# Patient Record
Sex: MALE | Race: WHITE | NOT HISPANIC OR LATINO | Employment: OTHER | URBAN - METROPOLITAN AREA
[De-identification: names, ages, dates, MRNs, and addresses within clinical notes are randomized per-mention and may not be internally consistent; named-entity substitution may affect disease eponyms.]

---

## 2023-01-01 ENCOUNTER — TELEPHONE (OUTPATIENT)
Dept: ADMISSION | Facility: HOSPITAL | Age: 68
End: 2023-01-01
Payer: MEDICARE

## 2023-01-01 ENCOUNTER — PHARMACY VISIT (OUTPATIENT)
Dept: PHARMACY | Facility: CLINIC | Age: 68
End: 2023-01-01
Payer: COMMERCIAL

## 2023-01-01 ENCOUNTER — APPOINTMENT (OUTPATIENT)
Dept: PALLIATIVE MEDICINE | Facility: HOSPITAL | Age: 68
End: 2023-01-01
Payer: MEDICARE

## 2023-01-01 ENCOUNTER — NUTRITION (OUTPATIENT)
Dept: HEMATOLOGY/ONCOLOGY | Facility: HOSPITAL | Age: 68
End: 2023-01-01
Payer: MEDICARE

## 2023-01-01 ENCOUNTER — OFFICE VISIT (OUTPATIENT)
Dept: HEMATOLOGY/ONCOLOGY | Facility: HOSPITAL | Age: 68
DRG: 372 | End: 2023-01-01
Payer: MEDICARE

## 2023-01-01 ENCOUNTER — NURSE TRIAGE (OUTPATIENT)
Dept: ADMISSION | Facility: HOSPITAL | Age: 68
End: 2023-01-01
Payer: MEDICARE

## 2023-01-01 ENCOUNTER — LAB REQUISITION (OUTPATIENT)
Dept: LAB | Facility: HOSPITAL | Age: 68
End: 2023-01-01
Payer: MEDICARE

## 2023-01-01 ENCOUNTER — APPOINTMENT (OUTPATIENT)
Dept: CARDIOLOGY | Facility: HOSPITAL | Age: 68
End: 2023-01-01
Payer: MEDICARE

## 2023-01-01 ENCOUNTER — ANCILLARY PROCEDURE (OUTPATIENT)
Dept: EMERGENCY MEDICINE | Facility: HOSPITAL | Age: 68
DRG: 312 | End: 2023-01-01
Payer: MEDICARE

## 2023-01-01 ENCOUNTER — APPOINTMENT (OUTPATIENT)
Dept: HEMATOLOGY/ONCOLOGY | Facility: HOSPITAL | Age: 68
End: 2023-01-01
Payer: MEDICARE

## 2023-01-01 ENCOUNTER — HOSPITAL ENCOUNTER (INPATIENT)
Facility: HOSPITAL | Age: 68
LOS: 7 days | Discharge: HOME | DRG: 371 | End: 2023-11-21
Attending: HOSPITALIST | Admitting: STUDENT IN AN ORGANIZED HEALTH CARE EDUCATION/TRAINING PROGRAM
Payer: MEDICARE

## 2023-01-01 ENCOUNTER — HOSPITAL ENCOUNTER (INPATIENT)
Facility: HOSPITAL | Age: 68
LOS: 3 days | Discharge: HOME | DRG: 372 | End: 2023-11-27
Attending: STUDENT IN AN ORGANIZED HEALTH CARE EDUCATION/TRAINING PROGRAM | Admitting: INTERNAL MEDICINE
Payer: MEDICARE

## 2023-01-01 ENCOUNTER — APPOINTMENT (OUTPATIENT)
Dept: HEMATOLOGY/ONCOLOGY | Facility: CLINIC | Age: 68
End: 2023-01-01
Payer: MEDICARE

## 2023-01-01 ENCOUNTER — APPOINTMENT (OUTPATIENT)
Dept: DERMATOLOGY | Facility: CLINIC | Age: 68
End: 2023-01-01
Payer: MEDICARE

## 2023-01-01 ENCOUNTER — TELEMEDICINE (OUTPATIENT)
Dept: PALLIATIVE MEDICINE | Facility: HOSPITAL | Age: 68
End: 2023-01-01
Payer: MEDICARE

## 2023-01-01 ENCOUNTER — HOSPITAL ENCOUNTER (OUTPATIENT)
Facility: HOSPITAL | Age: 68
Setting detail: OBSERVATION
Discharge: HOME | DRG: 312 | End: 2023-12-28
Attending: STUDENT IN AN ORGANIZED HEALTH CARE EDUCATION/TRAINING PROGRAM | Admitting: STUDENT IN AN ORGANIZED HEALTH CARE EDUCATION/TRAINING PROGRAM
Payer: MEDICARE

## 2023-01-01 ENCOUNTER — SOCIAL WORK (OUTPATIENT)
Dept: CASE MANAGEMENT | Facility: HOSPITAL | Age: 68
End: 2023-01-01
Payer: MEDICARE

## 2023-01-01 ENCOUNTER — APPOINTMENT (OUTPATIENT)
Dept: HEMATOLOGY/ONCOLOGY | Facility: HOSPITAL | Age: 68
DRG: 371 | End: 2023-01-01
Payer: MEDICARE

## 2023-01-01 ENCOUNTER — APPOINTMENT (OUTPATIENT)
Dept: RADIOLOGY | Facility: HOSPITAL | Age: 68
DRG: 312 | End: 2023-01-01
Payer: MEDICARE

## 2023-01-01 ENCOUNTER — APPOINTMENT (OUTPATIENT)
Dept: RADIOLOGY | Facility: HOSPITAL | Age: 68
DRG: 371 | End: 2023-01-01
Payer: MEDICARE

## 2023-01-01 ENCOUNTER — TELEPHONE (OUTPATIENT)
Dept: HEMATOLOGY/ONCOLOGY | Facility: HOSPITAL | Age: 68
End: 2023-01-01
Payer: MEDICARE

## 2023-01-01 ENCOUNTER — APPOINTMENT (OUTPATIENT)
Dept: PALLIATIVE MEDICINE | Facility: CLINIC | Age: 68
DRG: 371 | End: 2023-01-01
Payer: MEDICARE

## 2023-01-01 ENCOUNTER — OFFICE VISIT (OUTPATIENT)
Dept: HEMATOLOGY/ONCOLOGY | Facility: HOSPITAL | Age: 68
End: 2023-01-01
Payer: MEDICARE

## 2023-01-01 ENCOUNTER — HOSPITAL ENCOUNTER (INPATIENT)
Facility: HOSPITAL | Age: 68
LOS: 7 days | DRG: 312 | End: 2024-01-07
Attending: EMERGENCY MEDICINE | Admitting: INTERNAL MEDICINE
Payer: MEDICARE

## 2023-01-01 ENCOUNTER — HOME HEALTH ADMISSION (OUTPATIENT)
Dept: HOME HEALTH SERVICES | Facility: HOME HEALTH | Age: 68
End: 2023-01-01
Payer: MEDICARE

## 2023-01-01 ENCOUNTER — HOSPITAL ENCOUNTER (INPATIENT)
Age: 68
End: 2023-01-01
Attending: HOSPITALIST | Admitting: HOSPITALIST
Payer: MEDICARE

## 2023-01-01 ENCOUNTER — OFFICE VISIT (OUTPATIENT)
Dept: PALLIATIVE MEDICINE | Facility: HOSPITAL | Age: 68
End: 2023-01-01
Payer: MEDICARE

## 2023-01-01 ENCOUNTER — APPOINTMENT (OUTPATIENT)
Dept: RADIOLOGY | Facility: HOSPITAL | Age: 68
DRG: 372 | End: 2023-01-01
Payer: MEDICARE

## 2023-01-01 ENCOUNTER — NUTRITION (OUTPATIENT)
Dept: HEMATOLOGY/ONCOLOGY | Facility: HOSPITAL | Age: 68
End: 2023-01-01

## 2023-01-01 VITALS
SYSTOLIC BLOOD PRESSURE: 129 MMHG | DIASTOLIC BLOOD PRESSURE: 84 MMHG | TEMPERATURE: 97.3 F | WEIGHT: 117.95 LBS | OXYGEN SATURATION: 99 % | HEART RATE: 78 BPM | BODY MASS INDEX: 19.49 KG/M2 | RESPIRATION RATE: 18 BRPM

## 2023-01-01 VITALS
TEMPERATURE: 99.3 F | SYSTOLIC BLOOD PRESSURE: 124 MMHG | HEART RATE: 84 BPM | RESPIRATION RATE: 18 BRPM | DIASTOLIC BLOOD PRESSURE: 72 MMHG | HEIGHT: 67 IN | WEIGHT: 128.09 LBS | BODY MASS INDEX: 20.1 KG/M2 | OXYGEN SATURATION: 92 %

## 2023-01-01 VITALS
RESPIRATION RATE: 20 BRPM | WEIGHT: 130.95 LBS | HEART RATE: 86 BPM | SYSTOLIC BLOOD PRESSURE: 125 MMHG | OXYGEN SATURATION: 98 % | DIASTOLIC BLOOD PRESSURE: 75 MMHG | BODY MASS INDEX: 21.82 KG/M2 | HEIGHT: 65 IN | TEMPERATURE: 98.1 F

## 2023-01-01 VITALS
WEIGHT: 124.6 LBS | RESPIRATION RATE: 16 BRPM | BODY MASS INDEX: 20.76 KG/M2 | DIASTOLIC BLOOD PRESSURE: 69 MMHG | HEIGHT: 65 IN | OXYGEN SATURATION: 95 % | HEART RATE: 79 BPM | SYSTOLIC BLOOD PRESSURE: 112 MMHG | TEMPERATURE: 96.8 F

## 2023-01-01 VITALS
TEMPERATURE: 97.2 F | HEART RATE: 90 BPM | DIASTOLIC BLOOD PRESSURE: 71 MMHG | OXYGEN SATURATION: 98 % | WEIGHT: 127.43 LBS | BODY MASS INDEX: 21.05 KG/M2 | RESPIRATION RATE: 18 BRPM | SYSTOLIC BLOOD PRESSURE: 102 MMHG

## 2023-01-01 VITALS
BODY MASS INDEX: 21.23 KG/M2 | RESPIRATION RATE: 16 BRPM | OXYGEN SATURATION: 98 % | HEIGHT: 65 IN | SYSTOLIC BLOOD PRESSURE: 122 MMHG | DIASTOLIC BLOOD PRESSURE: 72 MMHG | HEART RATE: 100 BPM | TEMPERATURE: 97.3 F | WEIGHT: 127.43 LBS

## 2023-01-01 VITALS
SYSTOLIC BLOOD PRESSURE: 99 MMHG | RESPIRATION RATE: 16 BRPM | DIASTOLIC BLOOD PRESSURE: 55 MMHG | OXYGEN SATURATION: 92 % | TEMPERATURE: 97.3 F | HEART RATE: 95 BPM | HEIGHT: 66 IN | WEIGHT: 126.2 LBS | BODY MASS INDEX: 20.28 KG/M2

## 2023-01-01 DIAGNOSIS — R19.7 DIARRHEA OF PRESUMED INFECTIOUS ORIGIN: ICD-10-CM

## 2023-01-01 DIAGNOSIS — G62.0 CHRONIC PAINFUL POLYNEUROPATHY AFTER CHEMOTHERAPY (MULTI): ICD-10-CM

## 2023-01-01 DIAGNOSIS — C18.1 ADENOCARCINOMA OF APPENDIX (MULTI): ICD-10-CM

## 2023-01-01 DIAGNOSIS — G89.3 NEOPLASM RELATED PAIN: ICD-10-CM

## 2023-01-01 DIAGNOSIS — E86.0 DEHYDRATION: Primary | ICD-10-CM

## 2023-01-01 DIAGNOSIS — C18.1 PRIMARY MALIGNANT NEOPLASM OF APPENDIX (MULTI): ICD-10-CM

## 2023-01-01 DIAGNOSIS — C48.2 MALIGNANT NEOPLASM OF PERITONEUM, UNSPECIFIED (MULTI): ICD-10-CM

## 2023-01-01 DIAGNOSIS — Z98.890 STATUS POST REVERSAL OF ILEOSTOMY: ICD-10-CM

## 2023-01-01 DIAGNOSIS — F41.9 ANXIETY: ICD-10-CM

## 2023-01-01 DIAGNOSIS — R19.7 DIARRHEA, UNSPECIFIED TYPE: ICD-10-CM

## 2023-01-01 DIAGNOSIS — Z85.038 HISTORY OF MALIGNANT NEOPLASM OF COLON: Primary | ICD-10-CM

## 2023-01-01 DIAGNOSIS — C18.1 CANCER OF APPENDIX (MULTI): Primary | ICD-10-CM

## 2023-01-01 DIAGNOSIS — A09 DIARRHEA OF INFECTIOUS ORIGIN: ICD-10-CM

## 2023-01-01 DIAGNOSIS — R19.7 DIARRHEA: Primary | ICD-10-CM

## 2023-01-01 DIAGNOSIS — A04.72 COLITIS DUE TO CLOSTRIDIOIDES DIFFICILE: Primary | ICD-10-CM

## 2023-01-01 DIAGNOSIS — C80.1 MALIGNANT (PRIMARY) NEOPLASM, UNSPECIFIED (MULTI): ICD-10-CM

## 2023-01-01 DIAGNOSIS — E86.0 DEHYDRATION: ICD-10-CM

## 2023-01-01 DIAGNOSIS — A04.72 C. DIFFICILE DIARRHEA: ICD-10-CM

## 2023-01-01 DIAGNOSIS — G89.3 CANCER RELATED PAIN: Primary | ICD-10-CM

## 2023-01-01 DIAGNOSIS — R09.02 HYPOXEMIA: ICD-10-CM

## 2023-01-01 DIAGNOSIS — T45.1X5A CHRONIC PAINFUL POLYNEUROPATHY AFTER CHEMOTHERAPY (MULTI): ICD-10-CM

## 2023-01-01 DIAGNOSIS — E78.5 HYPERLIPIDEMIA, ACQUIRED: ICD-10-CM

## 2023-01-01 DIAGNOSIS — C78.6 SECONDARY MALIGNANT NEOPLASM OF RETROPERITONEUM AND PERITONEUM (MULTI): ICD-10-CM

## 2023-01-01 DIAGNOSIS — C18.1 PRIMARY MALIGNANT NEOPLASM OF APPENDIX (MULTI): Primary | ICD-10-CM

## 2023-01-01 DIAGNOSIS — E03.9 HYPOTHYROIDISM, UNSPECIFIED TYPE: ICD-10-CM

## 2023-01-01 DIAGNOSIS — G62.0 CHEMOTHERAPY-INDUCED NEUROPATHY (MULTI): Primary | ICD-10-CM

## 2023-01-01 DIAGNOSIS — I50.20 HFREF (HEART FAILURE WITH REDUCED EJECTION FRACTION) (MULTI): ICD-10-CM

## 2023-01-01 DIAGNOSIS — J18.9 PNEUMONIA OF RIGHT UPPER LOBE DUE TO INFECTIOUS ORGANISM: ICD-10-CM

## 2023-01-01 DIAGNOSIS — R55 SYNCOPE AND COLLAPSE: Primary | ICD-10-CM

## 2023-01-01 DIAGNOSIS — T45.1X5A CHEMOTHERAPY-INDUCED NEUROPATHY (MULTI): Primary | ICD-10-CM

## 2023-01-01 DIAGNOSIS — I81 PORTAL VEIN THROMBOSIS: ICD-10-CM

## 2023-01-01 DIAGNOSIS — R55 SYNCOPE, UNSPECIFIED SYNCOPE TYPE: ICD-10-CM

## 2023-01-01 DIAGNOSIS — E83.42 HYPOMAGNESEMIA: Primary | ICD-10-CM

## 2023-01-01 DIAGNOSIS — A04.72 C. DIFFICILE COLITIS: ICD-10-CM

## 2023-01-01 DIAGNOSIS — C78.6 PERITONEAL CARCINOMATOSIS (MULTI): ICD-10-CM

## 2023-01-01 DIAGNOSIS — Z85.038 HISTORY OF MALIGNANT NEOPLASM OF COLON: ICD-10-CM

## 2023-01-01 DIAGNOSIS — I50.22 CHRONIC SYSTOLIC CONGESTIVE HEART FAILURE (MULTI): ICD-10-CM

## 2023-01-01 DIAGNOSIS — R11.14 BILIOUS VOMITING WITH NAUSEA: ICD-10-CM

## 2023-01-01 LAB
ABO GROUP (TYPE) IN BLOOD: NORMAL
ACANTHOCYTES BLD QL SMEAR: ABNORMAL
ACANTHOCYTES BLD QL SMEAR: ABNORMAL
ACANTHOCYTES BLD QL SMEAR: NORMAL
ACTIVATED PARTIAL THROMBOPLASTIN TIME IN PPP BY COAGULATION ASSAY: 35 SEC (ref 27–38)
ALANINE AMINOTRANSFERASE (SGPT) (U/L) IN SER/PLAS: 16 U/L (ref 10–52)
ALBUMIN (G/DL) IN SER/PLAS: 2.8 G/DL (ref 3.4–5)
ALBUMIN SERPL BCP-MCNC: 2.3 G/DL (ref 3.4–5)
ALBUMIN SERPL BCP-MCNC: 2.3 G/DL (ref 3.4–5)
ALBUMIN SERPL BCP-MCNC: 2.5 G/DL (ref 3.4–5)
ALBUMIN SERPL BCP-MCNC: 2.6 G/DL (ref 3.4–5)
ALBUMIN SERPL BCP-MCNC: 2.6 G/DL (ref 3.4–5)
ALBUMIN SERPL BCP-MCNC: 2.7 G/DL (ref 3.4–5)
ALBUMIN SERPL BCP-MCNC: 2.9 G/DL (ref 3.4–5)
ALBUMIN SERPL BCP-MCNC: 3 G/DL (ref 3.4–5)
ALBUMIN SERPL BCP-MCNC: 3.1 G/DL (ref 3.4–5)
ALBUMIN SERPL BCP-MCNC: 3.1 G/DL (ref 3.4–5)
ALBUMIN SERPL BCP-MCNC: 3.2 G/DL (ref 3.4–5)
ALBUMIN SERPL BCP-MCNC: 3.2 G/DL (ref 3.4–5)
ALBUMIN SERPL BCP-MCNC: 3.3 G/DL (ref 3.4–5)
ALKALINE PHOSPHATASE (U/L) IN SER/PLAS: 279 U/L (ref 33–136)
ALP SERPL-CCNC: 226 U/L (ref 33–136)
ALP SERPL-CCNC: 257 U/L (ref 33–136)
ALP SERPL-CCNC: 258 U/L (ref 33–136)
ALP SERPL-CCNC: 275 U/L (ref 33–136)
ALP SERPL-CCNC: 283 U/L (ref 33–136)
ALP SERPL-CCNC: 289 U/L (ref 33–136)
ALP SERPL-CCNC: 291 U/L (ref 33–136)
ALP SERPL-CCNC: 304 U/L (ref 33–136)
ALP SERPL-CCNC: 338 U/L (ref 33–136)
ALP SERPL-CCNC: 338 U/L (ref 33–136)
ALT SERPL W P-5'-P-CCNC: 10 U/L (ref 10–52)
ALT SERPL W P-5'-P-CCNC: 11 U/L (ref 10–52)
ALT SERPL W P-5'-P-CCNC: 12 U/L (ref 10–52)
ALT SERPL W P-5'-P-CCNC: 13 U/L (ref 10–52)
ALT SERPL W P-5'-P-CCNC: 15 U/L (ref 10–52)
ALT SERPL W P-5'-P-CCNC: 15 U/L (ref 10–52)
ALT SERPL W P-5'-P-CCNC: 22 U/L (ref 10–52)
ALT SERPL W P-5'-P-CCNC: 22 U/L (ref 10–52)
ALT SERPL W P-5'-P-CCNC: 6 U/L (ref 10–52)
ALT SERPL W P-5'-P-CCNC: 7 U/L (ref 10–52)
ALT SERPL W P-5'-P-CCNC: 9 U/L (ref 10–52)
ANION GAP BLDV CALCULATED.4IONS-SCNC: 10 MMOL/L (ref 10–25)
ANION GAP BLDV CALCULATED.4IONS-SCNC: 5 MMOL/L (ref 10–25)
ANION GAP BLDV CALCULATED.4IONS-SCNC: ABNORMAL MMOL/L
ANION GAP IN SER/PLAS: 11 MMOL/L (ref 10–20)
ANION GAP SERPL CALC-SCNC: 10 MMOL/L (ref 10–20)
ANION GAP SERPL CALC-SCNC: 10 MMOL/L (ref 10–20)
ANION GAP SERPL CALC-SCNC: 11 MMOL/L (ref 10–20)
ANION GAP SERPL CALC-SCNC: 12 MMOL/L (ref 10–20)
ANION GAP SERPL CALC-SCNC: 13 MMOL/L (ref 10–20)
ANION GAP SERPL CALC-SCNC: 14 MMOL/L (ref 10–20)
ANION GAP SERPL CALC-SCNC: 15 MMOL/L (ref 10–20)
ANION GAP SERPL CALC-SCNC: 16 MMOL/L (ref 10–20)
ANTIBODY SCREEN: NORMAL
APPEARANCE UR: CLEAR
ASPARTATE AMINOTRANSFERASE (SGOT) (U/L) IN SER/PLAS: 17 U/L (ref 9–39)
AST SERPL W P-5'-P-CCNC: 12 U/L (ref 9–39)
AST SERPL W P-5'-P-CCNC: 16 U/L (ref 9–39)
AST SERPL W P-5'-P-CCNC: 20 U/L (ref 9–39)
AST SERPL W P-5'-P-CCNC: 20 U/L (ref 9–39)
AST SERPL W P-5'-P-CCNC: 21 U/L (ref 9–39)
AST SERPL W P-5'-P-CCNC: 22 U/L (ref 9–39)
AST SERPL W P-5'-P-CCNC: 23 U/L (ref 9–39)
AST SERPL W P-5'-P-CCNC: 27 U/L (ref 9–39)
ATRIAL RATE: 119 BPM
ATRIAL RATE: 83 BPM
ATRIAL RATE: 84 BPM
BACTERIA BLD AEROBE CULT: ABNORMAL
BACTERIA BLD CULT: ABNORMAL
BACTERIA BLD CULT: NORMAL
BAND NEUTROPHILS (10*3/UL) BLOOD MANUAL COUNT - WAM: 0.2 X10E9/L (ref 0–0.7)
BASE EXCESS BLDV CALC-SCNC: 0.2 MMOL/L (ref -2–3)
BASE EXCESS BLDV CALC-SCNC: 1.8 MMOL/L (ref -2–3)
BASE EXCESS BLDV CALC-SCNC: 3.5 MMOL/L (ref -2–3)
BASOPHILS # BLD AUTO: 0.02 X10*3/UL (ref 0–0.1)
BASOPHILS # BLD AUTO: 0.03 X10*3/UL (ref 0–0.1)
BASOPHILS # BLD AUTO: 0.04 X10*3/UL (ref 0–0.1)
BASOPHILS # BLD AUTO: 0.04 X10*3/UL (ref 0–0.1)
BASOPHILS # BLD AUTO: 0.05 X10*3/UL (ref 0–0.1)
BASOPHILS # BLD AUTO: 0.06 X10*3/UL (ref 0–0.1)
BASOPHILS # BLD AUTO: NORMAL 10*3/UL
BASOPHILS # BLD MANUAL: 0 X10*3/UL (ref 0–0.1)
BASOPHILS # BLD MANUAL: 0.16 X10*3/UL (ref 0–0.1)
BASOPHILS # BLD MANUAL: NORMAL 10*3/UL
BASOPHILS (10*3/UL) IN BLOOD BY MANUAL COUNT - WAM: 0.39 X10E9/L (ref 0–0.1)
BASOPHILS NFR BLD AUTO: 0.1 %
BASOPHILS NFR BLD AUTO: 0.2 %
BASOPHILS NFR BLD AUTO: 0.3 %
BASOPHILS NFR BLD AUTO: 0.4 %
BASOPHILS NFR BLD AUTO: 0.5 %
BASOPHILS NFR BLD AUTO: NORMAL %
BASOPHILS NFR BLD MANUAL: 0 %
BASOPHILS NFR BLD MANUAL: 1 %
BASOPHILS NFR BLD MANUAL: NORMAL %
BASOPHILS/100 LEUKOCYTES IN BLOOD BY MANUAL COUNT - WAM: 2 % (ref 0–2)
BILIRUB DIRECT SERPL-MCNC: 0.1 MG/DL (ref 0–0.3)
BILIRUB SERPL-MCNC: 0.3 MG/DL (ref 0–1.2)
BILIRUB SERPL-MCNC: 0.4 MG/DL (ref 0–1.2)
BILIRUB SERPL-MCNC: 0.5 MG/DL (ref 0–1.2)
BILIRUB SERPL-MCNC: 0.5 MG/DL (ref 0–1.2)
BILIRUB SERPL-MCNC: 0.6 MG/DL (ref 0–1.2)
BILIRUB SERPL-MCNC: 0.6 MG/DL (ref 0–1.2)
BILIRUB SERPL-MCNC: 0.9 MG/DL (ref 0–1.2)
BILIRUB UR STRIP.AUTO-MCNC: NEGATIVE MG/DL
BILIRUBIN DIRECT (MG/DL) IN SER/PLAS: 0.2 MG/DL (ref 0–0.3)
BILIRUBIN TOTAL (MG/DL) IN SER/PLAS: 0.4 MG/DL (ref 0–1.2)
BLOOD EXPIRATION DATE: NORMAL
BODY TEMPERATURE: 37 DEGREES CELSIUS
BUN SERPL-MCNC: 10 MG/DL (ref 6–23)
BUN SERPL-MCNC: 10 MG/DL (ref 6–23)
BUN SERPL-MCNC: 11 MG/DL (ref 6–23)
BUN SERPL-MCNC: 13 MG/DL (ref 6–23)
BUN SERPL-MCNC: 14 MG/DL (ref 6–23)
BUN SERPL-MCNC: 15 MG/DL (ref 6–23)
BUN SERPL-MCNC: 20 MG/DL (ref 6–23)
BUN SERPL-MCNC: 20 MG/DL (ref 6–23)
BUN SERPL-MCNC: 29 MG/DL (ref 6–23)
BUN SERPL-MCNC: 6 MG/DL (ref 6–23)
BUN SERPL-MCNC: 7 MG/DL (ref 6–23)
BUN SERPL-MCNC: 7 MG/DL (ref 6–23)
BUN SERPL-MCNC: 8 MG/DL (ref 6–23)
BUN SERPL-MCNC: 9 MG/DL (ref 6–23)
BURR CELLS BLD QL SMEAR: ABNORMAL
BURR CELLS BLD QL SMEAR: NORMAL
C COLI+JEJ+UPSA DNA STL QL NAA+PROBE: NOT DETECTED
C DIF TOX TCDA+TCDB STL QL NAA+PROBE: DETECTED
C DIF TOX TCDA+TCDB STL QL NAA+PROBE: NOT DETECTED
C DIFF TOX A+B STL QL IA: POSITIVE
CA-I BLD-SCNC: 1.13 MMOL/L (ref 1.1–1.33)
CA-I BLD-SCNC: 1.13 MMOL/L (ref 1.1–1.33)
CA-I BLDV-SCNC: 1.11 MMOL/L (ref 1.1–1.33)
CA-I BLDV-SCNC: 1.13 MMOL/L (ref 1.1–1.33)
CA-I BLDV-SCNC: 1.2 MMOL/L (ref 1.1–1.33)
CALCIUM (MG/DL) IN SER/PLAS: 8.8 MG/DL (ref 8.6–10.3)
CALCIUM SERPL-MCNC: 7.2 MG/DL (ref 8.6–10.3)
CALCIUM SERPL-MCNC: 7.3 MG/DL (ref 8.6–10.6)
CALCIUM SERPL-MCNC: 7.4 MG/DL (ref 8.6–10.6)
CALCIUM SERPL-MCNC: 7.6 MG/DL (ref 8.6–10.6)
CALCIUM SERPL-MCNC: 7.7 MG/DL (ref 8.6–10.6)
CALCIUM SERPL-MCNC: 7.7 MG/DL (ref 8.6–10.6)
CALCIUM SERPL-MCNC: 7.9 MG/DL (ref 8.6–10.6)
CALCIUM SERPL-MCNC: 8.2 MG/DL (ref 8.6–10.3)
CALCIUM SERPL-MCNC: 8.2 MG/DL (ref 8.6–10.3)
CALCIUM SERPL-MCNC: 8.2 MG/DL (ref 8.6–10.6)
CALCIUM SERPL-MCNC: 8.2 MG/DL (ref 8.6–10.6)
CALCIUM SERPL-MCNC: 8.3 MG/DL (ref 8.6–10.3)
CALCIUM SERPL-MCNC: 8.3 MG/DL (ref 8.6–10.3)
CALCIUM SERPL-MCNC: 8.3 MG/DL (ref 8.6–10.6)
CALCIUM SERPL-MCNC: 8.3 MG/DL (ref 8.6–10.6)
CALCIUM SERPL-MCNC: 8.4 MG/DL (ref 8.6–10.6)
CALCIUM SERPL-MCNC: 8.5 MG/DL (ref 8.6–10.6)
CALCIUM SERPL-MCNC: 8.5 MG/DL (ref 8.6–10.6)
CALCIUM SERPL-MCNC: 8.6 MG/DL (ref 8.6–10.3)
CALCIUM SERPL-MCNC: 8.6 MG/DL (ref 8.6–10.6)
CALCIUM SERPL-MCNC: 8.7 MG/DL (ref 8.6–10.6)
CALCIUM SERPL-MCNC: 8.8 MG/DL (ref 8.6–10.3)
CALCIUM SERPL-MCNC: 9 MG/DL (ref 8.6–10.3)
CARBON DIOXIDE, TOTAL (MMOL/L) IN SER/PLAS: 27 MMOL/L (ref 21–32)
CARDIAC TROPONIN I PNL SERPL HS: 4 NG/L (ref 0–53)
CARDIAC TROPONIN I PNL SERPL HS: 5 NG/L (ref 0–53)
CHLORIDE (MMOL/L) IN SER/PLAS: 102 MMOL/L (ref 98–107)
CHLORIDE BLDV-SCNC: 102 MMOL/L (ref 98–107)
CHLORIDE BLDV-SCNC: 106 MMOL/L (ref 98–107)
CHLORIDE BLDV-SCNC: ABNORMAL MMOL/L
CHLORIDE SERPL-SCNC: 100 MMOL/L (ref 98–107)
CHLORIDE SERPL-SCNC: 100 MMOL/L (ref 98–107)
CHLORIDE SERPL-SCNC: 102 MMOL/L (ref 98–107)
CHLORIDE SERPL-SCNC: 102 MMOL/L (ref 98–107)
CHLORIDE SERPL-SCNC: 103 MMOL/L (ref 98–107)
CHLORIDE SERPL-SCNC: 104 MMOL/L (ref 98–107)
CHLORIDE SERPL-SCNC: 105 MMOL/L (ref 98–107)
CHLORIDE SERPL-SCNC: 105 MMOL/L (ref 98–107)
CHLORIDE SERPL-SCNC: 106 MMOL/L (ref 98–107)
CHLORIDE SERPL-SCNC: 107 MMOL/L (ref 98–107)
CHLORIDE SERPL-SCNC: 97 MMOL/L (ref 98–107)
CHLORIDE SERPL-SCNC: 98 MMOL/L (ref 98–107)
CHLORIDE SERPL-SCNC: 99 MMOL/L (ref 98–107)
CHLORIDE UR-SCNC: 80 MMOL/L
CHLORIDE/CREATININE (MMOL/G) IN URINE: 315 MMOL/G CREAT (ref 23–275)
CO2 SERPL-SCNC: 22 MMOL/L (ref 21–32)
CO2 SERPL-SCNC: 22 MMOL/L (ref 21–32)
CO2 SERPL-SCNC: 24 MMOL/L (ref 21–32)
CO2 SERPL-SCNC: 24 MMOL/L (ref 21–32)
CO2 SERPL-SCNC: 25 MMOL/L (ref 21–32)
CO2 SERPL-SCNC: 26 MMOL/L (ref 21–32)
CO2 SERPL-SCNC: 27 MMOL/L (ref 21–32)
CO2 SERPL-SCNC: 28 MMOL/L (ref 21–32)
CO2 SERPL-SCNC: 30 MMOL/L (ref 21–32)
COBALAMIN (VITAMIN B12) (PG/ML) IN SER/PLAS: 1095 PG/ML (ref 211–911)
COLOR UR: ABNORMAL
COLOR UR: NORMAL
COLOR UR: NORMAL
COLOR UR: YELLOW
CREAT SERPL-MCNC: 0.67 MG/DL (ref 0.5–1.3)
CREAT SERPL-MCNC: 0.69 MG/DL (ref 0.5–1.3)
CREAT SERPL-MCNC: 0.69 MG/DL (ref 0.5–1.3)
CREAT SERPL-MCNC: 0.8 MG/DL (ref 0.5–1.3)
CREAT SERPL-MCNC: 0.81 MG/DL (ref 0.5–1.3)
CREAT SERPL-MCNC: 0.82 MG/DL (ref 0.5–1.3)
CREAT SERPL-MCNC: 0.82 MG/DL (ref 0.5–1.3)
CREAT SERPL-MCNC: 0.84 MG/DL (ref 0.5–1.3)
CREAT SERPL-MCNC: 0.85 MG/DL (ref 0.5–1.3)
CREAT SERPL-MCNC: 0.86 MG/DL (ref 0.5–1.3)
CREAT SERPL-MCNC: 0.87 MG/DL (ref 0.5–1.3)
CREAT SERPL-MCNC: 0.91 MG/DL (ref 0.5–1.3)
CREAT SERPL-MCNC: 0.93 MG/DL (ref 0.5–1.3)
CREAT SERPL-MCNC: 0.93 MG/DL (ref 0.5–1.3)
CREAT SERPL-MCNC: 0.96 MG/DL (ref 0.5–1.3)
CREAT SERPL-MCNC: 0.98 MG/DL (ref 0.5–1.3)
CREAT SERPL-MCNC: 0.99 MG/DL (ref 0.5–1.3)
CREAT SERPL-MCNC: 1.03 MG/DL (ref 0.5–1.3)
CREAT SERPL-MCNC: 1.16 MG/DL (ref 0.5–1.3)
CREAT SERPL-MCNC: 1.59 MG/DL (ref 0.5–1.3)
CREAT SERPL-MCNC: 1.76 MG/DL (ref 0.5–1.3)
CREAT SERPL-MCNC: 1.89 MG/DL (ref 0.5–1.3)
CREAT SERPL-MCNC: 2 MG/DL (ref 0.5–1.3)
CREAT UR-MCNC: 25.4 MG/DL (ref 20–370)
CREATININE (MG/DL) IN SER/PLAS: 0.61 MG/DL (ref 0.5–1.3)
DISPENSE STATUS: NORMAL
EC STX1 GENE STL QL NAA+PROBE: NOT DETECTED
EC STX2 GENE STL QL NAA+PROBE: NOT DETECTED
EOSINOPHIL # BLD AUTO: 0.03 X10*3/UL (ref 0–0.7)
EOSINOPHIL # BLD AUTO: 0.05 X10*3/UL (ref 0–0.7)
EOSINOPHIL # BLD AUTO: 0.07 X10*3/UL (ref 0–0.7)
EOSINOPHIL # BLD AUTO: 0.09 X10*3/UL (ref 0–0.7)
EOSINOPHIL # BLD AUTO: 0.1 X10*3/UL (ref 0–0.7)
EOSINOPHIL # BLD AUTO: 0.15 X10*3/UL (ref 0–0.7)
EOSINOPHIL # BLD AUTO: 0.18 X10*3/UL (ref 0–0.7)
EOSINOPHIL # BLD AUTO: 0.26 X10*3/UL (ref 0–0.7)
EOSINOPHIL # BLD AUTO: NORMAL 10*3/UL
EOSINOPHIL # BLD MANUAL: 0.12 X10*3/UL (ref 0–0.7)
EOSINOPHIL # BLD MANUAL: 0.13 X10*3/UL (ref 0–0.7)
EOSINOPHIL # BLD MANUAL: 0.14 X10*3/UL (ref 0–0.7)
EOSINOPHIL # BLD MANUAL: 0.16 X10*3/UL (ref 0–0.7)
EOSINOPHIL # BLD MANUAL: NORMAL 10*3/UL
EOSINOPHIL NFR BLD AUTO: 0.1 %
EOSINOPHIL NFR BLD AUTO: 0.3 %
EOSINOPHIL NFR BLD AUTO: 0.4 %
EOSINOPHIL NFR BLD AUTO: 0.7 %
EOSINOPHIL NFR BLD AUTO: 0.8 %
EOSINOPHIL NFR BLD AUTO: 1.1 %
EOSINOPHIL NFR BLD AUTO: 1.5 %
EOSINOPHIL NFR BLD AUTO: 2.3 %
EOSINOPHIL NFR BLD AUTO: NORMAL %
EOSINOPHIL NFR BLD MANUAL: 0.9 %
EOSINOPHIL NFR BLD MANUAL: 0.9 %
EOSINOPHIL NFR BLD MANUAL: 1 %
EOSINOPHIL NFR BLD MANUAL: 1 %
EOSINOPHIL NFR BLD MANUAL: NORMAL %
EOSINOPHILS (10*3/UL) IN BLOOD BY MANUAL COUNT - WAM: 0.59 X10E9/L (ref 0–0.7)
EOSINOPHILS/100 LEUKOCYTES IN BLOOD BY MANUAL COUNT - WAM: 3 % (ref 0–6)
ERYTHROCYTE DISTRIBUTION WIDTH (RATIO) BY AUTOMATED COUNT: 21 % (ref 11.5–14.5)
ERYTHROCYTE MEAN CORPUSCULAR HEMOGLOBIN CONCENTRATION (G/DL) BY AUTOMATED: 30.4 G/DL (ref 32–36)
ERYTHROCYTE MEAN CORPUSCULAR VOLUME (FL) BY AUTOMATED COUNT: 92 FL (ref 80–100)
ERYTHROCYTE [DISTWIDTH] IN BLOOD BY AUTOMATED COUNT: 17.7 % (ref 11.5–14.5)
ERYTHROCYTE [DISTWIDTH] IN BLOOD BY AUTOMATED COUNT: 18.6 % (ref 11.5–14.5)
ERYTHROCYTE [DISTWIDTH] IN BLOOD BY AUTOMATED COUNT: 18.7 % (ref 11.5–14.5)
ERYTHROCYTE [DISTWIDTH] IN BLOOD BY AUTOMATED COUNT: 19.9 % (ref 11.5–14.5)
ERYTHROCYTE [DISTWIDTH] IN BLOOD BY AUTOMATED COUNT: 19.9 % (ref 11.5–14.5)
ERYTHROCYTE [DISTWIDTH] IN BLOOD BY AUTOMATED COUNT: 20.3 % (ref 11.5–14.5)
ERYTHROCYTE [DISTWIDTH] IN BLOOD BY AUTOMATED COUNT: 20.7 % (ref 11.5–14.5)
ERYTHROCYTE [DISTWIDTH] IN BLOOD BY AUTOMATED COUNT: 21.2 % (ref 11.5–14.5)
ERYTHROCYTE [DISTWIDTH] IN BLOOD BY AUTOMATED COUNT: 21.2 % (ref 11.5–14.5)
ERYTHROCYTE [DISTWIDTH] IN BLOOD BY AUTOMATED COUNT: 21.5 % (ref 11.5–14.5)
ERYTHROCYTE [DISTWIDTH] IN BLOOD BY AUTOMATED COUNT: 21.6 % (ref 11.5–14.5)
ERYTHROCYTE [DISTWIDTH] IN BLOOD BY AUTOMATED COUNT: 22.2 % (ref 11.5–14.5)
ERYTHROCYTE [DISTWIDTH] IN BLOOD BY AUTOMATED COUNT: 22.5 % (ref 11.5–14.5)
ERYTHROCYTE [DISTWIDTH] IN BLOOD BY AUTOMATED COUNT: 22.5 % (ref 11.5–14.5)
ERYTHROCYTE [DISTWIDTH] IN BLOOD BY AUTOMATED COUNT: 23 % (ref 11.5–14.5)
ERYTHROCYTE [DISTWIDTH] IN BLOOD BY AUTOMATED COUNT: 23 % (ref 11.5–14.5)
ERYTHROCYTE [DISTWIDTH] IN BLOOD BY AUTOMATED COUNT: 23.2 % (ref 11.5–14.5)
ERYTHROCYTE [DISTWIDTH] IN BLOOD BY AUTOMATED COUNT: NORMAL %
ERYTHROCYTES (10*6/UL) IN BLOOD BY AUTOMATED COUNT: 3.26 X10E12/L (ref 4.5–5.9)
FLUAV RNA RESP QL NAA+PROBE: NOT DETECTED
FLUBV RNA RESP QL NAA+PROBE: NOT DETECTED
GFR MALE: >90 ML/MIN/1.73M2
GFR SERPL CREATININE-BSD FRML MDRD: 36 ML/MIN/1.73M*2
GFR SERPL CREATININE-BSD FRML MDRD: 38 ML/MIN/1.73M*2
GFR SERPL CREATININE-BSD FRML MDRD: 42 ML/MIN/1.73M*2
GFR SERPL CREATININE-BSD FRML MDRD: 47 ML/MIN/1.73M*2
GFR SERPL CREATININE-BSD FRML MDRD: 69 ML/MIN/1.73M*2
GFR SERPL CREATININE-BSD FRML MDRD: 80 ML/MIN/1.73M*2
GFR SERPL CREATININE-BSD FRML MDRD: 83 ML/MIN/1.73M*2
GFR SERPL CREATININE-BSD FRML MDRD: 85 ML/MIN/1.73M*2
GFR SERPL CREATININE-BSD FRML MDRD: 87 ML/MIN/1.73M*2
GFR SERPL CREATININE-BSD FRML MDRD: 90 ML/MIN/1.73M*2
GFR SERPL CREATININE-BSD FRML MDRD: 90 ML/MIN/1.73M*2
GFR SERPL CREATININE-BSD FRML MDRD: >90 ML/MIN/1.73M*2
GGT SERPL-CCNC: 108 U/L (ref 5–64)
GGT SERPL-CCNC: 91 U/L (ref 5–64)
GGT SERPL-CCNC: 95 U/L (ref 5–64)
GLUCOSE (MG/DL) IN SER/PLAS: 69 MG/DL (ref 74–99)
GLUCOSE BLDV-MCNC: 103 MG/DL (ref 74–99)
GLUCOSE BLDV-MCNC: 131 MG/DL (ref 74–99)
GLUCOSE BLDV-MCNC: 84 MG/DL (ref 74–99)
GLUCOSE SERPL-MCNC: 100 MG/DL (ref 74–99)
GLUCOSE SERPL-MCNC: 100 MG/DL (ref 74–99)
GLUCOSE SERPL-MCNC: 105 MG/DL (ref 74–99)
GLUCOSE SERPL-MCNC: 117 MG/DL (ref 74–99)
GLUCOSE SERPL-MCNC: 126 MG/DL (ref 74–99)
GLUCOSE SERPL-MCNC: 63 MG/DL (ref 74–99)
GLUCOSE SERPL-MCNC: 67 MG/DL (ref 74–99)
GLUCOSE SERPL-MCNC: 68 MG/DL (ref 74–99)
GLUCOSE SERPL-MCNC: 69 MG/DL (ref 74–99)
GLUCOSE SERPL-MCNC: 73 MG/DL (ref 74–99)
GLUCOSE SERPL-MCNC: 74 MG/DL (ref 74–99)
GLUCOSE SERPL-MCNC: 75 MG/DL (ref 74–99)
GLUCOSE SERPL-MCNC: 76 MG/DL (ref 74–99)
GLUCOSE SERPL-MCNC: 79 MG/DL (ref 74–99)
GLUCOSE SERPL-MCNC: 79 MG/DL (ref 74–99)
GLUCOSE SERPL-MCNC: 81 MG/DL (ref 74–99)
GLUCOSE SERPL-MCNC: 81 MG/DL (ref 74–99)
GLUCOSE SERPL-MCNC: 84 MG/DL (ref 74–99)
GLUCOSE SERPL-MCNC: 89 MG/DL (ref 74–99)
GLUCOSE SERPL-MCNC: 90 MG/DL (ref 74–99)
GLUCOSE SERPL-MCNC: 91 MG/DL (ref 74–99)
GLUCOSE SERPL-MCNC: 91 MG/DL (ref 74–99)
GLUCOSE SERPL-MCNC: 92 MG/DL (ref 74–99)
GLUCOSE UR STRIP.AUTO-MCNC: NEGATIVE MG/DL
GRAM STN SPEC: ABNORMAL
HCO3 BLDV-SCNC: 25.4 MMOL/L (ref 22–26)
HCO3 BLDV-SCNC: 26.6 MMOL/L (ref 22–26)
HCO3 BLDV-SCNC: 27.8 MMOL/L (ref 22–26)
HCT VFR BLD AUTO: 19.7 % (ref 41–52)
HCT VFR BLD AUTO: 20.6 % (ref 41–52)
HCT VFR BLD AUTO: 22 % (ref 41–52)
HCT VFR BLD AUTO: 23 % (ref 41–52)
HCT VFR BLD AUTO: 25.2 % (ref 41–52)
HCT VFR BLD AUTO: 25.6 % (ref 41–52)
HCT VFR BLD AUTO: 25.7 % (ref 41–52)
HCT VFR BLD AUTO: 25.8 % (ref 41–52)
HCT VFR BLD AUTO: 26.1 % (ref 41–52)
HCT VFR BLD AUTO: 26.7 % (ref 41–52)
HCT VFR BLD AUTO: 27 % (ref 41–52)
HCT VFR BLD AUTO: 27 % (ref 41–52)
HCT VFR BLD AUTO: 28 % (ref 41–52)
HCT VFR BLD AUTO: 28.4 % (ref 41–52)
HCT VFR BLD AUTO: 28.4 % (ref 41–52)
HCT VFR BLD AUTO: 28.8 % (ref 41–52)
HCT VFR BLD AUTO: 29.1 % (ref 41–52)
HCT VFR BLD AUTO: 30.6 % (ref 41–52)
HCT VFR BLD AUTO: 30.9 % (ref 41–52)
HCT VFR BLD AUTO: NORMAL %
HCT VFR BLD EST: 19 % (ref 41–52)
HCT VFR BLD EST: 23 % (ref 41–52)
HCT VFR BLD EST: 24 % (ref 41–52)
HEMATOCRIT (%) IN BLOOD BY AUTOMATED COUNT: 29.9 % (ref 41–52)
HEMOGLOBIN (G/DL) IN BLOOD: 9.1 G/DL (ref 13.5–17.5)
HGB BLD-MCNC: 6.4 G/DL (ref 13.5–17.5)
HGB BLD-MCNC: 6.7 G/DL (ref 13.5–17.5)
HGB BLD-MCNC: 7.2 G/DL (ref 13.5–17.5)
HGB BLD-MCNC: 7.5 G/DL (ref 13.5–17.5)
HGB BLD-MCNC: 7.9 G/DL (ref 13.5–17.5)
HGB BLD-MCNC: 8.2 G/DL (ref 13.5–17.5)
HGB BLD-MCNC: 8.3 G/DL (ref 13.5–17.5)
HGB BLD-MCNC: 8.3 G/DL (ref 13.5–17.5)
HGB BLD-MCNC: 8.4 G/DL (ref 13.5–17.5)
HGB BLD-MCNC: 8.5 G/DL (ref 13.5–17.5)
HGB BLD-MCNC: 8.8 G/DL (ref 13.5–17.5)
HGB BLD-MCNC: 8.8 G/DL (ref 13.5–17.5)
HGB BLD-MCNC: 9 G/DL (ref 13.5–17.5)
HGB BLD-MCNC: 9.1 G/DL (ref 13.5–17.5)
HGB BLD-MCNC: 9.1 G/DL (ref 13.5–17.5)
HGB BLD-MCNC: 9.4 G/DL (ref 13.5–17.5)
HGB BLD-MCNC: 9.6 G/DL (ref 13.5–17.5)
HGB BLD-MCNC: NORMAL G/DL
HGB BLDV-MCNC: 6.4 G/DL (ref 13.5–17.5)
HGB BLDV-MCNC: 7.6 G/DL (ref 13.5–17.5)
HGB BLDV-MCNC: 7.9 G/DL (ref 13.5–17.5)
HOLD SPECIMEN: NORMAL
HOWELL-JOLLY BOD BLD QL SMEAR: PRESENT
HOWELL-JOLLY BOD BLD QL SMEAR: PRESENT
HYPOCHROMIA (PRESENCE) IN BLOOD BY LIGHT MICROSCOPY: NORMAL
HYPOCHROMIA BLD QL SMEAR: ABNORMAL
HYPOCHROMIA BLD QL SMEAR: NORMAL
IMM GRANULOCYTES # BLD AUTO: 0.04 X10*3/UL (ref 0–0.7)
IMM GRANULOCYTES # BLD AUTO: 0.04 X10*3/UL (ref 0–0.7)
IMM GRANULOCYTES # BLD AUTO: 0.05 X10*3/UL (ref 0–0.7)
IMM GRANULOCYTES # BLD AUTO: 0.07 X10*3/UL (ref 0–0.7)
IMM GRANULOCYTES # BLD AUTO: 0.08 X10*3/UL (ref 0–0.7)
IMM GRANULOCYTES # BLD AUTO: 0.13 X10*3/UL (ref 0–0.7)
IMM GRANULOCYTES # BLD AUTO: 0.22 X10*3/UL (ref 0–0.7)
IMM GRANULOCYTES # BLD AUTO: NORMAL 10*3/UL
IMM GRANULOCYTES NFR BLD AUTO: 0.3 % (ref 0–0.9)
IMM GRANULOCYTES NFR BLD AUTO: 0.4 % (ref 0–0.9)
IMM GRANULOCYTES NFR BLD AUTO: 0.5 % (ref 0–0.9)
IMM GRANULOCYTES NFR BLD AUTO: 0.7 % (ref 0–0.9)
IMM GRANULOCYTES NFR BLD AUTO: 0.9 % (ref 0–0.9)
IMM GRANULOCYTES NFR BLD AUTO: NORMAL %
IMMATURE GRANULOCYTES/100 LEUKOCYTES IN BLOOD BY AUTOMATED COUNT: 0.6 % (ref 0–0.9)
INHALED O2 CONCENTRATION: 21 %
INHALED O2 CONCENTRATION: 36 %
INHALED O2 CONCENTRATION: 94 %
INR IN PPP BY COAGULATION ASSAY: 1.5 (ref 0.9–1.1)
INR PPP: 1.7 (ref 0.9–1.1)
KETONES UR STRIP.AUTO-MCNC: NEGATIVE MG/DL
LACTATE BLDV-SCNC: 0.8 MMOL/L (ref 0.4–2)
LACTATE BLDV-SCNC: 1.7 MMOL/L (ref 0.4–2)
LACTATE BLDV-SCNC: 2.8 MMOL/L (ref 0.4–2)
LEGIONELLA AG UR QL: NEGATIVE
LEUKOCYTE ESTERASE UR QL STRIP.AUTO: NEGATIVE
LEUKOCYTES (10*3/UL) IN BLOOD BY AUTOMATED COUNT: 19.7 X10E9/L (ref 4.4–11.3)
LYMPHOCYTES # BLD AUTO: 2.59 X10*3/UL (ref 1.2–4.8)
LYMPHOCYTES # BLD AUTO: 3.23 X10*3/UL (ref 1.2–4.8)
LYMPHOCYTES # BLD AUTO: 3.91 X10*3/UL (ref 1.2–4.8)
LYMPHOCYTES # BLD AUTO: 4.05 X10*3/UL (ref 1.2–4.8)
LYMPHOCYTES # BLD AUTO: 4.23 X10*3/UL (ref 1.2–4.8)
LYMPHOCYTES # BLD AUTO: 4.26 X10*3/UL (ref 1.2–4.8)
LYMPHOCYTES # BLD AUTO: 4.61 X10*3/UL (ref 1.2–4.8)
LYMPHOCYTES # BLD AUTO: 4.93 X10*3/UL (ref 1.2–4.8)
LYMPHOCYTES # BLD AUTO: NORMAL 10*3/UL
LYMPHOCYTES # BLD MANUAL: 1.59 X10*3/UL (ref 1.2–4.8)
LYMPHOCYTES # BLD MANUAL: 2.37 X10*3/UL (ref 1.2–4.8)
LYMPHOCYTES # BLD MANUAL: 4.14 X10*3/UL (ref 1.2–4.8)
LYMPHOCYTES # BLD MANUAL: 6.48 X10*3/UL (ref 1.2–4.8)
LYMPHOCYTES # BLD MANUAL: NORMAL 10*3/UL
LYMPHOCYTES (10*3/UL) IN BLOOD BY MANUAL COUNT - WAM: 5.52 X10E9/L (ref 1.2–4.8)
LYMPHOCYTES NFR BLD AUTO: 15.4 %
LYMPHOCYTES NFR BLD AUTO: 23.5 %
LYMPHOCYTES NFR BLD AUTO: 24.2 %
LYMPHOCYTES NFR BLD AUTO: 26.6 %
LYMPHOCYTES NFR BLD AUTO: 29.1 %
LYMPHOCYTES NFR BLD AUTO: 30.6 %
LYMPHOCYTES NFR BLD AUTO: 34.1 %
LYMPHOCYTES NFR BLD AUTO: 36.9 %
LYMPHOCYTES NFR BLD AUTO: NORMAL %
LYMPHOCYTES NFR BLD MANUAL: 11.5 %
LYMPHOCYTES NFR BLD MANUAL: 16.7 %
LYMPHOCYTES NFR BLD MANUAL: 30 %
LYMPHOCYTES NFR BLD MANUAL: 41 %
LYMPHOCYTES NFR BLD MANUAL: NORMAL %
LYMPHOCYTES VARIANT/100 LEUKOCYTES IN BLOOD - WAM: 1 % (ref 0–2)
LYMPHOCYTES/100 LEUKOCYTES IN BLOOD BY MANUAL COUNT - WAM: 28 % (ref 13–44)
MAGNESIUM (MG/DL) IN SER/PLAS: 1.89 MG/DL (ref 1.6–2.4)
MAGNESIUM SERPL-MCNC: 0.88 MG/DL (ref 1.6–2.4)
MAGNESIUM SERPL-MCNC: 0.89 MG/DL (ref 1.6–2.4)
MAGNESIUM SERPL-MCNC: 1.31 MG/DL (ref 1.6–2.4)
MAGNESIUM SERPL-MCNC: 1.33 MG/DL (ref 1.6–2.4)
MAGNESIUM SERPL-MCNC: 1.34 MG/DL (ref 1.6–2.4)
MAGNESIUM SERPL-MCNC: 1.37 MG/DL (ref 1.6–2.4)
MAGNESIUM SERPL-MCNC: 1.44 MG/DL (ref 1.6–2.4)
MAGNESIUM SERPL-MCNC: 1.54 MG/DL (ref 1.6–2.4)
MAGNESIUM SERPL-MCNC: 1.56 MG/DL (ref 1.6–2.4)
MAGNESIUM SERPL-MCNC: 1.57 MG/DL (ref 1.6–2.4)
MAGNESIUM SERPL-MCNC: 1.59 MG/DL (ref 1.6–2.4)
MAGNESIUM SERPL-MCNC: 1.59 MG/DL (ref 1.6–2.4)
MAGNESIUM SERPL-MCNC: 1.62 MG/DL (ref 1.6–2.4)
MAGNESIUM SERPL-MCNC: 1.64 MG/DL (ref 1.6–2.4)
MAGNESIUM SERPL-MCNC: 1.75 MG/DL (ref 1.6–2.4)
MAGNESIUM SERPL-MCNC: 1.83 MG/DL (ref 1.6–2.4)
MAGNESIUM SERPL-MCNC: 1.84 MG/DL (ref 1.6–2.4)
MAGNESIUM SERPL-MCNC: 1.87 MG/DL (ref 1.6–2.4)
MAGNESIUM SERPL-MCNC: 1.87 MG/DL (ref 1.6–2.4)
MAGNESIUM SERPL-MCNC: 1.9 MG/DL (ref 1.6–2.4)
MAGNESIUM SERPL-MCNC: 1.97 MG/DL (ref 1.6–2.4)
MAGNESIUM SERPL-MCNC: 1.97 MG/DL (ref 1.6–2.4)
MAGNESIUM SERPL-MCNC: 2 MG/DL (ref 1.6–2.4)
MAGNESIUM SERPL-MCNC: 2.29 MG/DL (ref 1.6–2.4)
MANUAL DIFFERENTIAL Y/N: ABNORMAL
MCH RBC QN AUTO: 26.2 PG (ref 26–34)
MCH RBC QN AUTO: 26.6 PG (ref 26–34)
MCH RBC QN AUTO: 27.2 PG (ref 26–34)
MCH RBC QN AUTO: 27.3 PG (ref 26–34)
MCH RBC QN AUTO: 27.4 PG (ref 26–34)
MCH RBC QN AUTO: 27.4 PG (ref 26–34)
MCH RBC QN AUTO: 27.6 PG (ref 26–34)
MCH RBC QN AUTO: 27.7 PG (ref 26–34)
MCH RBC QN AUTO: 27.8 PG (ref 26–34)
MCH RBC QN AUTO: 27.9 PG (ref 26–34)
MCH RBC QN AUTO: 28.1 PG (ref 26–34)
MCH RBC QN AUTO: 28.4 PG (ref 26–34)
MCH RBC QN AUTO: 28.5 PG (ref 26–34)
MCH RBC QN AUTO: 28.7 PG (ref 26–34)
MCH RBC QN AUTO: 28.9 PG (ref 26–34)
MCH RBC QN AUTO: NORMAL PG
MCHC RBC AUTO-ENTMCNC: 30.4 G/DL (ref 32–36)
MCHC RBC AUTO-ENTMCNC: 30.7 G/DL (ref 32–36)
MCHC RBC AUTO-ENTMCNC: 30.7 G/DL (ref 32–36)
MCHC RBC AUTO-ENTMCNC: 31 G/DL (ref 32–36)
MCHC RBC AUTO-ENTMCNC: 31.3 G/DL (ref 32–36)
MCHC RBC AUTO-ENTMCNC: 31.3 G/DL (ref 32–36)
MCHC RBC AUTO-ENTMCNC: 31.4 G/DL (ref 32–36)
MCHC RBC AUTO-ENTMCNC: 31.4 G/DL (ref 32–36)
MCHC RBC AUTO-ENTMCNC: 31.5 G/DL (ref 32–36)
MCHC RBC AUTO-ENTMCNC: 31.6 G/DL (ref 32–36)
MCHC RBC AUTO-ENTMCNC: 31.7 G/DL (ref 32–36)
MCHC RBC AUTO-ENTMCNC: 31.8 G/DL (ref 32–36)
MCHC RBC AUTO-ENTMCNC: 31.9 G/DL (ref 32–36)
MCHC RBC AUTO-ENTMCNC: 32 G/DL (ref 32–36)
MCHC RBC AUTO-ENTMCNC: 32.5 G/DL (ref 32–36)
MCHC RBC AUTO-ENTMCNC: 32.5 G/DL (ref 32–36)
MCHC RBC AUTO-ENTMCNC: 32.6 G/DL (ref 32–36)
MCHC RBC AUTO-ENTMCNC: 32.6 G/DL (ref 32–36)
MCHC RBC AUTO-ENTMCNC: 32.7 G/DL (ref 32–36)
MCHC RBC AUTO-ENTMCNC: NORMAL G/DL
MCV RBC AUTO: 81 FL (ref 80–100)
MCV RBC AUTO: 81 FL (ref 80–100)
MCV RBC AUTO: 84 FL (ref 80–100)
MCV RBC AUTO: 86 FL (ref 80–100)
MCV RBC AUTO: 87 FL (ref 80–100)
MCV RBC AUTO: 87 FL (ref 80–100)
MCV RBC AUTO: 88 FL (ref 80–100)
MCV RBC AUTO: 89 FL (ref 80–100)
MCV RBC AUTO: 89 FL (ref 80–100)
MCV RBC AUTO: 90 FL (ref 80–100)
MCV RBC AUTO: 91 FL (ref 80–100)
MCV RBC AUTO: 93 FL (ref 80–100)
MCV RBC AUTO: NORMAL FL
MONOCYTES # BLD AUTO: 1 X10*3/UL (ref 0.1–1)
MONOCYTES # BLD AUTO: 1.1 X10*3/UL (ref 0.1–1)
MONOCYTES # BLD AUTO: 1.1 X10*3/UL (ref 0.1–1)
MONOCYTES # BLD AUTO: 1.13 X10*3/UL (ref 0.1–1)
MONOCYTES # BLD AUTO: 1.15 X10*3/UL (ref 0.1–1)
MONOCYTES # BLD AUTO: 1.18 X10*3/UL (ref 0.1–1)
MONOCYTES # BLD AUTO: 1.25 X10*3/UL (ref 0.1–1)
MONOCYTES # BLD AUTO: 1.25 X10*3/UL (ref 0.1–1)
MONOCYTES # BLD AUTO: NORMAL 10*3/UL
MONOCYTES # BLD MANUAL: 0.25 X10*3/UL (ref 0.1–1)
MONOCYTES # BLD MANUAL: 0.74 X10*3/UL (ref 0.1–1)
MONOCYTES # BLD MANUAL: 0.83 X10*3/UL (ref 0.1–1)
MONOCYTES # BLD MANUAL: 2.05 X10*3/UL (ref 0.1–1)
MONOCYTES # BLD MANUAL: NORMAL 10*3/UL
MONOCYTES (10*3/UL) IN BLOOD BY MANUAL COUNT - WAM: 1.77 X10E9/L (ref 0.1–1)
MONOCYTES NFR BLD AUTO: 10.6 %
MONOCYTES NFR BLD AUTO: 4.9 %
MONOCYTES NFR BLD AUTO: 6.2 %
MONOCYTES NFR BLD AUTO: 7.9 %
MONOCYTES NFR BLD AUTO: 8 %
MONOCYTES NFR BLD AUTO: 8.7 %
MONOCYTES NFR BLD AUTO: 8.9 %
MONOCYTES NFR BLD AUTO: 8.9 %
MONOCYTES NFR BLD AUTO: NORMAL %
MONOCYTES NFR BLD MANUAL: 1.8 %
MONOCYTES NFR BLD MANUAL: 13 %
MONOCYTES NFR BLD MANUAL: 5.2 %
MONOCYTES NFR BLD MANUAL: 6 %
MONOCYTES NFR BLD MANUAL: NORMAL %
MONOCYTES/100 LEUKOCYTES IN BLOOD BY MANUAL COUNT - WAM: 9 % (ref 2–10)
MRSA DNA SPEC QL NAA+PROBE: NOT DETECTED
MUCOUS THREADS #/AREA URNS AUTO: NORMAL /LPF
NEUTROPHILS # BLD AUTO: 12.19 X10*3/UL (ref 1.2–7.7)
NEUTROPHILS # BLD AUTO: 20 X10*3/UL (ref 1.2–7.7)
NEUTROPHILS # BLD AUTO: 5.95 X10*3/UL (ref 1.2–7.7)
NEUTROPHILS # BLD AUTO: 6.78 X10*3/UL (ref 1.2–7.7)
NEUTROPHILS # BLD AUTO: 6.81 X10*3/UL (ref 1.2–7.7)
NEUTROPHILS # BLD AUTO: 7.72 X10*3/UL (ref 1.2–7.7)
NEUTROPHILS # BLD AUTO: 9.25 X10*3/UL (ref 1.2–7.7)
NEUTROPHILS # BLD AUTO: 9.8 X10*3/UL (ref 1.2–7.7)
NEUTROPHILS # BLD AUTO: NORMAL 10*3/UL
NEUTROPHILS (SEGS+BANDS) (10*3/UL) MANUAL COUNT - WAM: 11.23 X10E9/L (ref 1.2–7.7)
NEUTROPHILS BAND FORM/100 LEUKOCYTES IN BLOOD BY MANUAL COUNT - WAM: 1 % (ref 0–5)
NEUTROPHILS NFR BLD AUTO: 51.5 %
NEUTROPHILS NFR BLD AUTO: 54.6 %
NEUTROPHILS NFR BLD AUTO: 58.5 %
NEUTROPHILS NFR BLD AUTO: 61.8 %
NEUTROPHILS NFR BLD AUTO: 63.6 %
NEUTROPHILS NFR BLD AUTO: 65.9 %
NEUTROPHILS NFR BLD AUTO: 67.2 %
NEUTROPHILS NFR BLD AUTO: 78.5 %
NEUTROPHILS NFR BLD AUTO: NORMAL %
NEUTS SEG # BLD MANUAL: 10.83 X10*3/UL (ref 1.2–7)
NEUTS SEG # BLD MANUAL: 11.11 X10*3/UL (ref 1.2–7)
NEUTS SEG # BLD MANUAL: 6.32 X10*3/UL (ref 1.2–7)
NEUTS SEG # BLD MANUAL: 8.69 X10*3/UL (ref 1.2–7)
NEUTS SEG # BLD MANUAL: NORMAL 10*3/UL
NEUTS SEG NFR BLD MANUAL: 40 %
NEUTS SEG NFR BLD MANUAL: 63 %
NEUTS SEG NFR BLD MANUAL: 76.3 %
NEUTS SEG NFR BLD MANUAL: 80.5 %
NEUTS SEG NFR BLD MANUAL: NORMAL %
NITRITE UR QL STRIP.AUTO: NEGATIVE
NOROVIRUS GI + GII RNA STL NAA+PROBE: NOT DETECTED
NRBC (PER 100 WBCS) BY AUTOMATED COUNT: 0.1 /100 WBC (ref 0–0)
NRBC BLD MANUAL-RTO: 0.9 % (ref 0–0)
NRBC BLD-RTO: 0 /100 WBCS (ref 0–0)
NRBC BLD-RTO: 0.1 /100 WBCS (ref 0–0)
NRBC BLD-RTO: 0.2 /100 WBCS (ref 0–0)
NRBC BLD-RTO: NORMAL /100{WBCS}
OVALOCYTES BLD QL SMEAR: ABNORMAL
OVALOCYTES BLD QL SMEAR: NORMAL
OVALOCYTES PRESENCE IN BLOOD BY LIGHT MICROSCOPY: NORMAL
OXYHGB MFR BLDV: 15.9 % (ref 45–75)
OXYHGB MFR BLDV: 68.2 % (ref 45–75)
OXYHGB MFR BLDV: 76 % (ref 45–75)
P AXIS: 36 DEGREES
P AXIS: 37 DEGREES
P AXIS: 65 DEGREES
P OFFSET: 150 MS
P OFFSET: 176 MS
P OFFSET: 184 MS
P ONSET: 102 MS
P ONSET: 123 MS
P ONSET: 141 MS
PCO2 BLDV: 40 MM HG (ref 41–51)
PCO2 BLDV: 42 MM HG (ref 41–51)
PCO2 BLDV: 43 MM HG (ref 41–51)
PH BLDV: 7.38 PH (ref 7.33–7.43)
PH BLDV: 7.41 PH (ref 7.33–7.43)
PH BLDV: 7.45 PH (ref 7.33–7.43)
PH UR STRIP.AUTO: 6 [PH]
PHOSPHATE (MG/DL) IN SER/PLAS: 3.7 MG/DL (ref 2.5–4.9)
PHOSPHATE SERPL-MCNC: 2.6 MG/DL (ref 2.5–4.9)
PHOSPHATE SERPL-MCNC: 2.9 MG/DL (ref 2.5–4.9)
PHOSPHATE SERPL-MCNC: 3 MG/DL (ref 2.5–4.9)
PHOSPHATE SERPL-MCNC: 3.1 MG/DL (ref 2.5–4.9)
PHOSPHATE SERPL-MCNC: 3.2 MG/DL (ref 2.5–4.9)
PHOSPHATE SERPL-MCNC: 3.2 MG/DL (ref 2.5–4.9)
PHOSPHATE SERPL-MCNC: 3.3 MG/DL (ref 2.5–4.9)
PHOSPHATE SERPL-MCNC: 3.3 MG/DL (ref 2.5–4.9)
PHOSPHATE SERPL-MCNC: 3.4 MG/DL (ref 2.5–4.9)
PHOSPHATE SERPL-MCNC: 3.5 MG/DL (ref 2.5–4.9)
PHOSPHATE SERPL-MCNC: 3.5 MG/DL (ref 2.5–4.9)
PHOSPHATE SERPL-MCNC: 3.6 MG/DL (ref 2.5–4.9)
PHOSPHATE SERPL-MCNC: 3.6 MG/DL (ref 2.5–4.9)
PHOSPHATE SERPL-MCNC: 3.7 MG/DL (ref 2.5–4.9)
PHOSPHATE SERPL-MCNC: 3.8 MG/DL (ref 2.5–4.9)
PHOSPHATE SERPL-MCNC: 4 MG/DL (ref 2.5–4.9)
PHOSPHATE SERPL-MCNC: 4.1 MG/DL (ref 2.5–4.9)
PHOSPHATE SERPL-MCNC: 4.3 MG/DL (ref 2.5–4.9)
PLASMA CELLS # BLD MANUAL: 0.13 X10*3/UL
PLASMA CELLS NFR BLD MANUAL: 0.9 %
PLATELET # BLD AUTO: 361 X10*3/UL (ref 150–450)
PLATELET # BLD AUTO: 383 X10*3/UL (ref 150–450)
PLATELET # BLD AUTO: 441 X10*3/UL (ref 150–450)
PLATELET # BLD AUTO: 454 X10*3/UL (ref 150–450)
PLATELET # BLD AUTO: 459 X10*3/UL (ref 150–450)
PLATELET # BLD AUTO: 485 X10*3/UL (ref 150–450)
PLATELET # BLD AUTO: 486 X10*3/UL (ref 150–450)
PLATELET # BLD AUTO: 489 X10*3/UL (ref 150–450)
PLATELET # BLD AUTO: 531 X10*3/UL (ref 150–450)
PLATELET # BLD AUTO: 535 X10*3/UL (ref 150–450)
PLATELET # BLD AUTO: 538 X10*3/UL (ref 150–450)
PLATELET # BLD AUTO: 546 X10*3/UL (ref 150–450)
PLATELET # BLD AUTO: 547 X10*3/UL (ref 150–450)
PLATELET # BLD AUTO: 555 X10*3/UL (ref 150–450)
PLATELET # BLD AUTO: 570 X10*3/UL (ref 150–450)
PLATELET # BLD AUTO: 581 X10*3/UL (ref 150–450)
PLATELET # BLD AUTO: 586 X10*3/UL (ref 150–450)
PLATELET # BLD AUTO: 597 X10*3/UL (ref 150–450)
PLATELET # BLD AUTO: 598 X10*3/UL (ref 150–450)
PLATELET # BLD AUTO: NORMAL 10*3/UL
PLATELET CLUMP BLD QL SMEAR: PRESENT
PLATELETS (10*3/UL) IN BLOOD AUTOMATED COUNT: 610 X10E9/L (ref 150–450)
PMV BLD AUTO: 10.1 FL (ref 7.5–11.5)
PMV BLD AUTO: 10.7 FL (ref 7.5–11.5)
PMV BLD AUTO: NORMAL FL
PO2 BLDV: 25 MM HG (ref 35–45)
PO2 BLDV: 47 MM HG (ref 35–45)
PO2 BLDV: 49 MM HG (ref 35–45)
POC CALCIUM IONIZED (MMOL/L) IN BLOOD: NORMAL
POLYCHROMASIA BLD QL SMEAR: NORMAL
POLYCHROMASIA BLD QL SMEAR: NORMAL
POTASSIUM (MMOL/L) IN SER/PLAS: 4.5 MMOL/L (ref 3.5–5.3)
POTASSIUM BLDV-SCNC: 3.2 MMOL/L (ref 3.5–5.3)
POTASSIUM BLDV-SCNC: 3.3 MMOL/L (ref 3.5–5.3)
POTASSIUM BLDV-SCNC: 3.9 MMOL/L (ref 3.5–5.3)
POTASSIUM SERPL-SCNC: 3 MMOL/L (ref 3.5–5.3)
POTASSIUM SERPL-SCNC: 3.1 MMOL/L (ref 3.5–5.3)
POTASSIUM SERPL-SCNC: 3.2 MMOL/L (ref 3.5–5.3)
POTASSIUM SERPL-SCNC: 3.3 MMOL/L (ref 3.5–5.3)
POTASSIUM SERPL-SCNC: 3.3 MMOL/L (ref 3.5–5.3)
POTASSIUM SERPL-SCNC: 3.4 MMOL/L (ref 3.5–5.3)
POTASSIUM SERPL-SCNC: 3.5 MMOL/L (ref 3.5–5.3)
POTASSIUM SERPL-SCNC: 3.6 MMOL/L (ref 3.5–5.3)
POTASSIUM SERPL-SCNC: 3.7 MMOL/L (ref 3.5–5.3)
POTASSIUM SERPL-SCNC: 3.9 MMOL/L (ref 3.5–5.3)
POTASSIUM SERPL-SCNC: 4 MMOL/L (ref 3.5–5.3)
POTASSIUM SERPL-SCNC: 4.1 MMOL/L (ref 3.5–5.3)
POTASSIUM SERPL-SCNC: 4.1 MMOL/L (ref 3.5–5.3)
POTASSIUM SERPL-SCNC: 4.3 MMOL/L (ref 3.5–5.3)
POTASSIUM SERPL-SCNC: 4.4 MMOL/L (ref 3.5–5.3)
POTASSIUM SERPL-SCNC: 4.5 MMOL/L (ref 3.5–5.3)
POTASSIUM UR-SCNC: 14 MMOL/L
POTASSIUM/CREAT UR-RTO: 55 MMOL/G CREAT
PR INTERVAL: 154 MS
PR INTERVAL: 172 MS
PR INTERVAL: 186 MS
PRODUCT BLOOD TYPE: 5100
PRODUCT CODE: NORMAL
PROT SERPL-MCNC: 6.1 G/DL (ref 6.4–8.2)
PROT SERPL-MCNC: 6.2 G/DL (ref 6.4–8.2)
PROT SERPL-MCNC: 6.6 G/DL (ref 6.4–8.2)
PROT SERPL-MCNC: 6.7 G/DL (ref 6.4–8.2)
PROT SERPL-MCNC: 7 G/DL (ref 6.4–8.2)
PROT SERPL-MCNC: 7.8 G/DL (ref 6.4–8.2)
PROT UR STRIP.AUTO-MCNC: NEGATIVE MG/DL
PROTEIN TOTAL: 7.1 G/DL (ref 6.4–8.2)
PROTHROMBIN TIME (PT) IN PPP BY COAGULATION ASSAY: 17.2 SEC (ref 9.8–12.8)
PROTHROMBIN TIME: 19.6 SECONDS (ref 9.8–12.8)
Q ONSET: 188 MS
Q ONSET: 216 MS
Q ONSET: 218 MS
QRS COUNT: 13 BEATS
QRS COUNT: 14 BEATS
QRS COUNT: 19 BEATS
QRS DURATION: 132 MS
QRS DURATION: 138 MS
QRS DURATION: 142 MS
QT INTERVAL: 348 MS
QT INTERVAL: 412 MS
QT INTERVAL: 452 MS
QTC CALCULATION(BAZETT): 484 MS
QTC CALCULATION(BAZETT): 489 MS
QTC CALCULATION(BAZETT): 534 MS
QTC FREDERICIA: 437 MS
QTC FREDERICIA: 459 MS
QTC FREDERICIA: 505 MS
R AXIS: -29 DEGREES
R AXIS: -61 DEGREES
R AXIS: -75 DEGREES
RBC # BLD AUTO: 2.4 X10*6/UL (ref 4.5–5.9)
RBC # BLD AUTO: 2.44 X10*6/UL (ref 4.5–5.9)
RBC # BLD AUTO: 2.71 X10*6/UL (ref 4.5–5.9)
RBC # BLD AUTO: 2.74 X10*6/UL (ref 4.5–5.9)
RBC # BLD AUTO: 2.83 X10*6/UL (ref 4.5–5.9)
RBC # BLD AUTO: 2.84 X10*6/UL (ref 4.5–5.9)
RBC # BLD AUTO: 2.89 X10*6/UL (ref 4.5–5.9)
RBC # BLD AUTO: 2.94 X10*6/UL (ref 4.5–5.9)
RBC # BLD AUTO: 2.97 X10*6/UL (ref 4.5–5.9)
RBC # BLD AUTO: 2.98 X10*6/UL (ref 4.5–5.9)
RBC # BLD AUTO: 2.99 X10*6/UL (ref 4.5–5.9)
RBC # BLD AUTO: 3 X10*6/UL (ref 4.5–5.9)
RBC # BLD AUTO: 3.17 X10*6/UL (ref 4.5–5.9)
RBC # BLD AUTO: 3.21 X10*6/UL (ref 4.5–5.9)
RBC # BLD AUTO: 3.24 X10*6/UL (ref 4.5–5.9)
RBC # BLD AUTO: 3.31 X10*6/UL (ref 4.5–5.9)
RBC # BLD AUTO: 3.33 X10*6/UL (ref 4.5–5.9)
RBC # BLD AUTO: 3.34 X10*6/UL (ref 4.5–5.9)
RBC # BLD AUTO: 3.38 X10*6/UL (ref 4.5–5.9)
RBC # BLD AUTO: NORMAL 10*6/UL
RBC # UR STRIP.AUTO: ABNORMAL /UL
RBC # UR STRIP.AUTO: ABNORMAL /UL
RBC # UR STRIP.AUTO: NEGATIVE /UL
RBC # UR STRIP.AUTO: NEGATIVE /UL
RBC #/AREA URNS AUTO: NORMAL /HPF
RBC #/AREA URNS AUTO: NORMAL /HPF
RBC MORPH BLD: ABNORMAL
RBC MORPH BLD: NORMAL
RBC MORPHOLOGY IN BLOOD: NORMAL
RH FACTOR (ANTIGEN D): NORMAL
RSV RNA RESP QL NAA+PROBE: NOT DETECTED
RV RNA STL NAA+PROBE: NOT DETECTED
S PNEUM AG UR QL: NEGATIVE
SALMONELLA DNA STL QL NAA+PROBE: NOT DETECTED
SAO2 % BLDV: 16 % (ref 45–75)
SAO2 % BLDV: 69 % (ref 45–75)
SAO2 % BLDV: 78 % (ref 45–75)
SARS-COV-2 RNA RESP QL NAA+PROBE: NOT DETECTED
SARS-COV-2 RNA RESP QL NAA+PROBE: NOT DETECTED
SCHISTOCYTES (PRESENCE) IN BLOOD BY LIGHT MICROSCOPY: NORMAL
SCHISTOCYTES BLD QL SMEAR: NORMAL
SEGMENTED NEUTROPHILS (10*3/UL) BLOOD MANUAL - WAM: 11.03 X10E9/L (ref 1.2–7)
SEGMENTED NEUTROPHILS/100 LEUKOCYTES BY MANUAL COUNT -: 56 % (ref 40–80)
SHIGELLA DNA SPEC QL NAA+PROBE: NOT DETECTED
SODIUM (MMOL/L) IN SER/PLAS: 135 MMOL/L (ref 136–145)
SODIUM BLDV-SCNC: 132 MMOL/L (ref 136–145)
SODIUM BLDV-SCNC: 139 MMOL/L (ref 136–145)
SODIUM BLDV-SCNC: 140 MMOL/L (ref 136–145)
SODIUM SERPL-SCNC: 131 MMOL/L (ref 136–145)
SODIUM SERPL-SCNC: 133 MMOL/L (ref 136–145)
SODIUM SERPL-SCNC: 133 MMOL/L (ref 136–145)
SODIUM SERPL-SCNC: 134 MMOL/L (ref 136–145)
SODIUM SERPL-SCNC: 134 MMOL/L (ref 136–145)
SODIUM SERPL-SCNC: 135 MMOL/L (ref 136–145)
SODIUM SERPL-SCNC: 135 MMOL/L (ref 136–145)
SODIUM SERPL-SCNC: 136 MMOL/L (ref 136–145)
SODIUM SERPL-SCNC: 136 MMOL/L (ref 136–145)
SODIUM SERPL-SCNC: 137 MMOL/L (ref 136–145)
SODIUM SERPL-SCNC: 138 MMOL/L (ref 136–145)
SODIUM SERPL-SCNC: 139 MMOL/L (ref 136–145)
SODIUM SERPL-SCNC: 140 MMOL/L (ref 136–145)
SODIUM SERPL-SCNC: 141 MMOL/L (ref 136–145)
SODIUM SERPL-SCNC: 142 MMOL/L (ref 136–145)
SODIUM UR-SCNC: 67 MMOL/L
SODIUM/CREAT UR-RTO: 264 MMOL/G CREAT
SP GR UR STRIP.AUTO: 1
SP GR UR STRIP.AUTO: 1
SP GR UR STRIP.AUTO: 1.01
SP GR UR STRIP.AUTO: 1.03
T AXIS: 100 DEGREES
T AXIS: 37 DEGREES
T AXIS: 71 DEGREES
T OFFSET: 392 MS
T OFFSET: 414 MS
T OFFSET: 422 MS
T4 FREE SERPL-MCNC: 0.74 NG/DL (ref 0.78–1.48)
T4 FREE SERPL-MCNC: 0.75 NG/DL (ref 0.78–1.48)
TARGET CELLS IN BLOOD BY LIGHT MICROSCOPY: NORMAL
TARGETS BLD QL SMEAR: ABNORMAL
TARGETS BLD QL SMEAR: NORMAL
TARGETS BLD QL SMEAR: NORMAL
TOTAL CELLS COUNTED BLD: 100
TOTAL CELLS COUNTED BLD: 100
TOTAL CELLS COUNTED BLD: 113
TOTAL CELLS COUNTED BLD: 114
TOTAL CELLS COUNTED BLD: NORMAL
TRIGL SERPL-MCNC: 73 MG/DL (ref 0–149)
TRIGL SERPL-MCNC: 79 MG/DL (ref 0–149)
TRIGL SERPL-MCNC: 80 MG/DL (ref 0–149)
TRIGL SERPL-MCNC: 83 MG/DL (ref 0–149)
TRIGLYCERIDE (MG/DL) IN SER/PLAS: 104 MG/DL (ref 0–149)
TSH SERPL-ACNC: 28.34 MIU/L (ref 0.44–3.98)
TSH SERPL-ACNC: 38.38 MIU/L (ref 0.44–3.98)
UNIT ABO: NORMAL
UNIT NUMBER: NORMAL
UNIT RH: NORMAL
UNIT VOLUME: 350
UREA NITROGEN (MG/DL) IN SER/PLAS: 29 MG/DL (ref 6–23)
UROBILINOGEN UR STRIP.AUTO-MCNC: <2 MG/DL
V CHOLERAE DNA STL QL NAA+PROBE: NOT DETECTED
VANCOMYCIN SERPL-MCNC: 11.4 UG/ML (ref 5–20)
VANCOMYCIN SERPL-MCNC: 7.7 UG/ML (ref 5–20)
VANCOMYCIN TROUGH SERPL-MCNC: 4.6 UG/ML (ref 5–20)
VARIANT LYMPHOCYTES (10*3/UL) BLOOD MANUAL COUNT - WAM: 0.2 X10E9/L (ref 0–0.5)
VARIANT LYMPHS # BLD MANUAL: 0.63 X10*3/UL (ref 0–0.5)
VARIANT LYMPHS # BLD MANUAL: 0.73 X10*3/UL (ref 0–0.5)
VARIANT LYMPHS NFR BLD: 4 %
VARIANT LYMPHS NFR BLD: 5.3 %
VENTRICULAR RATE: 119 BPM
VENTRICULAR RATE: 83 BPM
VENTRICULAR RATE: 84 BPM
WBC # BLD AUTO: 10.7 X10*3/UL (ref 4.4–11.3)
WBC # BLD AUTO: 11.6 X10*3/UL (ref 4.4–11.3)
WBC # BLD AUTO: 12.4 X10*3/UL (ref 4.4–11.3)
WBC # BLD AUTO: 12.5 X10*3/UL (ref 4.4–11.3)
WBC # BLD AUTO: 12.8 X10*3/UL (ref 4.4–11.3)
WBC # BLD AUTO: 12.9 X10*3/UL (ref 4.4–11.3)
WBC # BLD AUTO: 13.2 X10*3/UL (ref 4.4–11.3)
WBC # BLD AUTO: 13.8 X10*3/UL (ref 4.4–11.3)
WBC # BLD AUTO: 13.9 X10*3/UL (ref 4.4–11.3)
WBC # BLD AUTO: 14.2 X10*3/UL (ref 4.4–11.3)
WBC # BLD AUTO: 15.5 X10*3/UL (ref 4.4–11.3)
WBC # BLD AUTO: 15.8 X10*3/UL (ref 4.4–11.3)
WBC # BLD AUTO: 15.9 X10*3/UL (ref 4.4–11.3)
WBC # BLD AUTO: 18.5 X10*3/UL (ref 4.4–11.3)
WBC # BLD AUTO: 23 X10*3/UL (ref 4.4–11.3)
WBC # BLD AUTO: 25.5 X10*3/UL (ref 4.4–11.3)
WBC # BLD AUTO: 27 X10*3/UL (ref 4.4–11.3)
WBC # BLD AUTO: NORMAL 10*3/UL
WBC #/AREA URNS AUTO: NORMAL /HPF
WBC #/AREA URNS AUTO: NORMAL /HPF
XM INTEP: NORMAL
Y ENTEROCOL DNA STL QL NAA+PROBE: NOT DETECTED

## 2023-01-01 PROCEDURE — 2500000001 HC RX 250 WO HCPCS SELF ADMINISTERED DRUGS (ALT 637 FOR MEDICARE OP)

## 2023-01-01 PROCEDURE — 85027 COMPLETE CBC AUTOMATED: CPT | Mod: OUT | Performed by: SURGERY

## 2023-01-01 PROCEDURE — 93010 ELECTROCARDIOGRAM REPORT: CPT | Performed by: STUDENT IN AN ORGANIZED HEALTH CARE EDUCATION/TRAINING PROGRAM

## 2023-01-01 PROCEDURE — 85025 COMPLETE CBC W/AUTO DIFF WBC: CPT | Mod: OUT | Performed by: INTERNAL MEDICINE

## 2023-01-01 PROCEDURE — 99215 OFFICE O/P EST HI 40 MIN: CPT

## 2023-01-01 PROCEDURE — 2500000004 HC RX 250 GENERAL PHARMACY W/ HCPCS (ALT 636 FOR OP/ED)

## 2023-01-01 PROCEDURE — 87899 AGENT NOS ASSAY W/OPTIC: CPT

## 2023-01-01 PROCEDURE — 2500000004 HC RX 250 GENERAL PHARMACY W/ HCPCS (ALT 636 FOR OP/ED): Performed by: STUDENT IN AN ORGANIZED HEALTH CARE EDUCATION/TRAINING PROGRAM

## 2023-01-01 PROCEDURE — 80053 COMPREHEN METABOLIC PANEL: CPT | Mod: OUT | Performed by: SURGERY

## 2023-01-01 PROCEDURE — 71045 X-RAY EXAM CHEST 1 VIEW: CPT | Mod: FOREIGN READ | Performed by: RADIOLOGY

## 2023-01-01 PROCEDURE — 80069 RENAL FUNCTION PANEL: CPT | Mod: OUT | Performed by: INTERNAL MEDICINE

## 2023-01-01 PROCEDURE — 2500000004 HC RX 250 GENERAL PHARMACY W/ HCPCS (ALT 636 FOR OP/ED): Performed by: NURSE PRACTITIONER

## 2023-01-01 PROCEDURE — 2550000001 HC RX 255 CONTRASTS: Performed by: EMERGENCY MEDICINE

## 2023-01-01 PROCEDURE — 74177 CT ABD & PELVIS W/CONTRAST: CPT | Performed by: RADIOLOGY

## 2023-01-01 PROCEDURE — 96361 HYDRATE IV INFUSION ADD-ON: CPT | Mod: INF

## 2023-01-01 PROCEDURE — 2500000002 HC RX 250 W HCPCS SELF ADMINISTERED DRUGS (ALT 637 FOR MEDICARE OP, ALT 636 FOR OP/ED): Performed by: STUDENT IN AN ORGANIZED HEALTH CARE EDUCATION/TRAINING PROGRAM

## 2023-01-01 PROCEDURE — 99215 OFFICE O/P EST HI 40 MIN: CPT | Performed by: STUDENT IN AN ORGANIZED HEALTH CARE EDUCATION/TRAINING PROGRAM

## 2023-01-01 PROCEDURE — 80069 RENAL FUNCTION PANEL: CPT

## 2023-01-01 PROCEDURE — 83735 ASSAY OF MAGNESIUM: CPT

## 2023-01-01 PROCEDURE — 85025 COMPLETE CBC W/AUTO DIFF WBC: CPT

## 2023-01-01 PROCEDURE — 80069 RENAL FUNCTION PANEL: CPT | Performed by: STUDENT IN AN ORGANIZED HEALTH CARE EDUCATION/TRAINING PROGRAM

## 2023-01-01 PROCEDURE — 83735 ASSAY OF MAGNESIUM: CPT | Mod: OUT | Performed by: SURGERY

## 2023-01-01 PROCEDURE — 80076 HEPATIC FUNCTION PANEL: CPT | Mod: CCI | Performed by: SURGERY

## 2023-01-01 PROCEDURE — 36415 COLL VENOUS BLD VENIPUNCTURE: CPT | Performed by: STUDENT IN AN ORGANIZED HEALTH CARE EDUCATION/TRAINING PROGRAM

## 2023-01-01 PROCEDURE — 80202 ASSAY OF VANCOMYCIN: CPT

## 2023-01-01 PROCEDURE — 99232 SBSQ HOSP IP/OBS MODERATE 35: CPT

## 2023-01-01 PROCEDURE — 1170000001 HC PRIVATE ONCOLOGY ROOM DAILY

## 2023-01-01 PROCEDURE — 2500000001 HC RX 250 WO HCPCS SELF ADMINISTERED DRUGS (ALT 637 FOR MEDICARE OP): Performed by: STUDENT IN AN ORGANIZED HEALTH CARE EDUCATION/TRAINING PROGRAM

## 2023-01-01 PROCEDURE — P9040 RBC LEUKOREDUCED IRRADIATED: HCPCS

## 2023-01-01 PROCEDURE — 93005 ELECTROCARDIOGRAM TRACING: CPT

## 2023-01-01 PROCEDURE — 99223 1ST HOSP IP/OBS HIGH 75: CPT | Performed by: STUDENT IN AN ORGANIZED HEALTH CARE EDUCATION/TRAINING PROGRAM

## 2023-01-01 PROCEDURE — 83735 ASSAY OF MAGNESIUM: CPT | Performed by: STUDENT IN AN ORGANIZED HEALTH CARE EDUCATION/TRAINING PROGRAM

## 2023-01-01 PROCEDURE — 82330 ASSAY OF CALCIUM: CPT

## 2023-01-01 PROCEDURE — 1036F TOBACCO NON-USER: CPT | Performed by: STUDENT IN AN ORGANIZED HEALTH CARE EDUCATION/TRAINING PROGRAM

## 2023-01-01 PROCEDURE — 99223 1ST HOSP IP/OBS HIGH 75: CPT

## 2023-01-01 PROCEDURE — 74177 CT ABD & PELVIS W/CONTRAST: CPT

## 2023-01-01 PROCEDURE — 84075 ASSAY ALKALINE PHOSPHATASE: CPT | Mod: OUT | Performed by: INTERNAL MEDICINE

## 2023-01-01 PROCEDURE — 3008F BODY MASS INDEX DOCD: CPT

## 2023-01-01 PROCEDURE — G0378 HOSPITAL OBSERVATION PER HR: HCPCS

## 2023-01-01 PROCEDURE — 83735 ASSAY OF MAGNESIUM: CPT | Mod: OUT | Performed by: INTERNAL MEDICINE

## 2023-01-01 PROCEDURE — 99285 EMERGENCY DEPT VISIT HI MDM: CPT | Performed by: EMERGENCY MEDICINE

## 2023-01-01 PROCEDURE — 85007 BL SMEAR W/DIFF WBC COUNT: CPT | Mod: OUT | Performed by: SURGERY

## 2023-01-01 PROCEDURE — 85007 BL SMEAR W/DIFF WBC COUNT: CPT | Mod: OUT | Performed by: INTERNAL MEDICINE

## 2023-01-01 PROCEDURE — 71275 CT ANGIOGRAPHY CHEST: CPT

## 2023-01-01 PROCEDURE — 85025 COMPLETE CBC W/AUTO DIFF WBC: CPT | Performed by: STUDENT IN AN ORGANIZED HEALTH CARE EDUCATION/TRAINING PROGRAM

## 2023-01-01 PROCEDURE — 81003 URINALYSIS AUTO W/O SCOPE: CPT

## 2023-01-01 PROCEDURE — 85027 COMPLETE CBC AUTOMATED: CPT

## 2023-01-01 PROCEDURE — 93010 ELECTROCARDIOGRAM REPORT: CPT | Performed by: INTERNAL MEDICINE

## 2023-01-01 PROCEDURE — 36430 TRANSFUSION BLD/BLD COMPNT: CPT

## 2023-01-01 PROCEDURE — 85025 COMPLETE CBC W/AUTO DIFF WBC: CPT | Performed by: NURSE PRACTITIONER

## 2023-01-01 PROCEDURE — 84478 ASSAY OF TRIGLYCERIDES: CPT | Mod: OUT | Performed by: INTERNAL MEDICINE

## 2023-01-01 PROCEDURE — 80053 COMPREHEN METABOLIC PANEL: CPT | Performed by: NURSE PRACTITIONER

## 2023-01-01 PROCEDURE — 93010 ELECTROCARDIOGRAM REPORT: CPT | Performed by: EMERGENCY MEDICINE

## 2023-01-01 PROCEDURE — 99222 1ST HOSP IP/OBS MODERATE 55: CPT | Performed by: SURGERY

## 2023-01-01 PROCEDURE — 71045 X-RAY EXAM CHEST 1 VIEW: CPT

## 2023-01-01 PROCEDURE — 84132 ASSAY OF SERUM POTASSIUM: CPT | Performed by: STUDENT IN AN ORGANIZED HEALTH CARE EDUCATION/TRAINING PROGRAM

## 2023-01-01 PROCEDURE — 2500000001 HC RX 250 WO HCPCS SELF ADMINISTERED DRUGS (ALT 637 FOR MEDICARE OP): Performed by: NURSE PRACTITIONER

## 2023-01-01 PROCEDURE — 96361 HYDRATE IV INFUSION ADD-ON: CPT

## 2023-01-01 PROCEDURE — 2500000002 HC RX 250 W HCPCS SELF ADMINISTERED DRUGS (ALT 637 FOR MEDICARE OP, ALT 636 FOR OP/ED)

## 2023-01-01 PROCEDURE — 87449 NOS EACH ORGANISM AG IA: CPT

## 2023-01-01 PROCEDURE — 83735 ASSAY OF MAGNESIUM: CPT | Performed by: NURSE PRACTITIONER

## 2023-01-01 PROCEDURE — 96366 THER/PROPH/DIAG IV INF ADDON: CPT

## 2023-01-01 PROCEDURE — 1126F AMNT PAIN NOTED NONE PRSNT: CPT | Performed by: STUDENT IN AN ORGANIZED HEALTH CARE EDUCATION/TRAINING PROGRAM

## 2023-01-01 PROCEDURE — 36415 COLL VENOUS BLD VENIPUNCTURE: CPT

## 2023-01-01 PROCEDURE — 84100 ASSAY OF PHOSPHORUS: CPT | Performed by: STUDENT IN AN ORGANIZED HEALTH CARE EDUCATION/TRAINING PROGRAM

## 2023-01-01 PROCEDURE — 82040 ASSAY OF SERUM ALBUMIN: CPT | Mod: CCI | Performed by: SURGERY

## 2023-01-01 PROCEDURE — 84439 ASSAY OF FREE THYROXINE: CPT | Performed by: STUDENT IN AN ORGANIZED HEALTH CARE EDUCATION/TRAINING PROGRAM

## 2023-01-01 PROCEDURE — 84100 ASSAY OF PHOSPHORUS: CPT | Performed by: NURSE PRACTITIONER

## 2023-01-01 PROCEDURE — 81003 URINALYSIS AUTO W/O SCOPE: CPT | Mod: 59 | Performed by: NURSE PRACTITIONER

## 2023-01-01 PROCEDURE — 84100 ASSAY OF PHOSPHORUS: CPT | Mod: OUT | Performed by: SURGERY

## 2023-01-01 PROCEDURE — 86900 BLOOD TYPING SEROLOGIC ABO: CPT | Mod: 91 | Performed by: STUDENT IN AN ORGANIZED HEALTH CARE EDUCATION/TRAINING PROGRAM

## 2023-01-01 PROCEDURE — 81001 URINALYSIS AUTO W/SCOPE: CPT

## 2023-01-01 PROCEDURE — 82247 BILIRUBIN TOTAL: CPT | Mod: OUT | Performed by: INTERNAL MEDICINE

## 2023-01-01 PROCEDURE — 85027 COMPLETE CBC AUTOMATED: CPT | Mod: OUT | Performed by: INTERNAL MEDICINE

## 2023-01-01 PROCEDURE — 1125F AMNT PAIN NOTED PAIN PRSNT: CPT

## 2023-01-01 PROCEDURE — 85025 COMPLETE CBC W/AUTO DIFF WBC: CPT | Mod: 91 | Performed by: STUDENT IN AN ORGANIZED HEALTH CARE EDUCATION/TRAINING PROGRAM

## 2023-01-01 PROCEDURE — RXMED WILLOW AMBULATORY MEDICATION CHARGE

## 2023-01-01 PROCEDURE — 2500000005 HC RX 250 GENERAL PHARMACY W/O HCPCS: Performed by: STUDENT IN AN ORGANIZED HEALTH CARE EDUCATION/TRAINING PROGRAM

## 2023-01-01 PROCEDURE — 82374 ASSAY BLOOD CARBON DIOXIDE: CPT | Mod: OUT | Performed by: INTERNAL MEDICINE

## 2023-01-01 PROCEDURE — 82040 ASSAY OF SERUM ALBUMIN: CPT | Mod: OUT | Performed by: INTERNAL MEDICINE

## 2023-01-01 PROCEDURE — 99285 EMERGENCY DEPT VISIT HI MDM: CPT | Performed by: NURSE PRACTITIONER

## 2023-01-01 PROCEDURE — 96360 HYDRATION IV INFUSION INIT: CPT | Mod: INF

## 2023-01-01 PROCEDURE — 87493 C DIFF AMPLIFIED PROBE: CPT | Performed by: STUDENT IN AN ORGANIZED HEALTH CARE EDUCATION/TRAINING PROGRAM

## 2023-01-01 PROCEDURE — 87075 CULTR BACTERIA EXCEPT BLOOD: CPT

## 2023-01-01 PROCEDURE — 96366 THER/PROPH/DIAG IV INF ADDON: CPT | Mod: INF

## 2023-01-01 PROCEDURE — 99215 OFFICE O/P EST HI 40 MIN: CPT | Mod: 25 | Performed by: NURSE PRACTITIONER

## 2023-01-01 PROCEDURE — 84484 ASSAY OF TROPONIN QUANT: CPT | Performed by: STUDENT IN AN ORGANIZED HEALTH CARE EDUCATION/TRAINING PROGRAM

## 2023-01-01 PROCEDURE — 2550000001 HC RX 255 CONTRASTS: Performed by: INTERNAL MEDICINE

## 2023-01-01 PROCEDURE — 1036F TOBACCO NON-USER: CPT

## 2023-01-01 PROCEDURE — 84132 ASSAY OF SERUM POTASSIUM: CPT

## 2023-01-01 PROCEDURE — 84478 ASSAY OF TRIGLYCERIDES: CPT | Mod: OUT | Performed by: SURGERY

## 2023-01-01 PROCEDURE — 87040 BLOOD CULTURE FOR BACTERIA: CPT

## 2023-01-01 PROCEDURE — 81001 URINALYSIS AUTO W/SCOPE: CPT | Performed by: STUDENT IN AN ORGANIZED HEALTH CARE EDUCATION/TRAINING PROGRAM

## 2023-01-01 PROCEDURE — 96375 TX/PRO/DX INJ NEW DRUG ADDON: CPT

## 2023-01-01 PROCEDURE — 99215 OFFICE O/P EST HI 40 MIN: CPT | Performed by: NURSE PRACTITIONER

## 2023-01-01 PROCEDURE — 96368 THER/DIAG CONCURRENT INF: CPT

## 2023-01-01 PROCEDURE — 84450 TRANSFERASE (AST) (SGOT): CPT | Mod: OUT | Performed by: INTERNAL MEDICINE

## 2023-01-01 PROCEDURE — 82977 ASSAY OF GGT: CPT | Mod: OUT | Performed by: INTERNAL MEDICINE

## 2023-01-01 PROCEDURE — 86920 COMPATIBILITY TEST SPIN: CPT | Mod: 91

## 2023-01-01 PROCEDURE — 87324 CLOSTRIDIUM AG IA: CPT | Performed by: STUDENT IN AN ORGANIZED HEALTH CARE EDUCATION/TRAINING PROGRAM

## 2023-01-01 PROCEDURE — 84460 ALANINE AMINO (ALT) (SGPT): CPT | Mod: OUT | Performed by: INTERNAL MEDICINE

## 2023-01-01 PROCEDURE — 99233 SBSQ HOSP IP/OBS HIGH 50: CPT | Performed by: NURSE PRACTITIONER

## 2023-01-01 PROCEDURE — 87641 MR-STAPH DNA AMP PROBE: CPT

## 2023-01-01 PROCEDURE — 84443 ASSAY THYROID STIM HORMONE: CPT | Performed by: STUDENT IN AN ORGANIZED HEALTH CARE EDUCATION/TRAINING PROGRAM

## 2023-01-01 PROCEDURE — 84132 ASSAY OF SERUM POTASSIUM: CPT | Performed by: NURSE PRACTITIONER

## 2023-01-01 PROCEDURE — 1111F DSCHRG MED/CURRENT MED MERGE: CPT | Performed by: STUDENT IN AN ORGANIZED HEALTH CARE EDUCATION/TRAINING PROGRAM

## 2023-01-01 PROCEDURE — 85007 BL SMEAR W/DIFF WBC COUNT: CPT

## 2023-01-01 PROCEDURE — 70450 CT HEAD/BRAIN W/O DYE: CPT | Performed by: RADIOLOGY

## 2023-01-01 PROCEDURE — 84100 ASSAY OF PHOSPHORUS: CPT

## 2023-01-01 PROCEDURE — 84132 ASSAY OF SERUM POTASSIUM: CPT | Mod: 91 | Performed by: STUDENT IN AN ORGANIZED HEALTH CARE EDUCATION/TRAINING PROGRAM

## 2023-01-01 PROCEDURE — 99223 1ST HOSP IP/OBS HIGH 75: CPT | Performed by: NURSE PRACTITIONER

## 2023-01-01 PROCEDURE — 80051 ELECTROLYTE PANEL: CPT

## 2023-01-01 PROCEDURE — 85610 PROTHROMBIN TIME: CPT | Performed by: STUDENT IN AN ORGANIZED HEALTH CARE EDUCATION/TRAINING PROGRAM

## 2023-01-01 PROCEDURE — 1159F MED LIST DOCD IN RCRD: CPT | Performed by: STUDENT IN AN ORGANIZED HEALTH CARE EDUCATION/TRAINING PROGRAM

## 2023-01-01 PROCEDURE — 99233 SBSQ HOSP IP/OBS HIGH 50: CPT

## 2023-01-01 PROCEDURE — 97161 PT EVAL LOW COMPLEX 20 MIN: CPT | Mod: GP

## 2023-01-01 PROCEDURE — 96367 TX/PROPH/DG ADDL SEQ IV INF: CPT

## 2023-01-01 PROCEDURE — 96365 THER/PROPH/DIAG IV INF INIT: CPT

## 2023-01-01 PROCEDURE — 1200000002 HC GENERAL ROOM WITH TELEMETRY DAILY

## 2023-01-01 PROCEDURE — 87506 IADNA-DNA/RNA PROBE TQ 6-11: CPT

## 2023-01-01 PROCEDURE — 84443 ASSAY THYROID STIM HORMONE: CPT

## 2023-01-01 PROCEDURE — 87493 C DIFF AMPLIFIED PROBE: CPT

## 2023-01-01 PROCEDURE — 84484 ASSAY OF TROPONIN QUANT: CPT | Mod: 91 | Performed by: STUDENT IN AN ORGANIZED HEALTH CARE EDUCATION/TRAINING PROGRAM

## 2023-01-01 PROCEDURE — 96376 TX/PRO/DX INJ SAME DRUG ADON: CPT

## 2023-01-01 PROCEDURE — 99285 EMERGENCY DEPT VISIT HI MDM: CPT | Mod: 25 | Performed by: STUDENT IN AN ORGANIZED HEALTH CARE EDUCATION/TRAINING PROGRAM

## 2023-01-01 PROCEDURE — 84439 ASSAY OF FREE THYROXINE: CPT

## 2023-01-01 PROCEDURE — 71045 X-RAY EXAM CHEST 1 VIEW: CPT | Performed by: RADIOLOGY

## 2023-01-01 PROCEDURE — 1159F MED LIST DOCD IN RCRD: CPT

## 2023-01-01 PROCEDURE — 99215 OFFICE O/P EST HI 40 MIN: CPT | Mod: 25,ZK | Performed by: NURSE PRACTITIONER

## 2023-01-01 PROCEDURE — 99233 SBSQ HOSP IP/OBS HIGH 50: CPT | Performed by: STUDENT IN AN ORGANIZED HEALTH CARE EDUCATION/TRAINING PROGRAM

## 2023-01-01 PROCEDURE — 99213 OFFICE O/P EST LOW 20 MIN: CPT | Mod: 95

## 2023-01-01 PROCEDURE — A4217 STERILE WATER/SALINE, 500 ML: HCPCS | Performed by: STUDENT IN AN ORGANIZED HEALTH CARE EDUCATION/TRAINING PROGRAM

## 2023-01-01 PROCEDURE — 82330 ASSAY OF CALCIUM: CPT | Mod: OUT | Performed by: INTERNAL MEDICINE

## 2023-01-01 PROCEDURE — 74177 CT ABD & PELVIS W/CONTRAST: CPT | Performed by: STUDENT IN AN ORGANIZED HEALTH CARE EDUCATION/TRAINING PROGRAM

## 2023-01-01 PROCEDURE — 87637 SARSCOV2&INF A&B&RSV AMP PRB: CPT | Performed by: STUDENT IN AN ORGANIZED HEALTH CARE EDUCATION/TRAINING PROGRAM

## 2023-01-01 PROCEDURE — 87635 SARS-COV-2 COVID-19 AMP PRB: CPT | Performed by: NURSE PRACTITIONER

## 2023-01-01 PROCEDURE — 87081 CULTURE SCREEN ONLY: CPT

## 2023-01-01 PROCEDURE — 70450 CT HEAD/BRAIN W/O DYE: CPT

## 2023-01-01 PROCEDURE — 71275 CT ANGIOGRAPHY CHEST: CPT | Performed by: RADIOLOGY

## 2023-01-01 PROCEDURE — 99213 OFFICE O/P EST LOW 20 MIN: CPT

## 2023-01-01 PROCEDURE — 99233 SBSQ HOSP IP/OBS HIGH 50: CPT | Performed by: INTERNAL MEDICINE

## 2023-01-01 PROCEDURE — 3008F BODY MASS INDEX DOCD: CPT | Performed by: STUDENT IN AN ORGANIZED HEALTH CARE EDUCATION/TRAINING PROGRAM

## 2023-01-01 PROCEDURE — 99239 HOSP IP/OBS DSCHRG MGMT >30: CPT

## 2023-01-01 PROCEDURE — 97530 THERAPEUTIC ACTIVITIES: CPT | Mod: GP

## 2023-01-01 PROCEDURE — 99239 HOSP IP/OBS DSCHRG MGMT >30: CPT | Performed by: STUDENT IN AN ORGANIZED HEALTH CARE EDUCATION/TRAINING PROGRAM

## 2023-01-01 PROCEDURE — 1157F ADVNC CARE PLAN IN RCRD: CPT | Performed by: STUDENT IN AN ORGANIZED HEALTH CARE EDUCATION/TRAINING PROGRAM

## 2023-01-01 PROCEDURE — 82436 ASSAY OF URINE CHLORIDE: CPT

## 2023-01-01 PROCEDURE — 97110 THERAPEUTIC EXERCISES: CPT | Mod: GP

## 2023-01-01 PROCEDURE — 87040 BLOOD CULTURE FOR BACTERIA: CPT | Performed by: STUDENT IN AN ORGANIZED HEALTH CARE EDUCATION/TRAINING PROGRAM

## 2023-01-01 PROCEDURE — 96365 THER/PROPH/DIAG IV INF INIT: CPT | Mod: INF

## 2023-01-01 PROCEDURE — 84520 ASSAY OF UREA NITROGEN: CPT

## 2023-01-01 RX ORDER — VIBEGRON 75 MG/1
75 TABLET, FILM COATED ORAL DAILY
COMMUNITY

## 2023-01-01 RX ORDER — ASPIRIN 81 MG/1
81 TABLET ORAL DAILY
Status: DISCONTINUED | OUTPATIENT
Start: 2023-01-01 | End: 2023-01-01 | Stop reason: HOSPADM

## 2023-01-01 RX ORDER — HEPARIN 100 UNIT/ML
5 SYRINGE INTRAVENOUS ONCE
Status: DISCONTINUED | OUTPATIENT
Start: 2023-01-01 | End: 2023-01-01 | Stop reason: HOSPADM

## 2023-01-01 RX ORDER — FAMOTIDINE 20 MG/1
20 TABLET, FILM COATED ORAL 2 TIMES DAILY
Status: DISCONTINUED | OUTPATIENT
Start: 2023-01-01 | End: 2023-01-01 | Stop reason: HOSPADM

## 2023-01-01 RX ORDER — FINASTERIDE 5 MG/1
5 TABLET, FILM COATED ORAL DAILY
Start: 2023-01-01 | End: 2023-01-01 | Stop reason: SDUPTHER

## 2023-01-01 RX ORDER — ROSUVASTATIN CALCIUM 20 MG/1
20 TABLET, COATED ORAL DAILY
Status: DISCONTINUED | OUTPATIENT
Start: 2023-01-01 | End: 2023-01-01 | Stop reason: HOSPADM

## 2023-01-01 RX ORDER — LOPERAMIDE HYDROCHLORIDE 2 MG/1
2 CAPSULE ORAL 4 TIMES DAILY PRN
Status: DISCONTINUED | OUTPATIENT
Start: 2023-01-01 | End: 2023-01-01 | Stop reason: HOSPADM

## 2023-01-01 RX ORDER — VANCOMYCIN HYDROCHLORIDE 125 MG/1
125 CAPSULE ORAL 4 TIMES DAILY
Qty: 37 CAPSULE | Refills: 1 | Status: ON HOLD | OUTPATIENT
Start: 2023-01-01 | End: 2023-01-01

## 2023-01-01 RX ORDER — LEVOTHYROXINE SODIUM 125 UG/1
125 TABLET ORAL DAILY
Status: DISCONTINUED | OUTPATIENT
Start: 2024-01-01 | End: 2024-01-01 | Stop reason: HOSPADM

## 2023-01-01 RX ORDER — LORAZEPAM 0.5 MG/1
0.5 TABLET ORAL ONCE
Status: COMPLETED | OUTPATIENT
Start: 2023-01-01 | End: 2023-01-01

## 2023-01-01 RX ORDER — ROSUVASTATIN CALCIUM 20 MG/1
20 TABLET, COATED ORAL DAILY
Status: CANCELLED | OUTPATIENT
Start: 2023-01-01

## 2023-01-01 RX ORDER — POTASSIUM CHLORIDE 14.9 MG/ML
20 INJECTION INTRAVENOUS
Status: DISCONTINUED | OUTPATIENT
Start: 2023-01-01 | End: 2023-01-01

## 2023-01-01 RX ORDER — DULOXETIN HYDROCHLORIDE 30 MG/1
30 CAPSULE, DELAYED RELEASE ORAL EVERY MORNING
Status: DISCONTINUED | OUTPATIENT
Start: 2024-01-01 | End: 2024-01-01 | Stop reason: HOSPADM

## 2023-01-01 RX ORDER — PANTOPRAZOLE SODIUM 40 MG/1
40 TABLET, DELAYED RELEASE ORAL DAILY
Status: DISCONTINUED | OUTPATIENT
Start: 2023-01-01 | End: 2023-01-01 | Stop reason: HOSPADM

## 2023-01-01 RX ORDER — HEPARIN 100 UNIT/ML
10 SYRINGE INTRAVENOUS AS NEEDED
Status: DISCONTINUED | OUTPATIENT
Start: 2023-01-01 | End: 2023-01-01 | Stop reason: HOSPADM

## 2023-01-01 RX ORDER — ASPIRIN 81 MG/1
81 TABLET ORAL DAILY
COMMUNITY

## 2023-01-01 RX ORDER — CALCIUM CARBONATE 200(500)MG
500 TABLET,CHEWABLE ORAL ONCE
Status: COMPLETED | OUTPATIENT
Start: 2023-01-01 | End: 2023-01-01

## 2023-01-01 RX ORDER — VANCOMYCIN HYDROCHLORIDE 125 MG/1
125 CAPSULE ORAL 4 TIMES DAILY
Qty: 35 CAPSULE | Refills: 0 | Status: SHIPPED | OUTPATIENT
Start: 2023-01-01 | End: 2023-01-01 | Stop reason: HOSPADM

## 2023-01-01 RX ORDER — ROSUVASTATIN CALCIUM 20 MG/1
1 TABLET, COATED ORAL DAILY
Status: ON HOLD | COMMUNITY
Start: 2020-08-27 | End: 2023-01-01 | Stop reason: SDUPTHER

## 2023-01-01 RX ORDER — ROSUVASTATIN CALCIUM 20 MG/1
20 TABLET, COATED ORAL DAILY
Qty: 30 TABLET | Refills: 0 | Status: SHIPPED | OUTPATIENT
Start: 2023-01-01 | End: 2023-01-01 | Stop reason: SDUPTHER

## 2023-01-01 RX ORDER — ONDANSETRON HYDROCHLORIDE 2 MG/ML
4 INJECTION, SOLUTION INTRAVENOUS ONCE
Status: COMPLETED | OUTPATIENT
Start: 2023-01-01 | End: 2023-01-01

## 2023-01-01 RX ORDER — ACETAMINOPHEN 325 MG/1
650 TABLET ORAL EVERY 6 HOURS PRN
COMMUNITY
Start: 2021-07-01

## 2023-01-01 RX ORDER — LORAZEPAM 0.5 MG/1
0.5 TABLET ORAL 2 TIMES DAILY PRN
Status: DISCONTINUED | OUTPATIENT
Start: 2023-01-01 | End: 2023-01-01 | Stop reason: HOSPADM

## 2023-01-01 RX ORDER — VANCOMYCIN HYDROCHLORIDE 1 G/200ML
1000 INJECTION, SOLUTION INTRAVENOUS EVERY 24 HOURS
Status: DISCONTINUED | OUTPATIENT
Start: 2023-01-01 | End: 2023-01-01

## 2023-01-01 RX ORDER — PANTOPRAZOLE SODIUM 40 MG/1
40 TABLET, DELAYED RELEASE ORAL 2 TIMES DAILY
Status: DISCONTINUED | OUTPATIENT
Start: 2023-01-01 | End: 2023-01-01 | Stop reason: HOSPADM

## 2023-01-01 RX ORDER — POTASSIUM CHLORIDE 14.9 MG/ML
20 INJECTION INTRAVENOUS
Status: COMPLETED | OUTPATIENT
Start: 2023-01-01 | End: 2023-01-01

## 2023-01-01 RX ORDER — POTASSIUM CHLORIDE 20 MEQ/1
40 TABLET, EXTENDED RELEASE ORAL ONCE
Status: COMPLETED | OUTPATIENT
Start: 2023-01-01 | End: 2023-01-01

## 2023-01-01 RX ORDER — CALCIUM CARBONATE 500(1250)
500 TABLET,CHEWABLE ORAL ONCE
Status: DISCONTINUED | OUTPATIENT
Start: 2023-01-01 | End: 2023-01-01

## 2023-01-01 RX ORDER — VANCOMYCIN HYDROCHLORIDE 125 MG/1
125 CAPSULE ORAL
Status: DISCONTINUED | OUTPATIENT
Start: 2023-01-01 | End: 2023-01-01 | Stop reason: HOSPADM

## 2023-01-01 RX ORDER — DULOXETIN HYDROCHLORIDE 60 MG/1
60 CAPSULE, DELAYED RELEASE ORAL EVERY MORNING
Status: DISCONTINUED | OUTPATIENT
Start: 2023-01-01 | End: 2023-01-01 | Stop reason: HOSPADM

## 2023-01-01 RX ORDER — PANTOPRAZOLE SODIUM 20 MG/1
40 TABLET, DELAYED RELEASE ORAL ONCE
Status: COMPLETED | OUTPATIENT
Start: 2023-01-01 | End: 2023-01-01

## 2023-01-01 RX ORDER — PROCHLORPERAZINE EDISYLATE 5 MG/ML
10 INJECTION INTRAMUSCULAR; INTRAVENOUS EVERY 6 HOURS PRN
Status: DISCONTINUED | OUTPATIENT
Start: 2023-01-01 | End: 2023-01-01 | Stop reason: HOSPADM

## 2023-01-01 RX ORDER — LEVOTHYROXINE SODIUM 100 UG/1
100 TABLET ORAL DAILY
Status: DISCONTINUED | OUTPATIENT
Start: 2023-01-01 | End: 2023-01-01 | Stop reason: HOSPADM

## 2023-01-01 RX ORDER — VANCOMYCIN HYDROCHLORIDE 125 MG/1
125 CAPSULE ORAL 4 TIMES DAILY
Status: CANCELLED | OUTPATIENT
Start: 2023-01-01 | End: 2023-01-01

## 2023-01-01 RX ORDER — LOPERAMIDE HYDROCHLORIDE 2 MG/1
2 CAPSULE ORAL 4 TIMES DAILY PRN
Qty: 30 CAPSULE | Refills: 0 | Status: SHIPPED | OUTPATIENT
Start: 2023-01-01

## 2023-01-01 RX ORDER — LORAZEPAM 0.5 MG/1
0.5 TABLET ORAL 2 TIMES DAILY PRN
Qty: 60 TABLET | Refills: 0 | Status: SHIPPED | OUTPATIENT
Start: 2023-01-01 | End: 2023-01-01

## 2023-01-01 RX ORDER — LANOLIN ALCOHOL/MO/W.PET/CERES
1000 CREAM (GRAM) TOPICAL DAILY
Status: DISCONTINUED | OUTPATIENT
Start: 2023-01-01 | End: 2024-01-01 | Stop reason: HOSPADM

## 2023-01-01 RX ORDER — SODIUM CHLORIDE 9 MG/ML
1000 INJECTION, SOLUTION INTRAVENOUS DAILY
COMMUNITY

## 2023-01-01 RX ORDER — LEVOTHYROXINE SODIUM 25 UG/1
25 TABLET ORAL DAILY
Qty: 30 TABLET | Refills: 0 | Status: SHIPPED | OUTPATIENT
Start: 2023-01-01 | End: 2023-01-01

## 2023-01-01 RX ORDER — ACETAMINOPHEN 325 MG/1
650 TABLET ORAL EVERY 6 HOURS PRN
Status: DISCONTINUED | OUTPATIENT
Start: 2023-01-01 | End: 2023-01-01 | Stop reason: HOSPADM

## 2023-01-01 RX ORDER — ONDANSETRON HYDROCHLORIDE 2 MG/ML
4 INJECTION, SOLUTION INTRAVENOUS EVERY 4 HOURS PRN
Status: DISCONTINUED | OUTPATIENT
Start: 2023-01-01 | End: 2023-01-01 | Stop reason: HOSPADM

## 2023-01-01 RX ORDER — ONDANSETRON 8 MG/1
8 TABLET, ORALLY DISINTEGRATING ORAL EVERY 8 HOURS PRN
Status: ON HOLD | COMMUNITY
Start: 2020-08-24 | End: 2023-01-01 | Stop reason: ALTCHOICE

## 2023-01-01 RX ORDER — POTASSIUM CHLORIDE 1.5 G/1.58G
20 POWDER, FOR SOLUTION ORAL ONCE
Status: COMPLETED | OUTPATIENT
Start: 2023-01-01 | End: 2023-01-01

## 2023-01-01 RX ORDER — SILDENAFIL 100 MG/1
100 TABLET, FILM COATED ORAL DAILY PRN
COMMUNITY
End: 2023-01-01 | Stop reason: WASHOUT

## 2023-01-01 RX ORDER — POTASSIUM CHLORIDE 14.9 MG/ML
20 INJECTION INTRAVENOUS ONCE
Status: COMPLETED | OUTPATIENT
Start: 2023-01-01 | End: 2023-01-01

## 2023-01-01 RX ORDER — FINASTERIDE 5 MG/1
5 TABLET, FILM COATED ORAL DAILY
Status: DISCONTINUED | OUTPATIENT
Start: 2023-01-01 | End: 2023-01-01 | Stop reason: HOSPADM

## 2023-01-01 RX ORDER — DULOXETIN HYDROCHLORIDE 30 MG/1
30 CAPSULE, DELAYED RELEASE ORAL DAILY
Qty: 30 CAPSULE | Refills: 3 | Status: SHIPPED | OUTPATIENT
Start: 2023-01-01 | End: 2023-01-01 | Stop reason: HOSPADM

## 2023-01-01 RX ORDER — FINASTERIDE 5 MG/1
5 TABLET, FILM COATED ORAL DAILY
Status: DISCONTINUED | OUTPATIENT
Start: 2023-01-01 | End: 2024-01-01 | Stop reason: HOSPADM

## 2023-01-01 RX ORDER — VANCOMYCIN HYDROCHLORIDE 125 MG/1
125 CAPSULE ORAL 2 TIMES WEEKLY
Qty: 30 CAPSULE | Refills: 0 | Status: SHIPPED | OUTPATIENT
Start: 2023-01-01 | End: 2024-01-01

## 2023-01-01 RX ORDER — HEPARIN 100 UNIT/ML
5 SYRINGE INTRAVENOUS AS NEEDED
Status: DISCONTINUED | OUTPATIENT
Start: 2023-01-01 | End: 2023-01-01

## 2023-01-01 RX ORDER — PROCHLORPERAZINE MALEATE 10 MG
10 TABLET ORAL EVERY 6 HOURS PRN
COMMUNITY
Start: 2020-08-24 | End: 2023-01-01 | Stop reason: HOSPADM

## 2023-01-01 RX ORDER — MAGNESIUM SULFATE HEPTAHYDRATE 40 MG/ML
2 INJECTION, SOLUTION INTRAVENOUS ONCE
Status: COMPLETED | OUTPATIENT
Start: 2023-01-01 | End: 2023-01-01

## 2023-01-01 RX ORDER — LEVOTHYROXINE SODIUM 25 UG/1
25 TABLET ORAL DAILY
Status: DISCONTINUED | OUTPATIENT
Start: 2023-01-01 | End: 2023-01-01

## 2023-01-01 RX ORDER — DIPHENOXYLATE HYDROCHLORIDE AND ATROPINE SULFATE 2.5; .025 MG/5ML; MG/5ML
5 SOLUTION ORAL EVERY 12 HOURS
COMMUNITY
Start: 2021-04-29 | End: 2023-01-01 | Stop reason: HOSPADM

## 2023-01-01 RX ORDER — MAGNESIUM SULFATE HEPTAHYDRATE 40 MG/ML
4 INJECTION, SOLUTION INTRAVENOUS ONCE
Status: COMPLETED | OUTPATIENT
Start: 2023-01-01 | End: 2023-01-01

## 2023-01-01 RX ORDER — FAMOTIDINE 10 MG/ML
20 INJECTION INTRAVENOUS DAILY
Status: DISCONTINUED | OUTPATIENT
Start: 2023-01-01 | End: 2023-01-01

## 2023-01-01 RX ORDER — VANCOMYCIN HYDROCHLORIDE 125 MG/1
125 CAPSULE ORAL DAILY
Qty: 7 CAPSULE | Refills: 0 | Status: SHIPPED | OUTPATIENT
Start: 2023-01-01 | End: 2023-01-01

## 2023-01-01 RX ORDER — FINASTERIDE 5 MG/1
5 TABLET, FILM COATED ORAL DAILY
Qty: 30 TABLET | Refills: 0 | Status: SHIPPED | OUTPATIENT
Start: 2023-01-01 | End: 2024-01-01

## 2023-01-01 RX ORDER — PREGABALIN 75 MG/1
300 CAPSULE ORAL 2 TIMES DAILY
Status: DISCONTINUED | OUTPATIENT
Start: 2023-01-01 | End: 2023-01-01

## 2023-01-01 RX ORDER — ONDANSETRON 4 MG/1
4 TABLET, ORALLY DISINTEGRATING ORAL ONCE
Status: DISCONTINUED | OUTPATIENT
Start: 2023-01-01 | End: 2023-01-01 | Stop reason: HOSPADM

## 2023-01-01 RX ORDER — VANCOMYCIN HYDROCHLORIDE 125 MG/1
125 CAPSULE ORAL 4 TIMES DAILY
Status: COMPLETED | OUTPATIENT
Start: 2023-01-01 | End: 2023-01-01

## 2023-01-01 RX ORDER — LIDOCAINE AND PRILOCAINE 25; 25 MG/G; MG/G
CREAM TOPICAL
Status: ON HOLD | COMMUNITY
Start: 2020-08-24 | End: 2023-01-01 | Stop reason: ALTCHOICE

## 2023-01-01 RX ORDER — ONDANSETRON 4 MG/1
4 TABLET, FILM COATED ORAL EVERY 8 HOURS PRN
Qty: 20 TABLET | Refills: 0 | Status: SHIPPED | OUTPATIENT
Start: 2023-01-01

## 2023-01-01 RX ORDER — PROCHLORPERAZINE 25 MG/1
25 SUPPOSITORY RECTAL EVERY 12 HOURS PRN
Status: DISCONTINUED | OUTPATIENT
Start: 2023-01-01 | End: 2023-01-01

## 2023-01-01 RX ORDER — DULOXETIN HYDROCHLORIDE 60 MG/1
60 CAPSULE, DELAYED RELEASE ORAL EVERY MORNING
Qty: 30 CAPSULE | Refills: 0 | Status: SHIPPED | OUTPATIENT
Start: 2023-01-01 | End: 2023-01-01

## 2023-01-01 RX ORDER — ROSUVASTATIN CALCIUM 20 MG/1
20 TABLET, COATED ORAL DAILY
Status: DISCONTINUED | OUTPATIENT
Start: 2023-01-01 | End: 2024-01-01 | Stop reason: HOSPADM

## 2023-01-01 RX ORDER — MIDODRINE HYDROCHLORIDE 10 MG/1
10 TABLET ORAL 2 TIMES DAILY
Status: CANCELLED | OUTPATIENT
Start: 2023-01-01

## 2023-01-01 RX ORDER — ASPIRIN 81 MG/1
81 TABLET ORAL DAILY
Status: DISCONTINUED | OUTPATIENT
Start: 2023-01-01 | End: 2024-01-01 | Stop reason: HOSPADM

## 2023-01-01 RX ORDER — PROCHLORPERAZINE 25 MG/1
25 SUPPOSITORY RECTAL EVERY 12 HOURS PRN
Status: DISCONTINUED | OUTPATIENT
Start: 2023-01-01 | End: 2023-01-01 | Stop reason: HOSPADM

## 2023-01-01 RX ORDER — PANTOPRAZOLE SODIUM 40 MG/1
40 TABLET, DELAYED RELEASE ORAL DAILY
Qty: 30 TABLET | Refills: 0 | Status: SHIPPED | OUTPATIENT
Start: 2023-01-01 | End: 2024-01-01

## 2023-01-01 RX ORDER — BACLOFEN 10 MG/1
5 TABLET ORAL 2 TIMES DAILY
Status: DISCONTINUED | OUTPATIENT
Start: 2023-01-01 | End: 2024-01-01 | Stop reason: HOSPADM

## 2023-01-01 RX ORDER — MIDODRINE HYDROCHLORIDE 5 MG/1
5 TABLET ORAL 3 TIMES DAILY
Qty: 270 TABLET | Refills: 1 | Status: SHIPPED | OUTPATIENT
Start: 2023-01-01 | End: 2024-01-01

## 2023-01-01 RX ORDER — VANCOMYCIN HYDROCHLORIDE 125 MG/1
125 CAPSULE ORAL EVERY OTHER DAY
Status: DISCONTINUED | OUTPATIENT
Start: 2024-01-01 | End: 2024-01-01 | Stop reason: HOSPADM

## 2023-01-01 RX ORDER — VANCOMYCIN HYDROCHLORIDE 125 MG/1
125 CAPSULE ORAL EVERY 24 HOURS
Status: DISCONTINUED | OUTPATIENT
Start: 2023-01-01 | End: 2023-01-01 | Stop reason: HOSPADM

## 2023-01-01 RX ORDER — TRAMADOL HYDROCHLORIDE 50 MG/1
50-100 TABLET ORAL 2 TIMES DAILY PRN
Qty: 42 TABLET | Refills: 0 | Status: SHIPPED | OUTPATIENT
Start: 2023-01-01 | End: 2023-01-01 | Stop reason: HOSPADM

## 2023-01-01 RX ORDER — PANTOPRAZOLE SODIUM 40 MG/1
40 TABLET, DELAYED RELEASE ORAL DAILY
Status: DISCONTINUED | OUTPATIENT
Start: 2023-01-01 | End: 2023-01-01

## 2023-01-01 RX ORDER — OXYMETAZOLINE HCL 0.05 %
2 SPRAY, NON-AEROSOL (ML) NASAL EVERY 12 HOURS PRN
Status: DISCONTINUED | OUTPATIENT
Start: 2023-01-01 | End: 2023-01-01 | Stop reason: HOSPADM

## 2023-01-01 RX ORDER — MIDODRINE HYDROCHLORIDE 5 MG/1
5 TABLET ORAL 3 TIMES DAILY
COMMUNITY
Start: 2023-01-01 | End: 2023-01-01

## 2023-01-01 RX ORDER — CALCIUM CARBONATE 200(500)MG
500 TABLET,CHEWABLE ORAL 4 TIMES DAILY PRN
Status: DISCONTINUED | OUTPATIENT
Start: 2023-01-01 | End: 2023-01-01 | Stop reason: HOSPADM

## 2023-01-01 RX ORDER — VANCOMYCIN HYDROCHLORIDE 125 MG/1
125 CAPSULE ORAL 2 TIMES DAILY
Status: DISCONTINUED | OUTPATIENT
Start: 2023-01-01 | End: 2023-01-01 | Stop reason: HOSPADM

## 2023-01-01 RX ORDER — HEPARIN 100 UNIT/ML
500 SYRINGE INTRAVENOUS AS NEEDED
Status: DISCONTINUED | OUTPATIENT
Start: 2023-01-01 | End: 2023-01-01 | Stop reason: HOSPADM

## 2023-01-01 RX ORDER — ROSUVASTATIN CALCIUM 20 MG/1
20 TABLET, COATED ORAL DAILY
Qty: 30 TABLET | Refills: 0 | Status: SHIPPED | OUTPATIENT
Start: 2023-01-01 | End: 2024-01-01

## 2023-01-01 RX ORDER — POTASSIUM CHLORIDE 29.8 MG/ML
40 INJECTION INTRAVENOUS ONCE
Status: COMPLETED | OUTPATIENT
Start: 2023-01-01 | End: 2023-01-01

## 2023-01-01 RX ORDER — LEVOTHYROXINE SODIUM 125 UG/1
125 TABLET ORAL DAILY
Qty: 30 TABLET | Refills: 1 | Status: CANCELLED | OUTPATIENT
Start: 2023-01-01 | End: 2023-01-01

## 2023-01-01 RX ORDER — ONDANSETRON 4 MG/1
4 TABLET, FILM COATED ORAL EVERY 8 HOURS PRN
Status: DISCONTINUED | OUTPATIENT
Start: 2023-01-01 | End: 2023-01-01

## 2023-01-01 RX ORDER — PREGABALIN 200 MG/1
200 CAPSULE ORAL 3 TIMES DAILY
Status: ON HOLD | COMMUNITY
End: 2023-01-01 | Stop reason: ALTCHOICE

## 2023-01-01 RX ORDER — DULOXETIN HYDROCHLORIDE 60 MG/1
60 CAPSULE, DELAYED RELEASE ORAL NIGHTLY
Status: DISCONTINUED | OUTPATIENT
Start: 2023-01-01 | End: 2024-01-01 | Stop reason: HOSPADM

## 2023-01-01 RX ORDER — PANTOPRAZOLE SODIUM 40 MG/1
40 TABLET, DELAYED RELEASE ORAL DAILY
Status: DISCONTINUED | OUTPATIENT
Start: 2024-01-01 | End: 2024-01-01

## 2023-01-01 RX ORDER — ONDANSETRON 4 MG/1
4 TABLET, FILM COATED ORAL EVERY 8 HOURS PRN
Status: DISCONTINUED | OUTPATIENT
Start: 2023-01-01 | End: 2023-01-01 | Stop reason: HOSPADM

## 2023-01-01 RX ORDER — DULOXETIN HYDROCHLORIDE 60 MG/1
60 CAPSULE, DELAYED RELEASE ORAL NIGHTLY
Status: DISCONTINUED | OUTPATIENT
Start: 2023-01-01 | End: 2023-01-01 | Stop reason: HOSPADM

## 2023-01-01 RX ORDER — PROCHLORPERAZINE MALEATE 10 MG
10 TABLET ORAL EVERY 6 HOURS PRN
Status: DISCONTINUED | OUTPATIENT
Start: 2023-01-01 | End: 2023-01-01 | Stop reason: HOSPADM

## 2023-01-01 RX ORDER — VANCOMYCIN HYDROCHLORIDE 750 MG/150ML
750 INJECTION, SOLUTION INTRAVENOUS EVERY 12 HOURS
Status: DISCONTINUED | OUTPATIENT
Start: 2023-01-01 | End: 2023-01-01

## 2023-01-01 RX ORDER — VANCOMYCIN HYDROCHLORIDE 125 MG/1
125 CAPSULE ORAL 2 TIMES WEEKLY
Qty: 4 CAPSULE | Refills: 0 | Status: SHIPPED | OUTPATIENT
Start: 2023-01-01 | End: 2023-01-01

## 2023-01-01 RX ORDER — MIDODRINE HYDROCHLORIDE 5 MG/1
5 TABLET ORAL 3 TIMES DAILY
Status: DISCONTINUED | OUTPATIENT
Start: 2023-01-01 | End: 2024-01-01

## 2023-01-01 RX ORDER — POLYETHYLENE GLYCOL 3350 17 G/17G
17 POWDER, FOR SOLUTION ORAL DAILY
COMMUNITY
Start: 2021-07-03 | End: 2023-01-01 | Stop reason: HOSPADM

## 2023-01-01 RX ORDER — POTASSIUM CHLORIDE 1.5 G/1.58G
40 POWDER, FOR SOLUTION ORAL ONCE
Status: DISCONTINUED | OUTPATIENT
Start: 2023-01-01 | End: 2023-01-01

## 2023-01-01 RX ORDER — LOPERAMIDE HYDROCHLORIDE 2 MG/1
2 CAPSULE ORAL 4 TIMES DAILY PRN
Status: DISCONTINUED | OUTPATIENT
Start: 2023-01-01 | End: 2023-01-01

## 2023-01-01 RX ORDER — GABAPENTIN 100 MG/1
CAPSULE ORAL
Status: ON HOLD | COMMUNITY
Start: 2021-07-09 | End: 2023-01-01 | Stop reason: ALTCHOICE

## 2023-01-01 RX ORDER — DULOXETIN HYDROCHLORIDE 30 MG/1
30 CAPSULE, DELAYED RELEASE ORAL EVERY MORNING
Status: DISCONTINUED | OUTPATIENT
Start: 2023-01-01 | End: 2023-01-01

## 2023-01-01 RX ORDER — PROCHLORPERAZINE MALEATE 10 MG
5 TABLET ORAL EVERY 6 HOURS PRN
Status: DISCONTINUED | OUTPATIENT
Start: 2023-01-01 | End: 2023-01-01

## 2023-01-01 RX ORDER — BACLOFEN 5 MG/1
5 TABLET ORAL 2 TIMES DAILY
Qty: 60 TABLET | Refills: 0 | Status: SHIPPED | OUTPATIENT
Start: 2023-01-01 | End: 2024-01-01

## 2023-01-01 RX ORDER — PANTOPRAZOLE SODIUM 40 MG/1
40 TABLET, DELAYED RELEASE ORAL
Status: DISCONTINUED | OUTPATIENT
Start: 2023-01-01 | End: 2023-01-01 | Stop reason: HOSPADM

## 2023-01-01 RX ORDER — OLANZAPINE 5 MG/1
5 TABLET ORAL NIGHTLY
Status: DISCONTINUED | OUTPATIENT
Start: 2023-01-01 | End: 2023-01-01 | Stop reason: HOSPADM

## 2023-01-01 RX ORDER — VANCOMYCIN HYDROCHLORIDE 125 MG/1
125 CAPSULE ORAL 4 TIMES DAILY
Status: DISCONTINUED | OUTPATIENT
Start: 2023-01-01 | End: 2023-01-01 | Stop reason: HOSPADM

## 2023-01-01 RX ORDER — VANCOMYCIN HYDROCHLORIDE 125 MG/1
125 CAPSULE ORAL 4 TIMES DAILY
Status: DISCONTINUED | OUTPATIENT
Start: 2023-01-01 | End: 2023-01-01

## 2023-01-01 RX ORDER — OLANZAPINE 5 MG/1
5 TABLET, ORALLY DISINTEGRATING ORAL NIGHTLY
Status: DISCONTINUED | OUTPATIENT
Start: 2023-01-01 | End: 2023-01-01

## 2023-01-01 RX ORDER — DULOXETIN HYDROCHLORIDE 60 MG/1
60 CAPSULE, DELAYED RELEASE ORAL NIGHTLY
Qty: 30 CAPSULE | Refills: 3 | Status: SHIPPED | OUTPATIENT
Start: 2023-01-01 | End: 2023-01-01 | Stop reason: HOSPADM

## 2023-01-01 RX ORDER — PANTOPRAZOLE SODIUM 40 MG/1
1 TABLET, DELAYED RELEASE ORAL DAILY
COMMUNITY
Start: 2021-01-08 | End: 2023-01-01 | Stop reason: SDUPTHER

## 2023-01-01 RX ORDER — LANOLIN ALCOHOL/MO/W.PET/CERES
1000 CREAM (GRAM) TOPICAL DAILY
COMMUNITY

## 2023-01-01 RX ORDER — FAMOTIDINE 20 MG/1
20 TABLET, FILM COATED ORAL DAILY
Status: DISCONTINUED | OUTPATIENT
Start: 2023-01-01 | End: 2023-01-01

## 2023-01-01 RX ORDER — GABAPENTIN 300 MG/1
1 CAPSULE ORAL NIGHTLY
Status: ON HOLD | COMMUNITY
Start: 2021-04-29 | End: 2023-01-01 | Stop reason: ALTCHOICE

## 2023-01-01 RX ORDER — BACLOFEN 5 MG/1
5 TABLET ORAL 2 TIMES DAILY
Qty: 60 TABLET | Refills: 0 | Status: SHIPPED | OUTPATIENT
Start: 2023-01-01 | End: 2023-01-01 | Stop reason: SDUPTHER

## 2023-01-01 RX ORDER — LEVOTHYROXINE SODIUM 100 UG/1
1 TABLET ORAL DAILY
COMMUNITY
Start: 2017-03-10

## 2023-01-01 RX ORDER — METRONIDAZOLE 7.5 MG/G
GEL TOPICAL
Status: ON HOLD | COMMUNITY
Start: 2019-10-08 | End: 2023-01-01 | Stop reason: ALTCHOICE

## 2023-01-01 RX ORDER — BACLOFEN 10 MG/1
5 TABLET ORAL 2 TIMES DAILY
Status: DISCONTINUED | OUTPATIENT
Start: 2023-01-01 | End: 2023-01-01 | Stop reason: HOSPADM

## 2023-01-01 RX ORDER — LOPERAMIDE HYDROCHLORIDE 2 MG/1
2 CAPSULE ORAL ONCE
Status: COMPLETED | OUTPATIENT
Start: 2023-01-01 | End: 2023-01-01

## 2023-01-01 RX ORDER — ONDANSETRON HYDROCHLORIDE 2 MG/ML
INJECTION, SOLUTION INTRAVENOUS
Status: COMPLETED
Start: 2023-01-01 | End: 2023-01-01

## 2023-01-01 RX ORDER — DULOXETIN HYDROCHLORIDE 60 MG/1
60 CAPSULE, DELAYED RELEASE ORAL DAILY
Status: DISCONTINUED | OUTPATIENT
Start: 2023-01-01 | End: 2023-01-01 | Stop reason: HOSPADM

## 2023-01-01 RX ORDER — ONDANSETRON 8 MG/1
8 TABLET, ORALLY DISINTEGRATING ORAL EVERY 8 HOURS PRN
Qty: 90 TABLET | Refills: 0 | Status: SHIPPED | OUTPATIENT
Start: 2023-01-01 | End: 2023-01-01 | Stop reason: HOSPADM

## 2023-01-01 RX ORDER — SODIUM CHLORIDE 9 MG/ML
500 INJECTION, SOLUTION INTRAVENOUS CONTINUOUS
Status: ACTIVE | OUTPATIENT
Start: 2023-01-01 | End: 2023-01-01

## 2023-01-01 RX ORDER — VANCOMYCIN HYDROCHLORIDE 750 MG/150ML
15 INJECTION, SOLUTION INTRAVENOUS EVERY 12 HOURS
Status: DISCONTINUED | OUTPATIENT
Start: 2023-01-01 | End: 2023-01-01

## 2023-01-01 RX ORDER — MAGNESIUM OXIDE 420 MG/1
420 TABLET ORAL 2 TIMES DAILY
COMMUNITY
Start: 2023-01-01

## 2023-01-01 RX ORDER — HEPARIN 100 UNIT/ML
5 SYRINGE INTRAVENOUS AS NEEDED
Status: DISCONTINUED | OUTPATIENT
Start: 2023-01-01 | End: 2023-01-01 | Stop reason: HOSPADM

## 2023-01-01 RX ORDER — CALCIUM CARBONATE 200(500)MG
500 TABLET,CHEWABLE ORAL DAILY
Status: DISCONTINUED | OUTPATIENT
Start: 2023-01-01 | End: 2023-01-01 | Stop reason: HOSPADM

## 2023-01-01 RX ORDER — OLANZAPINE 5 MG/1
5 TABLET, ORALLY DISINTEGRATING ORAL NIGHTLY
Status: DISCONTINUED | OUTPATIENT
Start: 2023-01-01 | End: 2023-01-01 | Stop reason: HOSPADM

## 2023-01-01 RX ORDER — LEVOTHYROXINE SODIUM 100 UG/1
100 TABLET ORAL DAILY
Status: DISCONTINUED | OUTPATIENT
Start: 2023-01-01 | End: 2023-01-01

## 2023-01-01 RX ORDER — VANCOMYCIN HYDROCHLORIDE 125 MG/1
125 CAPSULE ORAL DAILY
Status: DISCONTINUED | OUTPATIENT
Start: 2023-01-01 | End: 2023-01-01 | Stop reason: HOSPADM

## 2023-01-01 RX ORDER — ENOXAPARIN SODIUM 100 MG/ML
30 INJECTION SUBCUTANEOUS EVERY 24 HOURS
Status: DISCONTINUED | OUTPATIENT
Start: 2023-01-01 | End: 2023-01-01

## 2023-01-01 RX ORDER — FAMOTIDINE 10 MG/ML
20 INJECTION INTRAVENOUS 2 TIMES DAILY
Status: DISCONTINUED | OUTPATIENT
Start: 2023-01-01 | End: 2023-01-01 | Stop reason: HOSPADM

## 2023-01-01 RX ORDER — OLANZAPINE 5 MG/1
5 TABLET, ORALLY DISINTEGRATING ORAL NIGHTLY
Qty: 30 TABLET | Refills: 0 | Status: SHIPPED | OUTPATIENT
Start: 2023-01-01 | End: 2023-01-01

## 2023-01-01 RX ORDER — PREGABALIN 200 MG/1
200 CAPSULE ORAL 3 TIMES DAILY
COMMUNITY
Start: 2023-01-01

## 2023-01-01 RX ORDER — MIDODRINE HYDROCHLORIDE 5 MG/1
5 TABLET ORAL EVERY 8 HOURS
Status: DISCONTINUED | OUTPATIENT
Start: 2023-01-01 | End: 2023-01-01 | Stop reason: HOSPADM

## 2023-01-01 RX ORDER — LANOLIN ALCOHOL/MO/W.PET/CERES
420 CREAM (GRAM) TOPICAL 2 TIMES DAILY
Status: DISCONTINUED | OUTPATIENT
Start: 2023-01-01 | End: 2024-01-01

## 2023-01-01 RX ORDER — OLANZAPINE 5 MG/1
7.5 TABLET, ORALLY DISINTEGRATING ORAL NIGHTLY
Status: DISCONTINUED | OUTPATIENT
Start: 2023-01-01 | End: 2023-01-01 | Stop reason: HOSPADM

## 2023-01-01 RX ORDER — VANCOMYCIN HYDROCHLORIDE 125 MG/1
125 CAPSULE ORAL EVERY OTHER DAY
Status: DISCONTINUED | OUTPATIENT
Start: 2024-11-10 | End: 2023-01-01 | Stop reason: HOSPADM

## 2023-01-01 RX ORDER — VANCOMYCIN HYDROCHLORIDE 125 MG/1
125 CAPSULE ORAL 2 TIMES DAILY
Qty: 14 CAPSULE | Refills: 0 | Status: SHIPPED | OUTPATIENT
Start: 2023-01-01 | End: 2023-01-01

## 2023-01-01 RX ORDER — POTASSIUM CHLORIDE 20 MEQ/1
20 TABLET, EXTENDED RELEASE ORAL ONCE
Status: COMPLETED | OUTPATIENT
Start: 2023-01-01 | End: 2023-01-01

## 2023-01-01 RX ORDER — LOPERAMIDE HCL 2 MG
TABLET ORAL
COMMUNITY
Start: 2020-08-24 | End: 2023-01-01 | Stop reason: HOSPADM

## 2023-01-01 RX ORDER — HEPARIN 100 UNIT/ML
10 SYRINGE INTRAVENOUS AS NEEDED
Status: DISCONTINUED | OUTPATIENT
Start: 2023-01-01 | End: 2023-01-01

## 2023-01-01 RX ORDER — DULOXETIN HYDROCHLORIDE 30 MG/1
30 CAPSULE, DELAYED RELEASE ORAL EVERY MORNING
COMMUNITY
Start: 2023-01-01

## 2023-01-01 RX ORDER — LANOLIN ALCOHOL/MO/W.PET/CERES
400 CREAM (GRAM) TOPICAL ONCE
Status: COMPLETED | OUTPATIENT
Start: 2023-01-01 | End: 2023-01-01

## 2023-01-01 RX ADMIN — SODIUM CHLORIDE, POTASSIUM CHLORIDE, SODIUM LACTATE AND CALCIUM CHLORIDE 1000 ML: 600; 310; 30; 20 INJECTION, SOLUTION INTRAVENOUS at 10:39

## 2023-01-01 RX ADMIN — ONDANSETRON HYDROCHLORIDE 4 MG: 4 TABLET, FILM COATED ORAL at 15:50

## 2023-01-01 RX ADMIN — DULOXETINE HYDROCHLORIDE 60 MG: 60 CAPSULE, DELAYED RELEASE ORAL at 20:54

## 2023-01-01 RX ADMIN — POTASSIUM CHLORIDE 20 MEQ: 14.9 INJECTION, SOLUTION INTRAVENOUS at 10:43

## 2023-01-01 RX ADMIN — ONDANSETRON HYDROCHLORIDE 4 MG: 4 TABLET, FILM COATED ORAL at 09:08

## 2023-01-01 RX ADMIN — BACLOFEN 5 MG: 10 TABLET ORAL at 14:31

## 2023-01-01 RX ADMIN — ASPIRIN 81 MG: 81 TABLET, COATED ORAL at 20:48

## 2023-01-01 RX ADMIN — MIDODRINE HYDROCHLORIDE 5 MG: 5 TABLET ORAL at 18:45

## 2023-01-01 RX ADMIN — DULOXETINE HYDROCHLORIDE 30 MG: 30 CAPSULE, DELAYED RELEASE ORAL at 09:08

## 2023-01-01 RX ADMIN — APIXABAN 5 MG: 5 TABLET, FILM COATED ORAL at 09:24

## 2023-01-01 RX ADMIN — PANTOPRAZOLE SODIUM 40 MG: 40 TABLET, DELAYED RELEASE ORAL at 09:24

## 2023-01-01 RX ADMIN — VANCOMYCIN HYDROCHLORIDE 125 MG: 125 CAPSULE ORAL at 09:11

## 2023-01-01 RX ADMIN — APIXABAN 5 MG: 5 TABLET, FILM COATED ORAL at 22:35

## 2023-01-01 RX ADMIN — ROSUVASTATIN CALCIUM 20 MG: 20 TABLET, FILM COATED ORAL at 08:13

## 2023-01-01 RX ADMIN — MIDODRINE HYDROCHLORIDE 5 MG: 5 TABLET ORAL at 10:25

## 2023-01-01 RX ADMIN — APIXABAN 5 MG: 5 TABLET, FILM COATED ORAL at 22:26

## 2023-01-01 RX ADMIN — POTASSIUM CHLORIDE 40 MEQ: 29.8 INJECTION, SOLUTION INTRAVENOUS at 11:26

## 2023-01-01 RX ADMIN — SODIUM CHLORIDE, POTASSIUM CHLORIDE, SODIUM LACTATE AND CALCIUM CHLORIDE 500 ML: 600; 310; 30; 20 INJECTION, SOLUTION INTRAVENOUS at 06:08

## 2023-01-01 RX ADMIN — DULOXETINE HYDROCHLORIDE 60 MG: 60 CAPSULE, DELAYED RELEASE ORAL at 20:33

## 2023-01-01 RX ADMIN — CALCIUM CARBONATE (ANTACID) CHEW TAB 500 MG 500 MG: 500 CHEW TAB at 10:55

## 2023-01-01 RX ADMIN — ONDANSETRON 4 MG: 2 INJECTION INTRAMUSCULAR; INTRAVENOUS at 12:40

## 2023-01-01 RX ADMIN — MAGNESIUM SULFATE HEPTAHYDRATE 2 G: 40 INJECTION, SOLUTION INTRAVENOUS at 18:39

## 2023-01-01 RX ADMIN — PIPERACILLIN SODIUM AND TAZOBACTAM SODIUM 3.38 G: 3; .375 INJECTION, SOLUTION INTRAVENOUS at 00:02

## 2023-01-01 RX ADMIN — FINASTERIDE 5 MG: 5 TABLET, FILM COATED ORAL at 09:24

## 2023-01-01 RX ADMIN — MIDODRINE HYDROCHLORIDE 5 MG: 5 TABLET ORAL at 02:14

## 2023-01-01 RX ADMIN — SODIUM CHLORIDE 500 ML/HR: 9 INJECTION, SOLUTION INTRAVENOUS at 15:41

## 2023-01-01 RX ADMIN — ROSUVASTATIN CALCIUM 20 MG: 20 TABLET, FILM COATED ORAL at 09:24

## 2023-01-01 RX ADMIN — Medication 400 MG: at 09:11

## 2023-01-01 RX ADMIN — ASPIRIN 81 MG: 81 TABLET, COATED ORAL at 09:00

## 2023-01-01 RX ADMIN — CALCIUM CARBONATE (ANTACID) CHEW TAB 500 MG 500 MG: 500 CHEW TAB at 17:18

## 2023-01-01 RX ADMIN — VANCOMYCIN HYDROCHLORIDE 125 MG: 125 CAPSULE ORAL at 22:26

## 2023-01-01 RX ADMIN — LEVOTHYROXINE SODIUM 100 MCG: 100 TABLET ORAL at 06:04

## 2023-01-01 RX ADMIN — APIXABAN 5 MG: 5 TABLET, FILM COATED ORAL at 06:39

## 2023-01-01 RX ADMIN — PIPERACILLIN SODIUM AND TAZOBACTAM SODIUM 3.38 G: 3; .375 INJECTION, SOLUTION INTRAVENOUS at 20:44

## 2023-01-01 RX ADMIN — ONDANSETRON 4 MG: 2 INJECTION INTRAMUSCULAR; INTRAVENOUS at 20:43

## 2023-01-01 RX ADMIN — VANCOMYCIN HYDROCHLORIDE 125 MG: 125 CAPSULE ORAL at 13:53

## 2023-01-01 RX ADMIN — MIDODRINE HYDROCHLORIDE 5 MG: 5 TABLET ORAL at 20:32

## 2023-01-01 RX ADMIN — MIDODRINE HYDROCHLORIDE 5 MG: 5 TABLET ORAL at 01:54

## 2023-01-01 RX ADMIN — HEPARIN 500 UNITS: 100 SYRINGE at 16:46

## 2023-01-01 RX ADMIN — MIDODRINE HYDROCHLORIDE 5 MG: 5 TABLET ORAL at 01:10

## 2023-01-01 RX ADMIN — VANCOMYCIN HYDROCHLORIDE 125 MG: 125 CAPSULE ORAL at 13:00

## 2023-01-01 RX ADMIN — SODIUM CHLORIDE, SODIUM LACTATE, POTASSIUM CHLORIDE, AND CALCIUM CHLORIDE 1000 ML: 600; 310; 30; 20 INJECTION, SOLUTION INTRAVENOUS at 19:00

## 2023-01-01 RX ADMIN — PIPERACILLIN SODIUM AND TAZOBACTAM SODIUM 3.38 G: 3; .375 INJECTION, SOLUTION INTRAVENOUS at 02:14

## 2023-01-01 RX ADMIN — LEVOTHYROXINE SODIUM 100 MCG: 100 TABLET ORAL at 09:08

## 2023-01-01 RX ADMIN — FAMOTIDINE 20 MG: 20 TABLET, FILM COATED ORAL at 20:33

## 2023-01-01 RX ADMIN — MIDODRINE HYDROCHLORIDE 5 MG: 5 TABLET ORAL at 20:59

## 2023-01-01 RX ADMIN — SODIUM CHLORIDE 1000 ML: 9 INJECTION, SOLUTION INTRAVENOUS at 13:40

## 2023-01-01 RX ADMIN — FINASTERIDE 5 MG: 5 TABLET, FILM COATED ORAL at 08:14

## 2023-01-01 RX ADMIN — APIXABAN 5 MG: 5 TABLET, FILM COATED ORAL at 22:00

## 2023-01-01 RX ADMIN — OLANZAPINE 5 MG: 5 TABLET, ORALLY DISINTEGRATING ORAL at 22:36

## 2023-01-01 RX ADMIN — VANCOMYCIN HYDROCHLORIDE 125 MG: 125 CAPSULE ORAL at 16:13

## 2023-01-01 RX ADMIN — MIDODRINE HYDROCHLORIDE 5 MG: 5 TABLET ORAL at 02:17

## 2023-01-01 RX ADMIN — DULOXETINE HYDROCHLORIDE 60 MG: 60 CAPSULE, DELAYED RELEASE ORAL at 10:00

## 2023-01-01 RX ADMIN — MIDODRINE HYDROCHLORIDE 5 MG: 5 TABLET ORAL at 11:45

## 2023-01-01 RX ADMIN — FINASTERIDE 5 MG: 5 TABLET, FILM COATED ORAL at 09:11

## 2023-01-01 RX ADMIN — MAGNESIUM SULFATE HEPTAHYDRATE 2 G: 40 INJECTION, SOLUTION INTRAVENOUS at 13:04

## 2023-01-01 RX ADMIN — CALCIUM CARBONATE (ANTACID) CHEW TAB 500 MG 500 MG: 500 CHEW TAB at 20:13

## 2023-01-01 RX ADMIN — OLANZAPINE 5 MG: 5 TABLET, ORALLY DISINTEGRATING ORAL at 20:33

## 2023-01-01 RX ADMIN — PIPERACILLIN SODIUM AND TAZOBACTAM SODIUM 3.38 G: 3; .375 INJECTION, SOLUTION INTRAVENOUS at 12:13

## 2023-01-01 RX ADMIN — LEVOTHYROXINE SODIUM 125 MCG: 100 TABLET ORAL at 06:39

## 2023-01-01 RX ADMIN — LOPERAMIDE HYDROCHLORIDE 2 MG: 2 CAPSULE ORAL at 17:15

## 2023-01-01 RX ADMIN — PIPERACILLIN SODIUM AND TAZOBACTAM SODIUM 3.38 G: 3; .375 INJECTION, SOLUTION INTRAVENOUS at 17:25

## 2023-01-01 RX ADMIN — VANCOMYCIN HYDROCHLORIDE 125 MG: 125 CAPSULE ORAL at 16:46

## 2023-01-01 RX ADMIN — PIPERACILLIN SODIUM AND TAZOBACTAM SODIUM 3.38 G: 3; .375 INJECTION, SOLUTION INTRAVENOUS at 05:35

## 2023-01-01 RX ADMIN — VANCOMYCIN HYDROCHLORIDE 1500 MG: 5 INJECTION, POWDER, LYOPHILIZED, FOR SOLUTION INTRAVENOUS at 23:13

## 2023-01-01 RX ADMIN — VANCOMYCIN HYDROCHLORIDE 125 MG: 125 CAPSULE ORAL at 06:23

## 2023-01-01 RX ADMIN — LOPERAMIDE HYDROCHLORIDE 2 MG: 2 CAPSULE ORAL at 20:09

## 2023-01-01 RX ADMIN — SODIUM CHLORIDE, POTASSIUM CHLORIDE, SODIUM LACTATE AND CALCIUM CHLORIDE 1000 ML: 600; 310; 30; 20 INJECTION, SOLUTION INTRAVENOUS at 09:21

## 2023-01-01 RX ADMIN — ONDANSETRON 4 MG: 2 INJECTION INTRAMUSCULAR; INTRAVENOUS at 08:12

## 2023-01-01 RX ADMIN — ASPIRIN 81 MG: 81 TABLET, COATED ORAL at 10:00

## 2023-01-01 RX ADMIN — MIDODRINE HYDROCHLORIDE 5 MG: 5 TABLET ORAL at 08:13

## 2023-01-01 RX ADMIN — PIPERACILLIN SODIUM AND TAZOBACTAM SODIUM 3.38 G: 3; .375 INJECTION, SOLUTION INTRAVENOUS at 09:09

## 2023-01-01 RX ADMIN — ONDANSETRON 4 MG: 2 INJECTION INTRAMUSCULAR; INTRAVENOUS at 09:35

## 2023-01-01 RX ADMIN — LEVOTHYROXINE SODIUM 100 MCG: 100 TABLET ORAL at 08:13

## 2023-01-01 RX ADMIN — LORAZEPAM 0.5 MG: 0.5 TABLET ORAL at 23:25

## 2023-01-01 RX ADMIN — ASPIRIN 81 MG: 81 TABLET, COATED ORAL at 08:40

## 2023-01-01 RX ADMIN — FINASTERIDE 5 MG: 5 TABLET, FILM COATED ORAL at 20:48

## 2023-01-01 RX ADMIN — VANCOMYCIN HYDROCHLORIDE 1000 MG: 1 INJECTION, SOLUTION INTRAVENOUS at 09:35

## 2023-01-01 RX ADMIN — FAMOTIDINE 20 MG: 20 TABLET, FILM COATED ORAL at 09:34

## 2023-01-01 RX ADMIN — PANTOPRAZOLE SODIUM 40 MG: 40 TABLET, DELAYED RELEASE ORAL at 22:26

## 2023-01-01 RX ADMIN — PIPERACILLIN SODIUM AND TAZOBACTAM SODIUM 3.38 G: 3; .375 INJECTION, SOLUTION INTRAVENOUS at 20:18

## 2023-01-01 RX ADMIN — APIXABAN 5 MG: 5 TABLET, FILM COATED ORAL at 05:17

## 2023-01-01 RX ADMIN — DULOXETINE HYDROCHLORIDE 60 MG: 60 CAPSULE, DELAYED RELEASE ORAL at 22:36

## 2023-01-01 RX ADMIN — DULOXETINE HYDROCHLORIDE 60 MG: 60 CAPSULE, DELAYED RELEASE ORAL at 22:27

## 2023-01-01 RX ADMIN — DULOXETINE HYDROCHLORIDE 60 MG: 60 CAPSULE, DELAYED RELEASE ORAL at 09:11

## 2023-01-01 RX ADMIN — OLANZAPINE 5 MG: 5 TABLET, ORALLY DISINTEGRATING ORAL at 01:36

## 2023-01-01 RX ADMIN — OLANZAPINE 5 MG: 5 TABLET, ORALLY DISINTEGRATING ORAL at 22:27

## 2023-01-01 RX ADMIN — SODIUM CHLORIDE 1000 ML: 9 INJECTION, SOLUTION INTRAVENOUS at 10:08

## 2023-01-01 RX ADMIN — LOPERAMIDE HYDROCHLORIDE 2 MG: 2 CAPSULE ORAL at 11:00

## 2023-01-01 RX ADMIN — ASPIRIN 81 MG: 81 TABLET, COATED ORAL at 09:08

## 2023-01-01 RX ADMIN — APIXABAN 5 MG: 5 TABLET, FILM COATED ORAL at 20:31

## 2023-01-01 RX ADMIN — VANCOMYCIN HYDROCHLORIDE 1000 MG: 1 INJECTION, SOLUTION INTRAVENOUS at 10:19

## 2023-01-01 RX ADMIN — VANCOMYCIN HYDROCHLORIDE 125 MG: 125 CAPSULE ORAL at 12:52

## 2023-01-01 RX ADMIN — ASPIRIN 81 MG: 81 TABLET, COATED ORAL at 09:11

## 2023-01-01 RX ADMIN — ROSUVASTATIN CALCIUM 20 MG: 20 TABLET, FILM COATED ORAL at 09:12

## 2023-01-01 RX ADMIN — FINASTERIDE 5 MG: 5 TABLET, FILM COATED ORAL at 09:08

## 2023-01-01 RX ADMIN — VANCOMYCIN HYDROCHLORIDE 125 MG: 125 CAPSULE ORAL at 22:36

## 2023-01-01 RX ADMIN — MAGNESIUM SULFATE HEPTAHYDRATE 2 G: 40 INJECTION, SOLUTION INTRAVENOUS at 10:43

## 2023-01-01 RX ADMIN — LEVOTHYROXINE SODIUM 100 MCG: 100 TABLET ORAL at 20:48

## 2023-01-01 RX ADMIN — VANCOMYCIN HYDROCHLORIDE 125 MG: 125 CAPSULE ORAL at 08:12

## 2023-01-01 RX ADMIN — DULOXETINE HYDROCHLORIDE 60 MG: 60 CAPSULE, DELAYED RELEASE ORAL at 20:16

## 2023-01-01 RX ADMIN — LOPERAMIDE HYDROCHLORIDE 2 MG: 2 CAPSULE ORAL at 22:00

## 2023-01-01 RX ADMIN — FINASTERIDE 5 MG: 5 TABLET, FILM COATED ORAL at 10:00

## 2023-01-01 RX ADMIN — VANCOMYCIN HYDROCHLORIDE 125 MG: 125 CAPSULE ORAL at 20:46

## 2023-01-01 RX ADMIN — LEVOTHYROXINE SODIUM 125 MCG: 50 TABLET ORAL at 10:05

## 2023-01-01 RX ADMIN — VANCOMYCIN HYDROCHLORIDE 125 MG: 125 CAPSULE ORAL at 09:22

## 2023-01-01 RX ADMIN — PANTOPRAZOLE SODIUM 40 MG: 40 TABLET, DELAYED RELEASE ORAL at 08:14

## 2023-01-01 RX ADMIN — APIXABAN 5 MG: 5 TABLET, FILM COATED ORAL at 08:13

## 2023-01-01 RX ADMIN — ONDANSETRON 4 MG: 2 INJECTION INTRAMUSCULAR; INTRAVENOUS at 09:22

## 2023-01-01 RX ADMIN — ONDANSETRON HYDROCHLORIDE 4 MG: 4 TABLET, FILM COATED ORAL at 20:54

## 2023-01-01 RX ADMIN — BACLOFEN 5 MG: 10 TABLET ORAL at 20:43

## 2023-01-01 RX ADMIN — DULOXETINE HYDROCHLORIDE 60 MG: 60 CAPSULE, DELAYED RELEASE ORAL at 08:13

## 2023-01-01 RX ADMIN — ROSUVASTATIN CALCIUM 20 MG: 20 TABLET, FILM COATED ORAL at 09:33

## 2023-01-01 RX ADMIN — DULOXETINE HYDROCHLORIDE 60 MG: 60 CAPSULE, DELAYED RELEASE ORAL at 09:24

## 2023-01-01 RX ADMIN — VANCOMYCIN HYDROCHLORIDE 125 MG: 125 CAPSULE ORAL at 08:14

## 2023-01-01 RX ADMIN — ONDANSETRON 4 MG: 2 INJECTION INTRAMUSCULAR; INTRAVENOUS at 05:08

## 2023-01-01 RX ADMIN — VANCOMYCIN HYDROCHLORIDE 125 MG: 125 CAPSULE ORAL at 20:54

## 2023-01-01 RX ADMIN — VANCOMYCIN HYDROCHLORIDE 125 MG: 125 CAPSULE ORAL at 20:09

## 2023-01-01 RX ADMIN — BACLOFEN 5 MG: 10 TABLET ORAL at 09:25

## 2023-01-01 RX ADMIN — POTASSIUM CHLORIDE 40 MEQ: 1500 TABLET, EXTENDED RELEASE ORAL at 18:39

## 2023-01-01 RX ADMIN — PANTOPRAZOLE SODIUM 40 MG: 40 TABLET, DELAYED RELEASE ORAL at 10:00

## 2023-01-01 RX ADMIN — OLANZAPINE 5 MG: 5 TABLET, ORALLY DISINTEGRATING ORAL at 20:42

## 2023-01-01 RX ADMIN — ONDANSETRON 4 MG: 2 INJECTION INTRAMUSCULAR; INTRAVENOUS at 10:55

## 2023-01-01 RX ADMIN — FINASTERIDE 5 MG: 5 TABLET, FILM COATED ORAL at 09:22

## 2023-01-01 RX ADMIN — FAMOTIDINE 20 MG: 20 TABLET, FILM COATED ORAL at 20:43

## 2023-01-01 RX ADMIN — ONDANSETRON HYDROCHLORIDE 4 MG: 4 TABLET, FILM COATED ORAL at 23:38

## 2023-01-01 RX ADMIN — SODIUM CHLORIDE, POTASSIUM CHLORIDE, SODIUM LACTATE AND CALCIUM CHLORIDE 1000 ML: 600; 310; 30; 20 INJECTION, SOLUTION INTRAVENOUS at 14:25

## 2023-01-01 RX ADMIN — LEVOTHYROXINE SODIUM 100 MCG: 100 TABLET ORAL at 09:12

## 2023-01-01 RX ADMIN — DULOXETINE HYDROCHLORIDE 60 MG: 60 CAPSULE, DELAYED RELEASE ORAL at 20:48

## 2023-01-01 RX ADMIN — VANCOMYCIN HYDROCHLORIDE 125 MG: 125 CAPSULE ORAL at 20:32

## 2023-01-01 RX ADMIN — OLANZAPINE 5 MG: 5 TABLET, FILM COATED ORAL at 20:09

## 2023-01-01 RX ADMIN — PIPERACILLIN SODIUM AND TAZOBACTAM SODIUM 3.38 G: 3; .375 INJECTION, SOLUTION INTRAVENOUS at 01:56

## 2023-01-01 RX ADMIN — PANTOPRAZOLE SODIUM 40 MG: 40 TABLET, DELAYED RELEASE ORAL at 08:12

## 2023-01-01 RX ADMIN — PROCHLORPERAZINE EDISYLATE 10 MG: 5 INJECTION INTRAMUSCULAR; INTRAVENOUS at 12:52

## 2023-01-01 RX ADMIN — APIXABAN 5 MG: 5 TABLET, FILM COATED ORAL at 17:15

## 2023-01-01 RX ADMIN — IOHEXOL 75 ML: 350 INJECTION, SOLUTION INTRAVENOUS at 12:25

## 2023-01-01 RX ADMIN — MAGNESIUM SULFATE 4 G: 4 INJECTION INTRAVENOUS at 11:54

## 2023-01-01 RX ADMIN — PANTOPRAZOLE SODIUM 40 MG: 40 TABLET, DELAYED RELEASE ORAL at 09:11

## 2023-01-01 RX ADMIN — VANCOMYCIN HYDROCHLORIDE 125 MG: 125 CAPSULE ORAL at 13:04

## 2023-01-01 RX ADMIN — ASPIRIN 81 MG: 81 TABLET, COATED ORAL at 14:33

## 2023-01-01 RX ADMIN — ONDANSETRON HYDROCHLORIDE 4 MG: 2 INJECTION, SOLUTION INTRAVENOUS at 12:40

## 2023-01-01 RX ADMIN — ROSUVASTATIN CALCIUM 20 MG: 20 TABLET, FILM COATED ORAL at 10:00

## 2023-01-01 RX ADMIN — AZITHROMYCIN 500 MG: 500 INJECTION, POWDER, LYOPHILIZED, FOR SOLUTION INTRAVENOUS at 11:21

## 2023-01-01 RX ADMIN — CALCIUM CARBONATE (ANTACID) CHEW TAB 500 MG 500 MG: 500 CHEW TAB at 02:17

## 2023-01-01 RX ADMIN — PANTOPRAZOLE SODIUM 40 MG: 40 TABLET, DELAYED RELEASE ORAL at 08:13

## 2023-01-01 RX ADMIN — FINASTERIDE 5 MG: 5 TABLET, FILM COATED ORAL at 09:34

## 2023-01-01 RX ADMIN — VANCOMYCIN HYDROCHLORIDE 125 MG: 125 CAPSULE ORAL at 20:34

## 2023-01-01 RX ADMIN — APIXABAN 5 MG: 5 TABLET, FILM COATED ORAL at 09:12

## 2023-01-01 RX ADMIN — MIDODRINE HYDROCHLORIDE 5 MG: 5 TABLET ORAL at 17:10

## 2023-01-01 RX ADMIN — FAMOTIDINE 20 MG: 20 TABLET, FILM COATED ORAL at 22:26

## 2023-01-01 RX ADMIN — PIPERACILLIN SODIUM AND TAZOBACTAM SODIUM 3.38 G: 3; .375 INJECTION, SOLUTION INTRAVENOUS at 14:31

## 2023-01-01 RX ADMIN — APIXABAN 5 MG: 5 TABLET, FILM COATED ORAL at 20:54

## 2023-01-01 RX ADMIN — CALCIUM CARBONATE (ANTACID) CHEW TAB 500 MG 500 MG: 500 CHEW TAB at 15:50

## 2023-01-01 RX ADMIN — PIPERACILLIN SODIUM AND TAZOBACTAM SODIUM 3.38 G: 3; .375 INJECTION, SOLUTION INTRAVENOUS at 10:40

## 2023-01-01 RX ADMIN — LEVOTHYROXINE SODIUM 100 MCG: 100 TABLET ORAL at 05:35

## 2023-01-01 RX ADMIN — PIPERACILLIN SODIUM AND TAZOBACTAM SODIUM 3.38 G: 3; .375 INJECTION, SOLUTION INTRAVENOUS at 02:31

## 2023-01-01 RX ADMIN — PROCHLORPERAZINE EDISYLATE 10 MG: 5 INJECTION INTRAMUSCULAR; INTRAVENOUS at 00:10

## 2023-01-01 RX ADMIN — BACLOFEN 5 MG: 10 TABLET ORAL at 08:14

## 2023-01-01 RX ADMIN — DULOXETINE HYDROCHLORIDE 60 MG: 60 CAPSULE, DELAYED RELEASE ORAL at 20:43

## 2023-01-01 RX ADMIN — MIDODRINE HYDROCHLORIDE 5 MG: 5 TABLET ORAL at 02:12

## 2023-01-01 RX ADMIN — APIXABAN 5 MG: 5 TABLET, FILM COATED ORAL at 14:33

## 2023-01-01 RX ADMIN — PIPERACILLIN SODIUM AND TAZOBACTAM SODIUM 3.38 G: 3; .375 INJECTION, SOLUTION INTRAVENOUS at 09:34

## 2023-01-01 RX ADMIN — PROCHLORPERAZINE EDISYLATE 10 MG: 5 INJECTION INTRAMUSCULAR; INTRAVENOUS at 00:53

## 2023-01-01 RX ADMIN — FAMOTIDINE 20 MG: 20 TABLET, FILM COATED ORAL at 08:13

## 2023-01-01 RX ADMIN — HEPARIN 1000 UNITS: 100 SYRINGE at 12:21

## 2023-01-01 RX ADMIN — ALTEPLASE 1 MG: 2.2 INJECTION, POWDER, LYOPHILIZED, FOR SOLUTION INTRAVENOUS at 04:08

## 2023-01-01 RX ADMIN — APIXABAN 5 MG: 5 TABLET, FILM COATED ORAL at 09:08

## 2023-01-01 RX ADMIN — ROSUVASTATIN 20 MG: 10 TABLET, FILM COATED ORAL at 20:16

## 2023-01-01 RX ADMIN — ONDANSETRON HYDROCHLORIDE 4 MG: 4 TABLET, FILM COATED ORAL at 23:25

## 2023-01-01 RX ADMIN — LEVOTHYROXINE SODIUM 100 MCG: 100 TABLET ORAL at 05:38

## 2023-01-01 RX ADMIN — PREGABALIN 200 MG: 25 CAPSULE ORAL at 20:15

## 2023-01-01 RX ADMIN — VANCOMYCIN HYDROCHLORIDE 125 MG: 125 CAPSULE ORAL at 17:00

## 2023-01-01 RX ADMIN — POTASSIUM CHLORIDE 40 MEQ: 1500 TABLET, EXTENDED RELEASE ORAL at 13:08

## 2023-01-01 RX ADMIN — PANTOPRAZOLE SODIUM 40 MG: 40 TABLET, DELAYED RELEASE ORAL at 22:35

## 2023-01-01 RX ADMIN — ROSUVASTATIN CALCIUM 20 MG: 20 TABLET, FILM COATED ORAL at 10:05

## 2023-01-01 RX ADMIN — ONDANSETRON 4 MG: 2 INJECTION INTRAMUSCULAR; INTRAVENOUS at 12:11

## 2023-01-01 RX ADMIN — OLANZAPINE 5 MG: 5 TABLET, ORALLY DISINTEGRATING ORAL at 20:54

## 2023-01-01 RX ADMIN — VANCOMYCIN HYDROCHLORIDE 125 MG: 125 CAPSULE ORAL at 13:17

## 2023-01-01 RX ADMIN — LOPERAMIDE HYDROCHLORIDE 2 MG: 2 CAPSULE ORAL at 09:21

## 2023-01-01 RX ADMIN — VANCOMYCIN HYDROCHLORIDE 750 MG: 750 INJECTION, SOLUTION INTRAVENOUS at 05:10

## 2023-01-01 RX ADMIN — VANCOMYCIN HYDROCHLORIDE 1500 MG: 1.5 INJECTION, POWDER, LYOPHILIZED, FOR SOLUTION INTRAVENOUS at 12:52

## 2023-01-01 RX ADMIN — DULOXETINE HYDROCHLORIDE 60 MG: 60 CAPSULE, DELAYED RELEASE ORAL at 09:22

## 2023-01-01 RX ADMIN — FAMOTIDINE 20 MG: 20 TABLET, FILM COATED ORAL at 22:36

## 2023-01-01 RX ADMIN — PIPERACILLIN SODIUM AND TAZOBACTAM SODIUM 3.38 G: 3; .375 INJECTION, SOLUTION INTRAVENOUS at 22:35

## 2023-01-01 RX ADMIN — PANTOPRAZOLE SODIUM 40 MG: 40 TABLET, DELAYED RELEASE ORAL at 09:29

## 2023-01-01 RX ADMIN — LORAZEPAM 0.5 MG: 0.5 TABLET ORAL at 20:59

## 2023-01-01 RX ADMIN — LOPERAMIDE HYDROCHLORIDE 2 MG: 2 CAPSULE ORAL at 08:12

## 2023-01-01 RX ADMIN — ONDANSETRON 4 MG: 2 INJECTION INTRAMUSCULAR; INTRAVENOUS at 20:22

## 2023-01-01 RX ADMIN — APIXABAN 5 MG: 5 TABLET, FILM COATED ORAL at 08:14

## 2023-01-01 RX ADMIN — MIDODRINE HYDROCHLORIDE 5 MG: 5 TABLET ORAL at 03:17

## 2023-01-01 RX ADMIN — BACLOFEN 5 MG: 10 TABLET ORAL at 09:34

## 2023-01-01 RX ADMIN — DULOXETINE HYDROCHLORIDE 60 MG: 60 CAPSULE, DELAYED RELEASE ORAL at 08:40

## 2023-01-01 RX ADMIN — FINASTERIDE 5 MG: 5 TABLET, FILM COATED ORAL at 08:13

## 2023-01-01 RX ADMIN — PIPERACILLIN SODIUM AND TAZOBACTAM SODIUM 3.38 G: 3; .375 INJECTION, SOLUTION INTRAVENOUS at 13:17

## 2023-01-01 RX ADMIN — APIXABAN 5 MG: 5 TABLET, FILM COATED ORAL at 18:28

## 2023-01-01 RX ADMIN — CALCIUM CARBONATE (ANTACID) CHEW TAB 500 MG 500 MG: 500 CHEW TAB at 10:00

## 2023-01-01 RX ADMIN — VANCOMYCIN HYDROCHLORIDE 125 MG: 125 CAPSULE ORAL at 10:00

## 2023-01-01 RX ADMIN — LEVOTHYROXINE SODIUM 100 MCG: 100 TABLET ORAL at 05:08

## 2023-01-01 RX ADMIN — APIXABAN 5 MG: 5 TABLET, FILM COATED ORAL at 20:33

## 2023-01-01 RX ADMIN — PANTOPRAZOLE SODIUM 40 MG: 40 TABLET, DELAYED RELEASE ORAL at 09:08

## 2023-01-01 RX ADMIN — FINASTERIDE 5 MG: 5 TABLET, FILM COATED ORAL at 08:12

## 2023-01-01 RX ADMIN — PANTOPRAZOLE SODIUM 40 MG: 20 TABLET, DELAYED RELEASE ORAL at 14:18

## 2023-01-01 RX ADMIN — PIPERACILLIN SODIUM AND TAZOBACTAM SODIUM 3.38 G: 3; .375 INJECTION, SOLUTION INTRAVENOUS at 14:20

## 2023-01-01 RX ADMIN — ROSUVASTATIN CALCIUM 20 MG: 20 TABLET, FILM COATED ORAL at 20:48

## 2023-01-01 RX ADMIN — VANCOMYCIN HYDROCHLORIDE 125 MG: 125 CAPSULE ORAL at 20:43

## 2023-01-01 RX ADMIN — LORAZEPAM 0.5 MG: 0.5 TABLET ORAL at 23:15

## 2023-01-01 RX ADMIN — VANCOMYCIN HYDROCHLORIDE 125 MG: 125 CAPSULE ORAL at 09:34

## 2023-01-01 RX ADMIN — ASPIRIN 81 MG: 81 TABLET, COATED ORAL at 08:12

## 2023-01-01 RX ADMIN — MIDODRINE HYDROCHLORIDE 5 MG: 5 TABLET ORAL at 17:51

## 2023-01-01 RX ADMIN — POTASSIUM CHLORIDE 20 MEQ: 1500 TABLET, EXTENDED RELEASE ORAL at 13:04

## 2023-01-01 RX ADMIN — ALTEPLASE 1 MG: 2.2 INJECTION, POWDER, LYOPHILIZED, FOR SOLUTION INTRAVENOUS at 04:09

## 2023-01-01 RX ADMIN — PANTOPRAZOLE SODIUM 40 MG: 40 TABLET, DELAYED RELEASE ORAL at 20:33

## 2023-01-01 RX ADMIN — APIXABAN 5 MG: 5 TABLET, FILM COATED ORAL at 20:43

## 2023-01-01 RX ADMIN — ONDANSETRON 4 MG: 2 INJECTION INTRAMUSCULAR; INTRAVENOUS at 22:39

## 2023-01-01 RX ADMIN — SODIUM CHLORIDE, POTASSIUM CHLORIDE, SODIUM LACTATE AND CALCIUM CHLORIDE 500 ML: 600; 310; 30; 20 INJECTION, SOLUTION INTRAVENOUS at 16:15

## 2023-01-01 RX ADMIN — VANCOMYCIN HYDROCHLORIDE 1500 MG: 1.5 INJECTION, POWDER, LYOPHILIZED, FOR SOLUTION INTRAVENOUS at 10:26

## 2023-01-01 RX ADMIN — VANCOMYCIN HYDROCHLORIDE 125 MG: 125 CAPSULE ORAL at 20:22

## 2023-01-01 RX ADMIN — VANCOMYCIN HYDROCHLORIDE 125 MG: 125 CAPSULE ORAL at 06:39

## 2023-01-01 RX ADMIN — BACLOFEN 5 MG: 10 TABLET ORAL at 22:27

## 2023-01-01 RX ADMIN — VANCOMYCIN HYDROCHLORIDE 125 MG: 125 CAPSULE ORAL at 17:25

## 2023-01-01 RX ADMIN — POTASSIUM CHLORIDE 40 MEQ: 1500 TABLET, EXTENDED RELEASE ORAL at 13:24

## 2023-01-01 RX ADMIN — SODIUM CHLORIDE, POTASSIUM CHLORIDE, SODIUM LACTATE AND CALCIUM CHLORIDE 500 ML: 600; 310; 30; 20 INJECTION, SOLUTION INTRAVENOUS at 23:43

## 2023-01-01 RX ADMIN — PANTOPRAZOLE SODIUM 40 MG: 40 TABLET, DELAYED RELEASE ORAL at 20:43

## 2023-01-01 RX ADMIN — SODIUM CHLORIDE, POTASSIUM CHLORIDE, SODIUM LACTATE AND CALCIUM CHLORIDE 2000 ML: 600; 310; 30; 20 INJECTION, SOLUTION INTRAVENOUS at 20:23

## 2023-01-01 RX ADMIN — ONDANSETRON 4 MG: 2 INJECTION INTRAMUSCULAR; INTRAVENOUS at 11:59

## 2023-01-01 RX ADMIN — DULOXETINE HYDROCHLORIDE 60 MG: 60 CAPSULE, DELAYED RELEASE ORAL at 20:22

## 2023-01-01 RX ADMIN — MIDODRINE HYDROCHLORIDE 5 MG: 5 TABLET ORAL at 17:54

## 2023-01-01 RX ADMIN — PIPERACILLIN SODIUM AND TAZOBACTAM SODIUM 3.38 G: 3; .375 INJECTION, SOLUTION INTRAVENOUS at 20:33

## 2023-01-01 RX ADMIN — PROCHLORPERAZINE MALEATE 10 MG: 10 TABLET ORAL at 17:22

## 2023-01-01 RX ADMIN — POTASSIUM CHLORIDE 20 MEQ: 1.5 FOR SOLUTION ORAL at 11:26

## 2023-01-01 RX ADMIN — APIXABAN 5 MG: 5 TABLET, FILM COATED ORAL at 10:00

## 2023-01-01 RX ADMIN — VANCOMYCIN HYDROCHLORIDE 125 MG: 125 CAPSULE ORAL at 11:45

## 2023-01-01 RX ADMIN — VANCOMYCIN HYDROCHLORIDE 125 MG: 125 CAPSULE ORAL at 17:15

## 2023-01-01 RX ADMIN — POTASSIUM CHLORIDE 40 MEQ: 1500 TABLET, EXTENDED RELEASE ORAL at 12:30

## 2023-01-01 RX ADMIN — ALTEPLASE 1 MG: 2.2 INJECTION, POWDER, LYOPHILIZED, FOR SOLUTION INTRAVENOUS at 02:13

## 2023-01-01 RX ADMIN — LORAZEPAM 0.5 MG: 0.5 TABLET ORAL at 01:36

## 2023-01-01 RX ADMIN — FINASTERIDE 5 MG: 5 TABLET, FILM COATED ORAL at 14:33

## 2023-01-01 RX ADMIN — PANTOPRAZOLE SODIUM 40 MG: 40 TABLET, DELAYED RELEASE ORAL at 14:33

## 2023-01-01 RX ADMIN — BACLOFEN 5 MG: 10 TABLET ORAL at 20:32

## 2023-01-01 RX ADMIN — FAMOTIDINE 20 MG: 20 TABLET, FILM COATED ORAL at 09:25

## 2023-01-01 RX ADMIN — BACLOFEN 5 MG: 10 TABLET ORAL at 08:13

## 2023-01-01 RX ADMIN — FAMOTIDINE 20 MG: 20 TABLET, FILM COATED ORAL at 20:31

## 2023-01-01 RX ADMIN — VANCOMYCIN HYDROCHLORIDE 125 MG: 125 CAPSULE ORAL at 17:10

## 2023-01-01 RX ADMIN — BACLOFEN 5 MG: 10 TABLET ORAL at 22:35

## 2023-01-01 RX ADMIN — VANCOMYCIN HYDROCHLORIDE 125 MG: 125 CAPSULE ORAL at 17:53

## 2023-01-01 RX ADMIN — ROSUVASTATIN CALCIUM 20 MG: 20 TABLET, FILM COATED ORAL at 08:14

## 2023-01-01 RX ADMIN — CALCIUM CARBONATE (ANTACID) CHEW TAB 500 MG 500 MG: 500 CHEW TAB at 22:51

## 2023-01-01 RX ADMIN — IOHEXOL 80 ML: 350 INJECTION, SOLUTION INTRAVENOUS at 10:20

## 2023-01-01 RX ADMIN — OLANZAPINE 5 MG: 5 TABLET, FILM COATED ORAL at 20:46

## 2023-01-01 RX ADMIN — POTASSIUM CHLORIDE 20 MEQ: 14.9 INJECTION, SOLUTION INTRAVENOUS at 12:33

## 2023-01-01 RX ADMIN — PIPERACILLIN SODIUM AND TAZOBACTAM SODIUM 3.38 G: 3; .375 INJECTION, SOLUTION INTRAVENOUS at 11:35

## 2023-01-01 RX ADMIN — DULOXETINE HYDROCHLORIDE 60 MG: 60 CAPSULE, DELAYED RELEASE ORAL at 08:12

## 2023-01-01 RX ADMIN — MAGNESIUM SULFATE HEPTAHYDRATE 2 G: 40 INJECTION, SOLUTION INTRAVENOUS at 11:26

## 2023-01-01 RX ADMIN — DULOXETINE HYDROCHLORIDE 60 MG: 60 CAPSULE, DELAYED RELEASE ORAL at 09:34

## 2023-01-01 RX ADMIN — PIPERACILLIN SODIUM AND TAZOBACTAM SODIUM 3.38 G: 3; .375 INJECTION, SOLUTION INTRAVENOUS at 08:11

## 2023-01-01 RX ADMIN — ROSUVASTATIN CALCIUM 20 MG: 20 TABLET, FILM COATED ORAL at 14:33

## 2023-01-01 RX ADMIN — VANCOMYCIN HYDROCHLORIDE 125 MG: 125 CAPSULE ORAL at 17:46

## 2023-01-01 RX ADMIN — BACLOFEN 5 MG: 10 TABLET ORAL at 20:15

## 2023-01-01 RX ADMIN — VANCOMYCIN HYDROCHLORIDE 125 MG: 125 CAPSULE ORAL at 14:44

## 2023-01-01 RX ADMIN — ASPIRIN 81 MG: 81 TABLET, COATED ORAL at 08:13

## 2023-01-01 RX ADMIN — MIDODRINE HYDROCHLORIDE 5 MG: 5 TABLET ORAL at 01:56

## 2023-01-01 RX ADMIN — ASPIRIN 81 MG: 81 TABLET, COATED ORAL at 09:34

## 2023-01-01 RX ADMIN — FAMOTIDINE 20 MG: 20 TABLET, FILM COATED ORAL at 08:14

## 2023-01-01 RX ADMIN — LEVOTHYROXINE SODIUM 100 MCG: 100 TABLET ORAL at 10:00

## 2023-01-01 RX ADMIN — POTASSIUM CHLORIDE 20 MEQ: 14.9 INJECTION, SOLUTION INTRAVENOUS at 20:22

## 2023-01-01 RX ADMIN — MAGNESIUM SULFATE HEPTAHYDRATE 2 G: 40 INJECTION, SOLUTION INTRAVENOUS at 13:41

## 2023-01-01 RX ADMIN — PROCHLORPERAZINE EDISYLATE 10 MG: 5 INJECTION INTRAMUSCULAR; INTRAVENOUS at 20:41

## 2023-01-01 RX ADMIN — PIPERACILLIN SODIUM AND TAZOBACTAM SODIUM 3.38 G: 3; .375 INJECTION, SOLUTION INTRAVENOUS at 05:38

## 2023-01-01 RX ADMIN — APIXABAN 5 MG: 5 TABLET, FILM COATED ORAL at 09:34

## 2023-01-01 RX ADMIN — Medication 400 MG: at 20:15

## 2023-01-01 RX ADMIN — MIDODRINE HYDROCHLORIDE 5 MG: 5 TABLET ORAL at 09:35

## 2023-01-01 RX ADMIN — FAMOTIDINE 20 MG: 20 TABLET, FILM COATED ORAL at 09:11

## 2023-01-01 RX ADMIN — MIDODRINE HYDROCHLORIDE 5 MG: 5 TABLET ORAL at 17:26

## 2023-01-01 RX ADMIN — PANTOPRAZOLE SODIUM 40 MG: 40 TABLET, DELAYED RELEASE ORAL at 09:34

## 2023-01-01 RX ADMIN — DULOXETINE HYDROCHLORIDE 60 MG: 60 CAPSULE, DELAYED RELEASE ORAL at 08:14

## 2023-01-01 RX ADMIN — SODIUM CHLORIDE, PRESERVATIVE FREE 500 UNITS: 5 INJECTION INTRAVENOUS at 14:42

## 2023-01-01 RX ADMIN — DULOXETINE HYDROCHLORIDE 60 MG: 60 CAPSULE, DELAYED RELEASE ORAL at 20:31

## 2023-01-01 RX ADMIN — VANCOMYCIN HYDROCHLORIDE 125 MG: 125 CAPSULE ORAL at 09:24

## 2023-01-01 RX ADMIN — APIXABAN 5 MG: 5 TABLET, FILM COATED ORAL at 17:18

## 2023-01-01 RX ADMIN — ROSUVASTATIN CALCIUM 20 MG: 20 TABLET, FILM COATED ORAL at 09:08

## 2023-01-01 RX ADMIN — OLANZAPINE 5 MG: 5 TABLET, ORALLY DISINTEGRATING ORAL at 22:00

## 2023-01-01 RX ADMIN — ASPIRIN 81 MG: 81 TABLET, COATED ORAL at 09:24

## 2023-01-01 SDOH — SOCIAL STABILITY: SOCIAL INSECURITY: ABUSE: ADULT

## 2023-01-01 SDOH — SOCIAL STABILITY: SOCIAL INSECURITY: ARE YOU OR HAVE YOU BEEN THREATENED OR ABUSED PHYSICALLY, EMOTIONALLY, OR SEXUALLY BY ANYONE?: NO

## 2023-01-01 SDOH — SOCIAL STABILITY: SOCIAL INSECURITY: DO YOU FEEL ANYONE HAS EXPLOITED OR TAKEN ADVANTAGE OF YOU FINANCIALLY OR OF YOUR PERSONAL PROPERTY?: NO

## 2023-01-01 SDOH — SOCIAL STABILITY: SOCIAL INSECURITY: DOES ANYONE TRY TO KEEP YOU FROM HAVING/CONTACTING OTHER FRIENDS OR DOING THINGS OUTSIDE YOUR HOME?: NO

## 2023-01-01 SDOH — SOCIAL STABILITY: SOCIAL INSECURITY: HAS ANYONE EVER THREATENED TO HURT YOUR FAMILY OR YOUR PETS?: NO

## 2023-01-01 SDOH — SOCIAL STABILITY: SOCIAL INSECURITY: ARE THERE ANY APPARENT SIGNS OF INJURIES/BEHAVIORS THAT COULD BE RELATED TO ABUSE/NEGLECT?: NO

## 2023-01-01 SDOH — SOCIAL STABILITY: SOCIAL INSECURITY: DO YOU FEEL UNSAFE GOING BACK TO THE PLACE WHERE YOU ARE LIVING?: NO

## 2023-01-01 SDOH — SOCIAL STABILITY: SOCIAL INSECURITY: WERE YOU ABLE TO COMPLETE ALL THE BEHAVIORAL HEALTH SCREENINGS?: YES

## 2023-01-01 SDOH — SOCIAL STABILITY: SOCIAL INSECURITY: HAVE YOU HAD THOUGHTS OF HARMING ANYONE ELSE?: NO

## 2023-01-01 ASSESSMENT — COGNITIVE AND FUNCTIONAL STATUS - GENERAL
MOVING TO AND FROM BED TO CHAIR: A LITTLE
STANDING UP FROM CHAIR USING ARMS: TOTAL
MOBILITY SCORE: 24
WALKING IN HOSPITAL ROOM: TOTAL
STANDING UP FROM CHAIR USING ARMS: A LITTLE
MOVING FROM LYING ON BACK TO SITTING ON SIDE OF FLAT BED WITH BEDRAILS: A LITTLE
DAILY ACTIVITIY SCORE: 24
MOBILITY SCORE: 24
CLIMB 3 TO 5 STEPS WITH RAILING: A LITTLE
WALKING IN HOSPITAL ROOM: A LITTLE
WALKING IN HOSPITAL ROOM: TOTAL
MOVING TO AND FROM BED TO CHAIR: TOTAL
DRESSING REGULAR UPPER BODY CLOTHING: A LITTLE
DAILY ACTIVITIY SCORE: 24
PERSONAL GROOMING: A LITTLE
HELP NEEDED FOR BATHING: A LITTLE
MOBILITY SCORE: 19
DAILY ACTIVITIY SCORE: 22
PATIENT BASELINE BEDBOUND: NO
TURNING FROM BACK TO SIDE WHILE IN FLAT BAD: A LITTLE
DAILY ACTIVITIY SCORE: 20
DAILY ACTIVITIY SCORE: 19
TURNING FROM BACK TO SIDE WHILE IN FLAT BAD: A LITTLE
DAILY ACTIVITIY SCORE: 19
MOBILITY SCORE: 24
MOVING TO AND FROM BED TO CHAIR: A LITTLE
STANDING UP FROM CHAIR USING ARMS: A LITTLE
DAILY ACTIVITIY SCORE: 24
MOBILITY SCORE: 21
HELP NEEDED FOR BATHING: A LITTLE
CLIMB 3 TO 5 STEPS WITH RAILING: A LITTLE
MOBILITY SCORE: 14
STANDING UP FROM CHAIR USING ARMS: A LITTLE
MOBILITY SCORE: 24
MOVING FROM LYING ON BACK TO SITTING ON SIDE OF FLAT BED WITH BEDRAILS: A LITTLE
PERSONAL GROOMING: A LITTLE
HELP NEEDED FOR BATHING: A LITTLE
CLIMB 3 TO 5 STEPS WITH RAILING: A LITTLE
DRESSING REGULAR LOWER BODY CLOTHING: A LITTLE
CLIMB 3 TO 5 STEPS WITH RAILING: A LITTLE
MOBILITY SCORE: 18
TURNING FROM BACK TO SIDE WHILE IN FLAT BAD: A LITTLE
WALKING IN HOSPITAL ROOM: A LITTLE
DRESSING REGULAR UPPER BODY CLOTHING: A LITTLE
MOBILITY SCORE: 20
STANDING UP FROM CHAIR USING ARMS: A LITTLE
HELP NEEDED FOR BATHING: A LITTLE
WALKING IN HOSPITAL ROOM: A LITTLE
TOILETING: A LITTLE
TOILETING: A LITTLE
MOBILITY SCORE: 24
DAILY ACTIVITIY SCORE: 19
DRESSING REGULAR UPPER BODY CLOTHING: A LITTLE
PERSONAL GROOMING: A LITTLE
TOILETING: A LITTLE
WALKING IN HOSPITAL ROOM: A LITTLE
DAILY ACTIVITIY SCORE: 24
MOVING TO AND FROM BED TO CHAIR: A LITTLE
DAILY ACTIVITIY SCORE: 24
WALKING IN HOSPITAL ROOM: A LITTLE
DRESSING REGULAR UPPER BODY CLOTHING: A LITTLE
DAILY ACTIVITIY SCORE: 24
DAILY ACTIVITIY SCORE: 22
DAILY ACTIVITIY SCORE: 24
STANDING UP FROM CHAIR USING ARMS: A LITTLE
TOILETING: A LITTLE
TOILETING: A LITTLE
MOVING TO AND FROM BED TO CHAIR: A LITTLE
STANDING UP FROM CHAIR USING ARMS: A LITTLE
PATIENT BASELINE BEDBOUND: NO
MOBILITY SCORE: 20
TURNING FROM BACK TO SIDE WHILE IN FLAT BAD: A LITTLE
CLIMB 3 TO 5 STEPS WITH RAILING: A LITTLE
STANDING UP FROM CHAIR USING ARMS: A LITTLE
MOBILITY SCORE: 24
HELP NEEDED FOR BATHING: A LITTLE
STANDING UP FROM CHAIR USING ARMS: A LITTLE
DAILY ACTIVITIY SCORE: 24
DRESSING REGULAR LOWER BODY CLOTHING: A LITTLE
DAILY ACTIVITIY SCORE: 24
MOBILITY SCORE: 24
DAILY ACTIVITIY SCORE: 22
DAILY ACTIVITIY SCORE: 24
MOBILITY SCORE: 10
MOVING FROM LYING ON BACK TO SITTING ON SIDE OF FLAT BED WITH BEDRAILS: A LITTLE
CLIMB 3 TO 5 STEPS WITH RAILING: A LITTLE
MOBILITY SCORE: 24
DAILY ACTIVITIY SCORE: 20
WALKING IN HOSPITAL ROOM: A LITTLE
TOILETING: A LITTLE
MOBILITY SCORE: 18
DAILY ACTIVITIY SCORE: 22
PERSONAL GROOMING: A LITTLE
MOBILITY SCORE: 21
PERSONAL GROOMING: A LITTLE
MOBILITY SCORE: 24
TOILETING: A LITTLE
HELP NEEDED FOR BATHING: A LITTLE
TURNING FROM BACK TO SIDE WHILE IN FLAT BAD: A LITTLE
WALKING IN HOSPITAL ROOM: A LITTLE
MOVING FROM LYING ON BACK TO SITTING ON SIDE OF FLAT BED WITH BEDRAILS: A LITTLE
DRESSING REGULAR UPPER BODY CLOTHING: A LITTLE
MOVING FROM LYING ON BACK TO SITTING ON SIDE OF FLAT BED WITH BEDRAILS: A LITTLE
CLIMB 3 TO 5 STEPS WITH RAILING: TOTAL
CLIMB 3 TO 5 STEPS WITH RAILING: TOTAL
TURNING FROM BACK TO SIDE WHILE IN FLAT BAD: A LITTLE
DRESSING REGULAR LOWER BODY CLOTHING: A LITTLE
MOVING TO AND FROM BED TO CHAIR: A LITTLE
STANDING UP FROM CHAIR USING ARMS: A LITTLE
PATIENT BASELINE BEDBOUND: NO
CLIMB 3 TO 5 STEPS WITH RAILING: A LITTLE
STANDING UP FROM CHAIR USING ARMS: A LITTLE
MOBILITY SCORE: 18
MOBILITY SCORE: 23
MOBILITY SCORE: 24
CLIMB 3 TO 5 STEPS WITH RAILING: A LITTLE
HELP NEEDED FOR BATHING: A LITTLE
DAILY ACTIVITIY SCORE: 24
MOVING FROM LYING ON BACK TO SITTING ON SIDE OF FLAT BED WITH BEDRAILS: A LITTLE
MOVING TO AND FROM BED TO CHAIR: A LITTLE
CLIMB 3 TO 5 STEPS WITH RAILING: A LITTLE
TOILETING: A LITTLE
WALKING IN HOSPITAL ROOM: A LITTLE
HELP NEEDED FOR BATHING: A LITTLE
TURNING FROM BACK TO SIDE WHILE IN FLAT BAD: A LITTLE
MOVING TO AND FROM BED TO CHAIR: A LITTLE
WALKING IN HOSPITAL ROOM: A LITTLE
MOBILITY SCORE: 18
HELP NEEDED FOR BATHING: A LITTLE
DAILY ACTIVITIY SCORE: 24
CLIMB 3 TO 5 STEPS WITH RAILING: A LITTLE
TOILETING: A LITTLE
MOBILITY SCORE: 24
MOVING TO AND FROM BED TO CHAIR: A LITTLE

## 2023-01-01 ASSESSMENT — ENCOUNTER SYMPTOMS
VOMITING: 1
FEVER: 0
NAUSEA: 1
ABDOMINAL PAIN: 0
DEPRESSION: 0
CHEST TIGHTNESS: 0
ABDOMINAL DISTENTION: 0
ARTHRALGIAS: 0
DYSURIA: 0
LIGHT-HEADEDNESS: 0
APPETITE CHANGE: 0
CONSTIPATION: 0
DIARRHEA: 1
COLOR CHANGE: 0
SHORTNESS OF BREATH: 0
DYSURIA: 0
RECTAL PAIN: 0
ABDOMINAL PAIN: 0
WOUND: 0
AGITATION: 0
BLOOD IN STOOL: 0
UNEXPECTED WEIGHT CHANGE: 1
HEADACHES: 0
LOSS OF SENSATION IN FEET: 0
DIFFICULTY URINATING: 0
FEVER: 0
COUGH: 0
DIARRHEA: 1
DIFFICULTY URINATING: 0
SHORTNESS OF BREATH: 0
OCCASIONAL FEELINGS OF UNSTEADINESS: 0
CONSTIPATION: 0
CONFUSION: 0
NAUSEA: 1
BRUISES/BLEEDS EASILY: 1
ACTIVITY CHANGE: 0
SHORTNESS OF BREATH: 0
CHILLS: 0
FATIGUE: 1
MYALGIAS: 0
NUMBNESS: 0
BLOOD IN STOOL: 0
FACIAL SWELLING: 0
OCCASIONAL FEELINGS OF UNSTEADINESS: 0
CHILLS: 0
LOSS OF SENSATION IN FEET: 1
ANAL BLEEDING: 0
BLOOD IN STOOL: 0
COUGH: 0
CHEST TIGHTNESS: 0
HEMATURIA: 0
FEVER: 0
HEMATURIA: 0
VOMITING: 1
DYSURIA: 0
FATIGUE: 0
DIZZINESS: 0
FLANK PAIN: 0
WEAKNESS: 0
ABDOMINAL DISTENTION: 0
PALPITATIONS: 0

## 2023-01-01 ASSESSMENT — PAIN SCALES - GENERAL
PAINLEVEL_OUTOF10: 0 - NO PAIN
PAINLEVEL_OUTOF10: 2
PAINLEVEL_OUTOF10: 0 - NO PAIN
PAINLEVEL_OUTOF10: 2
PAINLEVEL: 0-NO PAIN
PAINLEVEL_OUTOF10: 0 - NO PAIN
PAINLEVEL: 3
PAINLEVEL_OUTOF10: 4
PAINLEVEL_OUTOF10: 2
PAINLEVEL_OUTOF10: 2
PAINLEVEL_OUTOF10: 0 - NO PAIN
PAINLEVEL: 0-NO PAIN
PAINLEVEL_OUTOF10: 0 - NO PAIN
PAINLEVEL: 0-NO PAIN
PAINLEVEL_OUTOF10: 0 - NO PAIN
PAINLEVEL: 0-NO PAIN
PAINLEVEL_OUTOF10: 2
PAINLEVEL_OUTOF10: 0 - NO PAIN
PAINLEVEL_OUTOF10: 2
PAINLEVEL_OUTOF10: 2
PAINLEVEL_OUTOF10: 0 - NO PAIN
PAINLEVEL_OUTOF10: 0 - NO PAIN

## 2023-01-01 ASSESSMENT — LIFESTYLE VARIABLES
SUBSTANCE_ABUSE_PAST_12_MONTHS: NO
HOW OFTEN DO YOU HAVE 6 OR MORE DRINKS ON ONE OCCASION: NEVER
HOW MANY STANDARD DRINKS CONTAINING ALCOHOL DO YOU HAVE ON A TYPICAL DAY: PATIENT DOES NOT DRINK
AUDIT-C TOTAL SCORE: 0
PRESCIPTION_ABUSE_PAST_12_MONTHS: NO
AUDIT-C TOTAL SCORE: 0
HOW OFTEN DO YOU HAVE A DRINK CONTAINING ALCOHOL: NEVER
HOW OFTEN DO YOU HAVE A DRINK CONTAINING ALCOHOL: NEVER
REASON UNABLE TO ASSESS: NO
AUDIT-C TOTAL SCORE: 0
HOW OFTEN DO YOU HAVE 6 OR MORE DRINKS ON ONE OCCASION: NEVER
EVER HAD A DRINK FIRST THING IN THE MORNING TO STEADY YOUR NERVES TO GET RID OF A HANGOVER: NO
SUBSTANCE_ABUSE_PAST_12_MONTHS: NO
SKIP TO QUESTIONS 9-10: 1
HOW MANY STANDARD DRINKS CONTAINING ALCOHOL DO YOU HAVE ON A TYPICAL DAY: PATIENT DOES NOT DRINK
HOW OFTEN DO YOU HAVE A DRINK CONTAINING ALCOHOL: NEVER
AUDIT-C TOTAL SCORE: 0
SUBSTANCE_ABUSE_PAST_12_MONTHS: NO
PRESCIPTION_ABUSE_PAST_12_MONTHS: NO
HAVE YOU EVER FELT YOU SHOULD CUT DOWN ON YOUR DRINKING: NO
HOW OFTEN DO YOU HAVE 6 OR MORE DRINKS ON ONE OCCASION: NEVER
HOW MANY STANDARD DRINKS CONTAINING ALCOHOL DO YOU HAVE ON A TYPICAL DAY: PATIENT DOES NOT DRINK
PRESCIPTION_ABUSE_PAST_12_MONTHS: NO
SKIP TO QUESTIONS 9-10: 1
AUDIT-C TOTAL SCORE: 0
HOW MANY STANDARD DRINKS CONTAINING ALCOHOL DO YOU HAVE ON A TYPICAL DAY: PATIENT DOES NOT DRINK
SKIP TO QUESTIONS 9-10: 1
EVER FELT BAD OR GUILTY ABOUT YOUR DRINKING: NO
SKIP TO QUESTIONS 9-10: 1
HAVE PEOPLE ANNOYED YOU BY CRITICIZING YOUR DRINKING: NO
HOW OFTEN DO YOU HAVE 6 OR MORE DRINKS ON ONE OCCASION: NEVER
AUDIT-C TOTAL SCORE: 0
HOW OFTEN DO YOU HAVE A DRINK CONTAINING ALCOHOL: NEVER
SUBSTANCE_ABUSE_PAST_12_MONTHS: NO
PRESCIPTION_ABUSE_PAST_12_MONTHS: NO

## 2023-01-01 ASSESSMENT — ACTIVITIES OF DAILY LIVING (ADL)
DRESSING YOURSELF: INDEPENDENT
GROOMING: INDEPENDENT
ADEQUATE_TO_COMPLETE_ADL: YES
HEARING - RIGHT EAR: FUNCTIONAL
ADEQUATE_TO_COMPLETE_ADL: YES
WALKS IN HOME: INDEPENDENT
FEEDING YOURSELF: INDEPENDENT
FEEDING YOURSELF: INDEPENDENT
TOILETING: INDEPENDENT
LACK_OF_TRANSPORTATION: NO
PATIENT'S MEMORY ADEQUATE TO SAFELY COMPLETE DAILY ACTIVITIES?: YES
JUDGMENT_ADEQUATE_SAFELY_COMPLETE_DAILY_ACTIVITIES: YES
FEEDING YOURSELF: INDEPENDENT
ADL_ASSISTANCE: INDEPENDENT
WALKS IN HOME: INDEPENDENT
PATIENT'S MEMORY ADEQUATE TO SAFELY COMPLETE DAILY ACTIVITIES?: YES
ASSISTIVE_DEVICE: EYEGLASSES
HEARING - RIGHT EAR: FUNCTIONAL
GROOMING: INDEPENDENT
HEARING - LEFT EAR: FUNCTIONAL
LACK_OF_TRANSPORTATION: NO
LACK_OF_TRANSPORTATION: NO
BATHING: INDEPENDENT
HEARING - LEFT EAR: FUNCTIONAL
DRESSING YOURSELF: INDEPENDENT
LACK_OF_TRANSPORTATION: NO
TOILETING: INDEPENDENT
JUDGMENT_ADEQUATE_SAFELY_COMPLETE_DAILY_ACTIVITIES: YES
BATHING: INDEPENDENT
PATIENT'S MEMORY ADEQUATE TO SAFELY COMPLETE DAILY ACTIVITIES?: YES
GROOMING: INDEPENDENT
WALKS IN HOME: INDEPENDENT
HEARING - LEFT EAR: FUNCTIONAL
FEEDING YOURSELF: INDEPENDENT
TOILETING: INDEPENDENT
ADEQUATE_TO_COMPLETE_ADL: YES
JUDGMENT_ADEQUATE_SAFELY_COMPLETE_DAILY_ACTIVITIES: YES
DRESSING YOURSELF: INDEPENDENT
PATIENT'S MEMORY ADEQUATE TO SAFELY COMPLETE DAILY ACTIVITIES?: YES
HEARING - LEFT EAR: FUNCTIONAL
DRESSING YOURSELF: INDEPENDENT
WALKS IN HOME: INDEPENDENT
BATHING: INDEPENDENT
ADEQUATE_TO_COMPLETE_ADL: YES
GROOMING: INDEPENDENT
TOILETING: INDEPENDENT
HEARING - RIGHT EAR: FUNCTIONAL
JUDGMENT_ADEQUATE_SAFELY_COMPLETE_DAILY_ACTIVITIES: YES
BATHING: INDEPENDENT
HEARING - RIGHT EAR: FUNCTIONAL

## 2023-01-01 ASSESSMENT — COLUMBIA-SUICIDE SEVERITY RATING SCALE - C-SSRS
1. IN THE PAST MONTH, HAVE YOU WISHED YOU WERE DEAD OR WISHED YOU COULD GO TO SLEEP AND NOT WAKE UP?: NO
2. HAVE YOU ACTUALLY HAD ANY THOUGHTS OF KILLING YOURSELF?: NO
2. HAVE YOU ACTUALLY HAD ANY THOUGHTS OF KILLING YOURSELF?: NO
6. HAVE YOU EVER DONE ANYTHING, STARTED TO DO ANYTHING, OR PREPARED TO DO ANYTHING TO END YOUR LIFE?: NO
1. IN THE PAST MONTH, HAVE YOU WISHED YOU WERE DEAD OR WISHED YOU COULD GO TO SLEEP AND NOT WAKE UP?: NO
6. HAVE YOU EVER DONE ANYTHING, STARTED TO DO ANYTHING, OR PREPARED TO DO ANYTHING TO END YOUR LIFE?: NO
2. HAVE YOU ACTUALLY HAD ANY THOUGHTS OF KILLING YOURSELF?: NO
1. IN THE PAST MONTH, HAVE YOU WISHED YOU WERE DEAD OR WISHED YOU COULD GO TO SLEEP AND NOT WAKE UP?: NO
1. IN THE PAST MONTH, HAVE YOU WISHED YOU WERE DEAD OR WISHED YOU COULD GO TO SLEEP AND NOT WAKE UP?: NO
6. HAVE YOU EVER DONE ANYTHING, STARTED TO DO ANYTHING, OR PREPARED TO DO ANYTHING TO END YOUR LIFE?: NO
6. HAVE YOU EVER DONE ANYTHING, STARTED TO DO ANYTHING, OR PREPARED TO DO ANYTHING TO END YOUR LIFE?: NO
2. HAVE YOU ACTUALLY HAD ANY THOUGHTS OF KILLING YOURSELF?: NO
6. HAVE YOU EVER DONE ANYTHING, STARTED TO DO ANYTHING, OR PREPARED TO DO ANYTHING TO END YOUR LIFE?: NO
1. IN THE PAST MONTH, HAVE YOU WISHED YOU WERE DEAD OR WISHED YOU COULD GO TO SLEEP AND NOT WAKE UP?: NO
2. HAVE YOU ACTUALLY HAD ANY THOUGHTS OF KILLING YOURSELF?: NO

## 2023-01-01 ASSESSMENT — PATIENT HEALTH QUESTIONNAIRE - PHQ9
1. LITTLE INTEREST OR PLEASURE IN DOING THINGS: NOT AT ALL
SUM OF ALL RESPONSES TO PHQ9 QUESTIONS 1 & 2: 0
SUM OF ALL RESPONSES TO PHQ9 QUESTIONS 1 & 2: 0
2. FEELING DOWN, DEPRESSED OR HOPELESS: NOT AT ALL
2. FEELING DOWN, DEPRESSED OR HOPELESS: NOT AT ALL
SUM OF ALL RESPONSES TO PHQ9 QUESTIONS 1 & 2: 0
1. LITTLE INTEREST OR PLEASURE IN DOING THINGS: NOT AT ALL
2. FEELING DOWN, DEPRESSED OR HOPELESS: NOT AT ALL
1. LITTLE INTEREST OR PLEASURE IN DOING THINGS: NOT AT ALL
2. FEELING DOWN, DEPRESSED OR HOPELESS: NOT AT ALL
SUM OF ALL RESPONSES TO PHQ9 QUESTIONS 1 AND 2: 0
SUM OF ALL RESPONSES TO PHQ9 QUESTIONS 1 & 2: 0
2. FEELING DOWN, DEPRESSED OR HOPELESS: NOT AT ALL

## 2023-01-01 ASSESSMENT — PAIN - FUNCTIONAL ASSESSMENT

## 2023-01-01 ASSESSMENT — PAIN DESCRIPTION - PAIN TYPE: TYPE: ACUTE PAIN

## 2023-01-01 ASSESSMENT — PAIN DESCRIPTION - DESCRIPTORS
DESCRIPTORS: TENDER
DESCRIPTORS: ACHING

## 2023-01-01 ASSESSMENT — PAIN DESCRIPTION - LOCATION: LOCATION: ABDOMEN

## 2023-10-04 PROBLEM — E46 MALNUTRITION (MULTI): Status: ACTIVE | Noted: 2023-01-01

## 2023-10-04 PROBLEM — Z85.038 HISTORY OF MALIGNANT NEOPLASM OF COLON: Status: ACTIVE | Noted: 2022-02-25

## 2023-10-04 PROBLEM — R10.9 ABDOMINAL PAIN: Status: ACTIVE | Noted: 2023-01-01

## 2023-10-04 PROBLEM — D63.8 ANEMIA IN OTHER CHRONIC DISEASES CLASSIFIED ELSEWHERE: Status: ACTIVE | Noted: 2023-01-01

## 2023-10-04 PROBLEM — E83.42 HYPOMAGNESEMIA: Status: ACTIVE | Noted: 2023-01-01

## 2023-10-04 PROBLEM — E86.0 DEHYDRATION: Status: ACTIVE | Noted: 2023-01-01
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
## 2023-10-04 PROBLEM — Z90.49 H/O HEMICOLECTOMY: Status: ACTIVE | Noted: 2021-07-22

## 2023-10-04 PROBLEM — S09.90XA INJURY OF HEAD: Status: ACTIVE | Noted: 2022-05-01

## 2023-10-04 PROBLEM — C80.0 CARCINOMATOSIS (MULTI): Status: ACTIVE | Noted: 2023-01-01

## 2023-10-04 PROBLEM — T81.43XA POSTPROCEDURAL INTRAABDOMINAL ABSCESS: Status: ACTIVE | Noted: 2023-01-01

## 2023-10-04 PROBLEM — M25.569 KNEE PAIN: Status: ACTIVE | Noted: 2023-01-01

## 2023-10-04 PROBLEM — E78.5 HYPERLIPIDEMIA, ACQUIRED: Status: ACTIVE | Noted: 2022-03-24

## 2023-10-04 PROBLEM — T45.1X5A TOXIC EFFECT OF CHEMOTHERAPY: Status: ACTIVE | Noted: 2022-05-01

## 2023-10-04 PROBLEM — T45.1X5A: Status: ACTIVE | Noted: 2022-02-25

## 2023-10-04 PROBLEM — S01.81XA FOREHEAD LACERATION: Status: ACTIVE | Noted: 2023-01-01

## 2023-10-04 PROBLEM — D22.5 MELANOCYTIC NEVI OF TRUNK: Status: ACTIVE | Noted: 2022-10-11

## 2023-10-04 PROBLEM — E87.6 HYPOKALEMIA: Status: ACTIVE | Noted: 2023-01-01

## 2023-10-04 PROBLEM — B99.9 INFECTION: Status: ACTIVE | Noted: 2023-01-01

## 2023-10-04 PROBLEM — R55 SYNCOPE: Status: ACTIVE | Noted: 2023-01-01

## 2023-10-04 PROBLEM — Z98.890 STATUS POST REVERSAL OF ILEOSTOMY: Status: ACTIVE | Noted: 2022-01-26

## 2023-10-04 PROBLEM — G89.3 NEOPLASM RELATED PAIN: Status: ACTIVE | Noted: 2023-01-01

## 2023-10-04 PROBLEM — L81.4 OTHER MELANIN HYPERPIGMENTATION: Status: ACTIVE | Noted: 2022-10-11

## 2023-10-04 PROBLEM — E03.9 HYPOTHYROIDISM: Status: ACTIVE | Noted: 2022-03-24

## 2023-10-04 PROBLEM — A49.8 INFECTION DUE TO KLEBSIELLA SPECIES: Status: ACTIVE | Noted: 2023-01-01

## 2023-10-04 PROBLEM — D22.71 MELANOCYTIC NEVI OF RIGHT LOWER LIMB, INCLUDING HIP: Status: ACTIVE | Noted: 2022-10-11

## 2023-10-04 PROBLEM — I44.7 LBBB (LEFT BUNDLE BRANCH BLOCK): Status: ACTIVE | Noted: 2023-01-01

## 2023-10-04 PROBLEM — K56.600 PARTIAL SMALL BOWEL OBSTRUCTION (MULTI): Status: ACTIVE | Noted: 2023-01-01

## 2023-10-04 PROBLEM — D22.60 MELANOCYTIC NEVI OF UNSPECIFIED UPPER LIMB, INCLUDING SHOULDER: Status: ACTIVE | Noted: 2022-10-11

## 2023-10-04 PROBLEM — Z85.038 PERSONAL HISTORY OF OTHER MALIGNANT NEOPLASM OF LARGE INTESTINE: Status: ACTIVE | Noted: 2022-03-24

## 2023-10-04 PROBLEM — C18.1 ADENOCARCINOMA OF APPENDIX (MULTI): Status: ACTIVE | Noted: 2023-01-01

## 2023-10-04 PROBLEM — I95.1 ORTHOSTATIC HYPOTENSION: Status: ACTIVE | Noted: 2023-01-01

## 2023-10-04 PROBLEM — Z85.07 HISTORY OF MALIGNANT NEOPLASM OF PANCREAS: Status: ACTIVE | Noted: 2021-07-22

## 2023-10-04 PROBLEM — D18.01 HEMANGIOMA OF SKIN AND SUBCUTANEOUS TISSUE: Status: ACTIVE | Noted: 2022-10-11

## 2023-10-04 PROBLEM — L71.9 ROSACEA, UNSPECIFIED: Status: ACTIVE | Noted: 2022-10-11

## 2023-10-04 PROBLEM — M17.12 PRIMARY OSTEOARTHRITIS OF LEFT KNEE: Status: ACTIVE | Noted: 2023-01-01

## 2023-10-04 PROBLEM — R93.1 ABNORMAL ECHOCARDIOGRAM: Status: ACTIVE | Noted: 2023-01-01

## 2023-10-04 PROBLEM — R94.31 ABNORMAL EKG: Status: ACTIVE | Noted: 2023-01-01

## 2023-10-04 PROBLEM — N17.9 ACUTE RENAL FAILURE (CMS-HCC): Status: ACTIVE | Noted: 2023-01-01

## 2023-10-04 PROBLEM — L71.1 RHINOPHYMA: Status: ACTIVE | Noted: 2022-10-11

## 2023-10-04 PROBLEM — I81 PORTAL VEIN THROMBOSIS: Status: ACTIVE | Noted: 2023-01-01

## 2023-10-04 PROBLEM — L82.1 OTHER SEBORRHEIC KERATOSIS: Status: ACTIVE | Noted: 2022-10-11

## 2023-10-04 PROBLEM — C78.6 PERITONEAL CARCINOMATOSIS (MULTI): Status: ACTIVE | Noted: 2023-01-01

## 2023-10-04 PROBLEM — D72.829 LEUCOCYTOSIS: Status: ACTIVE | Noted: 2023-01-01

## 2023-10-04 PROBLEM — G62.0: Status: ACTIVE | Noted: 2022-02-25

## 2023-10-04 PROBLEM — I25.10 CORONARY ARTERY CALCIFICATION SEEN ON CT SCAN: Status: ACTIVE | Noted: 2023-01-01

## 2023-10-04 PROBLEM — Z90.81 HISTORY OF SPLENECTOMY: Status: ACTIVE | Noted: 2021-07-22

## 2023-10-04 PROBLEM — C18.1: Status: ACTIVE | Noted: 2023-01-01

## 2023-10-26 NOTE — PROGRESS NOTES
SUPPORTIVE AND PALLIATIVE ONCOLOGY OUTPATIENT FOLLOW-UP      SERVICE DATE: 10/26/2023    Subjective   HISTORY OF PRESENT ILLNESS: Leonardo Wilder is a 68 y.o. male who presents with met appendicael cancer diagnosed 7/2020. Completed treatment with FOLFOX 9/2020-2/2021. He is s/p debulking surgery with HIPEC at [facility] 3/15/21. 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 He had an MRI  at [facility] that showed recurrence in the peritoneum and perihepatic fissures. He will start FOLFIRI 4/20/22. He is now on treatment with FOLFOX. CT scan 2/24/23 with stable disease. He is s/p CRS + HIPEC at [facility] on 8/31/23.     Pain Assessment:  Pain Score:   3/10  Location: Abdomen  Education:      Symptom Assessment:  Pain:a little  Headache: none  Dizziness:none  Lack of energy: somewhat related to lack of sleep   Difficulty sleeping: very much related to diarrhea and anxiety   Worrying: a little  Anxiety: a little  Depression: a little  Pain in mouth/swallowing: none  Dry mouth: none  Taste changes: none  Shortness of breath: none  Lack of appetite: somewhat   Nausea: somewhat  Vomiting: a little  Constipation: none  Diarrhea: somewhat  Sore muscles: none  Numbness or tingling in hands/feet/other: somewhat  Weight loss: somewhat  Other: none      Information obtained from: chart review, interview of patient, and interview of family  ______________________________________________________________________        Objective     Lab Requisition on 10/24/2023   Component Date Value Ref Range Status    Glucose 10/24/2023 73 (L)  74 - 99 mg/dL Final    Sodium 10/24/2023 133 (L)  136 - 145 mmol/L Final    Potassium 10/24/2023 3.1 (L)  3.5 - 5.3 mmol/L Final    Chloride 10/24/2023 98  98 - 107 mmol/L Final    Bicarbonate 10/24/2023 24  21 - 32 mmol/L Final    Anion Gap 10/24/2023 14  10 - 20 mmol/L Final    Urea Nitrogen 10/24/2023 11  6 - 23 mg/dL Final    Creatinine 10/24/2023 0.69  0.50 - 1.30 mg/dL Final    eGFR 10/24/2023 >90   >60 mL/min/1.73m*2 Final    Calculations of estimated GFR are performed using the 2021 CKD-EPI Study Refit equation without the race variable for the IDMS-Traceable creatinine methods.  https://jasn.asnjournals.org/content/early/2021/09/22/ASN.0544360345    Calcium 10/24/2023 8.3 (L)  8.6 - 10.3 mg/dL Final    Albumin 10/24/2023 2.9 (L)  3.4 - 5.0 g/dL Final    Alkaline Phosphatase 10/24/2023 338 (H)  33 - 136 U/L Final    Total Protein 10/24/2023 6.7  6.4 - 8.2 g/dL Final    AST 10/24/2023 27  9 - 39 U/L Final    Bilirubin, Total 10/24/2023 0.4  0.0 - 1.2 mg/dL Final    ALT 10/24/2023 22  10 - 52 U/L Final    Patients treated with Sulfasalazine may generate falsely decreased results for ALT.    WBC 10/24/2023 13.8 (H)  4.4 - 11.3 x10*3/uL Final    nRBC 10/24/2023 0.0  0.0 - 0.0 /100 WBCs Final    RBC 10/24/2023 3.33 (L)  4.50 - 5.90 x10*6/uL Final    Hemoglobin 10/24/2023 9.1 (L)  13.5 - 17.5 g/dL Final    Hematocrit 10/24/2023 28.8 (L)  41.0 - 52.0 % Final    MCV 10/24/2023 87  80 - 100 fL Final    MCH 10/24/2023 27.3  26.0 - 34.0 pg Final    MCHC 10/24/2023 31.6 (L)  32.0 - 36.0 g/dL Final    RDW 10/24/2023 17.7 (H)  11.5 - 14.5 % Final    Platelets 10/24/2023 531 (H)  150 - 450 x10*3/uL Final    MPV 10/24/2023 10.1  7.5 - 11.5 fL Final    Immature Granulocytes %, Automated 10/24/2023 0.5  0.0 - 0.9 % Final    Immature Granulocyte Count (IG) includes promyelocytes, myelocytes and metamyelocytes but does not include bands. Percent differential counts (%) should be interpreted in the context of the absolute cell counts (cells/UL).    Immature Granulocytes Absolute, Au* 10/24/2023 0.07  0.00 - 0.70 x10*3/uL Final    Magnesium 10/24/2023 1.34 (L)  1.60 - 2.40 mg/dL Final    Phosphorus 10/24/2023 3.3  2.5 - 4.9 mg/dL Final    The performance characteristics of phosphorus testing in heparinized plasma have been validated by the individual  laboratory site where testing is performed. Testing on heparinized plasma is  not approved by the FDA; however, such approval is not necessary.    Albumin 10/24/2023 2.9 (L)  3.4 - 5.0 g/dL Final    Bilirubin, Total 10/24/2023 0.4  0.0 - 1.2 mg/dL Final    Bilirubin, Direct 10/24/2023 0.1  0.0 - 0.3 mg/dL Final    Alkaline Phosphatase 10/24/2023 338 (H)  33 - 136 U/L Final    ALT 10/24/2023 22  10 - 52 U/L Final    Patients treated with Sulfasalazine may generate falsely decreased results for ALT.    AST 10/24/2023 27  9 - 39 U/L Final    Total Protein 10/24/2023 6.7  6.4 - 8.2 g/dL Final    Triglycerides 10/24/2023 73  0 - 149 mg/dL Final       Age         Desirable   Borderline High   High     Very High   0 D-90 D    19 - 174         ----         ----        ----  91 D- 9 Y     0 -  74        75 -  99     >/= 100      ----    10-19 Y     0 -  89        90 - 129     >/= 130      ----    20-24 Y     0 - 114       115 - 149     >/= 150      ----         >24 Y     0 - 149       150 - 199    200- 499    >/= 500    Venipuncture immediately after or during the administration of Metamizole may lead to falsely low results. Testing should be performed immediately prior to Metamizole dosing.    Albumin 10/24/2023 2.9 (L)  3.4 - 5.0 g/dL Final    Extra Tube 10/24/2023 Hold for add-ons.   Final    Auto resulted.    Neutrophils %, Manual 10/24/2023 63.0  40.0 - 80.0 % Final    Percent differential counts (%) should be interpreted in the context of the absolute cell counts (cells/uL).    Lymphocytes %, Manual 10/24/2023 30.0  13.0 - 44.0 % Final    Monocytes %, Manual 10/24/2023 6.0  2.0 - 10.0 % Final    Eosinophils %, Manual 10/24/2023 1.0  0.0 - 6.0 % Final    Basophils %, Manual 10/24/2023 0.0  0.0 - 2.0 % Final    Seg Neutrophils Absolute, Manual 10/24/2023 8.69 (H)  1.20 - 7.00 x10*3/uL Final    Lymphocytes Absolute, Manual 10/24/2023 4.14  1.20 - 4.80 x10*3/uL Final    Monocytes Absolute, Manual 10/24/2023 0.83  0.10 - 1.00 x10*3/uL Final    Eosinophils Absolute, Manual 10/24/2023 0.14  0.00 - 0.70  x10*3/uL Final    Basophils Absolute, Manual 10/24/2023 0.00  0.00 - 0.10 x10*3/uL Final    Total Cells Counted 10/24/2023 100   Final    RBC Morphology 10/24/2023 See Below   Final    Hypochromia 10/24/2023 Mild   Final    Target Cells 10/24/2023 Few   Final    Josie Cells 10/24/2023 Few   Final    Acanthocytes 10/24/2023 Few   Final    Lockwood-Paterson Bodies 10/24/2023 Present   Final        PHYSICAL EXAMINATION   Vital Signs:   Vital signs reviewed  Vitals:    10/26/23 1117   BP: 125/75   Pulse: 86   Resp: 20   Temp: 36.7 °C (98.1 °F)   SpO2: 98%     Pain Score:       Physical Exam  Constitutional:       Comments: thin   Pulmonary:      Effort: Pulmonary effort is normal.   Musculoskeletal:         General: Normal range of motion.   Skin:     General: Skin is warm.   Neurological:      Mental Status: He is alert and oriented to person, place, and time.   Psychiatric:         Mood and Affect: Mood normal.         Behavior: Behavior normal.         ASSESSMENT/PLAN    Pain  Pain is: post-operative  Type: visceral and somatic  Pain control: well-controlled  Home regimen:   - Not currently on any pain medications for post-op pain.    CIPN:  - B12 and  B6 normal  - Has been on Gabapentin 600 TID in the past   - Stop Lyrica to 200 mg TID. Was taken off in the hospital and does not have worsening neuropathy off of this  - Continue Duloxetine 30 mg in Am and 60 mg at bedtime   - Has tried creams in the past with minimal relief    Opioid Use  Medication Management:   - OARRS report reviewed with no aberrant behavior; consistent with  prescriptions/records and patient history  - MED 00.  Overdose Risk Score 00.   This has been discussed with patient.   - We will continue to closely monitor the patient for signs of prescription misuse including UDS, OARRS review and subjective reports at each visit.  - No concurrent benzodiazepine use   - I am a provider who either is or has consulted and collaborated with a provider certified  in Hospice and Palliative Medicine and have conducted a face-face visit and examination for this patient.  - Routine Urine Drug Screen: deferred  - Controlled Substance Agreement completed - not currently on controlled substances   - Specifically discussed that controlled substance prescriptions will only be provided by our group as outlined in the completed agreement  - Prescribed naloxone: Deferred at this time  - Red Flags: none     Nausea   Intermittent nausea with vomiting related to  post-op course    - Continue Ondansetron 8 mg every 8 hours as needed  - Continue Prochlorperazine 10 mg every 6 hours as needed  - Start Lorazepam 0.5 mg as needed 2 times per day- may try taking 1/2 tab first     Diarrhea  - Continue Imodium as prescribed by Surgeon  - Continue to increase fiber intake per surgeons recommendation - may start with 2 doses of benefiber daily     Altered Mood  Chronic anxiety and depression related to health concerns   controlled with home regimen  Home regimen:   - Continue Duloxetine as described above  - Start Lorazepam as needed for anxiety     Sleeping Difficulty:  Impaired sleep related to diarrhea/anxiety  Home regimen:    - Start Lorazepam as mentioned above  - Continue imodium as allowed    Decreased appetite  Related to disease process and surgery  Nutrition following  Home regimen:    - Continue smaller, more frequent meals  - was on TPN, discontinued    Supportive and Palliative Oncology encounter:  Spoke with patient and wife at bedside  Emotional support provided  Coordination of care  We will continue to follow and address symptoms as needed    Advance Directives  Existence of Advance Directives:Yes, but NOT documented in medical record  Decision maker: LORENAOA is wife Michelle  Code Status: Full code    Next Follow-Up Visit:  Return to clinic in 2 weeks virtually     Signature and billing  Medical complexity was moderate level due to due to complexity of problems, extensive data review,  and high risk of management/treatment.  Time was spent on the following: Prep Time, Time Directly with Patient/Family/Caregiver, Documentation Time. Total time spent: 40      Data  Diagnostic tests and information reviewed for today's visit:  Most recent labs, Most recent imaging, Medications         Some elements copied from Falguni Booth note on 6/8/23, the elements have been updated and all reflect current decision making from today, 10/26/2023.      Plan of Care discussed with: Patient and Family/Significant Other: wife    SIGNATURE: BRAYDEN Li    Contact information:  Supportive and Palliative Oncology  Monday-Friday 8 AM-5 PM  Phone:  [phone], press option #5, then option #1.   Or Epic Secure Chat

## 2023-10-31 NOTE — PROGRESS NOTES
"Visit Type: Clinical Nutrition, Oncology, Telephone Visit:  A telephone visit (audio only) between the patient (at the distant site) and the provider (at the originating site) was utilized to provide this telehealth service.    Verbal Consent for Encounter: Verbal consent was requested and obtained from patient on this date for a telehealth visit.     NUTRITION Follow-up NOTE  Reason for Visit:  Leonardo Wilder is a 68 y.o. male who presents for diet questions.  Patient was seen in [facility] in [facility] on 10- after appointment with supportive oncology.  He requested I call him.  Patient is called today     It is noted that He completed his surgery at [facility] on 8-.  (Cytoreductive surgery)   He is now having diarrhea  It appears he was on TPN through [facility] as of  10-6-2023- but per supportive onc's note on 10- it was stopped. He tells me he has been off TPN for 7-10 days  He reports he had vomiting with the TPN- the team at [facility] felt it was related to the lipids.    Does receive daily Ivs- this is tolerated  Also noted that patient is taking a Mg and K replacement- taking pills   Might be added as an IV- working with Dr. Ozuna to see if thsi is possible in out infusion.      10- Mg was 1.37 (10- was 1.34)  Phos 2.6 (10- was 3.3)  TG 79  Lab Results   Component Value Date/Time    GLUCOSE 76 10/30/2023 1137     10/30/2023 1137    K 3.3 (L) 10/30/2023 1137    CL 99 10/30/2023 1137    CO2 30 10/30/2023 1137    ANIONGAP 11 10/30/2023 1137    BUN 8 10/30/2023 1137    CREATININE 1.03 10/30/2023 1137    EGFR 80 10/30/2023 1137    CALCIUM 8.3 (L) 10/30/2023 1137    ALBUMIN 3.2 (L) 10/30/2023 1137    ALKPHOS 275 (H) 10/30/2023 1137    PROT 6.7 10/24/2023 1330    PROT 6.7 10/24/2023 1330    AST 20 10/30/2023 1137    BILITOT 0.6 10/30/2023 1137    ALT 13 10/30/2023 1137     No results found for: \"VITD25\"    Anthropometrics:       Significant weight loss is noted   In the past 3 months and " 3 weeks patient has  lost 25# (16%)   BMI is 21.63    Wt Readings from Last 10 Encounters:   10/26/23 59.4 kg (130 lb 15.3 oz)   07/03/23 70.5 kg (155 lb 6 oz)   06/29/23 70.5 kg (155 lb 6.8 oz)   06/27/23 70.4 kg (155 lb 3.3 oz)   03/15/23 68.8 kg (151 lb 10.8 oz)   03/13/23 70 kg (154 lb 5.2 oz)   03/01/23 68.5 kg (151 lb 0.2 oz)   02/27/23 69.2 kg (152 lb 8.9 oz)   02/15/23 68.5 kg (151 lb 0.2 oz)   02/13/23 67.9 kg (149 lb 11.1 oz)        Food And Nutrient Intake:  Food and Nutrient History  Food and Nutrient History: I patti estimate patient is taking in about 1200 calories per day from meal plan provided.  He was told by [facility] to try to reach 1000 to 1200 calories per day.  This prodes 20 dino/kg and would not support weight maintenacne  Energy Intake: Fair 50-75 %  Fluid Intake: drinking water, gatorade, coffee, soda  GI Symptoms: diarrhea, bloating, increased gas (is having 4 BMs per day- 2 in the am and takes a loperamide after the second and then has 2 BMS in the evening and takes loperamide after the second.  He reports BM are on the watery side but still there is some form)  GI Symptoms greater than 2 weeks: yes  Oral Problems: denies (food is tasting good to him- wants to eat- is enjoying eating)     Food Intake  Meal 1: 1 egg, 05 ounces of ham, 2 slices of toast, 3 tsp of cottage cheese, manarian orange  Meal 2: 2 slices of bread adn 2 TBSP  Meal 3: 3X4 slice of lasagna                        Parenteral Nutrition Intake  IV Fluids: currently receiving IVF daily - 1L                               Nutrition Focused Physical Exam:     Phone       Energy Needs  Estimated Energy Needs  Total Energy Estimated Needs (kCal):  (1782 to 2065)  Estimated Fluid Needs  Total Fluid Estimated Needs (mL):  (would anticipate with losses needs to be 2300 ml)  Estimated Protein Needs  Total Protein Estimated Needs (g):  (72 to 90 grams)  Estimated Fiber Needs  Total Fiber Estimated Needs (g):  (patietn is taking fiber  gummies - taking 6-8 per day and each one has 2 grams)        Nutrition Diagnosis      Nutrition Diagnosis  Patient has Nutrition Diagnosis: Yes  Diagnosis Status (1): New  Nutrition Diagnosis 1: Altered GI function  Related to (1): surgery and new side effect of diarrhea along with significant weight loss and need for IV fluid support  As Evidenced by (1): patient reportsl, diet hitory, weight history and labs    Nutrition Interventions/Recommendations   Food and Nutrition Delivery  Meals & Snacks: Modify Composition of Meals/Snacks, Modify schedule of foods/fluids  Goals: discussed foods that might be better tolerated.  Feel patient needs to try to eat smaller amounts more frequently.  Has tried unflavored protein powder and believe this would be good to incorporate with meals (add to milk alternative in cereal)  Parenteral Nutrition/IV Fluids:  (TPN was managed by [facility]- they would prefer not to restart.  If it is restarted would give smaller amounts of lipids QOD.)        There are no Patient Instructions on file for this visit.    Nutrition Monitoring and Evaluation   Food/Nutrient Related History Monitoring  Monitoring and Evaluation Plan: Energy intake, Fluid intake, Meal/snack pattern, Fiber intake  Patient is keeping food record for [facility] will share with me   Will follow       Time Spent  Prep time on day of patient encounter: 15 minutes  Time spent directly with patient, family or caregiver: 30 minutes  Additional Time Spent on Patient Care Activities: 0 minutes  Documentation Time: 15 minutes  Other Time Spent: 0 minutes  Total: 60 minutes

## 2023-11-09 NOTE — PROGRESS NOTES
SUPPORTIVE AND PALLIATIVE ONCOLOGY OUTPATIENT FOLLOW-UP      SERVICE DATE: 11/9/2023    Subjective   HISTORY OF PRESENT ILLNESS: Leonardo Wilder is a 68 y.o. male who presents with met appendicael cancer diagnosed 7/2020. Completed treatment with FOLFOX 9/2020-2/2021. He is s/p debulking surgery with HIPEC at [facility] 3/15/21. 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 He had an MRI  at [facility] that showed recurrence in the peritoneum and perihepatic fissures. He will start FOLFIRI 4/20/22. He is now on treatment with FOLFOX. CT scan 2/24/23 with stable disease. He is s/p CRS + HIPEC at [facility] on 8/31/23.     Pain Assessment:  Pain Score: 4/10  Location: right buttock  Description: patient was walking dog and was pulled down and injured his right buttock. States he is recovering ok from it, but he has pain. The pain is causing him not to sleep well. He has constant aching pain that is worse with sitting or laying on his right side. He has been taking tylenol with minimal relief.     Symptom Assessment:  Pain:a little  Headache: none  Dizziness:none  Lack of energy: somewhat related to lack of sleep   Difficulty sleeping: very much related to pain  Worrying: a little  Anxiety: a little  Depression: a little  Pain in mouth/swallowing: none  Dry mouth: none  Taste changes: none  Shortness of breath: none  Lack of appetite: somewhat   Nausea: somewhat  Vomiting: a little. This had improved, but then started vomiting again.   Constipation: none  Diarrhea: somewhat  Sore muscles: none  Numbness or tingling in hands/feet/other: somewhat  Weight loss: somewhat  Other: none      Information obtained from: chart review, interview of patient, and interview of family  ______________________________________________________________________        Objective     Lab Requisition on 11/06/2023   Component Date Value Ref Range Status    Sodium 11/06/2023 131 (L)  136 - 145 mmol/L Final    Potassium 11/06/2023 4.5  3.5 - 5.3  mmol/L Final    Chloride 11/06/2023 98  98 - 107 mmol/L Final    Bicarbonate 11/06/2023 26  21 - 32 mmol/L Final    Anion Gap 11/06/2023 12  10 - 20 mmol/L Final    Urea Nitrogen 11/06/2023 13  6 - 23 mg/dL Final    Glucose 11/06/2023 90  74 - 99 mg/dL Final    Creatinine 11/06/2023 0.82  0.50 - 1.30 mg/dL Final    eGFR 11/06/2023 >90  >60 mL/min/1.73m*2 Final    Calculations of estimated GFR are performed using the 2021 CKD-EPI Study Refit equation without the race variable for the IDMS-Traceable creatinine methods.  https://jasn.asnjournals.org/content/early/2021/09/22/ASN.2365384637    Bilirubin, Total 11/06/2023 0.6  0.0 - 1.2 mg/dL Final    Alkaline Phosphatase 11/06/2023 304 (H)  33 - 136 U/L Final    AST 11/06/2023 27  9 - 39 U/L Final    ALT 11/06/2023 15  10 - 52 U/L Final    Patients treated with Sulfasalazine may generate falsely decreased results for ALT.    GGT 11/06/2023 91 (H)  5 - 64 U/L Final    WBC 11/06/2023 15.9 (H)  4.4 - 11.3 x10*3/uL Final    nRBC 11/06/2023 0.0  0.0 - 0.0 /100 WBCs Final    RBC 11/06/2023 3.31 (L)  4.50 - 5.90 x10*6/uL Final    Hemoglobin 11/06/2023 9.0 (L)  13.5 - 17.5 g/dL Final    Hematocrit 11/06/2023 28.4 (L)  41.0 - 52.0 % Final    MCV 11/06/2023 86  80 - 100 fL Final    MCH 11/06/2023 27.2  26.0 - 34.0 pg Final    MCHC 11/06/2023 31.7 (L)  32.0 - 36.0 g/dL Final    RDW 11/06/2023 18.7 (H)  11.5 - 14.5 % Final    Platelets 11/06/2023 383  150 - 450 x10*3/uL Final    Neutrophils % 11/06/2023 61.8  40.0 - 80.0 % Final    Immature Granulocytes %, Automated 11/06/2023 0.5  0.0 - 0.9 % Final    Immature Granulocyte Count (IG) includes promyelocytes, myelocytes and metamyelocytes but does not include bands. Percent differential counts (%) should be interpreted in the context of the absolute cell counts (cells/UL).    Lymphocytes % 11/06/2023 29.1  13.0 - 44.0 % Final    Monocytes % 11/06/2023 7.9  2.0 - 10.0 % Final    Eosinophils % 11/06/2023 0.4  0.0 - 6.0 % Final     Basophils % 11/06/2023 0.3  0.0 - 2.0 % Final    Neutrophils Absolute 11/06/2023 9.80 (H)  1.20 - 7.70 x10*3/uL Final    Percent differential counts (%) should be interpreted in the context of the absolute cell counts (cells/uL).    Immature Granulocytes Absolute, Au* 11/06/2023 0.08  0.00 - 0.70 x10*3/uL Final    Lymphocytes Absolute 11/06/2023 4.61  1.20 - 4.80 x10*3/uL Final    Monocytes Absolute 11/06/2023 1.25 (H)  0.10 - 1.00 x10*3/uL Final    Eosinophils Absolute 11/06/2023 0.07  0.00 - 0.70 x10*3/uL Final    Basophils Absolute 11/06/2023 0.04  0.00 - 0.10 x10*3/uL Final    Triglycerides 11/06/2023 83  0 - 149 mg/dL Final       Age         Desirable   Borderline High   High     Very High   0 D-90 D    19 - 174         ----         ----        ----  91 D- 9 Y     0 -  74        75 -  99     >/= 100      ----    10-19 Y     0 -  89        90 - 129     >/= 130      ----    20-24 Y     0 - 114       115 - 149     >/= 150      ----         >24 Y     0 - 149       150 - 199    200- 499    >/= 500    Venipuncture immediately after or during the administration of Metamizole may lead to falsely low results. Testing should be performed immediately prior to Metamizole dosing.    POCT Calcium, Ionized 11/06/2023 1.13  1.1 - 1.33 mmol/L Final    The performance characteristics of ionized calcium tested  in heparinized plasma or serum have been validated by the  individual  laboratory site where testing is performed.   Testing on heparinized plasma or serum is not approved by   the FDA; however, such approval is not necessary.    Calcium 11/06/2023 8.2 (L)  8.6 - 10.3 mg/dL Final    Magnesium 11/06/2023 1.44 (L)  1.60 - 2.40 mg/dL Final    Phosphorus 11/06/2023 3.0  2.5 - 4.9 mg/dL Final    The performance characteristics of phosphorus testing in heparinized plasma have been validated by the individual  laboratory site where testing is performed. Testing on heparinized plasma is not approved by the FDA; however, such  approval is not necessary.    Albumin 11/06/2023 3.1 (L)  3.4 - 5.0 g/dL Final    Extra Tube 11/06/2023 Hold for add-ons.   Final    Auto resulted.   Lab Requisition on 11/02/2023   Component Date Value Ref Range Status    Glucose 11/02/2023 91  74 - 99 mg/dL Final    Sodium 11/02/2023 135 (L)  136 - 145 mmol/L Final    Potassium 11/02/2023 3.0 (L)  3.5 - 5.3 mmol/L Final    Chloride 11/02/2023 100  98 - 107 mmol/L Final    Bicarbonate 11/02/2023 25  21 - 32 mmol/L Final    Anion Gap 11/02/2023 13  10 - 20 mmol/L Final    Urea Nitrogen 11/02/2023 11  6 - 23 mg/dL Final    Creatinine 11/02/2023 0.69  0.50 - 1.30 mg/dL Final    eGFR 11/02/2023 >90  >60 mL/min/1.73m*2 Final    Calculations of estimated GFR are performed using the 2021 CKD-EPI Study Refit equation without the race variable for the IDMS-Traceable creatinine methods.  https://jasn.asnjournals.org/content/early/2021/09/22/ASN.6721802246    Calcium 11/02/2023 8.2 (L)  8.6 - 10.3 mg/dL Final    Albumin 11/02/2023 3.0 (L)  3.4 - 5.0 g/dL Final    Alkaline Phosphatase 11/02/2023 289 (H)  33 - 136 U/L Final    Total Protein 11/02/2023 7.0  6.4 - 8.2 g/dL Final    AST 11/02/2023 27  9 - 39 U/L Final    Bilirubin, Total 11/02/2023 0.5  0.0 - 1.2 mg/dL Final    ALT 11/02/2023 15  10 - 52 U/L Final    Patients treated with Sulfasalazine may generate falsely decreased results for ALT.    Magnesium 11/02/2023 1.31 (L)  1.60 - 2.40 mg/dL Final   Lab Requisition on 10/30/2023   Component Date Value Ref Range Status    Sodium 10/30/2023 137  136 - 145 mmol/L Final    Potassium 10/30/2023 3.3 (L)  3.5 - 5.3 mmol/L Final    Chloride 10/30/2023 99  98 - 107 mmol/L Final    Bicarbonate 10/30/2023 30  21 - 32 mmol/L Final    Anion Gap 10/30/2023 11  10 - 20 mmol/L Final    Glucose 10/30/2023 76  74 - 99 mg/dL Final    Urea Nitrogen 10/30/2023 8  6 - 23 mg/dL Final    Creatinine 10/30/2023 1.03  0.50 - 1.30 mg/dL Final    eGFR 10/30/2023 80  >60 mL/min/1.73m*2 Final     Calculations of estimated GFR are performed using the 2021 CKD-EPI Study Refit equation without the race variable for the IDMS-Traceable creatinine methods.  https://jasn.asnjournals.org/content/early/2021/09/22/ASN.3940751996    Bilirubin, Total 10/30/2023 0.6  0.0 - 1.2 mg/dL Final    Alkaline Phosphatase 10/30/2023 275 (H)  33 - 136 U/L Final    AST 10/30/2023 20  9 - 39 U/L Final    ALT 10/30/2023 13  10 - 52 U/L Final    Patients treated with Sulfasalazine may generate falsely decreased results for ALT.    GGT 10/30/2023 95 (H)  5 - 64 U/L Final    WBC 10/30/2023    Corrected    Corrected result: Previously reported as 15.7 x10*3/uL (reference range: 4.4-11.3 x10*3/uL) on 10/30/2023 at 1433 EDT.    nRBC 10/30/2023    Corrected    Corrected result: Previously reported as 0.0 /100 WBCs (reference range: 0.0-0.0 /100 WBCs) on 10/30/2023 at 1433 EDT.    RBC 10/30/2023    Corrected    Corrected result: Previously reported as 3.38 x10*6/uL (reference range: 4.50-5.90 x10*6/uL) on 10/30/2023 at 1433 EDT.    Hemoglobin 10/30/2023    Corrected    Corrected result: Previously reported as 9.2 g/dL (reference range: 13.5-17.5 g/dL) on 10/30/2023 at 1433 EDT.    Hematocrit 10/30/2023    Corrected    Corrected result: Previously reported as 29.0 % (reference range: 41.0-52.0 %) on 10/30/2023 at 1433 EDT.    MCV 10/30/2023    Corrected    Corrected result: Previously reported as 86 fL (reference range:  fL) on 10/30/2023 at [facility] EDT.    MCH 10/30/2023    Corrected    Corrected result: Previously reported as 27.2 pg (reference range: 26.0-34.0 pg) on 10/30/2023 at 1433 EDT.    MCHC 10/30/2023    Corrected    Corrected result: Previously reported as 31.7 g/dL (reference range: 32.0-36.0 g/dL) on 10/30/2023 at 1433 EDT.    RDW 10/30/2023    Corrected    Corrected result: Previously reported as 18.2 % (reference range: 11.5-14.5 %) on 10/30/2023 at 1433 EDT.    Platelets 10/30/2023    Corrected    Corrected result:  Previously reported as 393 x10*3/uL (reference range: 150-450 x10*3/uL) on 10/30/2023 at 1433 EDT.    MPV 10/30/2023    Corrected    Corrected result: Previously reported as 10.6 fL (reference range: 7.5-11.5 fL) on 10/30/2023 at 1433 EDT.    Immature Granulocytes %, Automated 10/30/2023    Corrected    Comment: Immature Granulocyte Count (IG) includes promyelocytes, myelocytes and metamyelocytes but does not include bands. Percent differential counts (%) should be interpreted in the context of the absolute cell counts (cells/UL).                             Corrected result: Previously reported as 0.4 % (reference range: 0.0-0.9 %) on 10/30/2023 at 1433 EDT.    Neutrophils Absolute 10/30/2023    Final    Percent differential counts (%) should be interpreted in the context of the absolute cell counts (cells/uL).    Immature Granulocytes Absolute, Au* 10/30/2023    Corrected    Corrected result: Previously reported as 0.07 x10*3/uL (reference range: 0.00-0.70 x10*3/uL) on 10/30/2023 at 1433 EDT.    Triglycerides 10/30/2023 79  0 - 149 mg/dL Final       Age         Desirable   Borderline High   High     Very High   0 D-90 D    19 - 174         ----         ----        ----  91 D- 9 Y     0 -  74        75 -  99     >/= 100      ----    10-19 Y     0 -  89        90 - 129     >/= 130      ----    20-24 Y     0 - 114       115 - 149     >/= 150      ----         >24 Y     0 - 149       150 - 199    200- 499    >/= 500    Venipuncture immediately after or during the administration of Metamizole may lead to falsely low results. Testing should be performed immediately prior to Metamizole dosing.    POCT Calcium, Ionized 10/30/2023 1.13  1.1 - 1.33 mmol/L Final    The performance characteristics of ionized calcium tested  in heparinized plasma or serum have been validated by the  individual  laboratory site where testing is performed.   Testing on heparinized plasma or serum is not approved by   the FDA; however, such  approval is not necessary.    Calcium 10/30/2023 8.3 (L)  8.6 - 10.3 mg/dL Final    Magnesium 10/30/2023 1.37 (L)  1.60 - 2.40 mg/dL Final    Phosphorus 10/30/2023 2.6  2.5 - 4.9 mg/dL Final    The performance characteristics of phosphorus testing in heparinized plasma have been validated by the individual  laboratory site where testing is performed. Testing on heparinized plasma is not approved by the FDA; however, such approval is not necessary.    Albumin 10/30/2023 3.2 (L)  3.4 - 5.0 g/dL Final    Extra Tube 10/30/2023 Hold for add-ons.   Final    Auto resulted.    Extra Tube 10/30/2023 Hold for add-ons.   Final    Auto resulted.    Extra Tube 10/30/2023 Hold for add-ons.   Final    Auto resulted.    Neutrophils %, Manual 10/30/2023    Corrected    Comment: Percent differential counts (%) should be interpreted in the context of the absolute cell counts (cells/uL).                             Corrected result: Previously reported as 78.0 % (reference range: 40.0-80.0 %) on 10/30/2023 at 1430 EDT.    Lymphocytes %, Manual 10/30/2023    Corrected    Corrected result: Previously reported as 16.0 % (reference range: 13.0-44.0 %) on 10/30/2023 at 1430 EDT.    Monocytes %, Manual 10/30/2023    Corrected    Corrected result: Previously reported as 5.0 % (reference range: 2.0-10.0 %) on 10/30/2023 at 1430 EDT.    Eosinophils %, Manual 10/30/2023    Corrected    Corrected result: Previously reported as 1.0 % (reference range: 0.0-6.0 %) on 10/30/2023 at 1430 EDT.    Basophils %, Manual 10/30/2023    Corrected    Corrected result: Previously reported as 0.0 % (reference range: 0.0-2.0 %) on 10/30/2023 at 1430 EDT.    Seg Neutrophils Absolute, Manual 10/30/2023    Corrected    Corrected result: Previously reported as 12.25 x10*3/uL (reference range: 1.20-7.00 x10*3/uL) on 10/30/2023 at 1430 EDT.    Lymphocytes Absolute, Manual 10/30/2023    Corrected    Corrected result: Previously reported as 2.51 x10*3/uL (reference  range: 1.20-4.80 x10*3/uL) on 10/30/2023 at 1430 EDT.    Monocytes Absolute, Manual 10/30/2023    Corrected    Corrected result: Previously reported as 0.79 x10*3/uL (reference range: 0.10-1.00 x10*3/uL) on 10/30/2023 at 1430 EDT.    Eosinophils Absolute, Manual 10/30/2023    Corrected    Corrected result: Previously reported as 0.16 x10*3/uL (reference range: 0.00-0.70 x10*3/uL) on 10/30/2023 at 1430 EDT.    Basophils Absolute, Manual 10/30/2023    Corrected    Corrected result: Previously reported as 0.00 x10*3/uL (reference range: 0.00-0.10 x10*3/uL) on 10/30/2023 at 1430 EDT.    Total Cells Counted 10/30/2023    Corrected    Corrected result: Previously reported as 100 (reference range: <null>) on 10/30/2023 at 1430 EDT.    RBC Morphology 10/30/2023    Corrected    Corrected result: Previously reported as See Below (reference range: <null>) on 10/30/2023 at 1430 EDT.    Hypochromia 10/30/2023    Corrected    Corrected result: Previously reported as Mild (reference range: <null>) on 10/30/2023 at 1430 EDT.    Josie Cells 10/30/2023    Corrected    Corrected result: Previously reported as Few (reference range: <null>) on 10/30/2023 at 1430 EDT.    Acanthocytes 10/30/2023    Corrected    Corrected result: Previously reported as Few (reference range: <null>) on 10/30/2023 at 1430 EDT.        PHYSICAL EXAMINATION   Vital Signs: not completed due to phone visit     Physical exam not completed due to phone visit     ASSESSMENT/PLAN    Pain  Pain is: related to a recent fall  Type: somatic  Pain control: not controlled  Home regimen:   - Start tramadol 50 mg 1-2 tabs as needed BID. RX sent.     CIPN:  - B12 and  B6 normal  - Has been on Gabapentin 600 TID in the past   - No longer taking Lyrica to 200 mg TID. Was taken off in the hospital and does not have worsening neuropathy off of this  - Continue Duloxetine 30 mg in Am and 60 mg at bedtime   - Has tried creams in the past with minimal relief    Opioid Use  Medication  Management:   - OARRS report reviewed with no aberrant behavior; consistent with  prescriptions/records and patient history  - MED 00.  Overdose Risk Score 00.   This has been discussed with patient.   - We will continue to closely monitor the patient for signs of prescription misuse including UDS, OARRS review and subjective reports at each visit.  - No concurrent benzodiazepine use   - I am a provider who either is or has consulted and collaborated with a provider certified in Hospice and Palliative Medicine and have conducted a face-face visit and examination for this patient.  - Routine Urine Drug Screen: deferred  - Controlled Substance Agreement completed - not currently on controlled substances   - Specifically discussed that controlled substance prescriptions will only be provided by our group as outlined in the completed agreement  - Prescribed naloxone: Deferred at this time  - Red Flags: none     Nausea   Intermittent nausea with vomiting related to  post-op course    - Change to Ondansetron 8 mg ODT every 8 hours as needed  - Continue Prochlorperazine 10 mg every 6 hours as needed  - Continue Lorazepam 0.5 mg as needed 2 times per day    Diarrhea  - Continue Imodium as prescribed by Surgeon  - Continue to increase fiber intake per surgeons recommendation - may start with 2 doses of benefiber daily     Altered Mood  Chronic anxiety and depression related to health concerns   controlled with home regimen  Home regimen:   - Continue Duloxetine as described above  - Continue Lorazepam as needed for anxiety     Sleeping Difficulty:  Impaired sleep related to diarrhea/anxiety  Home regimen:    - Continue Lorazepam as mentioned above  - Continue imodium as allowed    Decreased appetite  Related to disease process and surgery  Nutrition following  Home regimen:    - Continue smaller, more frequent meals  - was on TPN, discontinued    Supportive and Palliative Oncology encounter:  Spoke with patient and wife at  bedside  Emotional support provided  Coordination of care  We will continue to follow and address symptoms as needed    Advance Directives  Existence of Advance Directives:Yes, but NOT documented in medical record  Decision maker: SHAKEEL is wife Michelle  Code Status: Full code    Next Follow-Up Visit:  Return to clinic in 2 weeks     Signature and billing  Medical complexity was moderate level due to due to complexity of problems, extensive data review, and high risk of management/treatment.  Time was spent on the following: Prep Time, Time Directly with Patient/Family/Caregiver, Documentation Time. Total time spent: 20      Data  Diagnostic tests and information reviewed for today's visit:  Most recent labs, Most recent imaging, Medications         Some elements copied from Falguni Booth note on 10/26/23, the elements have been updated and all reflect current decision making from today, 11/9/2023.      Plan of Care discussed with: Patient    SIGNATURE: BRAYDEN Li    Contact information:  Supportive and Palliative Oncology  Monday-Friday 8 AM-5 PM  Phone:  [phone], press option #5, then option #1.   Or Epic Secure Chat

## 2023-11-13 NOTE — TELEPHONE ENCOUNTER
Pt had surgery at [facility] on 8/31- feels that his recovery is very slow. Is getting weekly labs as well as IV fluids at home daily.   Pt spoke with Dr. Denae Em's nurse today and she recommended pt reach out to Dr. Ozuna team to discuss need for ED or direct admit for evaluation.   Pt is weak, over the past few weeks has intermittent bouts of diarrhea, emesis, and feeling nauseous all day long. Appetite is poor.   Pt denies fever, body aches chills, dizziness, changes in vision, headache, unilateral weakness. No upper or lower extremity edema. No dyspnea.   Pt did have fall last week- fell while walking dog- landed on his buttocks- PCP is monitoring- no excess bruising, bleeding.   Pt does not have FUV scheduled.

## 2023-11-13 NOTE — TELEPHONE ENCOUNTER
Per request Dr. Ozuna pt will be seen in [facility] tomorrow 11/14 at 0900.   Pt notified and in agreement with plan.   In agreement if any new/worsening symptoms to report to ED overnight.

## 2023-11-14 PROBLEM — R19.7 DIARRHEA: Status: ACTIVE | Noted: 2023-01-01

## 2023-11-14 NOTE — H&P
History Of Present Illness  Leonardo Wilder is a 68 y.o. male presenting with 6 weeks of diarrhea. 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 (2/2021 and 8/2023). Patient states that after his most recent surgery, he had diarrhea in the post op phase that has not resolved. He states that after talking with his surgeon and his oncologist at [facility], the decision was made for him to be admitted to [facility] for further evaluation. Patient states that his diarrhea has been consistent since he was discharged after his surgery at the end of August. He reports that his diarrhea is responsive to imodium, but that it never completely resolves. He denies blood or mucus in his stool. He denies abdominal pain, just discomfort and cramping. He denies fevers and chills. Patient denies any sick contacts since he was discharged after his surgery. Patient endorses 10 pound weight loss over the past 4-6 weeks. He was originally discharged from the hospital on TPN, however he has been off TPN for the past 3 weeks. He states that he had severe nausea and vomiting while on TPN. After it was discontinued, his vomiting improved, though he still has this occasionally.  He denies any temporal or food related timing w/ his diarrhea and his nausea/vomiting.     Patient reports that he has history of portal vein thrombosis related to his malignancy, and is now on apixaban 5mg bid for this. He denies any new rashes, bruising, skin changes. Patient did recently have a fall related to walking his dog, but denies any new weakness, numbness, tingling. Denies change in urine output.     65 yo male with a hx of LBBB, hypothyroidism, CAD, portal vein thrombosis on Eliquis, appendiceal CA s/p HIPEC with CRS and DLI in 3/2021 followed by DLI takedown 6/2021 (both by Dr. Em) complicated by EC fistula at ostomy reversal site, recurrent bowel obstructions, and recurrent peritoneal carcinomatosis who is  scheduled for CRS and HIPEC on 8/31 and presents to the ED today w/ nausea and vomiting after bowel prep. Patient reports he started his bowel prep around noon and finished at 4PM. At around 6PM, he developed lower abdominal discomfort with waves of increasing severity reaching about a 4/10 in severity. Patient endorses associated nausea and x3 episodes of NBNB emesis. Patient endorses diarrhea. Denies fever/chills. Patient also reports he had 1 episode of emesis earlier today while driving to [place] after drinking coffee.     Oncologic history:  8/7/20- CT w/o (AP) OSH- Findings of peritoneal carcinomatosis with small volume ascites. Enlarged RLQ lymph nodes, likely metastatic.   8/8/20- *CA 19-9- <4**            *CEA- 1.5**  8/9/20- CT w & w/o (AP) OSH- Shelf like hypoattenuating cecal wall thickening extending to the base of a low density tubular blind ending structure arising from the cecum suspicious for markedly abnormal appearing and enlarged appendix. Pathologic RLQ lymphadenopathy (1.4 cm x 1.6 cm and 1.9 cm x 1.7 cm), supporting the above suspected location of the primary malignancy involving the cecum/appendix. Peritoneal carcinomatosis, notable for extensive small bowel serosal deposits resulting in multifocal areas of desmoplastic small bowel tethering. Segment stricture of the mid proximal sigmoid colon on a background of severe sigmoid colonic diverticulosis, therefore this is likely at least in part represents chronic diverticular stricturing. No evidence of metastatic disease within chest. A 0.2 cm oval subpleural nodule in LLL favored to represent pulmonary lymph node.  8/10/20- Colonoscopy (EMMA Guzman): Advanced to terminal ileum. A polypoid non obstructing medium sized mass was found in the cecum at the appendiceal orifice. It is likely a primary appendiceal adenocarcinoma involving the cecum. IC valve was not involved. The mass was non circumferential. The diamter of the visible lesion measured  15 mm. No bleeding present (biopsied). A 5 mm polyp was found in the cecum adjacent to the large polypoid lesion. The polyp was semi sessile. Polypectomy was not attempted due to the identification of likely malignancy in the cecum. Multiple small and large mouthed diverticula were found in the sigmoid colon, descending colon, transverse colon and ascending colon. There was an area of tight angulation due to significant diverticular disease in the sigmoid colon that was traversed without any intervention. EGD: CHANEL  PATHOLOGY: Cecal mass- poorly differentiated signet ring adenocarcinoma, ALLISON, BRAF- WT  KRAS- WT, NRAS- WT, SMAD4- disease associated genomic finding  9/11/20- FOLFOX goal of 12 cycles- completed 12 cycles on 2/19/21 - tolerated well(Dr. Ozuna, [facility])  11/6/20- CT w (CAP) OSH- There is definite interval improvement in the ascites as well as measurable and non measurable peritoneal carcinomatosis. In view of the new mild dilatation of the jejunal loops in the left rene abdomen, which is likely secondary to underlying carcinomatosis overall assessment of tumor burden is mixed in nature which tendency towards improvement.  12/30/20- CT w (CAP) OSH- Overall stable to minimal interval decrease in size of metastatic tumor burden, given slight interval decrease in size of mesenteric deposits. Stable appearance of mild intra abdominal ascites. No evidence of new metastatic lesions within the chest, abdomen, or pelvis.  2/18/2021: CT CAP (OSH): Stable degree of metastatic tumor burden with overall unchanged appearance of trace intra-abdominal ascites / peritoneal carcinomatosis. No new sites of metastatic disease within the chest, abdomen, or pelvis.  3/3/2021: (LAWANDA Em): Diagnostic laparoscopy, peritoneal biopsies.  PATHOLOGY: PERITONEUM, BIOPSY:  METASTATIC MUCINOUS ADENOCARCINOMA  3/15/2021: (LAWANDA Em): Exploratory laparotomy, lysis of adhesions; radicalintraperitoneal tumor  debulking to include omentectomy, splenectomy, complete peritonectomy, right hemicolectomy, cholecystectomy, sigmoid and low anterior resection with colorectal anastomosis and diverting loop ileostomy, hyperthermic intraperitoneal chemoperfusion with mitomycin-C, placement & removal of inflow and outflow catheters.  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 No definite perineural invasion by viable tumor. Presumed angiolymphatic invasion. Proximal margin is positive for carcinoma. Distal and mesenteric margins are negative. Carcinoma involves one of seventeen lymph nodes. PATHOLOGIC STAGE: y pT3 N1a M1b   PARTS 1 TO 8 & 10 TO 14: FALCIFORM LIGAMENT, GASTROHEPATIC LIGAMENT, OMENTUM WITH SPLEEN, LEFT PARTIAL HEMIDIAPHRAGM, LESSER SAC, RIGHT HEMIDIAPHRAGM, LEFT HEMIDIAPHRAGM, GALLBLADDER, SIGMOID COLON, RIGHT ABDOMINAL SIDEWALL, LEFT ABDOMINAL SIDEWALL, PERITONEUM, AND BLADDER PERITONEUM, RADICAL PERITONEAL DEBULKING PROCEDURE: Metastatic mucinous adenocarcinoma of the appendix involving: Omental tissue (part 3), left partial hemidiaphragm (part 4), lesser sac (part 5), right and left hemidiaphragm (parts 6 & 7), gallbladder serosa (part 8) - one lymph node negative for carcinoma, sigmoid colon serosa (part 10) - proximal and mesenteric margins are focally positive for carcinoma. Distal margin is negative. Six lymph nodes negative; right and left abdominal sidewall (parts 11 & 12), peritoneum (part 13). Organizing mucin involving: falciform ligament (part 1) - one lymph node negative, gastrohepatic ligament (part 2), splenic capsule (part 3), bladder peritoneum (part 14).  3/22/2021: CT CAP ([facility]): Chest: Bilateral pleural effusions and bibasilar compressive atelectasis. A/P: Expected postoperative changes status post HIPEC, splenectomy, cholecystectomy, right hemicolectomy, comment ectomy, and  distal colonic resection as described above. No evidence of drainable fluid collection or anastomotic leak.  5/5/2021: [place] [place]ase in the collection at the splenic bed. Soft tissue infiltration at the left upper abdomen probably from  appears slightly improved. An additional peritoneal mass at the right mid abdomen anterolaterally inferior to the gallbladder fossa appears stable, as does additional suspected peritoneal mass at the lower abdomen anteriorly. Mild dilatation of small bowel segments in the left mid and upper abdomen with mild mucosal edema and probably serosal involvement is also stable. Mild mesenteric nodularity that may be from mild adenopathy and/or additional peritoneal studs appears slightly increased, with largest nodule measuring 1.1cm in the right abdomen.  5/10/2021 Gastrovue: there is free flow of contrast material from rectum to ileostomy site.  There is no evidence of stenosis or leak at anastomotic site.  6/16/2021: (LAWANDA Em): Ileostomy closure.  6/27/2021 - 7/9/2021: Presented to OSH with fever, increased drainage; c/f EC fistula. Tx to [facility], conservative management -- bowel rest. DC on TPN and CLD for comfort.  7/23/2021: CT AP ([facility]): findings raising possibility of mild ileus without etiology.  No evidence of bowel wall thickening or perforation.  Anastomosis in medial colonic region and rectal region are patent.  S/p reversal of ileostomy.  Mild pelvocaliectasis on left without definite hydronephrosis.  Exophytic lower pole left renal cyst  10/14/21 CAP CT ([facility]): stable PO changes including right hemicolectomy with ileocolonic anastomosis and splenectomy.  Mild diffuse distention of small bowel loops with no transition point, which has decreased compared to 7/2021.  1.5 x 1.4cm focal nodular density in right anterior abdomina, which may represent enlarged lymph node or metastatic deposit.  Additional prominent mesenteric and bilateral external iliac lymph nodes  nonspecific.  PO changes.       CEA: 0.6  1/13/22: CT cap- stable prominent LN in abdomen including 1.4 cm right lower quadrant LN. Left subphrenic soft tissue thickening. CEA: 1   : <4      : 29.9  1/21/22: CT a/p-findings concerning for small bowel obstruction.  2/19/22: CT a/p-numerous distended loops of small bowel suggestive of ileus versus obstruction.  Severe gastric wall thickening suggestive of gastritis.  Relative thickening of the wall of the bladder suggestive of cystitis.  3/17/22: CT a/p-redemonstration of postoperative changes of multiple bowel resections.  Similar appearing mildly dilated fluid-filled small bowel loops measuring up to 3.8 cm in diameter with possible to transitional points in the right lower quadrant at the level of multiple collapse small bowel loops adjacent to the ileocolic anastomosis with adjacent mesenteric soft nodule/lymph nodes.  Findings are suggestive of partial bowel obstruction possibly secondary to adhesions or peritoneal carcinomatosis.  Interval increase in mild bilateral hydroureteronephrosis.  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 CEA: 1.1      : 5.64   : 50  4/2/22: MRI a/p ([facility])- Multiple enhancing mesenteric and serosal lesions, compatible with peritoneal carcinomatosis. Distortion and narrowing of multiple bowel loops with probable partial small bowel obstruction   at the level of the right lower quadrant tumor. Periportal tumor extending along the hepatic fissures with associated occlusion of the left portal vein and marked narrowing of the distal main portal vein with poststenotic dilatation of the anterior right portal vein.  4/2022: started Dr. Sulma GUEVARA.   5/8/22: CT a/p-inflammatory fat stranding and subcutaneous tissue surrounding the right lower quadrant ostomy likely representing cellulitis.  Multiple areas of small bowel narrowing with associated nodular and eccentric wall thickening involving portions of the  mesentery in the right lower quadrant concerning for peritoneal carcinomatosis rather than adhesions.  Near complete occlusion of the left portal vein which is not due to intraluminal thrombus but due to encasement by progressive soft tissue thickening infiltrating throughout the lionel hepatis falciform ligament and hepatic hilum that likely represents carcinomatosis.  5/25/22: FOLFIR x3, stopped due to poor tolerance.  6/2022: started FOLFOX  8/4/22: CT cap- unchanged appearance of periportal soft tissue infiltration and right lower quadrant nodularity which may represent peritoneal carcinomatosis.  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  Increased fluid-filled distended loops of small and large bowel without a discrete transition point.    10/7/22: CT cap-overall stable appearance of measurable and nonmeasurable tumor burden.  Right lower quadrant lymph nodes are relatively stable.  Irregular soft tissue density mass in the right lower quadrant is stable.  12/8/22: CT cap ([facility])-stable appearance of right lower quadrant soft tissue mass.  Stable appearance of prominent right lower quadrant lymph nodes.   1/20/23: MRI a/p ([facility])- Stable exam with small volume peritoneal disease in the right and left upper quadrant and right lower quadrant as well as mild narrowing of the proximal segment of the left portal vein from infiltrative disease along the hepatic fissures. No definite new areas of disease.  4/14/23: CT cap- re-demonstration of small scattered measurable and nonmeasurable soft tissue deposit which remain similar in appearance in size compared to prior CT study from 02/23/2023 and are consistent with history of metastatic appendiceal carcinoma.   7/2023: FOLFOX.  8/31/2023: ex-lap, SYLVIE, gastropexy, colonoscopy at [facility]     Past Medical History  - Appendiceal CA s/p HIPEC, CRS, and DLI in 3/2021 with ostomy takedown 6/2021  - Portal vein thrombosis on  Eliquis  - CAD   - LBBB   - EC fistula at ostomy reversal site  - Hypothyroidism  - Neuropathy  - Overactive bladder     Surgical History  - Exploratory laparotomy, lysis of adhesions, radical intraperitoneal tumor debulking (omentectomy, splenectomy, complete peritonectomy, R hemicolectomy, cholecystectomy, sigmoid and LAR with colorectal anastomosis, DLI creation, and HIPEC with mitomycin-C 3/15/2021  - Ex lap, lysis of adhesions, colonoscopy, gastropexy on 8/31/2023     Social History  He reports that he has quit smoking. His smoking use included cigarettes. He has never used smokeless tobacco. No history on file for alcohol use and drug use.  - Former tobacco use, quit in 2000  - Denies EtOH  - Denies illicit drug use  -       Family History  Family History   Problem Relation Name Age of Onset    Squamous cell carcinoma Other          Allergies  Patient has no known allergies.    Review of Systems   Constitutional:  Positive for unexpected weight change. Negative for chills, fatigue and fever.   HENT:  Negative for facial swelling and nosebleeds.    Respiratory:  Negative for cough, chest tightness and shortness of breath.    Cardiovascular:  Negative for chest pain, palpitations and leg swelling.   Gastrointestinal:  Positive for diarrhea, nausea and vomiting. Negative for abdominal distention, abdominal pain, anal bleeding, blood in stool, constipation and rectal pain.   Genitourinary:  Negative for difficulty urinating, dysuria, flank pain and hematuria.   Skin:  Negative for color change, pallor, rash and wound.   Neurological:  Negative for weakness, light-headedness and numbness.   Hematological:  Bruises/bleeds easily (on eliquis).   Psychiatric/Behavioral:  Negative for agitation, behavioral problems and confusion.         Physical Exam  Constitutional:       General: He is not in acute distress.     Appearance: Normal appearance. He is not toxic-appearing.   HENT:      Head: Normocephalic and  "atraumatic.      Mouth/Throat:      Mouth: Mucous membranes are moist.      Pharynx: Oropharynx is clear.   Eyes:      General: No scleral icterus.     Conjunctiva/sclera: Conjunctivae normal.      Pupils: Pupils are equal, round, and reactive to light.   Cardiovascular:      Rate and Rhythm: Normal rate and regular rhythm.      Pulses: Normal pulses.      Heart sounds: Normal heart sounds. No murmur heard.     No friction rub. No gallop.   Pulmonary:      Effort: Pulmonary effort is normal. No respiratory distress.      Breath sounds: Normal breath sounds. No wheezing, rhonchi or rales.   Abdominal:      General: Abdomen is flat. There is no distension.      Palpations: Abdomen is soft.      Tenderness: There is no abdominal tenderness. There is no guarding.      Comments: Hyperactive bowel sounds     Musculoskeletal:         General: No swelling. Normal range of motion.      Cervical back: Normal range of motion.   Skin:     General: Skin is warm and dry.      Capillary Refill: Capillary refill takes less than 2 seconds.      Coloration: Skin is not jaundiced.   Neurological:      General: No focal deficit present.      Mental Status: He is alert and oriented to person, place, and time.      Cranial Nerves: No cranial nerve deficit.      Motor: No weakness.   Psychiatric:         Mood and Affect: Mood normal.         Thought Content: Thought content normal.        Last Recorded Vitals  Blood pressure 125/82, pulse 76, temperature 36.3 °C (97.3 °F), temperature source Temporal, resp. rate 16, height 1.657 m (5' 5.24\"), weight 57.8 kg (127 lb 6.8 oz), SpO2 100 %.    Relevant Results  Scheduled medications  [Held by provider] apixaban, 5 mg, oral, BID  aspirin, 81 mg, oral, Daily  [START ON 11/15/2023] DULoxetine, 30 mg, oral, q AM  DULoxetine, 60 mg, oral, Nightly  finasteride, 5 mg, oral, Daily  levothyroxine, 100 mcg, oral, Daily  magnesium sulfate, 2 g, intravenous, Once  midodrine, 5 mg, oral, q8h  pantoprazole, " 40 mg, oral, Daily  rosuvastatin, 20 mg, oral, Daily      Continuous medications     PRN medications  PRN medications: acetaminophen, LORazepam  Results for orders placed or performed in visit on 11/14/23 (from the past 24 hour(s))   Comprehensive Metabolic Panel   Result Value Ref Range    Glucose 89 74 - 99 mg/dL    Sodium 135 (L) 136 - 145 mmol/L    Potassium 3.1 (L) 3.5 - 5.3 mmol/L    Chloride 99 98 - 107 mmol/L    Bicarbonate 28 21 - 32 mmol/L    Anion Gap 11 10 - 20 mmol/L    Urea Nitrogen 8 6 - 23 mg/dL    Creatinine 0.93 0.50 - 1.30 mg/dL    eGFR 90 >60 mL/min/1.73m*2    Calcium 8.6 8.6 - 10.3 mg/dL    Albumin 3.0 (L) 3.4 - 5.0 g/dL    Alkaline Phosphatase 291 (H) 33 - 136 U/L    Total Protein 6.7 6.4 - 8.2 g/dL    AST 23 9 - 39 U/L    Bilirubin, Total 0.9 0.0 - 1.2 mg/dL    ALT 12 10 - 52 U/L   CBC and Auto Differential   Result Value Ref Range    WBC 10.7 4.4 - 11.3 x10*3/uL    nRBC 0.0 0.0 - 0.0 /100 WBCs    RBC 2.99 (L) 4.50 - 5.90 x10*6/uL    Hemoglobin 8.4 (L) 13.5 - 17.5 g/dL    Hematocrit 25.8 (L) 41.0 - 52.0 %    MCV 86 80 - 100 fL    MCH 28.1 26.0 - 34.0 pg    MCHC 32.6 32.0 - 36.0 g/dL    RDW 21.2 (H) 11.5 - 14.5 %    Platelets 538 (H) 150 - 450 x10*3/uL    Neutrophils % 63.6 40.0 - 80.0 %    Immature Granulocytes %, Automated 0.5 0.0 - 0.9 %    Lymphocytes % 24.2 13.0 - 44.0 %    Monocytes % 10.6 2.0 - 10.0 %    Eosinophils % 0.8 0.0 - 6.0 %    Basophils % 0.3 0.0 - 2.0 %    Neutrophils Absolute 6.81 1.20 - 7.70 x10*3/uL    Immature Granulocytes Absolute, Automated 0.05 0.00 - 0.70 x10*3/uL    Lymphocytes Absolute 2.59 1.20 - 4.80 x10*3/uL    Monocytes Absolute 1.13 (H) 0.10 - 1.00 x10*3/uL    Eosinophils Absolute 0.09 0.00 - 0.70 x10*3/uL    Basophils Absolute 0.03 0.00 - 0.10 x10*3/uL   Magnesium   Result Value Ref Range    Magnesium 1.33 (L) 1.60 - 2.40 mg/dL   Morphology   Result Value Ref Range    RBC Morphology See Below     Polychromasia Mild     Hypochromia Mild     Target Cells Few      Josie Cells Few     Acanthocytes Many     Lockwood-Jolly Bodies Present    Sars-CoV-2 PCR, Screen Asymptomatic   Result Value Ref Range    Coronavirus 2019, PCR Not Detected Not Detected        Assessment/Plan   Principal Problem:    Diarrhea  Mr. Leonardo Wilder is a 68 year old gentleman w/ PMHx of LBBB, hypothyroidism, CAD, portal vein thrombosis on Eliquis, appendiceal CA s/p HIPEC x2 at [facility] (2021 and 8/2023) who presents as a direct admit for persistent diarrhea. Patient is hemodynamically stable, admitted for further workup and evaluation of diarrhea.    Persistent diarrhea  - 6 weeks of diarrhea following recent surgery (debulking and HIPEC at [facility] 8/31/2023)  - Hasn't improved since discharge, off TPN for past 3 weeks  - C diff testing pending  - Stool pathogen panel pending  - Will hold off on imodium for now given workup of infectious etiology of diarrhea  - Hypokalemic and hypomagnesemic on admission 2/2 diarrhea, replete PRN  - Consider GI consult in AM given significant abdominal surgery history and persistent diarrhea  - Will make NPO at midnight  - Held patient's evening dose of Eliquis in lieu of evaluation by GI/surg onc and possible procedure; if no plan for procedure, will need resumed as soon as possible    2. History of appendiceal cancer w/ carcinomatosis  - Prior surgical intervention and oncologic history at [facility]   -  s/p 6 mo neoadjuvant chemo w/ FOLFOX followed by debulking surgery with HIPEC (3/21)   - Most recently w/ SBO admitted to [facility] in 8/2023, complicated by GNR bacteremia   - surgery that admission: ex-lap, lysis of adhesions, colonoscopy, gastropexy but no HIPEC per discharge summary  - Consider surgical oncology consult in the AM given the patient's significant abdominal surgery history     3. History of portal vein thrombosis  - In setting of metastatic appendiceal cancer  - On Eliquis 5mg BID at home, held evening dose on admission  - Restart if no plan for procedures    4. CAD s/p  PCI 2023  - continue home crestor 20mg   - continue home Asa 81mg daily     5. Chronic hypotension  - S/p 1L NS bolus on admission  - continue home midodrine 5mg TID     6. GERD  - continue protonix 40mg daily     7. Hypothyroidism  - continue home synthroid    8. 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 Hypokalemia  Hypomagnesemia  - In setting of persistent diarrhea  - Repleted on admission, replete PRN       F: PRN  E: hypokalemia and hypomagnesemia  N: Regular diet, MPO after MN  A: Mediport, PIV  GI: PPI  DVT: home apixaban    Code status: Full code (confirmed on admission)  NOK: Wife Michelle Wilder [phone]    Cachorro Pastrana MD  Internal Medicine PGY-1

## 2023-11-14 NOTE — CARE PLAN
Problem: Fall/Injury  Goal: Not fall by end of shift  Outcome: Progressing  Goal: Be free from injury by end of the shift  Outcome: Progressing  Goal: Verbalize understanding of personal risk factors for fall in the hospital  Outcome: Progressing  Goal: Verbalize understanding of risk factor reduction measures to prevent injury from fall in the home  Outcome: Progressing  Goal: Use assistive devices by end of the shift  Outcome: Progressing  Goal: Pace activities to prevent fatigue by end of the shift  Outcome: Progressing     Problem: Pain - Adult  Goal: Verbalizes/displays adequate comfort level or baseline comfort level  Outcome: Progressing     Problem: Safety - Adult  Goal: Free from fall injury  Outcome: Progressing     Problem: Discharge Planning  Goal: Discharge to home or other facility with appropriate resources  Outcome: Progressing     Problem: Chronic Conditions and Co-morbidities  Goal: Patient's chronic conditions and co-morbidity symptoms are monitored and maintained or improved  Outcome: Progressing   The patient's goals for the shift include      The clinical goals for the shift include Pt will remain safe in room and use call light during shift on 11/14/23 @1930    Pt admitted from acute care clinic. Pt VSS upon admission. Pt expecting to discharge to home. MD aware of admission. Pt oriented to room and floor. Pt verbalized understanding and denied questions at the time.

## 2023-11-14 NOTE — SIGNIFICANT EVENT
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 Em) complicated by EC fistula at ostomy reversal site, recurrent bowel obstructions, and recurrent peritoneal carcinomatosis s/p  post debulking and HIPEC at [facility] 8/2023, hx of LBBB, hypothyroidism, CAD, portal vein thrombosis on Eliquis, directly admitted for n/v, diarrhea since his surgery at [facility] 8/2023. He is also having intermittent nausea and vomiting after po intake (both pills and food). He has been taking compazine and zofran. Denies blood in vomit or stool.  He has having frequent diarrhea, sometimes it is mildly form oftentimes it is completely liquid.  Cannot identify any particular triggers.  Patient was briefly on TPN in 10/2023 but it made his diarrhea worse and was stopped.  Patient denies any fevers, chest pain, shortness of breath, dysuria, abdominal pain. Pt Is not taking his tramadol.     Allergies: None    Onc hx:  8/7/20- CT w/o (AP) OSH- Findings of peritoneal carcinomatosis with small volume ascites. Enlarged RLQ lymph nodes, likely metastatic.   8/8/20- *CA 19-9- <4**            *CEA- 1.5**  8/9/20- CT w & w/o (AP) OSH- Shelf like hypoattenuating cecal wall thickening extending to the base of a low density tubular blind ending structure arising from the cecum suspicious for markedly abnormal appearing and enlarged appendix. Pathologic RLQ lymphadenopathy (1.4 cm x 1.6 cm and 1.9 cm x 1.7 cm), supporting the above suspected location of the primary malignancy involving the cecum/appendix. Peritoneal carcinomatosis, notable for extensive small bowel serosal deposits resulting in multifocal areas of desmoplastic small bowel tethering. Segment stricture of the mid proximal sigmoid colon on a background of severe sigmoid colonic diverticulosis, therefore this is likely at least in part represents chronic diverticular stricturing. No evidence of metastatic disease within chest.  A 0.2 cm oval subpleural nodule in LLL favored to represent pulmonary lymph node.  8/10/20- Colonoscopy ([facility] ): Advanced to terminal ileum. A polypoid non obstructing medium sized mass was found in the cecum at the appendiceal orifice. It is likely a primary appendiceal adenocarcinoma involving the cecum. IC valve was not involved. The mass was non circumferential. The diamter of the visible lesion measured 15 mm. No bleeding present (biopsied). A 5 mm polyp was found in the cecum adjacent to the large polypoid lesion. The polyp was semi sessile. Polypectomy was not attempted due to the identification of likely malignancy in the cecum. Multiple small and large mouthed diverticula were found in the sigmoid colon, descending colon, transverse colon and ascending colon. There was an area of tight angulation due to significant diverticular disease in the sigmoid colon that was traversed without any intervention. EGD: CHANEL  PATHOLOGY: Cecal mass- poorly differentiated signet ring adenocarcinoma, ALLISON, BRAF- WT  KRAS- WT, NRAS- WT, SMAD4- disease associated genomic finding  9/11/20- FOLFOX goal of 12 cycles- completed 12 cycles on 2/19/21 - tolerated well(Dr. Ozuna, [facility])  11/6/20- CT w (CAP) OSH- There is definite interval improvement in the ascites as well as measurable and non measurable peritoneal carcinomatosis. In view of the new mild dilatation of the jejunal loops in the left rene abdomen, which is likely secondary to underlying carcinomatosis overall assessment of tumor burden is mixed in nature which tendency towards improvement.  12/30/20- CT w (CAP) OSH- Overall stable to minimal interval decrease in size of metastatic tumor burden, given slight interval decrease in size of mesenteric deposits. Stable appearance of mild intra abdominal ascites. No evidence of new metastatic lesions within the chest, abdomen, or pelvis.  2/18/2021: CT CAP (OSH): Stable degree of metastatic tumor burden  with overall unchanged appearance of trace intra-abdominal ascites / peritoneal carcinomatosis. No new sites of metastatic disease within the chest, abdomen, or pelvis.  3/3/2021: (LAWANDA Em): Diagnostic laparoscopy, peritoneal biopsies.  PATHOLOGY: PERITONEUM, BIOPSY:  METASTATIC MUCINOUS ADENOCARCINOMA  3/15/2021: (LAWANDA Em): Exploratory laparotomy, lysis of adhesions; radicalintraperitoneal tumor debulking to include omentectomy, splenectomy, complete peritonectomy, right hemicolectomy, cholecystectomy, sigmoid and low anterior resection with colorectal anastomosis and diverting loop ileostomy, hyperthermic intraperitoneal chemoperfusion with mitomycin-C, placement & removal of inflow and outflow catheters.  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 No definite perineural invasion by viable tumor. Presumed angiolymphatic invasion. Proximal margin is positive for carcinoma. Distal and mesenteric margins are negative. Carcinoma involves one of seventeen lymph nodes. PATHOLOGIC STAGE: y pT3 N1a M1b   PARTS 1 TO 8 & 10 TO 14: FALCIFORM LIGAMENT, GASTROHEPATIC LIGAMENT, OMENTUM WITH SPLEEN, LEFT PARTIAL HEMIDIAPHRAGM, LESSER SAC, RIGHT HEMIDIAPHRAGM, LEFT HEMIDIAPHRAGM, GALLBLADDER, SIGMOID COLON, RIGHT ABDOMINAL SIDEWALL, LEFT ABDOMINAL SIDEWALL, PERITONEUM, AND BLADDER PERITONEUM, RADICAL PERITONEAL DEBULKING PROCEDURE: Metastatic mucinous adenocarcinoma of the appendix involving: Omental tissue (part 3), left partial hemidiaphragm (part 4), lesser sac (part 5), right and left hemidiaphragm (parts 6 & 7), gallbladder serosa (part 8) - one lymph node negative for carcinoma, sigmoid colon serosa (part 10) - proximal and mesenteric margins are focally positive for carcinoma. Distal margin is negative. Six lymph nodes negative; right and left abdominal sidewall (parts 11 & 12), peritoneum  (part 13). Organizing mucin involving: falciform ligament (part 1) - one lymph node negative, gastrohepatic ligament (part 2), splenic capsule (part 3), bladder peritoneum (part 14).  3/22/2021: CT CAP ([facility]): Chest: Bilateral pleural effusions and bibasilar compressive atelectasis. A/P: Expected postoperative changes status post HIPEC, splenectomy, cholecystectomy, right hemicolectomy, comment ectomy, and distal colonic resection as described above. No evidence of drainable fluid collection or anastomotic leak.  5/5/2021: CT AP ([facility]): Decrease in the collection at the splenic bed. Soft tissue infiltration at the left upper abdomen probably from  appears slightly improved. An additional peritoneal mass at the right mid abdomen anterolaterally inferior to the gallbladder fossa appears stable, as does additional suspected peritoneal mass at the lower abdomen anteriorly. Mild dilatation of small bowel segments in the left mid and upper abdomen with mild mucosal edema and probably serosal involvement is also stable. Mild mesenteric nodularity that may be from mild adenopathy and/or additional peritoneal studs appears slightly increased, with largest nodule measuring 1.1cm in the right abdomen.  5/10/2021 Gastrovue: there is free flow of contrast material from rectum to ileostomy site.  There is no evidence of stenosis or leak at anastomotic site.  6/16/2021: (LAWANDA Em): Ileostomy closure.  6/27/2021 - 7/9/2021: Presented to OSH with fever, increased drainage; c/f EC fistula. Tx to [facility], conservative management -- bowel rest. DC on TPN and CLD for comfort.  7/23/2021: CT AP ([facility]): findings raising possibility of mild ileus without etiology.  No evidence of bowel wall thickening or perforation.  Anastomosis in medial colonic region and rectal region are patent.  S/p reversal of ileostomy.  Mild pelvocaliectasis on left without definite hydronephrosis.  Exophytic lower pole left renal cyst  10/14/21 CAP CT ([facility]):  stable PO changes including right hemicolectomy with ileocolonic anastomosis and splenectomy.  Mild diffuse distention of small bowel loops with no transition point, which has decreased compared to 7/2021.  1.5 x 1.4cm focal nodular density in right anterior abdomina, which may represent enlarged lymph node or metastatic deposit.  Additional prominent mesenteric and bilateral external iliac lymph nodes nonspecific.  PO changes.       CEA: 0.6  1/13/22: CT cap- stable prominent LN in abdomen including 1.4 cm right lower quadrant LN. Left subphrenic soft tissue thickening. CEA: 1   : <4      : 29.9  1/21/22: CT a/p-findings concerning for small bowel obstruction.  2/19/22: CT a/p-numerous distended loops of small bowel suggestive of ileus versus obstruction.  Severe gastric wall thickening suggestive of gastritis.  Relative thickening of the wall of the bladder suggestive of cystitis.  3/17/22: CT a/p-redemonstration of postoperative changes of multiple bowel resections.  Similar appearing mildly dilated fluid-filled small bowel loops measuring up to 3.8 cm in diameter with possible to transitional points in the right lower quadrant at the level of multiple collapse small bowel loops adjacent to the ileocolic anastomosis with adjacent mesenteric soft nodule/lymph nodes.  Findings are suggestive of partial bowel obstruction possibly secondary to adhesions or peritoneal carcinomatosis.  Interval increase in mild bilateral hydroureteronephrosis.  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 CEA: 1.1      : 5.64   : 50  4/2/22: MRI a/p ([facility])- Multiple enhancing mesenteric and serosal lesions, compatible with peritoneal carcinomatosis. Distortion and narrowing of multiple bowel loops with probable partial small bowel obstruction   at the level of the right lower quadrant tumor. Periportal tumor extending along the hepatic fissures with associated occlusion of the left portal vein and marked  narrowing of the distal main portal vein with poststenotic dilatation of the anterior right portal vein.  4/2022: started FOLFIRDr. Sulma SOFIA.   5/8/22: CT a/p-inflammatory fat stranding and subcutaneous tissue surrounding the right lower quadrant ostomy likely representing cellulitis.  Multiple areas of small bowel narrowing with associated nodular and eccentric wall thickening involving portions of the mesentery in the right lower quadrant concerning for peritoneal carcinomatosis rather than adhesions.  Near complete occlusion of the left portal vein which is not due to intraluminal thrombus but due to encasement by progressive soft tissue thickening infiltrating throughout the lionel hepatis falciform ligament and hepatic hilum that likely represents carcinomatosis.  5/25/22: FOLFIR x3, stopped due to poor tolerance.  6/2022: started FOLFOX  8/4/22: CT cap- unchanged appearance of periportal soft tissue infiltration and right lower quadrant nodularity which may represent peritoneal carcinomatosis.  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  Increased fluid-filled distended loops of small and large bowel without a discrete transition point.    10/7/22: CT cap-overall stable appearance of measurable and nonmeasurable tumor burden.  Right lower quadrant lymph nodes are relatively stable.  Irregular soft tissue density mass in the right lower quadrant is stable.  12/8/22: CT cap ([facility])-stable appearance of right lower quadrant soft tissue mass.  Stable appearance of prominent right lower quadrant lymph nodes.   1/20/23: MRI a/p ([facility])- Stable exam with small volume peritoneal disease in the right and left upper quadrant and right lower quadrant as well as mild narrowing of the proximal segment of the left portal vein from infiltrative disease along the hepatic fissures. No definite new areas of disease.  4/14/23: CT cap- re-demonstration of small scattered  "measurable and nonmeasurable soft tissue deposit which remain similar in appearance in size compared to prior CT study from 02/23/2023 and are consistent with history of metastatic appendiceal carcinoma.   7/2023: FOLFOX.  8/31/2023: HIPEC @ [facility]        11/14/2023     9:18 AM 11/14/2023     9:19 AM 11/14/2023    12:50 PM 11/14/2023    12:55 PM 11/14/2023    12:58 PM 11/14/2023     5:17 PM 11/14/2023     5:33 PM   Vitals   Systolic 116 85 119 105 102 125 125   Diastolic 72 64 69 71 71 82 82   Heart Rate 98 112 75 88 90 76 76   Temp 36.2 °C (97.2 °F)     36.3 °C (97.3 °F) 36.3 °C (97.3 °F)   Resp 18     16 16   Height (in)       1.657 m (5' 5.24\")   Weight (lb) 127.43      127.43   BMI 21.05 kg/m2      21.05 kg/m2   BSA (m2) 1.63 m2      1.63 m2   Visit Report Report Report Report Report Report Report Report       Results for orders placed or performed in visit on 11/14/23 (from the past 24 hour(s))   Comprehensive Metabolic Panel   Result Value Ref Range    Glucose 89 74 - 99 mg/dL    Sodium 135 (L) 136 - 145 mmol/L    Potassium 3.1 (L) 3.5 - 5.3 mmol/L    Chloride 99 98 - 107 mmol/L    Bicarbonate 28 21 - 32 mmol/L    Anion Gap 11 10 - 20 mmol/L    Urea Nitrogen 8 6 - 23 mg/dL    Creatinine 0.93 0.50 - 1.30 mg/dL    eGFR 90 >60 mL/min/1.73m*2    Calcium 8.6 8.6 - 10.3 mg/dL    Albumin 3.0 (L) 3.4 - 5.0 g/dL    Alkaline Phosphatase 291 (H) 33 - 136 U/L    Total Protein 6.7 6.4 - 8.2 g/dL    AST 23 9 - 39 U/L    Bilirubin, Total 0.9 0.0 - 1.2 mg/dL    ALT 12 10 - 52 U/L   CBC and Auto Differential   Result Value Ref Range    WBC 10.7 4.4 - 11.3 x10*3/uL    nRBC 0.0 0.0 - 0.0 /100 WBCs    RBC 2.99 (L) 4.50 - 5.90 x10*6/uL    Hemoglobin 8.4 (L) 13.5 - 17.5 g/dL    Hematocrit 25.8 (L) 41.0 - 52.0 %    MCV 86 80 - 100 fL    MCH 28.1 26.0 - 34.0 pg    MCHC 32.6 32.0 - 36.0 g/dL    RDW 21.2 (H) 11.5 - 14.5 %    Platelets 538 (H) 150 - 450 x10*3/uL    Neutrophils % 63.6 40.0 - 80.0 %    Immature Granulocytes %, Automated 0.5 " 0.0 - 0.9 %    Lymphocytes % 24.2 13.0 - 44.0 %    Monocytes % 10.6 2.0 - 10.0 %    Eosinophils % 0.8 0.0 - 6.0 %    Basophils % 0.3 0.0 - 2.0 %    Neutrophils Absolute 6.81 1.20 - 7.70 x10*3/uL    Immature Granulocytes Absolute, Automated 0.05 0.00 - 0.70 x10*3/uL    Lymphocytes Absolute 2.59 1.20 - 4.80 x10*3/uL    Monocytes Absolute 1.13 (H) 0.10 - 1.00 x10*3/uL    Eosinophils Absolute 0.09 0.00 - 0.70 x10*3/uL    Basophils Absolute 0.03 0.00 - 0.10 x10*3/uL   Magnesium   Result Value Ref Range    Magnesium 1.33 (L) 1.60 - 2.40 mg/dL   Morphology   Result Value Ref Range    RBC Morphology See Below     Polychromasia Mild     Hypochromia Mild     Target Cells Few     Josie Cells Few     Acanthocytes Many     Lockwood-Jolly Bodies Present    Sars-CoV-2 PCR, Screen Asymptomatic   Result Value Ref Range    Coronavirus 2019, PCR Not Detected Not Detected       No results found.      GENERAL APPEARANCE: alert and cooperative, and appears to be in no acute distress.  CARDIAC: Normal S1 and S2. No S3, S4 or murmurs. Rhythm is regular. There is no peripheral edema, cyanosis or pallor. Extremities are warm and well perfused.   LUNGS: Clear to auscultation and percussion without rales, rhonchi, wheezing or diminished breath sounds.  ABDOMEN: Positive bowel sounds. Soft, nondistended, nontender. No guarding or rebound. No masses.  EXTREMITIES: No significant deformity or joint abnormality. No edema.   NEUROLOGICAL: CN II-XII grossly intact. Strength and sensation symmetric and intact throughout.   SKIN: Skin normal color, texture and turgor with no lesions or eruptions.       A/P:   68 year old with pmh appendiceal CA s/p HIPEC with CRS and DLI in 3/2021 followed by DLI takedown 6/2021 (both by Dr. Em) complicated by EC fistula at ostomy reversal site, recurrent bowel obstructions, and recurrent peritoneal carcinomatosis s/p  post debulking and HIPEC at [facility] 8/2023, hx of LBBB, hypothyroidism, CAD, portal vein thrombosis  on Eliquis, directly admitted for n/v, diarrhea and electrolyte abnormalities. Consult GI and surgical oncology.     #appendiceal CA s/p HIPEC   #diarrhea  #n/v  -c. Diff, stool pathogen  -Dr. Ozuna primary oncologist   -consult GI for potential EGD/colonoscopy  -consult surgical oncology for post op continuous diarrhea   -zofran/compazine  -c/w home pantoprazole  -continue home ativan .5mg BID prn for anxiety and insomnia   -nutrition consult     #hx of portal vein thrombosis  -hold eliquis in case of procedure with GI   -can consider heparin bridge if multiple days of holding eliquis     #hypotension  -continue home midodrine 5mg TID    #hypothyroidism  -continue home levothyroxine 50mcg    #HLD  -continue home rosuvastatin 20mg    #BPH  -continue home finasteride 5mg     #neuropathy  --c/w home duloxetine 30mg AM and 60mg QPM     DVT: holding eliquis   FULL CODE CONFIRMED   NOK: wife Michelle [phone]

## 2023-11-14 NOTE — PROGRESS NOTES
Patient ID: Leonardo Wilder is a 68 y.o. male.    The patient presents to [facility] for evaluation of diarrhea, nausea, vomiting, and dehydration. He is waking up 5-6x a night due to diarrhea, at times incontinent, taking loperamide on average 2-3 x a day. He is also having intermittent nausea and vomiting after po intake (both pills and food). He has been taking compazine and zofran. Denies blood in vomit or stool. He has been trying to consume 2-3 meals a day with a daily calorie goal of 4661-2470 calories a day. He denies fever, cough, SOB, chest pain. He feels his recovery after debulking surgery with HIPEC at [facility] is taking longer than expected. Per patient last imaging was shortly after procedure at [facility] in August. Symptoms have been going on intermittently since surgery. He has been on home daily IV fluids.     Objective    BSA: 1.63 meters squared          11/14/2023     9:18 AM 11/14/2023     9:19 AM 11/14/2023    12:50 PM 11/14/2023    12:55 PM 11/14/2023    12:58 PM   Vitals   Systolic 116 85 119 105 102   Diastolic 72 64 69 71 71   Heart Rate 98 112 75 88 90   Temp 36.2 °C (97.2 °F)       Resp 18       Weight (lb) 127.43       BMI 21.05 kg/m2       BSA (m2) 1.63 m2       Visit Report Report Report Report Report Report         Results from last 7 days   Lab Units 11/14/23  1007   WBC AUTO x10*3/uL 10.7   HEMOGLOBIN g/dL 8.4*   HEMATOCRIT % 25.8*   PLATELETS AUTO x10*3/uL 538*   NEUTROS ABS x10*3/uL 6.81   LYMPHS ABS AUTO x10*3/uL 2.59   MONOS ABS AUTO x10*3/uL 1.13*   EOS ABS AUTO x10*3/uL 0.09   NEUTROS PCT AUTO % 63.6   LYMPHS PCT AUTO % 24.2   MONOS PCT AUTO % 10.6   EOS PCT AUTO % 0.8        Results from last 7 days   Lab Units 11/14/23  1007   GLUCOSE mg/dL 89   SODIUM mmol/L 135*   POTASSIUM mmol/L 3.1*   CHLORIDE mmol/L 99   CO2 mmol/L 28   BUN mg/dL 8   CREATININE mg/dL 0.93   EGFR mL/min/1.73m*2 90   CALCIUM mg/dL 8.6   MAGNESIUM mg/dL 1.33*   ALBUMIN g/dL 3.0*   PROTEIN TOTAL g/dL 6.7                         Physical Exam  Vitals reviewed.   Constitutional:       Appearance: Normal appearance.   HENT:      Head: Normocephalic.      Nose: Nose normal.   Eyes:      Conjunctiva/sclera: Conjunctivae normal.   Cardiovascular:      Rate and Rhythm: Normal rate.      Pulses: Normal pulses.      Heart sounds: Normal heart sounds.   Pulmonary:      Effort: Pulmonary effort is normal.      Breath sounds: Normal breath sounds.   Abdominal:      General: Abdomen is flat. Bowel sounds are normal.      Palpations: Abdomen is soft.   Skin:     General: Skin is warm and dry.   Neurological:      Mental Status: He is alert.   Psychiatric:         Mood and Affect: Mood normal.         Behavior: Behavior normal.         Thought Content: Thought content normal.                   Oncology history   Appendiceal CA s/p HIPEC with CRS and DLI in 3/2021 followed by DLI takedown 6/2021 (both by Dr. Em) complicated by EC fistula at ostomy reversal site, recurrent bowel obstructions, and recurrent peritoneal carcinomatosis s/p  post debulking and HIPEC at [facility]          Plan:  - orthos positive  - labs significant for hyponatremia, hypokalemia and hypomagnesemia - electrolytes replaced  - 1L NS for dehydration, BP improved.   - if has BM will send stool studies  - protonix ordered for heartburn - had not taken home dose this am    Dispo:  - decision made to admit to inpatient with symptomology (diarrhea, nausea, and vomiting, hypotension) and prior CT imaging in September had concern for possible areas of aspiration/pneumonia, trace pleural effusion, extraperitoneal small hematoma. Needs further workup and repeat imaging.         Merced Owens, APRN-CNP

## 2023-11-15 NOTE — CONSULTS
Nutrition Initial Assessment:   Nutrition Assessment    The patient is a 68 y.o. male who is hospital day #1.  Pt admitted for further workup and evaluation of persistent diarrhea. Pt is C.diff positive.     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 Main barrier to increasing PO intake are N/V as well as early satiety. Pt reports that he will get full faster so what would be 1 meal for someone else he splits between 3 meals. Mentions today he was only able to eat about 33-50% of the omelette he ordered. The N/V initially improved with stopping the TPN but it continues to be an issue. Sometimes the N/V will be hours after eating and other times it will occur while he is having a meal. Has been having emesis with pills too. Per pt, knows he should do smaller frequent meals but it can be hard - sometimes does two meals a day. Does not drink ONS. Tried protein powder and he is incorporating into some meals. Hoping the N/V/D will improve once he is treated for C.diff.  Energy Intake: Fair 50-75 %  Additional Food and Nutrient Administration History: Pt was previously on TPN which was discontinued on 10/18/23 due to pt with N/V - thought to be related to lipids. Was encouraged to increase PO intake back then. Pt seen by outpatient [facility] RDN on 10/27 - estimated PO intake to be around 1200 kcal, doing IVF daily, and PO elecrolytes. Pt complaining of diarrhea - he was recommneded to do smaller frequent meals, increase amout of kcal consumed for wt maintenance, adding  "protein powder. Pt takes fiber gummies at home.    Food Allergies/Intolerances:  None  GI Symptoms: Diarrhea, Nausea, and Vomiting  Vitamin/Herbal Supplement Use: Mg and K  Oral Problems: None    Anthropometrics:  Start of admission anthropometrics:  Height: 165.7 cm (5' 5.24\")  Weight: 57.8 kg (127 lb 6.8 oz)  BMI (Calculated): 21.05    IBW/kg (Dietitian Calculated): 62.5 kg  Percent of IBW: 92 %       Wt Hx prior to admission    11/14/23 57.8 kg (127 lb 6.8 oz) - admit wt - stated? Measured?   10/26/23 59.4 kg (130 lb 15.3 oz) - 3% wt loss in 3 weeks    08/07/23 65.7 kg (144 lb) - 12% wt loss in 3 months    06/14/23 70.3 kg (155 lb 3.3 oz) - 18% wt loss in 5 months    03/15/23 68.8 kg (151 lb 10.8 oz)   02/15/23 68.5 kg (151 lb 0.2 oz)   01/16/23 68.4 kg (150 lb 12.7 oz)   12/14/22 62.3 kg (137 lb 5.6 oz)   11/15/22 68.9 kg (151 lb 14.4 oz)       Weight Change %:  Weight History / % Weight Change: Per wt hx, pt with significant wt loss in the past 5 months - wt loss rate appears to have decreased in the past 3 weeks. Pt reports that he used to be 142lb about two months ago and believes he isnow 128 lb per his scale at home. States the lowest he has been is 115lb.  Significant Weight Loss: Yes  Interpretation of Weight Loss: >7.5% in 3 months       Nutrition Focused Physical Exam Findings:    Subcutaneous Fat Loss:   Orbital Fat Pads: Mild-Moderate (slight dark circles and slight hollowing)   Buccal Fat Pads: Severe (hollow, sunken and narrow face)   Triceps: Mild-moderate (less than ample fat tissue)   Ribs: Severe (protruding prominent clavicle)   Muscle Wasting:  Temporalis: Mild-Moderate (slight depression)  Pectoralis (Clavicular Region): Mild-Moderate (some protrusion of clavicle)  Deltoid/Trapezius: Mild-Moderate (slight protrusion of acromion process)  Interosseous: Mild-Moderate (slightly depressed area between thumb and forefinger)  Trapezius/Infraspinatus/Supraspinatus (Scapular Region): " Defer  Quadriceps: Severe (depressions on inner and outer thigh)  Gastrocnemius: Severe (minimal muscle definition)  Edema:  Edema: none       Physical Findings:  Hair: Negative  Eyes: Negative  Mouth: Negative  Nails: Negative  Skin: Negative (bruising)    Objective Data:    Last BM Date: 11/15/23    Nutrition Significant Labs:    Results from last 7 days   Lab Units 11/15/23  0531 11/14/23  1007   HEMOGLOBIN g/dL 8.8* 8.4*   MCV fL 89 86   GLUCOSE mg/dL 92 89   POTASSIUM mmol/L 4.4 3.1*   SODIUM mmol/L 136 135*   PHOSPHORUS mg/dL 3.6 3.5   MAGNESIUM mg/dL 2.29 1.33*   CREATININE mg/dL 0.96 0.93   BUN mg/dL 10 8   ALT U/L  --  12   AST U/L  --  23   ALK PHOS U/L  --  291*       Nutrition Specific Medications:  levothyroxine, 100 mcg, oral, Daily  pantoprazole, 40 mg, oral, Daily  rosuvastatin, 20 mg, oral, Daily    Dietary Orders (From admission, onward)       Start     Ordered    11/15/23 0845  Adult diet Regular  Diet effective now        Question:  Diet type  Answer:  Regular    11/15/23 0844                     Estimated Needs:   Total Energy Estimated Needs (kCal): 1900 kCal  Total Estimated Energy Need per Day (kCal/kg): 33 kCal/kg  Method for Estimating Needs: using admit wt    Total Protein Estimated Needs (g): 80 g  Total Protein Estimated Needs (g/kg): 1.4 g/kg  Method for Estimating Needs: using admit wt    Total Fluid Estimated Needs (mL): 1900 mL  Method for Estimating Needs: at minimum 1 ml/kcal though likely pt needs more given diarrhea        Nutrition Diagnosis   Nutrition Diagnosis:  Malnutrition Diagnosis  Patient has Malnutrition Diagnosis: Yes  Diagnosis Status: New  Malnutrition Diagnosis: Severe malnutrition related to acute disease or injury  As Evidenced by: mod to severe losses; significant wt loss of >7.5% in 3 months; PO intake meeting <75% of nutr needs in the past month    Nutrition Diagnosis  Patient has Nutrition Diagnosis: Yes  Diagnosis Status (1): New  Nutrition Diagnosis 1:  Altered GI function  Related to (1): altered GI structure  As Evidenced by (1): s/p CRS (gall bladder, spleen, appendix removed, and R hemicolectomy w/ partial sigmoid resection w/ colorectal anastomis) and HIPEC       Nutrition Interventions/Recommendations   Nutrition Interventions and Recommendations:    Can continue with regular diet   If pt desires can order ONS: [employer] Standard 1.0 BID   Given reports of emesis immediately with eating and/or taking pills would consider either consulting SLP vs. GI to assess esophageal function.         Nutrition Prescription:  Individualized Nutrition Prescription Provided for : 1900 kcal and 80g protein        Nutrition Recommendation Details:   Food and/or Nutrient Delivery Interventions  Interventions: Meals and snacks  Meals and Snacks: Fiber-modified diet, Modify composition of meals/snacks  Goal: improve diarrhea and increase PO intake to meet >50% of nutr needs         Nutrition Education:    Provided pt with handout on nutritional strategies to help with diarrhea. Discussed main points on the handout. Encouraged pt to make smoothies at home with protein powders at home to increase PO intake.        Nutrition Monitoring and Evaluation   Monitoring/Evaluation:   Food/Nutrient Related History Monitoring  Monitoring and Evaluation Plan: Energy intake  Energy Intake: Estimated energy intake    Body Composition/Growth/Weight History  Monitoring and Evaluation Plan: Weight  Weight: Weight change                 Time Spent/Follow-up Reminder:   Follow Up  Time Spent (min): 75 minutes  Last Date of Nutrition Visit: 11/15/23  Nutrition Follow-Up Needed?: Dietitian to reassess per policy

## 2023-11-15 NOTE — CONSULTS
SUPPORTIVE AND PALLIATIVE ONCOLOGY CONSULT    Inpatient consult to [facility]  Consult performed by: Deborah Hunt, APRN-CNP  Consult ordered by: Ольга Haskins MD  Reason for consult: pain and anxiety        SERVICE DATE: 11/15/2023      PALLIATIVE MEDICINE OUTPATIENT PROVIDER:  None  CURRENT ATTENDING PROVIDER: Ольга Haskins MD     Medical Oncologist: Tiffanie Ozuna MD PhD  Ольга Haskins MD   Radiation Oncologist: No care team member to display  Primary Physician: Eddie Okeefe  [phone]    REASON FOR CONSULT/CHIEF CONSULT COMPLAINT: pain management and anxiety     Subjective   HISTORY OF PRESENT ILLNESS: Leonardo Wilder is a 68 y.o. male diagnosed with appendiceal cancer w/  mets to abdomen s/p 2 prior surgeries with HIPEC at [facility] (2/2021 and 8/2023). PMH significant for LBBB, hypothyroidism, CAD, portal vein thrombosis on Eliquis, directly admitted for n/v, diarrhea since his surgery at [facility] 8/2023 . Admitted 11/14/2023 for further evaluation and management of diarrhea, nausea, vomiting, and dehydration. Course complicated by surgery. Supportive and Palliative Oncology is consulted for pain management and other symptom control  anxiety.     Patient was seen at bedside with no family present, he was able to voice how appreciative he is about having supportive oncology following him inpatient and out. He voiced that his only concern at this time is his nausea, and diarrhea. We discussed how he has not used the tramadol prescribed to him by Falguni Booth after he had his dog ran him over. Patient reports that he has a lot of diarrhea due to C.diff and team plans to give him antibiotics and possibly discharge him. Discussed his nausea, and adding olanzapine and he was receptive. Discussed Duloxetine vs lyrica, patient reports that he feels that he had improved mood as a result of antidepressants, and noticed a decrease in his mood with discontinuation of Lyrica. He does not report  changes in his neuropathic pain, stating that he has not had Chemotherapy in the last 4 months and that is typically when his pain is worse. We discussed ativan use and he reports how ativan has been helping him sleep better.          Symptom Assessment:  Pain:none  Headache: none  Dizziness:none  Lack of energy: none  Difficulty sleeping: a little  Worrying: a little  Anxiety: a little  Depression: a little  Pain in mouth/swallowing: none  Dry mouth: none  Taste changes: none  Shortness of breath: none  Lack of appetite: none   Nausea: very much  Vomiting: none  Constipation: none  Diarrhea: very much  Sore muscles: none  Numbness or tingling in hands/feet/other: none  Weight loss: none  Other: none        Information obtained from: chart review, interview of patient, and discussion with primary team  ______________________________________________________________________     Oncology History    No history exists.       No past medical history on file.  No past surgical history on file.  Family History   Problem Relation Name Age of Onset    Squamous cell carcinoma Other          SOCIAL HISTORY:  Marital Status  to wife    Social History:  reports that he has quit smoking. His smoking use included cigarettes. He has never used smokeless tobacco.         REVIEW OF SYSTEMS:  Review of systems negative unless noted in HPI.       Objective       Lab Results   Component Value Date    WBC 14.2 (H) 11/15/2023    HGB 8.8 (L) 11/15/2023    HCT 28.4 (L) 11/15/2023    MCV 89 11/15/2023     (H) 11/15/2023      Lab Results   Component Value Date    GLUCOSE 92 11/15/2023    CALCIUM 8.7 11/15/2023     11/15/2023    K 4.4 11/15/2023    CO2 26 11/15/2023     11/15/2023    BUN 10 11/15/2023    CREATININE 0.96 11/15/2023     Lab Results   Component Value Date    ALT 12 11/14/2023    AST 23 11/14/2023    GGT 91 (H) 11/06/2023    ALKPHOS 291 (H) 11/14/2023    BILITOT 0.9 11/14/2023     Estimated Creatinine  Clearance: 61 mL/min (by C-G formula based on SCr of 0.96 mg/dL).     Current Outpatient Medications   Medication Instructions    acetaminophen (TYLENOL) 650 mg, oral, Every 6 hours PRN    amoxicillin-pot clavulanate (Augmentin) 875-125 mg tablet TAKE 1 TABLET (875MG) BY MOUTH TWO TIMES A DAY UNTIL (08/14)    apixaban (Eliquis) 5 mg tablet TAKE 1 TABLET BY MOUTH TWO TIMES A DAY    aspirin 81 mg EC tablet oral, Daily    diphenoxylate-atropine (Lomotil) 2.5-0.025 mg/5 mL liquid 5 mL, oral, Every 12 hours    DULoxetine (CYMBALTA) 30 mg, oral, Daily    DULoxetine (CYMBALTA) 60 mg, oral, Nightly, Do not crush or chew.    gabapentin (Neurontin) 100 mg capsule oral    gabapentin (Neurontin) 300 mg capsule 1 capsule, oral, Nightly    levothyroxine (Synthroid, Levoxyl) 100 mcg tablet 1 tablet, oral, Daily    lidocaine-prilocaine (Emla) 2.5-2.5 % cream APPLY TOPICALLY TO AFFECTED AREA 45 MINUTES PRIOR TO ACCESS AND COVER<BR>    loperamide (Imodium A-D) 2 mg tablet TAKE 2 TABLETS INITIALLY, FOLLOWED BY 1 TABLET AFTER EACH LOOSE BOWEL MOVEMENT. DO NOT EXCEED 8 TABLETS/DAY.<BR>    LORazepam (ATIVAN) 0.5 mg, oral, 2 times daily PRN    metroNIDAZOLE (Metrogel) 0.75 % gel 1 Application    ondansetron ODT (ZOFRAN-ODT) 8 mg, oral, Every 8 hours PRN    ondansetron ODT (ZOFRAN-ODT) 8 mg, oral, Every 8 hours PRN    pantoprazole (ProtoNix) 40 mg EC tablet 1 tablet, oral, Daily    polyethylene glycol (GLYCOLAX, MIRALAX) 17 g, oral, Daily    potassium chloride CR (Klor-Con) 10 mEq ER tablet TAKE 4 TABLETS BY MOUTH ONCE DAILY    prochlorperazine (COMPAZINE) 10 mg, oral, Every 6 hours PRN    rosuvastatin (Crestor) 20 mg tablet 1 tablet, oral, Daily    traMADol (ULTRAM)  mg, oral, 2 times daily PRN     Scheduled medications   apixaban, 5 mg, oral, BID  aspirin, 81 mg, oral, Daily  DULoxetine, 30 mg, oral, q AM  DULoxetine, 60 mg, oral, Nightly  finasteride, 5 mg, oral, Daily  levothyroxine, 100 mcg, oral, Daily  midodrine, 5 mg, oral,  q8h  pantoprazole, 40 mg, oral, Daily  rosuvastatin, 20 mg, oral, Daily      Continuous medications     PRN medications  PRN medications: acetaminophen, LORazepam, ondansetron, prochlorperazine **OR** prochlorperazine **OR** prochlorperazine     Allergies: No Known Allergies             PHYSICAL EXAMINATION:  Vital Signs:   Vital signs reviewed  Vitals:    11/15/23 0458   BP: 136/80   Pulse: 73   Resp: 18   Temp: 37 °C (98.6 °F)   SpO2: 98%     Pain Score: 2     Physical Exam  Constitutional:       Appearance: Normal appearance.   Eyes:      Pupils: Pupils are equal, round, and reactive to light.   Cardiovascular:      Heart sounds: Normal heart sounds.   Pulmonary:      Breath sounds: Normal breath sounds.   Abdominal:      General: Bowel sounds are normal.      Palpations: Abdomen is soft.   Musculoskeletal:         General: Normal range of motion.   Skin:     General: Skin is warm.   Neurological:      Mental Status: He is alert and oriented to person, place, and time.   Psychiatric:         Mood and Affect: Mood normal.           ASSESSMENT/PLAN:  Leonardo Wilder is a 68 y.o. male diagnosed with appendiceal cancer w/  mets to abdomen s/p 2 prior surgeries with HIPEC at [facility] (2/2021 and 8/2023). PMH significant for LBBB, hypothyroidism, CAD, portal vein thrombosis on Eliquis, directly admitted for n/v, diarrhea since his surgery at [facility] 8/2023 . Admitted 11/14/2023 for further evaluation and management of diarrhea, nausea, vomiting, and dehydration. Course complicated by surgery. Supportive and Palliative Oncology is consulted for pain management and other symptom control  anxiety.     Pain:  pain related to fall   Pain is:  related to fall [not taking tramadol as prescribed by outpatient supportive oncology due to patients endorsed side -effects.  Type: somatic  Pain control: sub-optimally controlled  Home regimen:  Duloxetine 30mg in the Am and 60mg,  and tramadol 50mg 1-2 tablets BID PRN for pain after fall    Intolerances/previously tried: Gabapentin 600 mg TID in the past, no longer taking Lyrica 200mg TID, she was taken off because his neuropathy concerns were unchanged with wean. Patient was informed to notified outpatient supportive oncology if he notices that he has increased neuropathic pain and we can add medications again.  Personalized pain goal: 0  Total OME usage for the past 24 hours:  0  Change duloxetine to 60mg in the AM and continue 60mg at bedtime.  Continue Acetaminophen     Nausea:  Intermittent nausea with vomiting related to  post op course    Home regimen:  Ondansetron , Prochlorperazine , and Lorazepam  Improving  Continue ondansetron 8mg OD  Continue prochlorperazine 10mg q 6 hrs PRN  Continue ativan 0.5mg BID PRN   Consider adding Olanzapine 5mg at bedtime for nausea/sleep and appetite improvement [monitor with compazine use].     Diarrhea:   LBM 11/15/2023  Consider adding Imodium 2mg q6 hours PRN    Patient has cdiff, so continue to monitor and       Altered Mood:  Chronic anxiety and depression related to health concerns   controlled with home regimen   Home regimen: Duloxetine 60mg BID  Continue Duloxetine   Continue Ativan     Sleeping Difficulty:  Impaired sleep related to hospital environment  Home regimen:  lorazepam  Continue Lorazepam as above   Continue Duloxetine as described above       #Palliative Medicine encounter: Palliative care was introduced as a service for patients with serious illness to improve quality of life, including helping with pain and other bothersome symptoms, clarifying patient's values and priorities to help align the patient's care with their goals, and providing support to patients and families.   Declined  for spiritual Support   Declined Music therapy for coping    Declined  Art Therapy for legacy building   Welcomes Pet Therapy for Emotional Support   Coordination of Care   Supportive Listening  Follows Outpatient supportive oncology at this Time  "      Goals of Care/ Minimum acceptable Quality of Life Measures   Patient's current clinical condition, including diagnosis, prognosis, and management plan, and goals of care were discussed.     Patients endorsed Goals: \"Get feeling like I have. Social independence as much as I can. It is so hard to go anywhere if you do not know if you will have diarrhea. I want to take a winter trip\".   Minimum acceptable outcome/QOL:  patient is receptive to aggressive treatment measures, like chest compressions, mechanical ventilation and artificial nutrition stating \"I want them to try to see if can make it\".  Code status discussion/patients Code status:  full code       Advance Directives/Advance Care Planning  Existence of Advance Directives: none completed on file.   Decision maker/ NOK: HCPOA is Wife  Code Status: Full code    [place] Surrogate Decision Maker Hierarchy    1. Court-appointed Guardian  2. Healthcare Power of   3. Legal Spouse  4. Majority of Adult Children (consensus if possible)  5. Parents  6. Majority of Adult Siblings (consensus if possible)  7. Nearest Blood Relative      Disposition:  Please  start the process of having prior authorization with meds to beds deliver medications to patient prior to discharge via [facility] pharmacy. Prescriptions will need to be sent 48-72 hours prior to discharge so that a prior authorization can be completed.     Discharge date: unknown pending acute issues  Will request an appointment with Outpatient Supportive Oncology BRAYDEN Kapoor      Signature and billing:  Thank you for allowing us to participate in the care of this patient. Recommendations will be communicated back to the consulting service by way of shared electronic medical record or face-to-face.    Medical complexity was high level due to due to complexity of problems, extensive data review, and high risk of management/treatment.    I spent 75 minutes in the care of this patient which included " chart review, interviewing patient/family, discussion with primary team, coordination of care, and documentation.      DATA   Diagnostic tests and information reviewed for today's visit:  Conversation with primary team       Plan of Care discussed with: Provider, RN, Patient    Thank you for asking Supportive and Palliative Oncology to assist with care of this patient.  We will continue to follow.  Please contact us for additional questions or concerns.      SIGNATURE: BRAYDEN Bingham  PAGER/CONTACT:  Contact information:  Supportive and Palliative Oncology  Monday-Friday 8 AM-5 PM  Epic Secure chat or pager [phone].  After hours and weekends:  pager [phone]

## 2023-11-15 NOTE — CARE PLAN
Problem: Fall/Injury  Goal: Not fall by end of shift  Outcome: Progressing  Goal: Be free from injury by end of the shift  Outcome: Progressing  Goal: Verbalize understanding of personal risk factors for fall in the hospital  Outcome: Progressing  Goal: Verbalize understanding of risk factor reduction measures to prevent injury from fall in the home  Outcome: Progressing  Goal: Use assistive devices by end of the shift  Outcome: Progressing  Goal: Pace activities to prevent fatigue by end of the shift  Outcome: Progressing     Problem: Pain - Adult  Goal: Verbalizes/displays adequate comfort level or baseline comfort level  Outcome: Progressing     Problem: Safety - Adult  Goal: Free from fall injury  Outcome: Progressing     Problem: Discharge Planning  Goal: Discharge to home or other facility with appropriate resources  Outcome: Progressing     Problem: Chronic Conditions and Co-morbidities  Goal: Patient's chronic conditions and co-morbidity symptoms are monitored and maintained or improved  Outcome: Progressing     Problem: Skin  Goal: Decreased wound size/increased tissue granulation at next dressing change  Outcome: Progressing  Goal: Participates in plan/prevention/treatment measures  Outcome: Progressing  Goal: Prevent/manage excess moisture  Outcome: Progressing  Goal: Prevent/minimize sheer/friction injuries  Outcome: Progressing  Goal: Promote/optimize nutrition  Outcome: Progressing  Goal: Promote skin healing  Outcome: Progressing   The patient's goals for the shift include      The clinical goals for the shift include Pt will remain safe in room and use call light during shift on 11/15/23 @1930    Pt is c diff positive. Pt started on oral antibiotic. Pt VSS and HDS during shift

## 2023-11-15 NOTE — ED NOTES
Pharmacy Medication History Review    Leonardo Wilder is a 68 y.o. male admitted for Diarrhea. Pharmacy reviewed the patient's [id number] medications and allergies for accuracy.    The list below reflects the updated PTA list. Comments regarding how patient may be taking medications differently can be found in the Admit Orders Activity  Prior to Admission Medications   Prescriptions Last Dose Informant Patient Reported? Taking?   DULoxetine (Cymbalta) 30 mg DR capsule   No No   Sig: Take 1 capsule (30 mg) by mouth once daily.   DULoxetine (Cymbalta) 60 mg DR capsule   No No   Sig: Take 1 capsule (60 mg) by mouth once daily at bedtime. Do not crush or chew.   LORazepam (Ativan) 0.5 mg tablet   No No   Sig: Take 1 tablet (0.5 mg) by mouth 2 times a day as needed for anxiety (insomnia).   acetaminophen (Tylenol) 325 mg tablet   Yes No   Sig: Take 2 tablets (650 mg) by mouth every 6 hours if needed.   amoxicillin-pot clavulanate (Augmentin) 875-125 mg tablet   No No   Sig: TAKE 1 TABLET (875MG) BY MOUTH TWO TIMES A DAY UNTIL (08/14)   Patient not taking: Reported on 10/26/2023   apixaban (Eliquis) 5 mg tablet   No No   Sig: TAKE 1 TABLET BY MOUTH TWO TIMES A DAY   aspirin 81 mg EC tablet   Yes No   Sig: Take by mouth once daily.   diphenoxylate-atropine (Lomotil) 2.5-0.025 mg/5 mL liquid   Yes No   Sig: Take 5 mL by mouth every 12 hours.   levothyroxine (Synthroid, Levoxyl) 100 mcg tablet   Yes No   Sig: Take 1 tablet (100 mcg) by mouth once daily.   loperamide (Imodium A-D) 2 mg tablet   Yes No   Sig: TAKE 2 TABLETS INITIALLY, FOLLOWED BY 1 TABLET AFTER EACH LOOSE BOWEL MOVEMENT. DO NOT EXCEED 8 TABLETS/DAY.   midodrine (Proamatine) 5 mg tablet   Yes Yes   Sig: Take 1 tablet (5 mg) by mouth.   ondansetron ODT (Zofran-ODT) 8 mg disintegrating tablet   No No   Sig: Take 1 tablet (8 mg) by mouth every 8 hours if needed for nausea or vomiting.   pantoprazole (ProtoNix) 40 mg EC tablet   Yes No   Sig: Take 1 tablet (40  mg) by mouth once daily.   polyethylene glycol (Glycolax, Miralax) 17 gram/dose powder   Yes No   Sig: Take 17 g by mouth once daily.   potassium chloride CR (Klor-Con) 10 mEq ER tablet   No No   Sig: TAKE 4 TABLETS BY MOUTH ONCE DAILY   Patient not taking: Reported on 10/26/2023   prochlorperazine (Compazine) 10 mg tablet   Yes No   Sig: Take 1 tablet (10 mg) by mouth every 6 hours if needed for nausea or vomiting.   rosuvastatin (Crestor) 20 mg tablet   Yes No   Sig: Take 1 tablet (20 mg) by mouth once daily.   traMADol (Ultram) 50 mg tablet   No No   Sig: Take 1-2 tablets ( mg) by mouth 2 times a day as needed for severe pain (7 - 10) for up to 14 days.      Facility-Administered Medications: None        The list below reflects the updated allergy list. Please review each documented allergy for additional clarification and justification.  Allergies  Reviewed by Jeremy Ross CPhT on 11/15/2023   No Known Allergies         Patient declines M2B at discharge. Pharmacy has been updated to [facility] ([place], [place]).    Sources used to complete the med history include:  Patient interviewing [patient served as historian]  Medication dispense report  Care everywhere  [place]    Below are additional concerns with the patient's PTA list.  None, patient served as a credible historian and was knowledgeable about medication utilization.    Jeremy Ross PharmD  PGY-1 Pharmacy Resident   [facility]  Please reach out via Secure Chat for questions, or if no response call [employer] or [employer]

## 2023-11-15 NOTE — PROGRESS NOTES
"Leonardo Wilder is a 68 y.o. male on day 1 of admission presenting with Diarrhea.    Subjective   OVN: The patient was admitted for 6 weeks of diarrhea    This morning the patient is doing ok. Still having diarrhea. 4 episodes last night. He tested positive for C diff. Denies pain and nausea.          Objective     Physical Exam    Last Recorded Vitals  Blood pressure 143/72, pulse 75, temperature 36.7 °C (98.1 °F), temperature source Temporal, resp. rate 16, height 1.657 m (5' 5.24\"), weight 57.8 kg (127 lb 6.8 oz), SpO2 99 %.  Intake/Output last 3 Shifts:  I/O last 3 completed shifts:  In: 50 (0.9 mL/kg) [I.V.:50 (0.9 mL/kg)]  Out: 500 (8.7 mL/kg) [Urine:500 (0.2 mL/kg/hr)]  Weight: 57.8 kg     Relevant Results         Scheduled medications  apixaban, 5 mg, oral, BID  aspirin, 81 mg, oral, Daily  DULoxetine, 60 mg, oral, Nightly  [START ON 11/16/2023] DULoxetine, 60 mg, oral, q AM  finasteride, 5 mg, oral, Daily  levothyroxine, 100 mcg, oral, Daily  midodrine, 5 mg, oral, q8h  OLANZapine zydis, 5 mg, oral, Nightly  pantoprazole, 40 mg, oral, Daily  rosuvastatin, 20 mg, oral, Daily  vancomycin, 125 mg, oral, 4x daily      Continuous medications     PRN medications  PRN medications: acetaminophen, LORazepam, ondansetron, prochlorperazine **OR** prochlorperazine **OR** prochlorperazine    Results for orders placed or performed during the hospital encounter of 11/14/23 (from the past 24 hour(s))   C. difficile, PCR    Specimen: Stool   Result Value Ref Range    C. difficile, PCR Detected (A) Not Detected   Clostridium Difficile EIA    Specimen: Stool   Result Value Ref Range    C. difficile (Toxin A/B) Positive (A) Negative, Indeterminate   CBC and Auto Differential   Result Value Ref Range    WBC 14.2 (H) 4.4 - 11.3 x10*3/uL    nRBC 0.0 0.0 - 0.0 /100 WBCs    RBC 3.21 (L) 4.50 - 5.90 x10*6/uL    Hemoglobin 8.8 (L) 13.5 - 17.5 g/dL    Hematocrit 28.4 (L) 41.0 - 52.0 %    MCV 89 80 - 100 fL    MCH 27.4 26.0 - 34.0 pg    " MCHC 31.0 (L) 32.0 - 36.0 g/dL    RDW 21.6 (H) 11.5 - 14.5 %    Platelets 586 (H) 150 - 450 x10*3/uL    Immature Granulocytes %, Automated 0.3 0.0 - 0.9 %    Immature Granulocytes Absolute, Automated 0.04 0.00 - 0.70 x10*3/uL   Renal function panel   Result Value Ref Range    Glucose 92 74 - 99 mg/dL    Sodium 136 136 - 145 mmol/L    Potassium 4.4 3.5 - 5.3 mmol/L    Chloride 100 98 - 107 mmol/L    Bicarbonate 26 21 - 32 mmol/L    Anion Gap 14 10 - 20 mmol/L    Urea Nitrogen 10 6 - 23 mg/dL    Creatinine 0.96 0.50 - 1.30 mg/dL    eGFR 87 >60 mL/min/1.73m*2    Calcium 8.7 8.6 - 10.6 mg/dL    Phosphorus 3.6 2.5 - 4.9 mg/dL    Albumin 3.1 (L) 3.4 - 5.0 g/dL   Magnesium   Result Value Ref Range    Magnesium 2.29 1.60 - 2.40 mg/dL   Manual Differential   Result Value Ref Range    Neutrophils %, Manual 76.3 40.0 - 80.0 %    Lymphocytes %, Manual 16.7 13.0 - 44.0 %    Monocytes %, Manual 5.2 2.0 - 10.0 %    Eosinophils %, Manual 0.9 0.0 - 6.0 %    Basophils %, Manual 0.0 0.0 - 2.0 %    Plasma Cells %, Manual 0.9 0.00 - 0.00 %    Seg Neutrophils Absolute, Manual 10.83 (H) 1.20 - 7.00 x10*3/uL    Lymphocytes Absolute, Manual 2.37 1.20 - 4.80 x10*3/uL    Monocytes Absolute, Manual 0.74 0.10 - 1.00 x10*3/uL    Eosinophils Absolute, Manual 0.13 0.00 - 0.70 x10*3/uL    Basophils Absolute, Manual 0.00 0.00 - 0.10 x10*3/uL    Plasma Cells Absolute, Manual 0.13 0.00 - 0.00 x10*3/uL    Total Cells Counted 114     RBC Morphology See Below     Target Cells Few     Josie Cells Few     Acanthocytes Few          Assessment/Plan   Principal Problem:    Diarrhea    Mr. Leonardo Wilder is a 68 year old gentleman w/ PMHx of LBBB, hypothyroidism, CAD, portal vein thrombosis on Eliquis, appendiceal CA s/p HIPEC x2 at [facility] (2021 and 8/2023) who presents as a direct admit for persistent diarrhea. Patient is hemodynamically stable, admitted for further workup and evaluation of diarrhea.     PLAN:    Persistent diarrhea  - 6 weeks of diarrhea following  recent surgery (debulking and HIPEC at [facility] 8/31/2023)  - Hasn't improved since discharge, off TPN for past 3 weeks  - C diff testing positive on admission  - Treat with oral vanc  - Hypokalemic and hypomagnesemic on admission 2/2 diarrhea,, replete PRN     History of appendiceal cancer w/ carcinomatosis  - Prior surgical intervention and oncologic history at [facility]   -  s/p 6 mo neoadjuvant chemo w/ FOLFOX followed by debulking surgery with HIPEC (3/21)   - Most recently w/ SBO admitted to [facility] in 8/2023, complicated by GNR bacteremia  - surgery that admission: ex-lap, lysis of adhesions, colonoscopy, gastropexy but no HIPEC per discharge summary    History of portal vein thrombosis  - In setting of metastatic appendiceal cancer  -Continue Eliquis 5mg BID  home med    Cancer related pain  - Supportive onc consulted; Patient is established with them  - Increase duloxetine to 60 BID  - Olanzapine 5 mg nightly     CAD s/p PCI 2023  - continue home crestor 20mg   - continue home Asa 81mg daily      Chronic hypotension  - S/p 1L NS bolus on admission  - continue home midodrine 5mg TID      GERD  - continue protonix 40mg daily      Hypothyroidism  - continue home synthroid     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

## 2023-11-15 NOTE — CARE PLAN
Problem: Fall/Injury  Goal: Not fall by end of shift  Outcome: Progressing  Goal: Be free from injury by end of the shift  Outcome: Progressing  Goal: Verbalize understanding of personal risk factors for fall in the hospital  Outcome: Progressing  Goal: Verbalize understanding of risk factor reduction measures to prevent injury from fall in the home  Outcome: Progressing  Goal: Use assistive devices by end of the shift  Outcome: Progressing  Goal: Pace activities to prevent fatigue by end of the shift  Outcome: Progressing     Problem: Pain - Adult  Goal: Verbalizes/displays adequate comfort level or baseline comfort level  Outcome: Progressing     Problem: Safety - Adult  Goal: Free from fall injury  Outcome: Progressing     Problem: Discharge Planning  Goal: Discharge to home or other facility with appropriate resources  Outcome: Progressing     Problem: Chronic Conditions and Co-morbidities  Goal: Patient's chronic conditions and co-morbidity symptoms are monitored and maintained or improved  Outcome: Progressing

## 2023-11-16 NOTE — PROGRESS NOTES
"Leonardo Wilder is a 68 y.o. male on day 2 of admission presenting with Diarrhea.    Subjective   OVN: No acute events    This morning the patient is doing well. Denies pain and nausea. Responding well to oral vanc, notes great improvement of diarrhea, last episode was small at 11:00 PM.          Objective     Physical Exam  GEN: Awake, alert, NAD  HEENT: Head NCAT, mucus membranes moist  CARDIO: Normal rate and rhythm  RESP: CTAB, normal WOB  ABDOMEN: Soft, nt, nd  EXTREM: No edema of BLE  SKIN: warm, dry, intact  NEURO: NFD, AOx3      Last Recorded Vitals  Blood pressure 132/80, pulse 88, temperature 36.8 °C (98.2 °F), resp. rate 16, height 1.657 m (5' 5.24\"), weight 57.8 kg (127 lb 6.8 oz), SpO2 99 %.  Intake/Output last 3 Shifts:  I/O last 3 completed shifts:  In: 850 (14.7 mL/kg) [P.O.:800; I.V.:50 (0.9 mL/kg)]  Out: 500 (8.7 mL/kg) [Urine:500 (0.2 mL/kg/hr)]  Weight: 57.8 kg     Relevant Results         Scheduled medications  apixaban, 5 mg, oral, BID  aspirin, 81 mg, oral, Daily  DULoxetine, 60 mg, oral, Nightly  DULoxetine, 60 mg, oral, q AM  finasteride, 5 mg, oral, Daily  levothyroxine, 100 mcg, oral, Daily  midodrine, 5 mg, oral, q8h  OLANZapine zydis, 5 mg, oral, Nightly  pantoprazole, 40 mg, oral, Daily  rosuvastatin, 20 mg, oral, Daily  vancomycin, 125 mg, oral, 4x daily      Continuous medications     PRN medications  PRN medications: acetaminophen, LORazepam, ondansetron, prochlorperazine **OR** prochlorperazine **OR** prochlorperazine    Results for orders placed or performed during the hospital encounter of 11/14/23 (from the past 24 hour(s))   CBC   Result Value Ref Range    WBC 13.9 (H) 4.4 - 11.3 x10*3/uL    nRBC 0.0 0.0 - 0.0 /100 WBCs    RBC 3.24 (L) 4.50 - 5.90 x10*6/uL    Hemoglobin 9.1 (L) 13.5 - 17.5 g/dL    Hematocrit 29.1 (L) 41.0 - 52.0 %    MCV 90 80 - 100 fL    MCH 28.1 26.0 - 34.0 pg    MCHC 31.3 (L) 32.0 - 36.0 g/dL    RDW 22.5 (H) 11.5 - 14.5 %    Platelets 597 (H) 150 - 450 x10*3/uL "   Renal function panel   Result Value Ref Range    Glucose 100 (H) 74 - 99 mg/dL    Sodium 136 136 - 145 mmol/L    Potassium 4.1 3.5 - 5.3 mmol/L    Chloride 99 98 - 107 mmol/L    Bicarbonate 28 21 - 32 mmol/L    Anion Gap 13 10 - 20 mmol/L    Urea Nitrogen 11 6 - 23 mg/dL    Creatinine 0.84 0.50 - 1.30 mg/dL    eGFR >90 >60 mL/min/1.73m*2    Calcium 8.6 8.6 - 10.6 mg/dL    Phosphorus 3.5 2.5 - 4.9 mg/dL    Albumin 3.2 (L) 3.4 - 5.0 g/dL   Magnesium   Result Value Ref Range    Magnesium 1.84 1.60 - 2.40 mg/dL         Assessment/Plan   Principal Problem:    Diarrhea    Mr. Leonardo Wilder is a 68 year old gentleman w/ PMHx of LBBB, hypothyroidism, CAD, portal vein thrombosis on Eliquis, appendiceal CA s/p HIPEC x2 at [facility] (2021 and 8/2023) who presents as a direct admit for persistent diarrhea. Patient is hemodynamically stable, admitted for further workup and evaluation of diarrhea.     PLAN:    Updates 11/16:  - Responding well to oral vanc  - Diarrhea much improved  - Vanc 125 mg capsule QID for 10 day total    Persistent diarrhea  - 6 weeks of diarrhea following recent surgery (debulking and HIPEC at [facility] 8/31/2023)  - Hasn't improved since discharge, off TPN for past 3 weeks  - C diff testing positive on admission  - Treat with oral vanc 125 mg for 10 days total  - Hypokalemic and hypomagnesemic on admission 2/2 diarrhea,, replete PRN     History of appendiceal cancer w/ carcinomatosis  - Prior surgical intervention and oncologic history at [facility]   -  s/p 6 mo neoadjuvant chemo w/ FOLFOX followed by debulking surgery with HIPEC (3/21)   - Most recently w/ SBO admitted to [facility] in 8/2023, complicated by GNR bacteremia  - surgery that admission: ex-lap, lysis of adhesions, colonoscopy, gastropexy but no HIPEC per discharge summary    History of portal vein thrombosis  - In setting of metastatic appendiceal cancer  -Continue Eliquis 5mg BID  home med    Cancer related pain  - Supportive onc consulted; Patient is  established with them  - Duloxetine 60 BID  - Olanzapine 5 mg nightly     CAD s/p PCI 2023  - continue home crestor 20mg   - continue home Asa 81mg daily      Chronic hypotension  - S/p 1L NS bolus on admission  - continue home midodrine 5mg TID      GERD  - continue protonix 40mg daily      Hypothyroidism  - continue home synthroid     Neuropathy from prior chemotherapy  - continue home cymbalta 30mg in AM and 60mg in PM     Hypokalemia  Hypomagnesemia  - In setting of persistent diarrhea  - Repleted on admission, replete PRN           F: PRN  E: hypokalemia and hypomagnesemia  N: Regular dietA: Mediport, PIV  GI: PPI  DVT: home apixaban     Code status: Full code (confirmed on admission)  NOK: Wife Michelle Wilder [phone]      Rafa Calvin MD

## 2023-11-16 NOTE — CARE PLAN
Problem: Fall/Injury  Goal: Not fall by end of shift  Outcome: Progressing  Goal: Be free from injury by end of the shift  Outcome: Progressing  Goal: Verbalize understanding of personal risk factors for fall in the hospital  Outcome: Progressing  Goal: Verbalize understanding of risk factor reduction measures to prevent injury from fall in the home  Outcome: Progressing  Goal: Use assistive devices by end of the shift  Outcome: Progressing  Goal: Pace activities to prevent fatigue by end of the shift  Outcome: Progressing     Problem: Pain - Adult  Goal: Verbalizes/displays adequate comfort level or baseline comfort level  Outcome: Progressing     Problem: Safety - Adult  Goal: Free from fall injury  Outcome: Progressing     Problem: Discharge Planning  Goal: Discharge to home or other facility with appropriate resources  Outcome: Progressing     Problem: Chronic Conditions and Co-morbidities  Goal: Patient's chronic conditions and co-morbidity symptoms are monitored and maintained or improved  Outcome: Progressing     Problem: Skin  Goal: Decreased wound size/increased tissue granulation at next dressing change  Outcome: Progressing  Flowsheets (Taken 11/15/2023 1937)  Decreased wound size/increased tissue granulation at next dressing change: Promote sleep for wound healing  Goal: Participates in plan/prevention/treatment measures  Outcome: Progressing  Flowsheets (Taken 11/15/2023 1937)  Participates in plan/prevention/treatment measures: Discuss with provider PT/OT consult  Goal: Prevent/manage excess moisture  Outcome: Progressing  Flowsheets (Taken 11/15/2023 1937)  Prevent/manage excess moisture: Cleanse incontinence/protect with barrier cream  Goal: Prevent/minimize sheer/friction injuries  Outcome: Progressing  Flowsheets (Taken 11/15/2023 1937)  Prevent/minimize sheer/friction injuries: Complete micro-shifts as needed if patient unable. Adjust patient position to relieve pressure points, not a full  turn  Goal: Promote/optimize nutrition  Outcome: Progressing  Flowsheets (Taken 11/15/2023 1937)  Promote/optimize nutrition: Assist with feeding  Goal: Promote skin healing  Outcome: Progressing  Flowsheets (Taken 11/15/2023 1937)  Promote skin healing: Assess skin/pad under line(s)/device(s)

## 2023-11-16 NOTE — PROGRESS NOTES
SUPPORTIVE AND PALLIATIVE ONCOLOGY INPATIENT FOLLOW-UP      SERVICE DATE: 11/16/23     SUBJECTIVE:  Interval Events:  Leonardo Wilder is a 68 y.o. 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 PMH significant for LBBB, hypothyroidism, CAD, portal vein thrombosis on Eliquis, directly admitted for n/v, diarrhea since his surgery at [facility] 8/2023 . Admitted 11/14/2023 for further evaluation and management of diarrhea, nausea, vomiting, and dehydration. Course complicated by surgery. Supportive and Palliative Oncology is consulted for pain management and other symptom control  anxiety.      Patient reports that he has had less diarrhea today compared to yesterday. He has not noticed a difference with olanzapine uptake. He has a virtual visit with Falguni Booth next week. Reports that he is doing well and would most likely discharge tomorrow.    Consider adding baclofen 5mg BID PRN for hiccups.      Symptom Assessment:  Other  hiccups   Diarrhea very much   Nausea very much     Information obtained from: chart review, interview of family, discussion with RN, and discussion with primary team  ______________________________________________________________________        OBJECTIVE:    Lab Results   Component Value Date    WBC 13.9 (H) 11/16/2023    HGB 9.1 (L) 11/16/2023    HCT 29.1 (L) 11/16/2023    MCV 90 11/16/2023     (H) 11/16/2023      Lab Results   Component Value Date    GLUCOSE 100 (H) 11/16/2023    CALCIUM 8.6 11/16/2023     11/16/2023    K 4.1 11/16/2023    CO2 28 11/16/2023    CL 99 11/16/2023    BUN 11 11/16/2023    CREATININE 0.84 11/16/2023     Lab Results   Component Value Date    ALT 12 11/14/2023    AST 23 11/14/2023    GGT 91 (H) 11/06/2023    ALKPHOS 291 (H) 11/14/2023    BILITOT 0.9 11/14/2023     Estimated Creatinine Clearance: 69.8 mL/min (by C-G formula based on SCr of 0.84 mg/dL).     Scheduled medications  apixaban,  5 mg, oral, BID  aspirin, 81 mg, oral, Daily  DULoxetine, 60 mg, oral, Nightly  DULoxetine, 60 mg, oral, q AM  finasteride, 5 mg, oral, Daily  levothyroxine, 100 mcg, oral, Daily  midodrine, 5 mg, oral, q8h  OLANZapine zydis, 5 mg, oral, Nightly  pantoprazole, 40 mg, oral, Daily  rosuvastatin, 20 mg, oral, Daily  vancomycin, 125 mg, oral, 4x daily      Continuous medications     PRN medications  PRN medications: acetaminophen, LORazepam, ondansetron, prochlorperazine **OR** prochlorperazine **OR** prochlorperazine   }     PHYSICAL EXAMINATION:    Vital Signs:   Vital signs reviewed  Visit Vitals  /78   Pulse 80   Temp 36.8 °C (98.2 °F)   Resp 16        Pain Score: 2       Physical Exam  Constitutional:       Appearance: Normal appearance.   HENT:      Mouth/Throat:      Mouth: Mucous membranes are moist.   Cardiovascular:      Rate and Rhythm: Regular rhythm.      Heart sounds: Normal heart sounds.   Pulmonary:      Breath sounds: Normal breath sounds.   Abdominal:      General: Bowel sounds are normal.   Neurological:      Mental Status: He is alert.   Psychiatric:         Mood and Affect: Mood normal.          ASSESSMENT/PLAN:      Leonardo Wilder is a 68 y.o. 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 (2/2021 and 8/2023). PMH significant for LBBB, hypothyroidism, CAD, portal vein thrombosis on Eliquis, directly admitted for n/v, diarrhea since his surgery at [facility] 8/2023 . Admitted 11/14/2023 for further evaluation and management of diarrhea, nausea, vomiting, and dehydration. Course complicated by surgery. Supportive and Palliative Oncology is consulted for pain management and other symptom control  anxiety.      Pain:  pain related to fall   Pain is:  related to fall [not taking tramadol as prescribed by outpatient supportive oncology due to patients endorsed side -effects.  Type: somatic  Pain control: sub-optimally controlled  Home regimen:  Duloxetine  30mg in the Am and 60mg,  and tramadol 50mg 1-2 tablets BID PRN for pain after fall   Intolerances/previously tried: Gabapentin 600 mg TID in the past, no longer taking Lyrica 200mg TID, she was taken off because his neuropathy concerns were unchanged with wean. Patient was informed to notified outpatient supportive oncology if he notices that he has increased neuropathic pain and we can add medications again.  Personalized pain goal: 0  Total OME usage for the past 24 hours:  0  Continue duloxetine 60mg BID at bedtime.  Continue Acetaminophen      Nausea:  Intermittent nausea with vomiting related to  post op course    Home regimen:  Ondansetron , Prochlorperazine , and Lorazepam  Improving  Continue ondansetron 8mg OD  Continue prochlorperazine 10mg q 6 hrs PRN  Continue ativan 0.5mg BID PRN   Continue Olanzapine 5mg at bedtime for nausea/sleep and appetite improvement [monitor with compazine use].      Hiccups   Consider adding baclofen 5mg q 12 hrs PRN      Diarrhea:   LBM 11/16/2023  Consider adding Imodium 2mg q6 hours PRN    Patient has cdiff, so continue to monitor and        Altered Mood:  Chronic anxiety and depression related to health concerns   controlled with home regimen   Home regimen: Duloxetine 60mg BID  Continue Duloxetine   Continue Ativan      Sleeping Difficulty:  Impaired sleep related to hospital environment  Home regimen:  lorazepam  Continue Lorazepam as above   Continue Duloxetine as described above         #Palliative Medicine encounter: Palliative care was introduced as a service for patients with serious illness to improve quality of life, including helping with pain and other bothersome symptoms, clarifying patient's values and priorities to help align the patient's care with their goals, and providing support to patients and families.   Declined  for spiritual Support   Declined Music therapy for coping    Declined  Art Therapy for legacy building   Welcomes Pet Therapy for  "Emotional Support   Coordination of Care   Supportive Listening  Follows Outpatient supportive oncology at this Time         Goals of Care/ Minimum acceptable Quality of Life Measures   Patient's current clinical condition, including diagnosis, prognosis, and management plan, and goals of care were discussed.      Patients endorsed Goals: \"Get feeling like I have. Social independence as much as I can. It is so hard to go anywhere if you do not know if you will have diarrhea. I want to take a winter trip\".   Minimum acceptable outcome/QOL:  patient is receptive to aggressive treatment measures, like chest compressions, mechanical ventilation and artificial nutrition stating \"I want them to try to see if can make it\".  Code status discussion/patients Code status:  full code         Advance Directives/Advance Care Planning  Existence of Advance Directives: none completed on file.   Decision maker/ NOK: SHAKEEL is Wife  Code Status: Full code     [place] Surrogate Decision Maker Hierarchy     1. Court-appointed Guardian  2. Healthcare Power of   3. Legal Spouse  4. Majority of Adult Children (consensus if possible)  5. Parents  6. Majority of Adult Siblings (consensus if possible)  7. Nearest Blood Relative     Disposition:  Please  start the process of having prior authorization with meds to beds deliver medications to patient prior to discharge via [facility] pharmacy. Prescriptions will need to be sent 48-72 hours prior to discharge so that a prior authorization can be completed.      Discharge date: unknown pending acute issues  Will request an appointment with Outpatient Supportive Oncology BRAYDEN Kapoor  Signature and billing:  Thank you for allowing us to participate in the care of this patient. Recommendations will be communicated back to the consulting service by way of shared electronic medical record or face-to-face.    Medical complexity was high level due to due to complexity of problems, " extensive data review, and high risk of management/treatment.    I spent 50 minutes in the care of this patient which included chart review, interviewing patient/family, discussion with primary team, coordination of care, and documentation.    Data:   Diagnostic tests and information reviewed for today's visit:  Conversation with primary team       Some elements copied from my last note on 11/15, the elements have been updated and all reflect current decision making from today, 11/16/23       Plan of Care discussed with: Provider, RN, Patient    Thank you for asking Supportive and Palliative Oncology to assist with care of this patient.  We will continue to sign off as discussed with primary team  Please contact us for additional questions or concerns.      SIGNATURE: BRAYDEN Bingham   PAGER/CONTACT:  Contact information:  Supportive and Palliative Oncology  Monday-Friday 8 AM-5 PM  Epic Secure chat or pager [phone].  After hours and weekends:  pager [phone]

## 2023-11-16 NOTE — CARE PLAN
Problem: Fall/Injury  Goal: Not fall by end of shift  Outcome: Progressing  Goal: Be free from injury by end of the shift  Outcome: Progressing  Goal: Verbalize understanding of personal risk factors for fall in the hospital  Outcome: Progressing  Goal: Verbalize understanding of risk factor reduction measures to prevent injury from fall in the home  Outcome: Progressing  Goal: Use assistive devices by end of the shift  Outcome: Progressing  Goal: Pace activities to prevent fatigue by end of the shift  Outcome: Progressing     Problem: Pain - Adult  Goal: Verbalizes/displays adequate comfort level or baseline comfort level  Outcome: Progressing     Problem: Safety - Adult  Goal: Free from fall injury  Outcome: Progressing     Problem: Discharge Planning  Goal: Discharge to home or other facility with appropriate resources  Outcome: Progressing     Problem: Chronic Conditions and Co-morbidities  Goal: Patient's chronic conditions and co-morbidity symptoms are monitored and maintained or improved  Outcome: Progressing     Problem: Skin  Goal: Decreased wound size/increased tissue granulation at next dressing change  Outcome: Progressing  Goal: Participates in plan/prevention/treatment measures  Outcome: Progressing  Goal: Prevent/manage excess moisture  Outcome: Progressing  Goal: Prevent/minimize sheer/friction injuries  Outcome: Progressing  Goal: Promote/optimize nutrition  Outcome: Progressing  Goal: Promote skin healing  Outcome: Progressing   The patient's goals for the shift include      The clinical goals for the shift include Pt will remain safe in room and use call light during shift on 11/16/23 @1930    Pt had persistent hiccups with decreased Bms today. VSS and HDS

## 2023-11-17 NOTE — PROGRESS NOTES
"Leonardo Wilder is a 68 y.o. male on day 3 of admission presenting with Diarrhea.    Subjective   OVN: The patient felt lightheaded when walking to the bathroom so he sat on the floor to get his bearings. He has an episode of emesis. He was found to be tachycardic to 110s-120s and desatted requiring 3LO2 to sat 94%. Chest x-ray was collected which showed patchy infiltrate in right mid/lower lobe concerning for pneumonia. Started on vanc and zosyn and blood cultures collected.    This morning the patient is feeling much better. He no longer has any shortness of breath and is breathing room air satting 92%. He does not have a cough. The patient denies abdominal pain but expresses some nausea. Also had hiccups since yesterday, stopping 5 hours overnight, but restarting this morning. The patient's diarrhea continues to improve. He noted 3 episodes of diarrhea yesterday that were more formed than his usual diarrhea.       Objective     Physical Exam  GEN: Awake, alert, NAD  HEENT: Head NCAT, mucus membranes moist  CARDIO: Normal rate and rhythm  RESP: CTAB, normal WOB  ABDOMEN: Soft, nt, nd  EXTREM: No edema of BLE  SKIN: warm, dry, intact  NEURO: NFD, AOx3      Last Recorded Vitals  Blood pressure 109/77, pulse 90, temperature 36.4 °C (97.5 °F), resp. rate 16, height 1.657 m (5' 5.24\"), weight 57.8 kg (127 lb 6.8 oz), SpO2 96 %.  Intake/Output last 3 Shifts:  I/O last 3 completed shifts:  In: 950 (16.4 mL/kg) [P.O.:800; IV Piggyback:150]  Out: - (0 mL/kg)   Weight: 57.8 kg     Relevant Results         Scheduled medications  apixaban, 5 mg, oral, BID  aspirin, 81 mg, oral, Daily  baclofen, 5 mg, oral, BID  DULoxetine, 60 mg, oral, Nightly  DULoxetine, 60 mg, oral, q AM  famotidine, 20 mg, oral, BID   Or  famotidine, 20 mg, intravenous, BID  finasteride, 5 mg, oral, Daily  levothyroxine, 100 mcg, oral, Daily  midodrine, 5 mg, oral, q8h  OLANZapine zydis, 5 mg, oral, Nightly  pantoprazole, 40 mg, oral, " BID  piperacillin-tazobactam, 3.375 g, intravenous, q6h  rosuvastatin, 20 mg, oral, Daily  vancomycin, 125 mg, oral, 4x daily  vancomycin, 750 mg, intravenous, q12h      Continuous medications     PRN medications  PRN medications: acetaminophen, LORazepam, ondansetron, [DISCONTINUED] prochlorperazine **OR** prochlorperazine **OR** [DISCONTINUED] prochlorperazine    Results for orders placed or performed during the hospital encounter of 11/14/23 (from the past 24 hour(s))   Blood Culture    Specimen: Peripheral Venipuncture; Blood culture   Result Value Ref Range    Blood Culture Loaded on Instrument - Culture in progress    Blood Culture    Specimen: Peripheral Venipuncture; Blood culture   Result Value Ref Range    Blood Culture Loaded on Instrument - Culture in progress    MRSA Surveillance for Vancomycin De-escalation, PCR    Specimen: Anterior Nares; Swab   Result Value Ref Range    MRSA PCR Not Detected Not Detected   CBC   Result Value Ref Range    WBC 27.0 (H) 4.4 - 11.3 x10*3/uL    nRBC 0.0 0.0 - 0.0 /100 WBCs    RBC 3.38 (L) 4.50 - 5.90 x10*6/uL    Hemoglobin 9.6 (L) 13.5 - 17.5 g/dL    Hematocrit 30.6 (L) 41.0 - 52.0 %    MCV 91 80 - 100 fL    MCH 28.4 26.0 - 34.0 pg    MCHC 31.4 (L) 32.0 - 36.0 g/dL    RDW 23.0 (H) 11.5 - 14.5 %    Platelets 555 (H) 150 - 450 x10*3/uL   Renal function panel   Result Value Ref Range    Glucose 81 74 - 99 mg/dL    Sodium 134 (L) 136 - 145 mmol/L    Potassium 4.0 3.5 - 5.3 mmol/L    Chloride 97 (L) 98 - 107 mmol/L    Bicarbonate 25 21 - 32 mmol/L    Anion Gap 16 10 - 20 mmol/L    Urea Nitrogen 15 6 - 23 mg/dL    Creatinine 0.98 0.50 - 1.30 mg/dL    eGFR 85 >60 mL/min/1.73m*2    Calcium 8.5 (L) 8.6 - 10.6 mg/dL    Phosphorus 3.8 2.5 - 4.9 mg/dL    Albumin 2.9 (L) 3.4 - 5.0 g/dL   Magnesium   Result Value Ref Range    Magnesium 1.56 (L) 1.60 - 2.40 mg/dL   ECG 12 Lead   Result Value Ref Range    Ventricular Rate 84 BPM    Atrial Rate 84 BPM    AK Interval 172 ms    QRS  Duration 138 ms    QT Interval 452 ms    QTC Calculation(Bazett) 534 ms    P Axis 65 degrees    R Axis -75 degrees    T Axis 37 degrees    QRS Count 14 beats    Q Onset 188 ms    P Onset 102 ms    P Offset 150 ms    T Offset 414 ms    QTC Fredericia 505 ms   Magnesium   Result Value Ref Range    Magnesium 1.57 (L) 1.60 - 2.40 mg/dL         Assessment/Plan   Principal Problem:    Diarrhea    Mr. Leonardo Wilder is a 68 year old gentleman w/ PMHx of LBBB, hypothyroidism, CAD, portal vein thrombosis on Eliquis, appendiceal CA s/p HIPEC x2 at [facility] (2021 and 8/2023) who presents as a direct admit for persistent diarrhea. Patient is hemodynamically stable, admitted for further workup and evaluation of diarrhea.  Positive for C. Diff. Treating with oral vanc. Chest x-ray the night of 11/16 concerning for pneumonia, currently on vanc/zosyn    PLAN:    Updates 11/17:  - Responding well to oral vanc  - Diarrhea much improved; 3 episodes last 24 hours with stools more formed  - Vanc 125 mg capsule QID for 10 day total  - Chest x-ray overnight of 11/16 concerning for pneumonia; Possibly aspiration in the setting of vomiting/reflux  - Started on vanc/zosyn; MRSA nares negative, vanc stopped  - Blood Cx pending  - Zofran/compazine changed to IV  - Protonix increased to BID  - Baclofen added for hiiccups    Persistent diarrhea  - 6 weeks of diarrhea following recent surgery (debulking and HIPEC at [facility] 8/31/2023)  - Hasn't improved since discharge, off TPN for past 3 weeks  - C diff testing positive on admission  - Treat with oral vanc 125 mg for 10 days total (11/15-11/24)  - Hypokalemic and hypomagnesemic on admission 2/2 diarrhea,, replete PRN    Pneumonia  - Chest x-ray overnight of 11/16 with patchy opacities of right mid/lower lobe concerning for pneumonia.  - WBC increased to 27.0 from 13.9  - Patient denies cough/ mucus production  - Started on vanc and zosyn  - Blood cultures pending  - MRSA nares negative  - Stop vanc  -  Continue zosyn       History of appendiceal cancer w/ carcinomatosis  - Prior surgical intervention and oncologic history at [facility]   -  s/p 6 mo neoadjuvant chemo w/ FOLFOX followed by debulking surgery with HIPEC (3/21)   - Most recently w/ SBO admitted to [facility] in 8/2023, complicated by GNR bacteremia  - surgery that admission: ex-lap, lysis of adhesions, colonoscopy, gastropexy but no HIPEC per discharge summary    History of portal vein thrombosis  - In setting of metastatic appendiceal cancer  -Continue Eliquis 5mg BID  home med    Cancer related pain/nausea  - Supportive onc consulted; Patient is established with them  - Duloxetine 60 BID  - Olanzapine 5 mg nightly  - IV zofran and compazine for nausea     CAD s/p PCI 2023  - continue home crestor 20mg   - continue home Asa 81mg daily      Chronic hypotension  - S/p 1L NS bolus on admission  - continue home midodrine 5mg TID      GERD  - Increased protonix 40mg BID  - Baclofen for hiccups     Hypothyroidism  - continue home synthroid     Neuropathy from prior chemotherapy  - continue home cymbalta 30mg in AM and 60mg in PM     Hypokalemia  Hypomagnesemia  - In setting of persistent diarrhea  - Repleted on admission, replete PRN           F: PRN  E: hypokalemia and hypomagnesemia  N: Regular dietA: Mediport, PIV  GI: PPI  DVT: home apixaban     Code status: Full code (confirmed on admission)  NOK: Wife Michelle Wilder [phone]      Rafa Calvin MD

## 2023-11-17 NOTE — PROGRESS NOTES
Physical Therapy    Physical Therapy Evaluation    Patient Name: Leonardo Wilder  MRN: [id number]  Today's Date: 11/17/2023   Time Calculation  Start Time: 1042  Stop Time: 1057  Time Calculation (min): 15 min    Assessment/Plan   PT Assessment  PT Assessment Results: Decreased strength, Impaired balance, Decreased mobility, Decreased endurance  Rehab Prognosis: Excellent  Evaluation/Treatment Tolerance:  (pt limited 2/2 to postive orthostatic BP)  Medical Staff Made Aware: Yes  End of Session Communication: Bedside nurse  Assessment Comment: 68 year old male presents with diarrhea. Pt presents with decreased strength, endurance and balance impacting functional mobility. Pt would benefit from continued PT while in hospital to improve functional mobility and return to PLOF.  End of Session Patient Position: Bed, 3 rail up, Alarm on  IP OR SWING BED PT PLAN  Inpatient or Swing Bed: Inpatient  PT Plan  Treatment/Interventions: Bed mobility, Transfer training, Gait training, Stair training, Balance training, Neuromuscular re-education, Strengthening, Endurance training, Therapeutic exercise, Home exercise program, Therapeutic activity, Positioning, Postural re-education  PT Plan: Skilled PT  PT Frequency: 2 times per week  PT Discharge Recommendations: Low intensity level of continued care  PT Recommended Transfer Status: Assist x1  PT - OK to Discharge: Yes      Subjective   General Visit Information:  General  Reason for Referral: 68 year old male admitted with Diarrhea.  Past Medical History Relevant to Rehab: LBBB, hypothyroidism, CAD, portal vein thrombosis on Eliquis, appendiceal CA s/p HIPEC x2 at [facility] (2021 and 8/2023)  Prior to Session Communication: Bedside nurse  Patient Position Received: Bed, 3 rail up, Alarm on  General Comment: Pt supine in bed upon entry to room. Pt pleasant, cooperative and willing to work with PT. noted IV.  Home Living:  Home Living  Type of Home: House  Lives With: Spouse (2 dogs)  Home  "Layout: Two level (5 stairs to enter and 1 flight of stairs to second floor)  Prior Level of Function:  Prior Function Per Pt/Caregiver Report  Level of [place]: Independent with ADLs and functional transfers, Independent with homemaking with ambulation  ADL Assistance: Independent  Homemaking Assistance: Independent  Ambulatory Assistance: Independent  Leisure: pt enjoys working out with his dogs  Prior Function Comments: pt independent prior to admission; pt stating that home PT has been coming to his house; pt denies any falls over the last 6 months other then his \"fall\" yesterday at the hospital  Precautions:  Precautions  Medical Precautions: Fall precautions (contact plus)  Vital Signs:  Vital Signs  Heart Rate: 91 (supine; 120 sitting; 90 supine)  BP: (!) 109/47 (supine; 72/52 sitting; 113/59 supine)    Objective   Pain:  Pain Assessment  Pain Assessment: 0-10  Pain Score: 0 - No pain  Cognition:  Cognition  Overall Cognitive Status: Within Functional Limits  Orientation Level: Oriented X4    General Assessments:  Static Sitting Balance  Static Sitting-Comment/Number of Minutes: sba  Dynamic Sitting Balance  Dynamic Sitting-Comments: sba       Functional Assessments:  Bed Mobility  Bed Mobility: Yes  Bed Mobility 1  Bed Mobility 1: Supine to sitting, Sitting to supine  Level of Assistance 1: Contact guard    Transfers  Transfer: No (deferred OOB activity at this time 2/2 to pt with (+) orthostatic BP while sitting EOB; per nrusing pt has been ambulating in room to and from bathroom independently)       Extremity/Trunk Assessments:  RLE   RLE :  (at least 4+/5)  LLE   LLE :  (at least 4+/5)  Outcome Measures:  [facility] Basic Mobility  Turning from your back to your side while in a flat bed without using bedrails: A little  Moving from lying on your back to sitting on the side of a flat bed without using bedrails: A little  Moving to and from bed to chair (including a wheelchair): Total  Standing up from a " chair using your arms (e.g. wheelchair or bedside chair): Total  To walk in hospital room: Total  Climbing 3-5 steps with railing: Total  Basic Mobility - Total Score: 10    Encounter Problems       Encounter Problems (Active)       Balance       Pt will score >24 On the [place] to reduce the risk for falls.         Start:  11/17/23    Expected End:  12/08/23               Mobility       Pt will perform bed mobility independently.        Start:  11/17/23    Expected End:  12/08/23            Pt will ambulate >250ft with LRAD and MI with no LOB and VSS.         Start:  11/17/23    Expected End:  12/08/23            Pt will ascend and descend 1 flight of Stairs with sba.        Start:  11/17/23    Expected End:  12/08/23               Pain - Adult          Transfers       Pt will perform all functional transfers with LRAD and MI.        Start:  11/17/23    Expected End:  12/08/23                   Education Documentation  Mobility Training, taught by Isaura Gonzalez, PT at 11/17/2023 12:00 PM.  Learner: Patient  Readiness: Acceptance  Method: Explanation  Response: Needs Reinforcement    Education Comments  No comments found.        11/17/23 at 12:02 PM - Isaura Gonzalez, PT

## 2023-11-17 NOTE — PROGRESS NOTES
"Vancomycin Dosing by Pharmacy- INITIAL    Leonardo Wilder is a 68 y.o. year old male who Pharmacy has been consulted for vancomycin dosing for pneumonia. Based on the patient's indication and renal status this patient will be dosed based on a goal AUC of 400-600.     Renal function is currently stable.    Visit Vitals  /78   Pulse (!) 118   Temp 36.8 °C (98.2 °F)   Resp 18        Lab Results   Component Value Date    CREATININE 0.84 11/16/2023    CREATININE 0.96 11/15/2023    CREATININE 0.93 11/14/2023    CREATININE 0.82 11/06/2023        Patient weight is No results found for: \"PTWEIGHT\"    No results found for: \"CULTURE\"     I/O last 3 completed shifts:  In: 1600 (27.7 mL/kg) [P.O.:1600]  Out: - (0 mL/kg)   Weight: 57.8 kg   [unfilled]    Lab Results   Component Value Date    PATIENTTEMP 37.0 08/01/2023    PATIENTTEMP 37.0 07/31/2023    PATIENTTEMP 37.0 07/31/2023          Assessment/Plan     Patient will not be given a loading dose.  Will initiate vancomycin maintenance,  750 mg every 12 hours.    This dosing regimen is predicted by [employer] to result in the following pharmacokinetic parameters:  AUC24,ss: 493 mg/L.hr  Probability of AUC24 > 400: 73 %  Ctrough,ss: 15.6 mg/L  Probability of Ctrough,ss > 20: 27 %    Follow-up level will be ordered on 11/18 at 0500 unless clinically indicated sooner.  Will continue to monitor renal function daily while on vancomycin and order serum creatinine at least every 48 hours if not already ordered.  Follow for continued vancomycin needs, clinical response, and signs/symptoms of toxicity.       Jeimy Whipple, PharmD       " Xenograft Text: The defect edges were debeveled with a #15 scalpel blade.  Given the location of the defect, shape of the defect and the proximity to free margins a xenograft was deemed most appropriate.  The graft was then trimmed to fit the size of the defect.  The graft was then placed in the primary defect and oriented appropriately.

## 2023-11-17 NOTE — CARE PLAN
Problem: Fall/Injury  Goal: Not fall by end of shift  11/17/2023 0751 by Christofer Li RN  Outcome: Progressing  11/17/2023 0445 by Christofer Li RN  Outcome: Progressing  11/17/2023 0444 by Christofer Li RN  Outcome: Progressing  Goal: Be free from injury by end of the shift  11/17/2023 0751 by Christofer Li RN  Outcome: Progressing  11/17/2023 0445 by Christofer Li RN  Outcome: Progressing  11/17/2023 0444 by Christofer Li RN  Outcome: Progressing  Goal: Verbalize understanding of personal risk factors for fall in the hospital  11/17/2023 0751 by Christofer Li RN  Outcome: Progressing  11/17/2023 0445 by Christofer Li RN  Outcome: Progressing  11/17/2023 0444 by Christofer Li RN  Outcome: Progressing  Goal: Verbalize understanding of risk factor reduction measures to prevent injury from fall in the home  11/17/2023 0751 by Christofer Li RN  Outcome: Progressing  11/17/2023 0445 by Christofer Li RN  Outcome: Progressing  11/17/2023 0444 by Christofer Li RN  Outcome: Progressing  Goal: Use assistive devices by end of the shift  11/17/2023 0751 by Christofer Li RN  Outcome: Progressing  11/17/2023 0445 by Christofer Li RN  Outcome: Progressing  11/17/2023 0444 by Christofer Li RN  Outcome: Progressing  Goal: Pace activities to prevent fatigue by end of the shift  11/17/2023 0751 by Christofer Li RN  Outcome: Progressing  11/17/2023 0445 by Christofer Li RN  Outcome: Progressing  11/17/2023 0444 by Christofer Li RN  Outcome: Progressing     Problem: Pain - Adult  Goal: Verbalizes/displays adequate comfort level or baseline comfort level  11/17/2023 0751 by Christofer Li RN  Outcome: Progressing  11/17/2023 0445 by Christofer Li RN  Outcome: Progressing  11/17/2023 0444 by Christofer Li, RN  Outcome: Progressing     Problem: Safety - Adult  Goal: Free from fall injury  11/17/2023 0751 by Christofer Li RN  Outcome:  Progressing  11/17/2023 0445 by Christofer Li RN  Outcome: Progressing  11/17/2023 0444 by Christofer Li RN  Outcome: Progressing     Problem: Discharge Planning  Goal: Discharge to home or other facility with appropriate resources  11/17/2023 0751 by Christofer Li RN  Outcome: Progressing  11/17/2023 0445 by Christofer Li RN  Outcome: Progressing  11/17/2023 0444 by Christofer Li RN  Outcome: Progressing     Problem: Chronic Conditions and Co-morbidities  Goal: Patient's chronic conditions and co-morbidity symptoms are monitored and maintained or improved  11/17/2023 0751 by Christofer Li RN  Outcome: Progressing  11/17/2023 0445 by Christofer Li RN  Outcome: Progressing  11/17/2023 0444 by Christofer Li RN  Outcome: Progressing     Problem: Skin  Goal: Decreased wound size/increased tissue granulation at next dressing change  11/17/2023 0751 by Christofer Li RN  Outcome: Progressing  Flowsheets (Taken 11/17/2023 0751)  Decreased wound size/increased tissue granulation at next dressing change: Promote sleep for wound healing  11/17/2023 0445 by Christofer Li RN  Outcome: Progressing  11/17/2023 0444 by Christofer Li RN  Outcome: Progressing  Goal: Participates in plan/prevention/treatment measures  11/17/2023 0751 by Christofer Li RN  Outcome: Progressing  Flowsheets (Taken 11/17/2023 0751)  Participates in plan/prevention/treatment measures: Discuss with provider PT/OT consult  11/17/2023 0445 by Christofer Li RN  Outcome: Progressing  11/17/2023 0444 by Christofer Li RN  Outcome: Progressing  Goal: Prevent/manage excess moisture  11/17/2023 0751 by Christofer Li RN  Outcome: Progressing  Flowsheets (Taken 11/17/2023 0751)  Prevent/manage excess moisture: Cleanse incontinence/protect with barrier cream  11/17/2023 0445 by Christofer Li RN  Outcome: Progressing  11/17/2023 0444 by Christofer Li RN  Outcome: Progressing  Goal: Prevent/minimize  sheer/friction injuries  11/17/2023 0751 by Christofer Li RN  Outcome: Progressing  Flowsheets (Taken 11/17/2023 0751)  Prevent/minimize sheer/friction injuries: Increase activity/out of bed for meals  11/17/2023 0445 by Christofer Li RN  Outcome: Progressing  11/17/2023 0444 by Christofer Li RN  Outcome: Progressing  Goal: Promote/optimize nutrition  11/17/2023 0751 by Christofer Li RN  Outcome: Progressing  Flowsheets (Taken 11/17/2023 0751)  Promote/optimize nutrition: Monitor/record intake including meals  11/17/2023 0445 by Christofer Li RN  Outcome: Progressing  11/17/2023 0444 by Christofer Li RN  Outcome: Progressing  Goal: Promote skin healing  11/17/2023 0751 by Christofer Li RN  Outcome: Progressing  Flowsheets (Taken 11/17/2023 0751)  Promote skin healing: Turn/reposition every 2 hours/use positioning/transfer devices  11/17/2023 0445 by Christofer Li RN  Outcome: Progressing  11/17/2023 0444 by Christofer Li RN  Outcome: Progressing

## 2023-11-17 NOTE — CARE PLAN
Problem: Fall/Injury  Goal: Not fall by end of shift  Outcome: Progressing  Goal: Be free from injury by end of the shift  Outcome: Progressing  Goal: Verbalize understanding of personal risk factors for fall in the hospital  Outcome: Progressing  Goal: Verbalize understanding of risk factor reduction measures to prevent injury from fall in the home  Outcome: Progressing  Goal: Use assistive devices by end of the shift  Outcome: Progressing  Goal: Pace activities to prevent fatigue by end of the shift  Outcome: Progressing     Problem: Pain - Adult  Goal: Verbalizes/displays adequate comfort level or baseline comfort level  Outcome: Progressing     Problem: Safety - Adult  Goal: Free from fall injury  Outcome: Progressing     Problem: Discharge Planning  Goal: Discharge to home or other facility with appropriate resources  Outcome: Progressing     Problem: Chronic Conditions and Co-morbidities  Goal: Patient's chronic conditions and co-morbidity symptoms are monitored and maintained or improved  Outcome: Progressing     Problem: Skin  Goal: Decreased wound size/increased tissue granulation at next dressing change  Outcome: Progressing  Goal: Participates in plan/prevention/treatment measures  Outcome: Progressing  Goal: Prevent/manage excess moisture  Outcome: Progressing  Goal: Prevent/minimize sheer/friction injuries  Outcome: Progressing  Goal: Promote/optimize nutrition  Outcome: Progressing  Goal: Promote skin healing  Outcome: Progressing

## 2023-11-17 NOTE — CARE PLAN
The patient's goals for the shift include      The clinical goals for the shift include Pt will remain safe in room and use call light during shift on 11/16/23 @1930

## 2023-11-17 NOTE — NURSING NOTE
Nursing assistant called RN into room. Pt told RN that pt stood up to walk to the bathroom, felt dizzy, and lowered himself to the ground on his bottom. Pt stood up independently without staff and walked to bed in order to hit call light to inform RN. VSS. MD informed and MD stated to obtain orthostatic vs. Pt does not want to inform familiy

## 2023-11-17 NOTE — SIGNIFICANT EVENT
Rapid Response RN Note    Rapid response RN at bedside for RADAR score 8 due to the following VS: T 36.4 °Celsius;  ; RR 24; /71; SPO2 92%.     Reviewed abnormal VS with bedside RN who indicated that they may have been taken at a point of time close to patient having exerted himself with ambulation to the BR.  VS were rechecked by RN and improvements noted in RR and O2 saturation.  No interventions by rapid response team indicated at this time.

## 2023-11-17 NOTE — CARE PLAN
Problem: Fall/Injury  Goal: Not fall by end of shift  11/17/2023 0445 by Christofer Li RN  Outcome: Progressing  11/17/2023 0444 by Christofer Li RN  Outcome: Progressing  Goal: Be free from injury by end of the shift  11/17/2023 0445 by Christofer Li RN  Outcome: Progressing  11/17/2023 0444 by Christofer Li RN  Outcome: Progressing  Goal: Verbalize understanding of personal risk factors for fall in the hospital  11/17/2023 0445 by Christofer Li RN  Outcome: Progressing  11/17/2023 0444 by Christofer Li RN  Outcome: Progressing  Goal: Verbalize understanding of risk factor reduction measures to prevent injury from fall in the home  11/17/2023 0445 by Christofer Li RN  Outcome: Progressing  11/17/2023 0444 by Christofer Li RN  Outcome: Progressing  Goal: Use assistive devices by end of the shift  11/17/2023 0445 by Christofer Li RN  Outcome: Progressing  11/17/2023 0444 by Christofer Li RN  Outcome: Progressing  Goal: Pace activities to prevent fatigue by end of the shift  11/17/2023 0445 by Christofer Li RN  Outcome: Progressing  11/17/2023 0444 by Christofer Li RN  Outcome: Progressing     Problem: Pain - Adult  Goal: Verbalizes/displays adequate comfort level or baseline comfort level  11/17/2023 0445 by Christofer Li RN  Outcome: Progressing  11/17/2023 0444 by Christofer Li RN  Outcome: Progressing     Problem: Safety - Adult  Goal: Free from fall injury  11/17/2023 0445 by Christofer Li RN  Outcome: Progressing  11/17/2023 0444 by Christofer Li RN  Outcome: Progressing     Problem: Discharge Planning  Goal: Discharge to home or other facility with appropriate resources  11/17/2023 0445 by Christofer Li RN  Outcome: Progressing  11/17/2023 0444 by Christofer Li RN  Outcome: Progressing     Problem: Chronic Conditions and Co-morbidities  Goal: Patient's chronic conditions and co-morbidity symptoms are monitored and maintained or  improved  11/17/2023 0445 by Christofer Li RN  Outcome: Progressing  11/17/2023 0444 by Christofer Li RN  Outcome: Progressing     Problem: Skin  Goal: Decreased wound size/increased tissue granulation at next dressing change  11/17/2023 0445 by Christofer Li RN  Outcome: Progressing  11/17/2023 0444 by Christofer Li RN  Outcome: Progressing  Goal: Participates in plan/prevention/treatment measures  11/17/2023 0445 by Christofer Li RN  Outcome: Progressing  11/17/2023 0444 by Christofer Li RN  Outcome: Progressing  Goal: Prevent/manage excess moisture  11/17/2023 0445 by Christofer Li RN  Outcome: Progressing  11/17/2023 0444 by Christofer Li RN  Outcome: Progressing  Goal: Prevent/minimize sheer/friction injuries  11/17/2023 0445 by Christofer Li RN  Outcome: Progressing  11/17/2023 0444 by Christofer Li RN  Outcome: Progressing  Goal: Promote/optimize nutrition  11/17/2023 0445 by Christofer Li RN  Outcome: Progressing  11/17/2023 0444 by Christofer Li RN  Outcome: Progressing  Goal: Promote skin healing  11/17/2023 0445 by Christofer Li RN  Outcome: Progressing  11/17/2023 0444 by Christofer Li RN  Outcome: Progressing

## 2023-11-18 NOTE — PROGRESS NOTES
"Vancomycin Dosing by Pharmacy- INITIAL    Leonardo Wilder is a 68 y.o. year old male who Pharmacy has been consulted for vancomycin dosing for other +bcx . Based on the patient's indication and renal status this patient will be dosed based on a goal AUC of 400-600.     Renal function is currently stable.    Visit Vitals  BP 84/53 (BP Location: Right arm, Patient Position: Sitting)   Pulse (!) 112   Temp 36.2 °C (97.2 °F) (Temporal)   Resp 16        Lab Results   Component Value Date    CREATININE 1.16 11/18/2023    CREATININE 0.98 11/17/2023    CREATININE 0.84 11/16/2023    CREATININE 0.96 11/15/2023        Patient weight is No results found for: \"PTWEIGHT\"    No results found for: \"CULTURE\"     I/O last 3 completed shifts:  In: 800 (13.8 mL/kg) [IV Piggyback:800]  Out: 1050 (18.2 mL/kg) [Urine:600 (0.3 mL/kg/hr); Emesis/NG output:450]  Weight: 57.8 kg   [unfilled]    Lab Results   Component Value Date    PATIENTTEMP 37.0 08/01/2023    PATIENTTEMP 37.0 07/31/2023    PATIENTTEMP 37.0 07/31/2023          Assessment/Plan     Patient will not be given a loading dose. Got a one time dose of 750mg last night.   Will initiate vancomycin maintenance,  1000 mg every 24 hours.    This dosing regimen is predicted by [employer] to result in the following pharmacokinetic parameters:  Regimen: 1000 mg IV every 24 hours.  Start time: 08:32 on 11/18/2023  Exposure target: AUC24 (range)400-600 mg/L.hr   AUC24,ss: 422 mg/L.hr  Probability of AUC24 > 400: 60 %  Ctrough,ss: 12.2 mg/L  Probability of Ctrough,ss > 20: 3 %  Probability of nephrotoxicity (Lodise RYAN 2009): 7 %      Follow-up level will be ordered on 11/20 at am labs unless clinically indicated sooner.  Will continue to monitor renal function daily while on vancomycin and order serum creatinine at least every 48 hours if not already ordered.  Follow for continued vancomycin needs, clinical response, and signs/symptoms of toxicity.       Maureen Marin, PharmD       "

## 2023-11-18 NOTE — CARE PLAN
The patient's goals for the shift include      The clinical goals for the shift include pt will remain safe and free from injury through shift      Problem: Fall/Injury  Goal: Be free from injury by end of the shift  Outcome: Progressing     Problem: Pain - Adult  Goal: Verbalizes/displays adequate comfort level or baseline comfort level  Outcome: Progressing     Problem: Chronic Conditions and Co-morbidities  Goal: Patient's chronic conditions and co-morbidity symptoms are monitored and maintained or improved  Outcome: Progressing     Problem: Skin  Goal: Decreased wound size/increased tissue granulation at next dressing change  Outcome: Progressing

## 2023-11-18 NOTE — PROGRESS NOTES
"Leonardo Wilder is a 68 y.o. male on day 4 of admission presenting with Diarrhea.    Subjective   OVN: No acute events    This morning the patient was sleepier than usual and a bit confused upon wakening. He needed prompting to remember the president and the name of the hospital he was at. Denies nausea and abdominal pain. No complaints. Says his diarrhea continues to improve. 2 episodes yesterday, more formed.         Objective     Physical Exam  GEN: Awake, alert, NAD  HEENT: Head NCAT, mucus membranes moist  CARDIO: Normal rate and rhythm  RESP: CTAB, normal WOB  ABDOMEN: Soft, nt, nd  EXTREM: No edema of BLE  SKIN: warm, dry, intact  NEURO: NFD, AOx3      Last Recorded Vitals  Blood pressure 101/55, pulse 82, temperature 36.3 °C (97.3 °F), resp. rate 16, height 1.657 m (5' 5.24\"), weight 57.8 kg (127 lb 6.8 oz), SpO2 94 %.  Intake/Output last 3 Shifts:  I/O last 3 completed shifts:  In: 800 (13.8 mL/kg) [IV Piggyback:800]  Out: 1050 (18.2 mL/kg) [Urine:600 (0.3 mL/kg/hr); Emesis/NG output:450]  Weight: 57.8 kg     Relevant Results         Scheduled medications  apixaban, 5 mg, oral, BID  aspirin, 81 mg, oral, Daily  baclofen, 5 mg, oral, BID  DULoxetine, 60 mg, oral, Nightly  DULoxetine, 60 mg, oral, q AM  famotidine, 20 mg, oral, BID   Or  famotidine, 20 mg, intravenous, BID  finasteride, 5 mg, oral, Daily  lactated Ringer's, 1,000 mL, intravenous, Once  levothyroxine, 100 mcg, oral, Daily  midodrine, 5 mg, oral, q8h  OLANZapine zydis, 5 mg, oral, Nightly  pantoprazole, 40 mg, oral, BID  piperacillin-tazobactam, 3.375 g, intravenous, q6h  rosuvastatin, 20 mg, oral, Daily  vancomycin, 125 mg, oral, 4x daily  vancomycin, 1,000 mg, intravenous, q24h      Continuous medications     PRN medications  PRN medications: acetaminophen, LORazepam, ondansetron, [DISCONTINUED] prochlorperazine **OR** prochlorperazine **OR** [DISCONTINUED] prochlorperazine    Results for orders placed or performed during the hospital encounter " of 11/14/23 (from the past 24 hour(s))   Vancomycin, Trough   Result Value Ref Range    Vancomycin, Trough 4.6 (L) 5.0 - 20.0 ug/mL   CBC   Result Value Ref Range    WBC 23.0 (H) 4.4 - 11.3 x10*3/uL    nRBC 0.0 0.0 - 0.0 /100 WBCs    RBC 2.89 (L) 4.50 - 5.90 x10*6/uL    Hemoglobin 8.3 (L) 13.5 - 17.5 g/dL    Hematocrit 26.1 (L) 41.0 - 52.0 %    MCV 90 80 - 100 fL    MCH 28.7 26.0 - 34.0 pg    MCHC 31.8 (L) 32.0 - 36.0 g/dL    RDW 23.2 (H) 11.5 - 14.5 %    Platelets 454 (H) 150 - 450 x10*3/uL   Renal function panel   Result Value Ref Range    Glucose 117 (H) 74 - 99 mg/dL    Sodium 134 (L) 136 - 145 mmol/L    Potassium 3.5 3.5 - 5.3 mmol/L    Chloride 98 98 - 107 mmol/L    Bicarbonate 27 21 - 32 mmol/L    Anion Gap 13 10 - 20 mmol/L    Urea Nitrogen 20 6 - 23 mg/dL    Creatinine 1.16 0.50 - 1.30 mg/dL    eGFR 69 >60 mL/min/1.73m*2    Calcium 8.3 (L) 8.6 - 10.6 mg/dL    Phosphorus 3.3 2.5 - 4.9 mg/dL    Albumin 2.6 (L) 3.4 - 5.0 g/dL   Magnesium   Result Value Ref Range    Magnesium 1.62 1.60 - 2.40 mg/dL   Blood Culture    Specimen: Peripheral Venipuncture; Blood culture   Result Value Ref Range    Blood Culture Loaded on Instrument - Culture in progress    Blood Culture    Specimen: Peripheral Venipuncture; Blood culture   Result Value Ref Range    Blood Culture Loaded on Instrument - Culture in progress    Blood Gas Venous Full Panel   Result Value Ref Range    POCT pH, Venous 7.45 (H) 7.33 - 7.43 pH    POCT pCO2, Venous 40 (L) 41 - 51 mm Hg    POCT pO2, Venous 49 (H) 35 - 45 mm Hg    POCT SO2, Venous 78 (H) 45 - 75 %    POCT Oxy Hemoglobin, Venous 76.0 (H) 45.0 - 75.0 %    POCT Hematocrit Calculated, Venous 24.0 (L) 41.0 - 52.0 %    POCT Sodium, Venous 132 (L) 136 - 145 mmol/L    POCT Potassium, Venous 3.2 (L) 3.5 - 5.3 mmol/L    POCT Chloride, Venous 102 98 - 107 mmol/L    POCT Ionized Calicum, Venous 1.13 1.10 - 1.33 mmol/L    POCT Glucose, Venous 103 (H) 74 - 99 mg/dL    POCT Lactate, Venous 1.7 0.4 - 2.0  mmol/L    POCT Base Excess, Venous 3.5 (H) -2.0 - 3.0 mmol/L    POCT HCO3 Calculated, Venous 27.8 (H) 22.0 - 26.0 mmol/L    POCT Hemoglobin, Venous 7.9 (L) 13.5 - 17.5 g/dL    POCT Anion Gap, Venous 5.0 (L) 10.0 - 25.0 mmol/L    Patient Temperature 37.0 degrees Celsius    FiO2 94 %         Assessment/Plan   Principal Problem:    Diarrhea    Mr. Leonardo Wilder is a 68 year old gentleman w/ PMHx of LBBB, hypothyroidism, CAD, portal vein thrombosis on Eliquis, appendiceal CA s/p HIPEC x2 at [facility] (2021 and 8/2023) who presents as a direct admit for persistent diarrhea. Patient is hemodynamically stable, admitted for further workup and evaluation of diarrhea.  Positive for C. Diff. Treating with oral vanc. Chest x-ray the night of 11/16 concerning for pneumonia, currently on vanc/zosyn    PLAN:    Updates 11/18:  - Responding well to oral vanc  - Diarrhea much improved; 2 episodes last 24 hours with stools more formed  - Vanc 125 mg capsule QID for 10 day total  - Chest x-ray overnight of 11/16 concerning for pneumonia; Possibly aspiration in the setting of vomiting/reflux  - Started on vanc/zosyn  - 1 of 2 blood culture positive for gram positive cocci in clusters; Vanc restarted. Blood cultures redrawn      Persistent diarrhea  - 6 weeks of diarrhea following recent surgery (debulking and HIPEC at [facility] 8/31/2023)  - Hasn't improved since discharge, off TPN for past 3 weeks  - C diff testing positive on admission  - Treat with oral vanc 125 mg for 10 days total (11/15-11/24)  - Diarrhea improving. 2 episodes over 24 hours with stools more formed.  - Hypokalemic and hypomagnesemic on admission 2/2 diarrhea,, replete PRN    Pneumonia  - Chest x-ray overnight of 11/16 with patchy opacities of right mid/lower lobe concerning for pneumonia.  - WBC increased to 27.0 from 13.9  - Patient denies cough/ mucus production  - Started on vanc and zosyn    Sepsis  - Blood culture on 11/18 positive for gram positive cocci in clusters  -  Vanc restarted  - blood cultures x2 redrawn  - Patient with hypotension systolic in 80s this afternoon.  - Responded well to 1 L LR  - Monitor closely     History of appendiceal cancer w/ carcinomatosis  - Prior surgical intervention and oncologic history at [facility]   -  s/p 6 mo neoadjuvant chemo w/ FOLFOX followed by debulking surgery with HIPEC (3/21)   - Most recently w/ SBO admitted to [facility] in 8/2023, complicated by GNR bacteremia  - surgery that admission: ex-lap, lysis of adhesions, colonoscopy, gastropexy but no HIPEC per discharge summary    History of portal vein thrombosis  - In setting of metastatic appendiceal cancer  -Continue Eliquis 5mg BID  home med    Cancer related pain/nausea  - Supportive onc consulted; Patient is established with them  - Duloxetine 60 BID  - Olanzapine 5 mg nightly  - IV zofran and compazine for nausea     CAD s/p PCI 2023  - continue home crestor 20mg   - continue home Asa 81mg daily      Chronic hypotension  - S/p 1L NS bolus on admission  - continue home midodrine 5mg TID      GERD  - Increased protonix 40mg BID  - Baclofen for hiccups     Hypothyroidism  - continue home synthroid     Neuropathy from prior chemotherapy  - continue home cymbalta 30mg in AM and 60mg in PM     Hypokalemia  Hypomagnesemia  - In setting of persistent diarrhea  - Repleted on admission, replete PRN           F: PRN  E: hypokalemia and hypomagnesemia  N: Regular dietA: Mediport, PIV  GI: PPI  DVT: home apixaban     Code status: Full code (confirmed on admission)  NOK: Wife Michelle Wilder [phone]      Rafa Calvin MD       23-Nov-2018 14:13

## 2023-11-18 NOTE — CARE PLAN
The patient's goals for the shift include      The clinical goals for the shift include pt will remain safe and free from injury through shift

## 2023-11-19 NOTE — CARE PLAN
The patient's goals for the shift include   Problem: Fall/Injury  Goal: Not fall by end of shift  Outcome: Progressing  Goal: Be free from injury by end of the shift  Outcome: Progressing  Goal: Verbalize understanding of personal risk factors for fall in the hospital  Outcome: Progressing  Goal: Verbalize understanding of risk factor reduction measures to prevent injury from fall in the home  Outcome: Progressing  Goal: Use assistive devices by end of the shift  Outcome: Progressing  Goal: Pace activities to prevent fatigue by end of the shift  Outcome: Progressing     Problem: Pain - Adult  Goal: Verbalizes/displays adequate comfort level or baseline comfort level  Outcome: Progressing     Problem: Safety - Adult  Goal: Free from fall injury  Outcome: Progressing     Problem: Discharge Planning  Goal: Discharge to home or other facility with appropriate resources  Outcome: Progressing     Problem: Chronic Conditions and Co-morbidities  Goal: Patient's chronic conditions and co-morbidity symptoms are monitored and maintained or improved  Outcome: Progressing     Problem: Skin  Goal: Decreased wound size/increased tissue granulation at next dressing change  Outcome: Progressing  Flowsheets (Taken 11/19/2023 0503)  Decreased wound size/increased tissue granulation at next dressing change: Promote sleep for wound healing  Goal: Participates in plan/prevention/treatment measures  Outcome: Progressing  Flowsheets (Taken 11/19/2023 0503)  Participates in plan/prevention/treatment measures: Increase activity/out of bed for meals  Goal: Prevent/manage excess moisture  Outcome: Progressing  Flowsheets (Taken 11/19/2023 0503)  Prevent/manage excess moisture: Cleanse incontinence/protect with barrier cream  Goal: Prevent/minimize sheer/friction injuries  Outcome: Progressing  Flowsheets (Taken 11/19/2023 0503)  Prevent/minimize sheer/friction injuries:   Use pull sheet   HOB 30 degrees or less  Goal: Promote/optimize  nutrition  Outcome: Progressing  Flowsheets (Taken 11/19/2023 0503)  Promote/optimize nutrition: Offer water/supplements/favorite foods  Goal: Promote skin healing  Outcome: Progressing  Flowsheets (Taken 11/19/2023 0503)  Promote skin healing:   Turn/reposition every 2 hours/use positioning/transfer devices   Rotate device position/do not position patient on device

## 2023-11-19 NOTE — PROGRESS NOTES
"Leonardo Wilder is a 68 y.o. male on day 5 of admission presenting with Diarrhea.    Subjective   NAEON.     This AM, says no additional episodes of diarrhea, stools are more formed. No nausea, vomiting. Denies dyspnea, cough, fevers, chills. Feeling better overall compared to yesterday         Objective     Physical Exam  GEN: Awake, alert, NAD, cachectic   HEENT: Head NCAT, mucus membranes moist  CARDIO: Normal rate and rhythm  RESP: CTAB, normal WOB  ABDOMEN: Soft, nt, nd  EXTREM: No edema of BLE  SKIN: warm, dry, intact  NEURO: NFD, AOx3      Last Recorded Vitals  Blood pressure 109/61, pulse 100, temperature 36 °C (96.8 °F), temperature source Temporal, resp. rate 18, height 1.657 m (5' 5.24\"), weight 57.8 kg (127 lb 6.8 oz), SpO2 95 %.  Intake/Output last 3 Shifts:  I/O last 3 completed shifts:  In: 1800 (31.1 mL/kg) [P.O.:300; IV Piggyback:1500]  Out: - (0 mL/kg)   Weight: 57.8 kg     Relevant Results         Scheduled medications  apixaban, 5 mg, oral, BID  aspirin, 81 mg, oral, Daily  baclofen, 5 mg, oral, BID  DULoxetine, 60 mg, oral, Nightly  DULoxetine, 60 mg, oral, q AM  famotidine, 20 mg, oral, BID   Or  famotidine, 20 mg, intravenous, BID  finasteride, 5 mg, oral, Daily  lactated Ringer's, 1,000 mL, intravenous, Once  levothyroxine, 100 mcg, oral, Daily  midodrine, 5 mg, oral, q8h  OLANZapine zydis, 5 mg, oral, Nightly  pantoprazole, 40 mg, oral, BID  piperacillin-tazobactam, 3.375 g, intravenous, q6h  rosuvastatin, 20 mg, oral, Daily  vancomycin, 125 mg, oral, 4x daily  vancomycin, 1,000 mg, intravenous, q24h      Continuous medications     PRN medications  PRN medications: acetaminophen, alteplase, LORazepam, ondansetron, [DISCONTINUED] prochlorperazine **OR** prochlorperazine **OR** [DISCONTINUED] prochlorperazine    Results for orders placed or performed during the hospital encounter of 11/14/23 (from the past 24 hour(s))   Blood Culture    Specimen: Peripheral Venipuncture; Blood culture   Result " Value Ref Range    Blood Culture Loaded on Instrument - Culture in progress    Blood Culture    Specimen: Peripheral Venipuncture; Blood culture   Result Value Ref Range    Blood Culture Loaded on Instrument - Culture in progress    Blood Gas Venous Full Panel   Result Value Ref Range    POCT pH, Venous 7.45 (H) 7.33 - 7.43 pH    POCT pCO2, Venous 40 (L) 41 - 51 mm Hg    POCT pO2, Venous 49 (H) 35 - 45 mm Hg    POCT SO2, Venous 78 (H) 45 - 75 %    POCT Oxy Hemoglobin, Venous 76.0 (H) 45.0 - 75.0 %    POCT Hematocrit Calculated, Venous 24.0 (L) 41.0 - 52.0 %    POCT Sodium, Venous 132 (L) 136 - 145 mmol/L    POCT Potassium, Venous 3.2 (L) 3.5 - 5.3 mmol/L    POCT Chloride, Venous 102 98 - 107 mmol/L    POCT Ionized Calicum, Venous 1.13 1.10 - 1.33 mmol/L    POCT Glucose, Venous 103 (H) 74 - 99 mg/dL    POCT Lactate, Venous 1.7 0.4 - 2.0 mmol/L    POCT Base Excess, Venous 3.5 (H) -2.0 - 3.0 mmol/L    POCT HCO3 Calculated, Venous 27.8 (H) 22.0 - 26.0 mmol/L    POCT Hemoglobin, Venous 7.9 (L) 13.5 - 17.5 g/dL    POCT Anion Gap, Venous 5.0 (L) 10.0 - 25.0 mmol/L    Patient Temperature 37.0 degrees Celsius    FiO2 94 %   CBC   Result Value Ref Range    WBC 15.5 (H) 4.4 - 11.3 x10*3/uL    nRBC 0.0 0.0 - 0.0 /100 WBCs    RBC 2.84 (L) 4.50 - 5.90 x10*6/uL    Hemoglobin 8.2 (L) 13.5 - 17.5 g/dL    Hematocrit 25.7 (L) 41.0 - 52.0 %    MCV 91 80 - 100 fL    MCH 28.9 26.0 - 34.0 pg    MCHC 31.9 (L) 32.0 - 36.0 g/dL    RDW 23.0 (H) 11.5 - 14.5 %    Platelets 459 (H) 150 - 450 x10*3/uL         Assessment/Plan   Principal Problem:    Diarrhea    Mr. Leonardo Wilder is a 68 year old gentleman w/ PMHx of LBBB, hypothyroidism, CAD, portal vein thrombosis on Eliquis, appendiceal CA s/p HIPEC x2 at [facility] (2021 and 8/2023) who presents as a direct admit for persistent diarrhea. Patient is hemodynamically stable, admitted for further workup and evaluation of diarrhea.  Positive for C. Diff. Treating with oral vanc. Chest x-ray the night of  11/16 concerning for pneumonia, currently on vanc/zosyn    PLAN:    Updates 11/19:  - C/w Vanc 125 mg capsule QID for 10 day total  - C/w Vanc/Zosyn; bcx 11/17 1/2 sets growing GPCs, awaiting speciation; repeat bcx 11/18 no growth so far  - If BP remain low then will give 1L additional IVF    Persistent diarrhea  - 6 weeks of diarrhea following recent surgery (debulking and HIPEC at [facility] 8/31/2023)  - Hasn't improved since discharge, off TPN for past 3 weeks  - C diff testing positive on admission  - Treat with oral vanc 125 mg for 10 days total (11/15-11/24)  - Diarrhea improving  - Hypokalemic and hypomagnesemic on admission 2/2 diarrhea,, replete PRN    Pneumonia  - Chest x-ray overnight of 11/16 with patchy opacities of right mid/lower lobe concerning for pneumonia.  - WBC increased to 27.0 from 13.9  - Patient denies cough/ mucus production  - Started on vanc and zosyn    Sepsis  Gram + bacteremia   - Blood culture on 11/18 positive for gram positive cocci in clusters  - Vanc restarted  - blood cultures x2 redrawn  - Patient with hypotension systolic in 80s 11/18 responded well to 1 L LR  - Monitor closely     History of appendiceal cancer w/ carcinomatosis  - Prior surgical intervention and oncologic history at [facility]   -  s/p 6 mo neoadjuvant chemo w/ FOLFOX followed by debulking surgery with HIPEC (3/21)   - Most recently w/ SBO admitted to [facility] in 8/2023, complicated by GNR bacteremia  - surgery that admission: ex-lap, lysis of adhesions, colonoscopy, gastropexy but no HIPEC per discharge summary    History of portal vein thrombosis  - In setting of metastatic appendiceal cancer  -Continue Eliquis 5mg BID  home med    Cancer related pain/nausea  - Supportive onc consulted; Patient is established with them  - Duloxetine 60 BID  - Olanzapine 5 mg nightly  - IV zofran and compazine for nausea     CAD s/p PCI 2023  - continue home crestor 20mg   - continue home Asa 81mg daily      Chronic hypotension  - continue  home midodrine 5mg TID      GERD  - Increased protonix 40mg BID  - Baclofen for hiccups     Hypothyroidism  - continue home synthroid     Neuropathy from prior chemotherapy  - continue home cymbalta 30mg in AM and 60mg in PM     Hypokalemia  Hypomagnesemia  - In setting of persistent diarrhea  - Repleted on admission, replete PRN           F: PRN  E: hypokalemia and hypomagnesemia  N: Regular dietA: Mediport, PIV  GI: PPI  DVT: home apixaban     Code status: Full code (confirmed on admission)  NOK: Wife Michelle Wilder [phone]      Alhaji Villafana MD

## 2023-11-20 NOTE — PROGRESS NOTES
"Vancomycin Dosing by Pharmacy- FOLLOW UP    Leonardo Wilder is a 68 y.o. year old male who Pharmacy has been consulted for vancomycin dosing for other positive blood cultures (gram positive cocci) . Based on the patient's indication and renal status this patient is being dosed based on a goal AUC of 400-600.     Renal function is currently stable.    Current vancomycin dose: 1000 mg given every 24 hours    Estimated vancomycin AUC on current dose: 301 mg/L.hr     Visit Vitals  /83 (BP Location: Right arm, Patient Position: Lying)   Pulse 77   Temp 36.2 °C (97.2 °F) (Temporal)   Resp 16        Lab Results   Component Value Date    CREATININE 0.81 11/20/2023    CREATININE 0.93 11/19/2023    CREATININE 1.16 11/18/2023    CREATININE 0.98 11/17/2023        Patient weight is No results found for: \"PTWEIGHT\"    No results found for: \"CULTURE\"     I/O last 3 completed shifts:  In: 200 (3.5 mL/kg) [IV Piggyback:200]  Out: 1125 (19.5 mL/kg) [Urine:1125 (0.5 mL/kg/hr)]  Weight: 57.8 kg   [unfilled]    Lab Results   Component Value Date    PATIENTTEMP 37.0 11/18/2023    PATIENTTEMP 37.0 08/01/2023    PATIENTTEMP 37.0 07/31/2023    PATIENTTEMP 37.0 07/31/2023        Assessment/Plan    Below goal AUC. Orders placed for new vancomcyin regimen of 1500 mg every 24 hours to begin at 11/20 1000.     This dosing regimen is predicted by [employer] to result in the following pharmacokinetic parameters:  Loading dose: N/A  Regimen: 1500 mg IV every 24 hours.  Start time: 09:35 on 11/20/2023  Exposure target: AUC24 (range)400-600 mg/L.hr   AUC24,ss: 446 mg/L.hr  Probability of AUC24 > 400: 72 %  Ctrough,ss: 10.7 mg/L  Probability of Ctrough,ss > 20: 2 %  Probability of nephrotoxicity (Lodise RYAN 2009): 6 %      The next level will be obtained on 11/21 with AM labs. May be obtained sooner if clinically indicated.   Will continue to monitor renal function daily while on vancomycin and order serum creatinine at least every 48 hours if not " already ordered.  Follow for continued vancomycin needs, clinical response, and signs/symptoms of toxicity.       Charisse Shoemaker, RPh

## 2023-11-20 NOTE — PROGRESS NOTES
"Leonardo Wilder is a 68 y.o. male on day 6 of admission presenting with Diarrhea.    Subjective   OVN: No acute events    This morning the patient is doing well. Diarrhea continues to improve. Last episode 8 PM last night. Denies nausea and abdominal pain. Denies fever, chills, cough, sob.     Objective     Physical Exam  GEN: Awake, alert, NAD, cachectic   HEENT: Head NCAT, mucus membranes moist  CARDIO: Normal rate and rhythm  RESP: CTAB, normal WOB  ABDOMEN: Soft, nt, nd  EXTREM: No edema of BLE  SKIN: warm, dry, intact  NEURO: NFD, AOx3      Last Recorded Vitals  Blood pressure 134/67, pulse 77, temperature 36.5 °C (97.7 °F), temperature source Temporal, resp. rate 16, height 1.657 m (5' 5.24\"), weight 57.8 kg (127 lb 6.8 oz), SpO2 97 %.  Intake/Output last 3 Shifts:  I/O last 3 completed shifts:  In: 200 (3.5 mL/kg) [IV Piggyback:200]  Out: 1125 (19.5 mL/kg) [Urine:1125 (0.5 mL/kg/hr)]  Weight: 57.8 kg     Relevant Results         Scheduled medications  apixaban, 5 mg, oral, BID  aspirin, 81 mg, oral, Daily  baclofen, 5 mg, oral, BID  DULoxetine, 60 mg, oral, Nightly  DULoxetine, 60 mg, oral, q AM  famotidine, 20 mg, oral, BID   Or  famotidine, 20 mg, intravenous, BID  finasteride, 5 mg, oral, Daily  lactated Ringer's, 1,000 mL, intravenous, Once  levothyroxine, 100 mcg, oral, Daily  midodrine, 5 mg, oral, q8h  OLANZapine zydis, 5 mg, oral, Nightly  pantoprazole, 40 mg, oral, BID  piperacillin-tazobactam, 3.375 g, intravenous, q6h  rosuvastatin, 20 mg, oral, Daily  vancomycin, 125 mg, oral, 4x daily  vancomycin, 1,500 mg, intravenous, q24h      Continuous medications     PRN medications  PRN medications: acetaminophen, alteplase, LORazepam, ondansetron, [DISCONTINUED] prochlorperazine **OR** prochlorperazine **OR** [DISCONTINUED] prochlorperazine    Results for orders placed or performed during the hospital encounter of 11/14/23 (from the past 24 hour(s))   CBC   Result Value Ref Range    WBC 12.8 (H) 4.4 - 11.3 " x10*3/uL    nRBC 0.0 0.0 - 0.0 /100 WBCs    RBC 3.00 (L) 4.50 - 5.90 x10*6/uL    Hemoglobin 8.3 (L) 13.5 - 17.5 g/dL    Hematocrit 27.0 (L) 41.0 - 52.0 %    MCV 90 80 - 100 fL    MCH 27.7 26.0 - 34.0 pg    MCHC 30.7 (L) 32.0 - 36.0 g/dL    RDW 22.2 (H) 11.5 - 14.5 %    Platelets 489 (H) 150 - 450 x10*3/uL   Renal function panel   Result Value Ref Range    Glucose 100 (H) 74 - 99 mg/dL    Sodium 140 136 - 145 mmol/L    Potassium 3.4 (L) 3.5 - 5.3 mmol/L    Chloride 106 98 - 107 mmol/L    Bicarbonate 25 21 - 32 mmol/L    Anion Gap 12 10 - 20 mmol/L    Urea Nitrogen 8 6 - 23 mg/dL    Creatinine 0.81 0.50 - 1.30 mg/dL    eGFR >90 >60 mL/min/1.73m*2    Calcium 8.3 (L) 8.6 - 10.6 mg/dL    Phosphorus 3.4 2.5 - 4.9 mg/dL    Albumin 2.7 (L) 3.4 - 5.0 g/dL   Magnesium   Result Value Ref Range    Magnesium 1.97 1.60 - 2.40 mg/dL   Vancomycin   Result Value Ref Range    Vancomycin 7.7 5.0 - 20.0 ug/mL         Assessment/Plan   Principal Problem:    Diarrhea    Mr. Leonardo Wilder is a 68 year old gentleman w/ PMHx of LBBB, hypothyroidism, CAD, portal vein thrombosis on Eliquis, appendiceal CA s/p HIPEC x2 at [facility] (2021 and 8/2023) who presents as a direct admit for persistent diarrhea. Patient is hemodynamically stable, admitted for further workup and evaluation of diarrhea.  Positive for C. Diff. Treating with oral vanc. Chest x-ray the night of 11/16 concerning for pneumonia, currently on vanc/zosyn    PLAN:    Updates 11/20:  - C/w Vanc 125 mg capsule QID for 10 day total  - C/w Vanc/Zosyn; bcx 11/17 1/2 sets growing GPCs, awaiting speciation; repeat bcx 11/18 no growth so far  - If BP remain low then will give 1L additional IVF  - K 3.4; Repleted with 40 mEQ K    Persistent diarrhea  - 6 weeks of diarrhea following recent surgery (debulking and HIPEC at [facility] 8/31/2023)  - Hasn't improved since discharge, off TPN for past 3 weeks  - C diff testing positive on admission  - Treat with oral vanc 125 mg for 10 days total  (11/15-11/24)  - Diarrhea much improved  - Replete electrolytes PRN    Pneumonia  - Chest x-ray overnight of 11/16 with patchy opacities of right mid/lower lobe concerning for pneumonia.  - WBC increased to 27.0 from 13.9 (11/17)  - Patient denies cough/ mucus production  - Started on vanc and zosyn  - WBC today 12.8   - CTM    Sepsis  Gram + bacteremia   - Blood culture on 11/18 positive for gram positive cocci in clusters  - Vanc restarted 11/18  - blood cultures x2 redrawn; NGTD  - Patient with hypotension systolic in 80s 11/18 responded well to 1 L LR (11/18)  - Monitor closely     History of appendiceal cancer w/ carcinomatosis  - Prior surgical intervention and oncologic history at [facility]   -  s/p 6 mo neoadjuvant chemo w/ FOLFOX followed by debulking surgery with HIPEC (3/21)   - Most recently w/ SBO admitted to [facility] in 8/2023, complicated by GNR bacteremia  - surgery that admission: ex-lap, lysis of adhesions, colonoscopy, gastropexy but no HIPEC per discharge summary    History of portal vein thrombosis  - In setting of metastatic appendiceal cancer  -Continue Eliquis 5mg BID  home med    Cancer related pain/nausea  - Supportive onc consulted; Patient is established with them  - Duloxetine 60 BID  - Olanzapine 5 mg nightly  - IV zofran and compazine for nausea     CAD s/p PCI 2023  - continue home crestor 20mg   - continue home Asa 81mg daily      Chronic hypotension  - continue home midodrine 5mg TID      GERD  - Increased protonix 40mg BID  - Baclofen for hiccups     Hypothyroidism  - continue home synthroid     Neuropathy from prior chemotherapy  - continue home cymbalta 30mg in AM and 60mg in PM     Hypokalemia  Hypomagnesemia  - In setting of persistent diarrhea  - Repleted on admission, replete PRN           F: PRN  E: hypokalemia and hypomagnesemia  N: Regular dietA: Mediport, PIV  GI: PPI  DVT: home apixaban     Code status: Full code (confirmed on admission)  NOK: Wife Michelle Wilder  [phone]      Rafa Calvin MD

## 2023-11-20 NOTE — PROGRESS NOTES
Physical Therapy    Physical Therapy Treatment    Patient Name: Leonardo Wilder  MRN: [id number]  Today's Date: 11/20/2023  Time Calculation  Start Time: 1454  Stop Time: 1535  Time Calculation (min): 41 min       Assessment/Plan   PT Assessment  Evaluation/Treatment Tolerance:  (session limited to positive orthostatic BP)  Medical Staff Made Aware: Yes  End of Session Communication: Bedside nurse  Assessment Comment: Continue to recommend LOW intensity PT with resolve of orthostatic BP  End of Session Patient Position:  (siiting on bench; call light in reach; RN aware)  PT Plan  Inpatient/Swing Bed or Outpatient: Inpatient  PT Plan  Treatment/Interventions: Bed mobility, Transfer training, Gait training, Stair training, Balance training, Neuromuscular re-education, Strengthening, Endurance training, Therapeutic exercise, Home exercise program, Therapeutic activity, Positioning, Postural re-education  PT Plan: Skilled PT  PT Frequency: 2 times per week  PT Discharge Recommendations: Low intensity level of continued care  PT Recommended Transfer Status: Assist x1  PT - OK to Discharge: Yes      General Visit Information:   PT  Visit  PT Received On: 11/20/23  Response to Previous Treatment: Patient with no complaints from previous session.  General  Patient Position Received:  (sitting on couch in room)  General Comment: Pt supine in bed upon entry to room. Pt pleasant, cooperative and willing to work with PT.    Subjective   Precautions:  Precautions  Medical Precautions: Fall precautions (contact plus)  Vital Signs:  Vital Signs  Heart Rate: 96 (sitting; 134 standing; 120 sitting; 166 standing; 91 sitting; 137 standing)  BP: 111/75 (sitting; 90/58 standing; 120/74 sitting; 76/59 standing; 134/74 sitting; 74/57 standing after marches)    Objective   Pain:  Pain Assessment  Pain Assessment: 0-10  Pain Score: 0 - No pain  Cognition:  Cognition  Overall Cognitive Status: Within Functional Limits  Orientation Level: Oriented  X4    Treatments:  Therapeutic Exercise  Therapeutic Exercise Performed: Yes  Therapeutic Exercise Activity 1: x10 seated B ankle pumps  Therapeutic Exercise Activity 2: x10 seated B LAQ  Therapeutic Exercise Activity 3: x10 seated B marches  Therapeutic Exercise Activity 4: x10 B standing marches with fww    Therapeutic Activity  Therapeutic Activity Performed: Yes  Therapeutic Activity 1: pt performing 3 STS transfers with sba this date and standing for ~2 minutes before returning to sitting 2/2 to postiive orthostatic vitals    Ambulation/Gait Training  Ambulation/Gait Training Performed: No (deferred this date 2/2 to positive orthostatics)  Transfers  Transfer: Yes  Transfer 1  Technique 1: Sit to stand, Stand to sit  Transfer Level of Assistance 1:  (sba)  Trials/Comments 1: x3 trials    Outcome Measures:  [facility] Basic Mobility  Turning from your back to your side while in a flat bed without using bedrails: A little  Moving from lying on your back to sitting on the side of a flat bed without using bedrails: A little  Moving to and from bed to chair (including a wheelchair): A little  Standing up from a chair using your arms (e.g. wheelchair or bedside chair): A little  To walk in hospital room: Total  Climbing 3-5 steps with railing: Total  Basic Mobility - Total Score: 14    Education Documentation  Mobility Training, taught by Isaura Gonzalez PT at 11/20/2023  4:43 PM.  Learner: Patient  Readiness: Acceptance  Method: Explanation  Response: Needs Reinforcement    Education Comments  No comments found.        OP EDUCATION:       Encounter Problems       Encounter Problems (Active)       Balance       Pt will score >24 On the [place] to reduce the risk for falls.   (Progressing)       Start:  11/17/23    Expected End:  12/08/23               Mobility       Pt will perform bed mobility independently.  (Progressing)       Start:  11/17/23    Expected End:  12/08/23            Pt will ambulate >250ft with LRAD  and MI with no LOB and VSS.   (Not Progressing)       Start:  11/17/23    Expected End:  12/08/23            Pt will ascend and descend 1 flight of Stairs with sba.  (Not Progressing)       Start:  11/17/23    Expected End:  12/08/23               Pain - Adult          Transfers       Pt will perform all functional transfers with LRAD and MI.  (Not Progressing)       Start:  11/17/23    Expected End:  12/08/23 11/20/23 at 4:44 PM - Isaura Gonzalez, PT

## 2023-11-20 NOTE — CARE PLAN
The patient's goals for the shift include      The clinical goals for the shift include Pt will remain free of falls or injury throughout shift      Problem: Fall/Injury  Goal: Not fall by end of shift  Outcome: Progressing  Goal: Be free from injury by end of the shift  Outcome: Progressing  Goal: Verbalize understanding of personal risk factors for fall in the hospital  Outcome: Progressing  Goal: Verbalize understanding of risk factor reduction measures to prevent injury from fall in the home  Outcome: Progressing  Goal: Use assistive devices by end of the shift  Outcome: Progressing  Goal: Pace activities to prevent fatigue by end of the shift  Outcome: Progressing     Problem: Pain - Adult  Goal: Verbalizes/displays adequate comfort level or baseline comfort level  Outcome: Progressing     Problem: Safety - Adult  Goal: Free from fall injury  Outcome: Progressing     Problem: Discharge Planning  Goal: Discharge to home or other facility with appropriate resources  Outcome: Progressing     Problem: Chronic Conditions and Co-morbidities  Goal: Patient's chronic conditions and co-morbidity symptoms are monitored and maintained or improved  Outcome: Progressing     Problem: Skin  Goal: Decreased wound size/increased tissue granulation at next dressing change  Outcome: Progressing  Flowsheets (Taken 11/20/2023 0618)  Decreased wound size/increased tissue granulation at next dressing change: Promote sleep for wound healing  Goal: Participates in plan/prevention/treatment measures  Outcome: Progressing  Flowsheets (Taken 11/20/2023 0618)  Participates in plan/prevention/treatment measures: Increase activity/out of bed for meals  Goal: Prevent/manage excess moisture  Outcome: Progressing  Flowsheets (Taken 11/20/2023 0618)  Prevent/manage excess moisture: Cleanse incontinence/protect with barrier cream  Goal: Prevent/minimize sheer/friction injuries  Outcome: Progressing  Flowsheets (Taken 11/20/2023  0618)  Prevent/minimize sheer/friction injuries: HOB 30 degrees or less  Goal: Promote/optimize nutrition  Outcome: Progressing  Flowsheets (Taken 11/20/2023 0618)  Promote/optimize nutrition:   Monitor/record intake including meals   Offer water/supplements/favorite foods  Goal: Promote skin healing  Outcome: Progressing  Flowsheets (Taken 11/20/2023 0618)  Promote skin healing:   Assess skin/pad under line(s)/device(s)   Rotate device position/do not position patient on device   Pt continues to progress towards goals.

## 2023-11-21 NOTE — HOSPITAL COURSE
HPI  Leonardo Wilder is a 68 y.o. male presenting with 6 weeks of diarrhea. 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 Patient states that after his most recent surgery, he had diarrhea in the post op phase that has not resolved. He states that after talking with his surgeon and his oncologist at [facility], the decision was made for him to be admitted to [facility] for further evaluation. Patient states that his diarrhea has been consistent since he was discharged after his surgery at the end of August. He reports that his diarrhea is responsive to imodium, but that it never completely resolves. He denies blood or mucus in his stool. He denies abdominal pain, just discomfort and cramping. He denies fevers and chills. Patient denies any sick contacts since he was discharged after his surgery. Patient endorses 10 pound weight loss over the past 4-6 weeks. He was originally discharged from the hospital on TPN, however he has been off TPN for the past 3 weeks. He states that he had severe nausea and vomiting while on TPN. After it was discontinued, his vomiting improved, though he still has this occasionally.  He denies any temporal or food related timing w/ his diarrhea and his nausea/vomiting.     On the Floor  On admission labs were collected including C diff and stool pathogen. Imodium held for infectious workup to result. His home medication of eliquis was held with the consideration of a surg on consult in the morning. The rest of his home medications were continued. By the morning of 11/15, C diff came back positive. The patient was started on oral vanc 125 mg capsule QID for a planned treatment course of 10 days. His home Eliquis was restarted.  By 11/16 his diarrhea had already greatly improved with episodes decreasing from 6 a day to 3. Overnight 11/16, the patient became tachycardic and desatted requiring 3L O2 to sat 94%.  Chest x-ray was  collected and showed patchy infiltrate in right mid/lower lobe concerning for pneumonia. Started on vanc and zosyn and blood cultures collected. MRSA nares collected. On 11/17 the MRSA nares was negative so his vanc was stopped, zosyn continued. On 11/18. 1 of 2 blood cultures collected on the 17th was positive for gram positive cocci so vanc was restarted. Patient did not note any new symptoms. By 11/21, diarrhea significantly improved and patient was feeling well. 1 episode of diarrhea the night before with stools more formed. Blood cultures from the 18th were negative over 48 hours. The positive culture from the 17th was deemed staph epidermidis. He finished his last dose of IV vanc and zosyn on 11/21 and was ready for discharge to finish his course of oral vanc for c diff followed by a pulse dose of oral vanc because he had c diff 1 year ago.

## 2023-11-21 NOTE — DISCHARGE INSTRUCTIONS
Dear Mr. Wilder,    You were admitted for diarrhea. We ran some tests and found you to be positive for a bacteria called clostridium difficile otherwise known as C. Diff. We started treatment with an antibiotic called vancomycin which you began taking orally with a planned treatment course of 10 days. One of the nights you were here you required oxygen supplementation so we got a chest x-ray which showed you had a pneumonia. We began treatment with IV antibiotics. You completed treatment on 11/21 for the pneumonia. Also while you were here you had 1 of 4 blood cultures come back positive for a bacteria, but it was evaluated and deemed to be contamination and not a true positive blood culture.    Medications:  - You will then need to take a pulse dose of vancomycin since you previously had C. Diff. About a year ago. Follow the instructions below:   - Please take 1 vancomycin 125 mg capsule 4 times a day for 3 more days (11/22, 11/23, 11/24)   - Please take 1 vancomycin 125 mg capsule 2 times a day for 7 days. (11/25-12/1)   - Please take 1 vancomycin 125 mg capsule 1 time a day for 7 days (12/2-12/8)   - Please take 1 vancomycin 125 mg capsule twice weekly for 2 weeks (12/9-12/22)    - We have also sent you prescriptions we have started in the hospital:  - Please take 1 baclofen 5 mg tablet 2 times a day as needed if you are having hiccups and follow up with supportive oncology  - Please take 1 olanzapine 5 mg tablet at bed time  - Please take 1 Zofran 4 mg tablet every 4 hours as needed for nausea    Appointments:  - You have an appointment with Dr. Ozuna scheduled for 12/4. Please make sure to attend this appointment.    Thank you,   Care Team

## 2023-11-21 NOTE — CARE PLAN
The patient's goals for the shift include      The clinical goals for the shift include Pt will remain free of fall or injury through end of shift 11/21 0700      Problem: Fall/Injury  Goal: Not fall by end of shift  Outcome: Progressing  Goal: Be free from injury by end of the shift  Outcome: Progressing  Goal: Verbalize understanding of personal risk factors for fall in the hospital  Outcome: Progressing  Goal: Verbalize understanding of risk factor reduction measures to prevent injury from fall in the home  Outcome: Progressing  Goal: Use assistive devices by end of the shift  Outcome: Progressing  Goal: Pace activities to prevent fatigue by end of the shift  Outcome: Progressing     Problem: Pain - Adult  Goal: Verbalizes/displays adequate comfort level or baseline comfort level  Outcome: Progressing     Problem: Safety - Adult  Goal: Free from fall injury  Outcome: Progressing     Problem: Discharge Planning  Goal: Discharge to home or other facility with appropriate resources  Outcome: Progressing     Problem: Chronic Conditions and Co-morbidities  Goal: Patient's chronic conditions and co-morbidity symptoms are monitored and maintained or improved  Outcome: Progressing     Problem: Skin  Goal: Decreased wound size/increased tissue granulation at next dressing change  Outcome: Progressing  Goal: Participates in plan/prevention/treatment measures  Outcome: Progressing  Goal: Prevent/manage excess moisture  Outcome: Progressing  Goal: Prevent/minimize sheer/friction injuries  Outcome: Progressing  Goal: Promote/optimize nutrition  Outcome: Progressing  Goal: Promote skin healing  Outcome: Progressing     Pt continues to progress towards goals.

## 2023-11-21 NOTE — CARE PLAN
Problem: Fall/Injury  Goal: Not fall by end of shift  Outcome: Progressing  Goal: Be free from injury by end of the shift  Outcome: Progressing  Goal: Verbalize understanding of personal risk factors for fall in the hospital  Outcome: Progressing  Goal: Verbalize understanding of risk factor reduction measures to prevent injury from fall in the home  Outcome: Progressing  Goal: Use assistive devices by end of the shift  Outcome: Progressing  Goal: Pace activities to prevent fatigue by end of the shift  Outcome: Progressing     Problem: Pain - Adult  Goal: Verbalizes/displays adequate comfort level or baseline comfort level  Outcome: Progressing     Problem: Safety - Adult  Goal: Free from fall injury  Outcome: Progressing     Problem: Discharge Planning  Goal: Discharge to home or other facility with appropriate resources  Outcome: Progressing     Problem: Chronic Conditions and Co-morbidities  Goal: Patient's chronic conditions and co-morbidity symptoms are monitored and maintained or improved  Outcome: Progressing     Problem: Skin  Goal: Decreased wound size/increased tissue granulation at next dressing change  Outcome: Progressing  Goal: Participates in plan/prevention/treatment measures  Outcome: Progressing  Goal: Prevent/manage excess moisture  Outcome: Progressing  Goal: Prevent/minimize sheer/friction injuries  Outcome: Progressing  Goal: Promote/optimize nutrition  Outcome: Progressing  Goal: Promote skin healing  Outcome: Progressing   The patient's goals for the shift include      The clinical goals for the shift include Pt will remain safe in room and use call light during shift on 11/21/23 @1930    Pt was discharged to home. Pt was VSS upon discharge. PICC line removed. Port heparinized and deaccessed prior to discharge. Pt took medication home with him. Discharge paperwork including medications, follow up appointments, and discharge instructions were reviewed with patient. Pt verbalized  understanding and denied questions at the time. Pt belongings were collected and sent with patient. Discharge Complete.

## 2023-11-21 NOTE — PROGRESS NOTES
"Vancomycin Dosing by Pharmacy- FOLLOW UP    Leonardo Wilder is a 68 y.o. year old male who Pharmacy has been consulted for vancomycin dosing for other gram positive cocci blood cultures . Based on the patient's indication and renal status this patient is being dosed based on a goal AUC of 400-600.     Renal function is currently stable.    Current vancomycin dose: 1500 mg given every 24 hours    Estimated vancomycin AUC on current dose: 455 mg/L.hr     Visit Vitals  /76 (BP Location: Right arm, Patient Position: Lying)   Pulse 66   Temp 36.4 °C (97.5 °F) (Temporal)   Resp 16        Lab Results   Component Value Date    CREATININE 0.86 11/21/2023    CREATININE 0.81 11/20/2023    CREATININE 0.93 11/19/2023    CREATININE 1.16 11/18/2023        Patient weight is No results found for: \"PTWEIGHT\"    No results found for: \"CULTURE\"     I/O last 3 completed shifts:  In: 750 (13 mL/kg) [IV Piggyback:750]  Out: 2000 (34.6 mL/kg) [Urine:1850 (0.9 mL/kg/hr); Emesis/NG output:150]  Weight: 57.8 kg   [unfilled]    Lab Results   Component Value Date    PATIENTTEMP 37.0 11/18/2023    PATIENTTEMP 37.0 08/01/2023    PATIENTTEMP 37.0 07/31/2023    PATIENTTEMP 37.0 07/31/2023        Assessment/Plan    Within goal AUC range. Continue current vancomycin regimen.    This dosing regimen is predicted by [employer] to result in the following pharmacokinetic parameters:  Loading dose: N/A  Regimen: 1500 mg IV every 24 hours.  Start time: 12:52 on 11/21/2023  Exposure target: AUC24 (range)400-600 mg/L.hr   AUC24,ss: 455 mg/L.hr  Probability of AUC24 > 400: 80 %  Ctrough,ss: 10.9 mg/L  Probability of Ctrough,ss > 20: 1 %  Probability of nephrotoxicity (Lodise RYAN 2009): 6 %      The next level will be obtained on 11/24 with AM labs. May be obtained sooner if clinically indicated.   Will continue to monitor renal function daily while on vancomycin and order serum creatinine at least every 48 hours if not already ordered.  Follow for " continued vancomycin needs, clinical response, and signs/symptoms of toxicity.       Charisse Shoemaker, RPh

## 2023-11-22 NOTE — DISCHARGE SUMMARY
Discharge Diagnosis  Diarrhea    Issues Requiring Follow-Up  [ ] Onc appointment with Dr. Ozuna    Test Results Pending At Discharge  Pending Labs       Order Current Status    Blood Culture Preliminary result    Blood Culture Preliminary result    Blood Culture Preliminary result            Hospital Course  HPI  Leonardo Wilder is a 68 y.o. male presenting with 6 weeks of diarrhea. 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 Patient states that after his most recent surgery, he had diarrhea in the post op phase that has not resolved. He states that after talking with his surgeon and his oncologist at [facility], the decision was made for him to be admitted to [facility] for further evaluation. Patient states that his diarrhea has been consistent since he was discharged after his surgery at the end of August. He reports that his diarrhea is responsive to imodium, but that it never completely resolves. He denies blood or mucus in his stool. He denies abdominal pain, just discomfort and cramping. He denies fevers and chills. Patient denies any sick contacts since he was discharged after his surgery. Patient endorses 10 pound weight loss over the past 4-6 weeks. He was originally discharged from the hospital on TPN, however he has been off TPN for the past 3 weeks. He states that he had severe nausea and vomiting while on TPN. After it was discontinued, his vomiting improved, though he still has this occasionally.  He denies any temporal or food related timing w/ his diarrhea and his nausea/vomiting.     On the Floor  On admission labs were collected including C diff and stool pathogen. Imodium held for infectious workup to result. His home medication of eliquis was held with the consideration of a surg on consult in the morning. The rest of his home medications were continued. By the morning of 11/15, C diff came back positive. The patient was started  on oral vanc 125 mg capsule QID for a planned treatment course of 10 days. His home Eliquis was restarted.  By 11/16 his diarrhea had already greatly improved with episodes decreasing from 6 a day to 3. Overnight 11/16, the patient became tachycardic and desatted requiring 3L O2 to sat 94%.  Chest x-ray was collected and showed patchy infiltrate in right mid/lower lobe concerning for pneumonia. Started on vanc and zosyn and blood cultures collected. MRSA nares collected. On 11/17 the MRSA nares was negative so his vanc was stopped, zosyn continued. On 11/18. 1 of 2 blood cultures collected on the 17th was positive for gram positive cocci so vanc was restarted. Patient did not note any new symptoms. By 11/21, diarrhea significantly improved and patient was feeling well. 1 episode of diarrhea the night before with stools more formed. Blood cultures from the 18th were negative over 48 hours. The positive culture from the 17th was deemed staph epidermidis. He finished his last dose of IV vanc and zosyn on 11/21 and was ready for discharge to finish his course of oral vanc for c diff followed by a pulse dose of oral vanc because he had c diff 1 year ago.    Pertinent Physical Exam At Time of Discharge  Physical Exam  GEN: Awake, alert, NAD, cachectic   HEENT: Head NCAT, mucus membranes moist  CARDIO: Normal rate and rhythm  RESP: CTAB, normal WOB  ABDOMEN: Soft, nt, nd  EXTREM: No edema of BLE  SKIN: warm, dry, intact  NEURO: NFD, AOx3    Home Medications     Medication List      START taking these medications     baclofen 5 mg tablet; Commonly known as: Lioresal; Take 1 tablet (5 mg)   by mouth 2 times a day.   finasteride 5 mg tablet; Commonly known as: Proscar; Take 1 tablet (5   mg) by mouth once daily. Do not crush, chew, or split. Do not start before   November 17, 2023.   OLANZapine zydis 5 mg disintegrating tablet; Commonly known as: ZyPREXA;   Take 1 tablet (5 mg) by mouth once daily at bedtime.   ondansetron 4  mg tablet; Commonly known as: Zofran; Take 1 tablet (4 mg)   by mouth every 8 hours if needed for nausea.   vancomycin 125 mg capsule; Commonly known as: Vancocin; Take 1 capsule   (125 mg) by mouth 4 times a day for 3 days, then 1 capsule twice daily for   7 days, then 1 capsule once daily for 7 days, then 1 capsule twice weekly   for 2 weeks.     CHANGE how you take these medications     * DULoxetine 60 mg DR capsule; Commonly known as: Cymbalta; Take 1   capsule (60 mg) by mouth once daily at bedtime. Do not crush or chew.;   What changed: Another medication with the same name was changed. Make sure   you understand how and when to take each.   * DULoxetine 60 mg DR capsule; Commonly known as: Cymbalta; Take 1   capsule (60 mg) by mouth once daily in the morning. Do not crush or chew.;   What changed: medication strength, how much to take, when to take this,   additional instructions  * This list has 2 medication(s) that are the same as other medications   prescribed for you. Read the directions carefully, and ask your doctor or   other care provider to review them with you.     CONTINUE taking these medications     acetaminophen 325 mg tablet; Commonly known as: Tylenol   aspirin 81 mg EC tablet   Eliquis 5 mg tablet; Generic drug: apixaban; TAKE 1 TABLET BY MOUTH TWO   TIMES A DAY   levothyroxine 100 mcg tablet; Commonly known as: Synthroid, Levoxyl   LORazepam 0.5 mg tablet; Commonly known as: Ativan; Take 1 tablet (0.5   mg) by mouth 2 times a day as needed for anxiety (insomnia).   midodrine 5 mg tablet; Commonly known as: Proamatine   pantoprazole 40 mg EC tablet; Commonly known as: ProtoNix   rosuvastatin 20 mg tablet; Commonly known as: Crestor     STOP taking these medications     amoxicillin-pot clavulanate 875-125 mg tablet; Commonly known as:   Augmentin   diphenoxylate-atropine 2.5-0.025 mg/5 mL liquid; Commonly known as:   Lomotil   loperamide 2 mg tablet; Commonly known as: Imodium A-D    ondansetron ODT 8 mg disintegrating tablet; Commonly known as:   Zofran-ODT   polyethylene glycol 17 gram/dose powder; Commonly known as: Glycolax,   Miralax   potassium chloride CR 10 mEq ER tablet; Commonly known as: Klor-Con   prochlorperazine 10 mg tablet; Commonly known as: Compazine   traMADol 50 mg tablet; Commonly known as: Ultram       Outpatient Follow-Up  Future Appointments   Date Time Provider Department Center   12/4/2023 11:30 AM Tiffanie Ozuna MD PhD [id number] Academic   1/10/2024  8:30 AM Emilie Rey MD [id number] Psychiatric       Rafa Calvin MD

## 2023-11-24 PROBLEM — A04.72 COLITIS DUE TO CLOSTRIDIOIDES DIFFICILE: Status: ACTIVE | Noted: 2023-01-01

## 2023-11-24 NOTE — H&P
"History Of Present Illness  Leonardo Wilder is a 68 y.o. 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 Of note, patient was recently admitted from 11/14-11/21 to [facility] for intractable diarrhea found to be C. Diff positive. He was started on oral vancomycin on 11/15 for a 10 day total course however he had shortness of breath with SpO2 of 94% on 3L O2 and was tachycardic on 11/16. CXR revealed Patchy right mid and lower opacities concerning for pneumonia. Bcx drawn had 1/2 growing gram positive coci and patient was given a 5 day course of Vanc + Zosyn. He was discharged on 11/21 with his diarrhea almost completely resolved with only 1 episode daily. He states that at home he continued to not feel well and within a day he began having more frequent and looser stools, up to 3 a day. He states his wife helps him with his medications and is unsure of what meds he does take, but that his wife gives him \"everything I'm supposed to take per the medication instructions.\" He represented to outpatient infusion center on 11/24 for evaluation given continued symptoms and he was directly admitted to the hospital for evaluation.     Oncologic history:  8/7/20- CT w/o (AP) OSH- Findings of peritoneal carcinomatosis with small volume ascites. Enlarged RLQ lymph nodes, likely metastatic.   8/8/20- *CA 19-9- <4**            *CEA- 1.5**  8/9/20- CT w & w/o (AP) OSH- Shelf like hypoattenuating cecal wall thickening extending to the base of a low density tubular blind ending structure arising from the cecum suspicious for markedly abnormal appearing and enlarged appendix. Pathologic RLQ lymphadenopathy (1.4 cm x 1.6 cm and 1.9 cm x 1.7 cm), supporting the above suspected location of the primary malignancy involving the cecum/appendix. Peritoneal " carcinomatosis, notable for extensive small bowel serosal deposits resulting in multifocal areas of desmoplastic small bowel tethering. Segment stricture of the mid proximal sigmoid colon on a background of severe sigmoid colonic diverticulosis, therefore this is likely at least in part represents chronic diverticular stricturing. No evidence of metastatic disease within chest. A 0.2 cm oval subpleural nodule in LLL favored to represent pulmonary lymph node.  8/10/20- Colonoscopy ([facility] ): Advanced to terminal ileum. A polypoid non obstructing medium sized mass was found in the cecum at the appendiceal orifice. It is likely a primary appendiceal adenocarcinoma involving the cecum. IC valve was not involved. The mass was non circumferential. The diamter of the visible lesion measured 15 mm. No bleeding present (biopsied). A 5 mm polyp was found in the cecum adjacent to the large polypoid lesion. The polyp was semi sessile. Polypectomy was not attempted due to the identification of likely malignancy in the cecum. Multiple small and large mouthed diverticula were found in the sigmoid colon, descending colon, transverse colon and ascending colon. There was an area of tight angulation due to significant diverticular disease in the sigmoid colon that was traversed without any intervention. EGD: CHANEL  PATHOLOGY: Cecal mass- poorly differentiated signet ring adenocarcinoma, ALLISON, BRAF- WT  KRAS- WT, NRAS- WT, SMAD4- disease associated genomic finding  9/11/20- FOLFOX goal of 12 cycles- completed 12 cycles on 2/19/21 - tolerated well(Dr. Ozuna, [facility])  11/6/20- CT w (CAP) OSH- There is definite interval improvement in the ascites as well as measurable and non measurable peritoneal carcinomatosis. In view of the new mild dilatation of the jejunal loops in the left rene abdomen, which is likely secondary to underlying carcinomatosis overall assessment of tumor burden is mixed in nature which tendency  towards improvement.  12/30/20- CT w (CAP) OSH- Overall stable to minimal interval decrease in size of metastatic tumor burden, given slight interval decrease in size of mesenteric deposits. Stable appearance of mild intra abdominal ascites. No evidence of new metastatic lesions within the chest, abdomen, or pelvis.  2/18/2021: CT CAP ([facility]): Stable degree of metastatic tumor burden with overall unchanged appearance of trace intra-abdominal ascites / peritoneal carcinomatosis. No new sites of metastatic disease within the chest, abdomen, or pelvis.  3/3/2021: ([facility]): Diagnostic laparoscopy, peritoneal biopsies.  PATHOLOGY: PERITONEUM, BIOPSY:  METASTATIC MUCINOUS ADENOCARCINOMA  3/15/2021: ([facility]): Exploratory laparotomy, lysis of adhesions; radicalintraperitoneal tumor debulking to include omentectomy, splenectomy, complete peritonectomy, right hemicolectomy, cholecystectomy, sigmoid and low anterior resection with colorectal anastomosis and diverting loop ileostomy, hyperthermic intraperitoneal chemoperfusion with mitomycin-C, placement & removal of inflow and outflow catheters.  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 No definite perineural invasion by viable tumor. Presumed angiolymphatic invasion. Proximal margin is positive for carcinoma. Distal and mesenteric margins are negative. Carcinoma involves one of seventeen lymph nodes. PATHOLOGIC STAGE: y pT3 N1a M1b   PARTS 1 TO 8 & 10 TO 14: FALCIFORM LIGAMENT, GASTROHEPATIC LIGAMENT, OMENTUM WITH SPLEEN, LEFT PARTIAL HEMIDIAPHRAGM, LESSER SAC, RIGHT HEMIDIAPHRAGM, LEFT HEMIDIAPHRAGM, GALLBLADDER, SIGMOID COLON, RIGHT ABDOMINAL SIDEWALL, LEFT ABDOMINAL SIDEWALL, PERITONEUM, AND BLADDER PERITONEUM, RADICAL PERITONEAL DEBULKING PROCEDURE: Metastatic mucinous adenocarcinoma of the appendix involving: Omental tissue (part 3),  left partial hemidiaphragm (part 4), lesser sac (part 5), right and left hemidiaphragm (parts 6 & 7), gallbladder serosa (part 8) - one lymph node negative for carcinoma, sigmoid colon serosa (part 10) - proximal and mesenteric margins are focally positive for carcinoma. Distal margin is negative. Six lymph nodes negative; right and left abdominal sidewall (parts 11 & 12), peritoneum (part 13). Organizing mucin involving: falciform ligament (part 1) - one lymph node negative, gastrohepatic ligament (part 2), splenic capsule (part 3), bladder peritoneum (part 14).  3/22/2021: CT CAP ([facility]): Chest: Bilateral pleural effusions and bibasilar compressive atelectasis. A/P: Expected postoperative changes status post HIPEC, splenectomy, cholecystectomy, right hemicolectomy, comment ectomy, and distal colonic resection as described above. No evidence of drainable fluid collection or anastomotic leak.  5/5/2021: [place] AP [place] Decrease in the collection at the splenic bed. Soft tissue infiltration at the left upper abdomen probably from  appears slightly improved. An additional peritoneal mass at the right mid abdomen anterolaterally inferior to the gallbladder fossa appears stable, as does additional suspected peritoneal mass at the lower abdomen anteriorly. Mild dilatation of small bowel segments in the left mid and upper abdomen with mild mucosal edema and probably serosal involvement is also stable. Mild mesenteric nodularity that may be from mild adenopathy and/or additional peritoneal studs appears slightly increased, with largest nodule measuring 1.1cm in the right abdomen.  5/10/2021 Gastrovue: there is free flow of contrast material from rectum to ileostomy site.  There is no evidence of stenosis or leak at anastomotic site.  6/16/2021: ([facility]): Ileostomy closure.  6/27/2021 - 7/9/2021: Presented to OSH with fever, increased drainage; c/f EC fistula. Tx to [facility], conservative management -- bowel rest. DC on  TPN and CLD for comfort.  7/23/2021: CT AP ([facility]): findings raising possibility of mild ileus without etiology.  No evidence of bowel wall thickening or perforation.  Anastomosis in medial colonic region and rectal region are patent.  S/p reversal of ileostomy.  Mild pelvocaliectasis on left without definite hydronephrosis.  Exophytic lower pole left renal cyst  10/14/21 CAP CT ([facility]): stable PO changes including right hemicolectomy with ileocolonic anastomosis and splenectomy.  Mild diffuse distention of small bowel loops with no transition point, which has decreased compared to 7/2021.  1.5 x 1.4cm focal nodular density in right anterior abdomina, which may represent enlarged lymph node or metastatic deposit.  Additional prominent mesenteric and bilateral external iliac lymph nodes nonspecific.  PO changes.       CEA: 0.6  1/13/22: CT cap- stable prominent LN in abdomen including 1.4 cm right lower quadrant LN. Left subphrenic soft tissue thickening. CEA: 1   : <4      : 29.9  1/21/22: CT a/p-findings concerning for small bowel obstruction.  2/19/22: CT a/p-numerous distended loops of small bowel suggestive of ileus versus obstruction.  Severe gastric wall thickening suggestive of gastritis.  Relative thickening of the wall of the bladder suggestive of cystitis.  3/17/22: CT a/p-redemonstration of postoperative changes of multiple bowel resections.  Similar appearing mildly dilated fluid-filled small bowel loops measuring up to 3.8 cm in diameter with possible to transitional points in the right lower quadrant at the level of multiple collapse small bowel loops adjacent to the ileocolic anastomosis with adjacent mesenteric soft nodule/lymph nodes.  Findings are suggestive of partial bowel obstruction possibly secondary to adhesions or peritoneal carcinomatosis.  Interval increase in mild bilateral hydroureteronephrosis.  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 CEA: 1.1      : 5.64   :  50  4/2/22: MRI a/p ([facility])- Multiple enhancing mesenteric and serosal lesions, compatible with peritoneal carcinomatosis. Distortion and narrowing of multiple bowel loops with probable partial small bowel obstruction   at the level of the right lower quadrant tumor. Periportal tumor extending along the hepatic fissures with associated occlusion of the left portal vein and marked narrowing of the distal main portal vein with poststenotic dilatation of the anterior right portal vein.  4/2022: started FOLFIRDr. Sulma SOFIA.   5/8/22: CT a/p-inflammatory fat stranding and subcutaneous tissue surrounding the right lower quadrant ostomy likely representing cellulitis.  Multiple areas of small bowel narrowing with associated nodular and eccentric wall thickening involving portions of the mesentery in the right lower quadrant concerning for peritoneal carcinomatosis rather than adhesions.  Near complete occlusion of the left portal vein which is not due to intraluminal thrombus but due to encasement by progressive soft tissue thickening infiltrating throughout the lionel hepatis falciform ligament and hepatic hilum that likely represents carcinomatosis.  5/25/22: FOLFIR x3, stopped due to poor tolerance.  6/2022: started FOLFOX  8/4/22: CT cap- unchanged appearance of periportal soft tissue infiltration and right lower quadrant nodularity which may represent peritoneal carcinomatosis.  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  Increased fluid-filled distended loops of small and large bowel without a discrete transition point.    10/7/22: CT cap-overall stable appearance of measurable and nonmeasurable tumor burden.  Right lower quadrant lymph nodes are relatively stable.  Irregular soft tissue density mass in the right lower quadrant is stable.  12/8/22: CT cap ([facility])-stable appearance of right lower quadrant soft tissue mass.  Stable appearance of prominent right  lower quadrant lymph nodes.   1/20/23: MRI a/p ([facility])- Stable exam with small volume peritoneal disease in the right and left upper quadrant and right lower quadrant as well as mild narrowing of the proximal segment of the left portal vein from infiltrative disease along the hepatic fissures. No definite new areas of disease.  4/14/23: CT cap- re-demonstration of small scattered measurable and nonmeasurable soft tissue deposit which remain similar in appearance in size compared to prior CT study from 02/23/2023 and are consistent with history of metastatic appendiceal carcinoma.   7/2023: FOLFOX.  8/31/2023: ex-lap, SYLVIE, gastropexy, colonoscopy at [facility]  Past Medical History  No past medical history on file.    Surgical History  No past surgical history on file.     Social History  He reports that he has quit smoking. His smoking use included cigarettes. He has never used smokeless tobacco. No history on file for alcohol use and drug use.    Family History  Family History   Problem Relation Name Age of Onset    Squamous cell carcinoma Other          Allergies  Patient has no known allergies.    Review of Systems   Constitutional:  Positive for fatigue. Negative for fever.   Respiratory:  Negative for shortness of breath.    Cardiovascular:  Negative for chest pain.   Gastrointestinal:  Positive for diarrhea, nausea and vomiting. Negative for blood in stool.   Genitourinary:  Negative for dysuria and hematuria.   Neurological:  Negative for dizziness and headaches.   All other systems reviewed and are negative.       Physical Exam  HENT:      Head: Normocephalic and atraumatic.      Nose: Nose normal.      Mouth/Throat:      Mouth: Mucous membranes are moist.      Pharynx: Oropharynx is clear.   Eyes:      General: No scleral icterus.     Extraocular Movements: Extraocular movements intact.   Cardiovascular:      Rate and Rhythm: Normal rate and regular rhythm.      Pulses: Normal pulses.      Heart sounds: Normal  heart sounds. No murmur heard.  Pulmonary:      Effort: Pulmonary effort is normal.      Breath sounds: Normal breath sounds. No wheezing.   Abdominal:      General: Bowel sounds are normal. There is no distension.      Palpations: Abdomen is soft.      Tenderness: There is no abdominal tenderness.   Musculoskeletal:         General: No swelling. Normal range of motion.      Cervical back: Normal range of motion.      Right lower leg: No edema.      Left lower leg: No edema.   Skin:     General: Skin is warm and dry.      Capillary Refill: Capillary refill takes more than 3 seconds.   Neurological:      General: No focal deficit present.      Mental Status: He is alert and oriented to person, place, and time.      Motor: No weakness.   Psychiatric:         Mood and Affect: Mood normal.         Behavior: Behavior normal.          Last Recorded Vitals  There were no vitals taken for this visit.    Relevant Results         PRN medications  Results for orders placed or performed in visit on 11/24/23 (from the past 24 hour(s))   Comprehensive Metabolic Panel   Result Value Ref Range    Glucose 105 (H) 74 - 99 mg/dL    Sodium 138 136 - 145 mmol/L    Potassium 3.5 3.5 - 5.3 mmol/L    Chloride 102 98 - 107 mmol/L    Bicarbonate 25 21 - 32 mmol/L    Anion Gap 15 10 - 20 mmol/L    Urea Nitrogen 20 6 - 23 mg/dL    Creatinine 2.00 (H) 0.50 - 1.30 mg/dL    eGFR 36 (L) >60 mL/min/1.73m*2    Calcium 9.0 8.6 - 10.3 mg/dL    Albumin 3.3 (L) 3.4 - 5.0 g/dL    Alkaline Phosphatase 226 (H) 33 - 136 U/L    Total Protein 7.8 6.4 - 8.2 g/dL    AST 12 9 - 39 U/L    Bilirubin, Total 0.5 0.0 - 1.2 mg/dL    ALT 7 (L) 10 - 52 U/L   CBC and Auto Differential   Result Value Ref Range    WBC 13.8 (H) 4.4 - 11.3 x10*3/uL    nRBC 0.0 0.0 - 0.0 /100 WBCs    RBC 3.17 (L) 4.50 - 5.90 x10*6/uL    Hemoglobin 8.8 (L) 13.5 - 17.5 g/dL    Hematocrit 28.0 (L) 41.0 - 52.0 %    MCV 88 80 - 100 fL    MCH 27.8 26.0 - 34.0 pg    MCHC 31.4 (L) 32.0 - 36.0 g/dL     RDW 22.2 (H) 11.5 - 14.5 %    Platelets 598 (H) 150 - 450 x10*3/uL    Neutrophils % 67.2 40.0 - 80.0 %    Immature Granulocytes %, Automated 0.5 0.0 - 0.9 %    Lymphocytes % 23.5 13.0 - 44.0 %    Monocytes % 8.0 2.0 - 10.0 %    Eosinophils % 0.7 0.0 - 6.0 %    Basophils % 0.1 0.0 - 2.0 %    Neutrophils Absolute 9.25 (H) 1.20 - 7.70 x10*3/uL    Immature Granulocytes Absolute, Automated 0.07 0.00 - 0.70 x10*3/uL    Lymphocytes Absolute 3.23 1.20 - 4.80 x10*3/uL    Monocytes Absolute 1.10 (H) 0.10 - 1.00 x10*3/uL    Eosinophils Absolute 0.10 0.00 - 0.70 x10*3/uL    Basophils Absolute 0.02 0.00 - 0.10 x10*3/uL   Magnesium   Result Value Ref Range    Magnesium 1.75 1.60 - 2.40 mg/dL   Morphology   Result Value Ref Range    RBC Morphology See Below     Polychromasia Mild     RBC Fragments Few     Target Cells Few     Ovalocytes Few     Franklin Cells Many     Acanthocytes Few     Clumped Platelets Present    Urinalysis with Reflex Microscopic   Result Value Ref Range    Color, Urine Straw Straw, Yellow    Appearance, Urine Clear Clear    Specific Gravity, Urine 1.005 1.005 - 1.035    pH, Urine 6.0 5.0, 5.5, 6.0, 6.5, 7.0, 7.5, 8.0    Protein, Urine NEGATIVE NEGATIVE mg/dL    Glucose, Urine NEGATIVE NEGATIVE mg/dL    Blood, Urine NEGATIVE NEGATIVE    Ketones, Urine NEGATIVE NEGATIVE mg/dL    Bilirubin, Urine NEGATIVE NEGATIVE    Urobilinogen, Urine <2.0 <2.0 mg/dL    Nitrite, Urine NEGATIVE NEGATIVE    Leukocyte Esterase, Urine NEGATIVE NEGATIVE        Assessment/Plan   Active Problems:  There are no active Hospital Problems.      #Intractable Diarrhea  #Dehydration  #Nausea and vomiting  #Hx of C Diff  -Had 5+ watery stools since discharge on 11/21  -Had C diff diagnosed over a year ago, treated with oral vanc  -Diagnosed with C diff on 11/15, started on 10 day course of oral vancomycin  -Was also on IV vanc + Zosyn for 5 days for pneumonia.   Plan  -Hydrate with IVF  -C/W oral vancomycin  -Repeat C. Diff testing and stool  studies  -Zofran and compazine    #Appendiceal cancer with mets to   #Hx of SBO 2/2 cancer and procedures  -- Prior surgical intervention and oncologic history at [facility]   -  s/p 6 mo neoadjuvant chemo w/ FOLFOX followed by debulking surgery with HIPEC (3/21)   - Most recently w/ SBO admitted to [facility] in 8/2023, complicated by GNR bacteremia  - surgery that admission: ex-lap, lysis of adhesions, colonoscopy, gastropexy but no HIPEC per discharge summary    #Hypothyroidism  -continue home synthroid  -TSH ordered    #History of CAD s/p PCI 2023  -c/w home aspirin, eliquis 5mg bid     F: Prn  E: prn  N: regular  A: PIV    CODE STATUS: Full code           Hina Shrestha MD

## 2023-11-24 NOTE — PROGRESS NOTES
Patient ID: Leonardo Wilder is a 68 y.o. male.    The patient presents to [facility] for evaluation of intractable diarrhea. He reports he got discharged from the hospital Tuesday and didn't feel great. He began having diarrhea again Wednesday he thinks about 5 episodes and much more liquid than previously. He also began having N/V with NBNB emesis and nonbloody stool. He denies any new abdominal pain but has had tenderness since his surgery this summer. He has still been taking his oral vanco but doesn't know how often. He denies any fevers or chills. Denies lightheadedness, syncope, HA, blurry vision, cough or SOB. He is not eating or drinking much and hasn't gotten his daily IVF that was prescribed by [facility] since his discharge because his port was deaccessed. He reports feeling generally unwell and like things are getting worse.     Objective    BSA: There is no height or weight on file to calculate BSA.          11/21/2023     1:15 AM 11/21/2023     5:36 AM 11/21/2023     9:50 AM 11/21/2023     1:46 PM 11/21/2023     5:41 PM   Vitals   Systolic 123 136 124 117 122   Diastolic 77 76 86 68 72   Heart Rate 84 66 87 97 100   Temp 36.3 °C (97.3 °F) 36.4 °C (97.5 °F) 36.3 °C (97.3 °F) 36 °C (96.8 °F) 36.3 °C (97.3 °F)   Resp 16 16 16 16 16         Results from last 7 days   Lab Units 11/21/23 0529 11/20/23  0538 11/19/23  0604 11/18/23  0345   WBC AUTO x10*3/uL 12.9* 12.8* 15.5* 23.0*   HEMOGLOBIN g/dL 8.2* 8.3* 8.2* 8.3*   HEMATOCRIT % 26.7* 27.0* 25.7* 26.1*   PLATELETS AUTO x10*3/uL 485* 489* 459* 454*        Results from last 7 days   Lab Units 11/21/23  0529 11/20/23  0538 11/19/23  0604 11/18/23  0345   GLUCOSE mg/dL 75 100* 79 117*   SODIUM mmol/L 140 140 137 134*   POTASSIUM mmol/L 3.7 3.4* 3.5 3.5   CHLORIDE mmol/L 106 106 103 98   CO2 mmol/L 26 25 26 27   BUN mg/dL 6 8 13 20   CREATININE mg/dL 0.86 0.81 0.93 1.16   EGFR mL/min/1.73m*2 >90 >90 90 69   CALCIUM mg/dL 8.5* 8.3* 8.2* 8.3*   MAGNESIUM mg/dL 1.87 1.97 1.59*  1.62   PHOSPHORUS mg/dL 2.9 3.4 3.1 3.3   ALBUMIN g/dL 2.7* 2.7* 2.6* 2.6*                      === 11/14/23 ===    XR CHEST 1 VIEW    - Impression -  1. Patchy right mid and lower lung opacities concerning for pneumonia.  2. Probable small right pleural effusion.  3. Medical devices as above.    I personally reviewed the images/study and I agree with the resident  Cristobal Shetty's findings as stated. This study was interpreted  at [place], [place].    MACRO:  None    Signed by: Jose Batista 11/17/2023 9:54 AM  Dictation workstation:   [id number]  === 02/23/23 ===    CT CHEST ABDOMEN PELVIS W IV CONTRAST    - Impression -  Restaging scan for appendiceal adenocarcinoma:  1. No evidence of new or worsening intrathoracic or  intra-abdominopelvic disease burden.  2. Two stable areas of soft tissue masslike densities in the right  lower mesentery; the more inferior of the two may reflect fibrotic  changes versus peritoneal deposits; the more superior cluster of soft  tissue densities may reflect prominent mesenteric lymph nodes versus  peritoneal deposits.  3. Unchanged nonspecific nodular thickening of the right major  fissure. Continued attention on follow-up imaging is recommended.  4. Additional findings as above.    I personally reviewed the images/study and I agree with the findings  as stated. This study was interpreted at [place], [place].     No nuclear medicine results found for the past 12 months   No echocardiogram results found for the past 12 months         Physical Exam  Vitals reviewed.   Constitutional:       Appearance: He is underweight.   HENT:      Head: Normocephalic and atraumatic.      Nose: Nose normal.      Mouth/Throat:      Mouth: Mucous membranes are moist.   Eyes:      Conjunctiva/sclera: Conjunctivae normal.      Pupils: Pupils are equal, round, and reactive to light.   Cardiovascular:       Rate and Rhythm: Normal rate and regular rhythm.   Pulmonary:      Effort: Pulmonary effort is normal.      Breath sounds: Normal breath sounds.   Chest:      Comments: Right chest wall port c/d/I without erythema, swelling or drainage  Abdominal:      General: Abdomen is flat. Bowel sounds are normal.      Palpations: Abdomen is soft.      Tenderness: There is no abdominal tenderness.   Musculoskeletal:         General: Normal range of motion.      Cervical back: Normal range of motion.   Skin:     General: Skin is warm and dry.   Neurological:      General: No focal deficit present.      Mental Status: He is alert.   Psychiatric:         Mood and Affect: Mood normal.         Behavior: Behavior normal.         Cancer Staging   No matching staging information was found for the patient.    Oncology History    No history exists.        69 yo M with appendiceal CA who presents to [facility] for evaluation of intractable diarrhea.     Plan:  - orthos borderline   - labs significant for mild leukocytosis and thrombocytosis likely in the setting of inflammation/active cdiff infection and keysha 2/2 dehydration  - 2L NS for dehydration/keysha  - per discharge instructions, patient should have still been receiving full treatment dose of vancocin and developed intractable diarrhea despite treatment, KEYSHA and n/v    Dispo:  - decision made to admit to inpatient for further denton      Geno Kline, APRN-CNP

## 2023-11-24 NOTE — CARE PLAN
Problem: Fall/Injury  Goal: Not fall by end of shift  Outcome: Progressing     Problem: Fall/Injury  Goal: Be free from injury by end of the shift  Outcome: Progressing     Problem: Skin  Goal: Prevent/manage excess moisture  Outcome: Progressing     Problem: Skin  Goal: Promote/optimize nutrition  Outcome: Progressing     Problem: Skin  Goal: Promote skin healing  Outcome: Progressing

## 2023-11-24 NOTE — TELEPHONE ENCOUNTER
Over the past 24 hrs, patient has had a recurrence of nausea, vomiting and diarrhea.  He was recently hospitalized with these symptoms and discharged on 11/21.  No fever, chest pain, difficulty breathing or abdominal pain.  He endorses dry mouth and some fatigue.  Spouse inquires if he can be seen in [facility] today.     Additional Information   Commented on: What have you eaten in the last 2 days?     Yesterday was nauseated but able to eat some of Thanksgiving dinner.  Had some quiche this morning   Commented on: What have you been drinking in the last 24 hours?     Ensure, gatorade   Commented on: What else do you want to tell me about this problem?     Pt is on vanco for c-diff   Commented on: When was the last time you had a bowel movement? Is this/what is normal for you?     Had diarrhea today   Commented on: What are you taking? how often are you taking it?     Pt alternates zofran and compazine    Protocols used: Diarrhea, Nausea/Vomiting

## 2023-11-25 NOTE — PROGRESS NOTES
"Leonardo Wilder is a 68 y.o. male on day 1 of admission presenting with Colitis due to Clostridioides difficile.    Subjective   No acute events overnight    Patient still complaining of abdominal discomfort. He denies any episodes of vomiting overnight. He was getting ready to use the bathroom when interviewed, unsure if it was for a bowel movement.        Objective     Physical Exam  Constitutional:       Appearance: Normal appearance.   HENT:      Head: Normocephalic and atraumatic.      Nose: Nose normal.      Mouth/Throat:      Mouth: Mucous membranes are dry.      Pharynx: Oropharynx is clear.   Eyes:      General: No scleral icterus.     Extraocular Movements: Extraocular movements intact.   Cardiovascular:      Rate and Rhythm: Normal rate and regular rhythm.      Pulses: Normal pulses.      Heart sounds: Normal heart sounds.   Pulmonary:      Effort: Pulmonary effort is normal.      Breath sounds: Normal breath sounds. No wheezing.   Abdominal:      General: Bowel sounds are normal.      Tenderness: There is no abdominal tenderness.   Musculoskeletal:         General: No swelling. Normal range of motion.      Cervical back: Normal range of motion.      Right lower leg: No edema.      Left lower leg: No edema.   Skin:     General: Skin is warm and dry.      Capillary Refill: Capillary refill takes more than 3 seconds.   Neurological:      General: No focal deficit present.      Mental Status: He is alert and oriented to person, place, and time.   Psychiatric:         Mood and Affect: Mood normal.         Behavior: Behavior normal.         Last Recorded Vitals  Blood pressure 144/79, pulse 90, temperature 37 °C (98.6 °F), temperature source Temporal, resp. rate 16, height 1.643 m (5' 4.69\"), weight 56.5 kg (124 lb 9.6 oz), SpO2 95 %.  Intake/Output last 3 Shifts:  I/O last 3 completed shifts:  In: 1703.1 (30.1 mL/kg) [IV Piggyback:1703.1]  Out: 1870 (33.1 mL/kg) [Urine:1770 (0.9 mL/kg/hr); Emesis/NG " output:100]  Weight: 56.5 kg     Relevant Results              Scheduled medications  apixaban, 5 mg, oral, q12h  aspirin, 81 mg, oral, Daily  DULoxetine, 60 mg, oral, Daily  [START ON 11/26/2023] finasteride, 5 mg, oral, Daily  levothyroxine, 100 mcg, oral, Daily  OLANZapine, 5 mg, oral, Nightly  [START ON 11/26/2023] pantoprazole, 40 mg, oral, Daily before breakfast  vancomycin, 125 mg, oral, 4x daily      Continuous medications     PRN medications  PRN medications: calcium carbonate, ondansetron, prochlorperazine **OR** prochlorperazine **OR** prochlorperazine  Results for orders placed or performed during the hospital encounter of 11/24/23 (from the past 24 hour(s))   Urine electrolytes   Result Value Ref Range    Sodium, Urine Random 67 mmol/L    Sodium/Creatinine Ratio 264 Not established. mmol/g Creat    Potassium, Urine Random 14 mmol/L    Potassium/Creatinine Ratio 55 Not established mmol/g Creat    Chloride, Urine Random 80 mmol/L    Chloride/Creatinine Ratio 315 (H) 23 - 275 mmol/g creat    Creatinine, Urine Random 25.4 20.0 - 370.0 mg/dL   Urinalysis with Reflex Microscopic and Culture   Result Value Ref Range    Color, Urine Straw Straw, Yellow    Appearance, Urine Clear Clear    Specific Gravity, Urine 1.005 1.005 - 1.035    pH, Urine 6.0 5.0, 5.5, 6.0, 6.5, 7.0, 7.5, 8.0    Protein, Urine NEGATIVE NEGATIVE mg/dL    Glucose, Urine NEGATIVE NEGATIVE mg/dL    Blood, Urine SMALL (1+) (A) NEGATIVE    Ketones, Urine NEGATIVE NEGATIVE mg/dL    Bilirubin, Urine NEGATIVE NEGATIVE    Urobilinogen, Urine <2.0 <2.0 mg/dL    Nitrite, Urine NEGATIVE NEGATIVE    Leukocyte Esterase, Urine NEGATIVE NEGATIVE   Extra Urine Gray Tube   Result Value Ref Range    Extra Tube Hold for add-ons.    Urinalysis Microscopic   Result Value Ref Range    WBC, Urine 1-5 1-5, NONE /HPF    RBC, Urine 1-2 NONE, 1-2, 3-5 /HPF   C. difficile, PCR    Specimen: Stool   Result Value Ref Range    C. difficile, PCR Not Detected Not Detected    Stool Pathogen Panel, PCR    Specimen: Stool   Result Value Ref Range    Campylobacter Group Not Detected Not Detected    Salmonella species Not Detected Not Detected    Shigella species Not Detected Not Detected    Vibrio Group Not Detected Not Detected    Yersinia Enterocolitica Not Detected Not Detected    Shiga Toxin 1 Not Detected Not Detected    Shiga Toxin 2 Not Detected Not Detected    Norovirus GI/GII Not Detected Not Detected    Rotavirus A Not Detected Not Detected   CBC and Auto Differential   Result Value Ref Range    WBC 13.2 (H) 4.4 - 11.3 x10*3/uL    nRBC 0.0 0.0 - 0.0 /100 WBCs    RBC 2.83 (L) 4.50 - 5.90 x10*6/uL    Hemoglobin 7.9 (L) 13.5 - 17.5 g/dL    Hematocrit 25.2 (L) 41.0 - 52.0 %    MCV 89 80 - 100 fL    MCH 27.9 26.0 - 34.0 pg    MCHC 31.3 (L) 32.0 - 36.0 g/dL    RDW 21.2 (H) 11.5 - 14.5 %    Platelets 547 (H) 150 - 450 x10*3/uL    Neutrophils % 58.5 40.0 - 80.0 %    Immature Granulocytes %, Automated 0.5 0.0 - 0.9 %    Lymphocytes % 30.6 13.0 - 44.0 %    Monocytes % 8.9 2.0 - 10.0 %    Eosinophils % 1.1 0.0 - 6.0 %    Basophils % 0.4 0.0 - 2.0 %    Neutrophils Absolute 7.72 (H) 1.20 - 7.70 x10*3/uL    Immature Granulocytes Absolute, Automated 0.07 0.00 - 0.70 x10*3/uL    Lymphocytes Absolute 4.05 1.20 - 4.80 x10*3/uL    Monocytes Absolute 1.18 (H) 0.10 - 1.00 x10*3/uL    Eosinophils Absolute 0.15 0.00 - 0.70 x10*3/uL    Basophils Absolute 0.05 0.00 - 0.10 x10*3/uL   Renal function panel   Result Value Ref Range    Glucose 69 (L) 74 - 99 mg/dL    Sodium 140 136 - 145 mmol/L    Potassium 3.9 3.5 - 5.3 mmol/L    Chloride 107 98 - 107 mmol/L    Bicarbonate 24 21 - 32 mmol/L    Anion Gap 13 10 - 20 mmol/L    Urea Nitrogen 15 6 - 23 mg/dL    Creatinine 1.76 (H) 0.50 - 1.30 mg/dL    eGFR 42 (L) >60 mL/min/1.73m*2    Calcium 8.2 (L) 8.6 - 10.6 mg/dL    Phosphorus 3.7 2.5 - 4.9 mg/dL    Albumin 2.5 (L) 3.4 - 5.0 g/dL   Magnesium   Result Value Ref Range    Magnesium 1.64 1.60 - 2.40 mg/dL    Morphology   Result Value Ref Range    RBC Morphology See Below     Hypochromia Mild                Assessment/Plan   Principal Problem:    Colitis due to Clostridioides difficile    #Intractable Diarrhea  #Dehydration  #Nausea and vomiting  #Hx of C Diff  -Had 5+ watery stools since discharge on 11/21  -Had C diff diagnosed over a year ago, treated with oral vanc  -Diagnosed with C diff on 11/15, started on 10 day course of oral vancomycin  -Was also on IV vanc + Zosyn for 5 days for pneumonia.   -C diff PCR negative.  Plan  -Hydrate with IVF, given another 1L LR bolus today  -C/W oral vancomycin taper  -Ordered CT AP with concern for partial bowel obstruction  -Zofran and compazine     #Appendiceal cancer with mets to   #Hx of SBO 2/2 cancer and procedures  -- Prior surgical intervention and oncologic history at [facility]   -  s/p 6 mo neoadjuvant chemo w/ FOLFOX followed by debulking surgery with HIPEC (3/21)   - Most recently w/ SBO admitted to [facility] in 8/2023, complicated by GNR bacteremia  - surgery that admission: ex-lap, lysis of adhesions, colonoscopy, gastropexy but no HIPEC per discharge summary     #Hypothyroidism  -home synthroid 100 mcg daily  -TSH 28.34  Plan  -increase synthroid to 125 mcg daily  -obtain free T4      #History of CAD s/p PCI 2023  -c/w home aspirin, eliquis 5mg bid      F: Prn  E: prn  N: regular  A: PIV     CODE STATUS: Full code           Hina Shrestha MD

## 2023-11-25 NOTE — CARE PLAN
The patient's goals for the shift include      The clinical goals for the shift include pt will remain safe and free from injury through shift      Problem: Fall/Injury  Goal: Verbalize understanding of risk factor reduction measures to prevent injury from fall in the home  Outcome: Progressing     Problem: Fall/Injury  Goal: Not fall by end of shift  Outcome: Progressing     Problem: Fall/Injury  Goal: Pace activities to prevent fatigue by end of the shift  Outcome: Progressing     Problem: Skin  Goal: Decreased wound size/increased tissue granulation at next dressing change  Outcome: Progressing     Problem: Skin  Goal: Promote skin healing  Outcome: Progressing

## 2023-11-25 NOTE — SIGNIFICANT EVENT
Given recurrent nausea with oral intake and previous history of bowel obstructions, clinical indication for CT imaging. Patient Creatinine improving with fluids, and receiving 1L LR bolus prior to CT scan. Primary team is okay with CT scan in setting of kidney status

## 2023-11-25 NOTE — SIGNIFICANT EVENT
Senior Admission Note    CC: Leonardo Wilder is a 68/ male, was admitted from 11/14 to 11/21 6 week hx of diarrhea and was found to have C. DIFF. One day post discharge started having more frequent and more liquidy bowel movements again, in addition to severe intractable N/V impairing his PO intake.     HPI: Patient has appendiceal cancer w/ mets to abdomen s/p 2 prior surgeries w/ HIPEC at [facility] (2/2021 and 8/2023). Patient states that after his most recent surgery, he had diarrhea in the post op phase that has not resolved. Patient states that his diarrhea has been consistent since he was discharged after his surgery at the end of August. He reports that his diarrhea is responsive to imodium, but that it never completely resolves. He was originally discharged from the hospital post op on TPN then was off TPN. He states that he had severe nausea and vomiting while on TPN. After it was discontinued, his vomiting improved. He was found to have C. DIFF (second time as he had the disgnosis last year as well).  The patient was started on oral vanc 125 mg capsule QID for a planned treatment course of 10 days.  Hospitalization was c/w pneumonia and 1 set of BC positive for GPC. Was treated for the PNM inpatient for 5 days and was DC on oral vanc for the recurrent C. DIFF. His diarrhea at the time of DC was better compared to admission. On the next day (11/22), patient restarted having diarrhea, intractable, associated with N/V and he is not feeling well now.   -----------------------------------------------------------------------------------      11/21/2023     1:46 PM 11/21/2023     5:41 PM 11/24/2023     1:23 PM 11/24/2023     1:25 PM 11/24/2023     4:57 PM 11/24/2023     5:49 PM 11/24/2023     6:10 PM   Vitals   Systolic 117 122 126 118 129 150 150   Diastolic 68 72 84 78 84 79 79   Heart Rate 97 100 94 99 78 76 76   Temp 36 °C (96.8 °F) 36.3 °C (97.3 °F) 36.3 °C (97.3 °F)   36.6 °C (97.9 °F) 36.6 °C (97.9 °F)   Resp 16 16  "18 18 18 18 18   Height (in)      1.643 m (5' 4.69\")    Weight (lb)   117.95   124.6    BMI   19.49 kg/m2   20.94 kg/m2    BSA (m2)   1.57 m2   1.61 m2    Visit Report   Report Report Report Report Report    -----------------------------------------------------------------------------------  PE:  A, O X4, patient is underweight  Looks dehydrated, dry skin and lips noticed   Non tender and non distended abdomen   Normal lung/ heart sounds  -----------------------------------------------------------------------------------  A/P:  Patient is 68/ male, here for continued C. DIFF symptoms with no improvement after DC. Has dehydration and KEYSHA on this admission.    #C. DIFF  #Dehydration due to diarrhea  #KEYSHA due to dehydration   -C/W oral vanc for now and waiting the rest of studies, considering ID consult in the AM   -Repeat C. DIFF testing and stool studies   -Rehydrate with IVF   -Zofran and compazine for nausea and vomiting  -Urine analysis and U Lytes     #Hypokalemia, repleating and monitoring     Home meds that are resumed:  -ASA low dose   -Apixaban 5mg BID   -Levothyroxine 100mcg daily, TSH ordered     DCed meds:  -Pregabalin stopped for now, accumulates in impaired KFT     #FULL CODE   #Wife is the POA       "

## 2023-11-25 NOTE — PROGRESS NOTES
Pharmacy Medication History Review    Leonardo Wilder is a 68 y.o. male admitted for Colitis due to Clostridioides difficile. Pharmacy reviewed the patient's [id number] medications and allergies for accuracy.    The list below reflects the updated PTA list. Comments regarding how patient may be taking medications differently can be found in the Admit Orders Activity  Prior to Admission Medications   Prescriptions Last Dose Informant Patient Reported? Taking?   DULoxetine (Cymbalta) 60 mg DR capsule 11/23/2023  No No   Sig: Take 1 capsule (60 mg) by mouth once daily at bedtime. Do not crush or chew.   DULoxetine (Cymbalta) 60 mg DR capsule 11/24/2023  No No   Sig: Take 1 capsule (60 mg) by mouth once daily in the morning. Do not crush or chew.   LORazepam (Ativan) 0.5 mg tablet 11/24/2023  No No   Sig: Take 1 tablet (0.5 mg) by mouth 2 times a day as needed for anxiety (insomnia).   OLANZapine zydis (ZyPREXA) 5 mg disintegrating tablet 11/23/2023  No No   Sig: Take 1 tablet (5 mg) by mouth once daily at bedtime.   acetaminophen (Tylenol) 325 mg tablet   Yes No   Sig: Take 2 tablets (650 mg) by mouth every 6 hours if needed.   apixaban (Eliquis) 5 mg tablet 11/24/2023  No No   Sig: TAKE 1 TABLET BY MOUTH TWO TIMES A DAY   aspirin 81 mg EC tablet 11/24/2023  Yes No   Sig: Take by mouth once daily.   baclofen (Lioresal) 5 mg tablet 11/24/2023  No No   Sig: Take 1 tablet (5 mg) by mouth 2 times a day.   finasteride (Proscar) 5 mg tablet 11/24/2023  No No   Sig: Take 1 tablet (5 mg) by mouth once daily. Do not crush, chew, or split. Do not start before November 17, 2023.   levothyroxine (Synthroid, Levoxyl) 100 mcg tablet 11/24/2023  Yes No   Sig: Take 1 tablet (100 mcg) by mouth once daily.   midodrine (Proamatine) 5 mg tablet 11/24/2023  Yes No   Sig: Take 1 tablet (5 mg) by mouth 3 times a day.   ondansetron (Zofran) 4 mg tablet 11/24/2023  No No   Sig: Take 1 tablet (4 mg) by mouth every 8 hours if needed for  "nausea.   pantoprazole (ProtoNix) 40 mg EC tablet 11/24/2023  Yes No   Sig: Take 1 tablet (40 mg) by mouth once daily.   pregabalin (Lyrica) 200 mg capsule 11/24/2023  Yes No   Sig: Take 1 capsule (200 mg) by mouth 3 times a day.   rosuvastatin (Crestor) 20 mg tablet 11/24/2023  Yes No   Sig: Take 1 tablet (20 mg) by mouth once daily.   sildenafil (Viagra) 100 mg tablet   Yes No   Sig: Take 1 tablet (100 mg) by mouth once daily as needed for erectile dysfunction.   vancomycin (Vancocin) 125 mg capsule 11/24/2023  No No   Sig: Take 1 capsule (125 mg) by mouth 4 times a day for 3 days, then 1 capsule twice daily for 7 days, then 1 capsule once daily for 7 days, then 1 capsule twice weekly for 2 weeks.      Facility-Administered Medications: None        The list below reflects the updated allergy list. Please review each documented allergy for additional clarification and justification.  Allergies  Reviewed by Maddy Martin PharmD on 11/24/2023   No Known Allergies         Patient accepts M2B at discharge. Pharmacy has been updated to [facility].    Sources used: Pharmacy dispense history, OARRs, patient interview (good historian- knew most drug, doses and frequency), and discharge summary 11/21    Below are additional concerns with the patient's PTA list.  -pt was not sure if cymbalta was twice daily or 3 times daily  -obtains eliquis and levothyroxine from [facility] (no dispense history)  -Crestor last filled at the end of August- pt states that he just requested for a refill  -pt was taking last 2 doses of vanco \"4 times daily\" and should be tapering to BID tomorrow  -Still taking lyrica (200 mg TID)- last dispensed 9/23 (30 day supply). States that he obtained more from [facility] when he was admitted there. Unable to find documentation. Per Care Everywhere, pt stopped? May need further clarification     Maddy Martin PharmD, [facility]  Transitions of Care Pharmacist  [facility]  Please " reach out via Secure Chat for questions, or if no response call [employer] or [facility]

## 2023-11-25 NOTE — CARE PLAN
The clinical goals for the shift include patient will remain from from nausea and/or emesis throughout shift 11/25 at 1900      Problem: Fall/Injury  Goal: Not fall by end of shift  Outcome: Progressing     Problem: Fall/Injury  Goal: Be free from injury by end of the shift  Outcome: Progressing     Problem: Skin  Goal: Prevent/manage excess moisture  Outcome: Progressing     Problem: Skin  Goal: Promote skin healing  Outcome: Progressing     Problem: Skin  Goal: Promote/optimize nutrition  Outcome: Progressing

## 2023-11-26 NOTE — PROGRESS NOTES
"Leonardo Wilder is a 68 y.o. male on day 2 of admission presenting with Colitis due to Clostridioides difficile.    Subjective   No acute events overnight    Patient states his abdomen hasn't felt as bad as the previous days. He had upwards of 8-9 bowel movements yesterday however some were loose and some were small and formed. He denies any blood or mucous in the stool. He has not had any episodes of vomiting and only one episode of mild nausea overnight that was treated with zofran and compazine.        Objective     Physical Exam  Constitutional:       Appearance: Normal appearance.   HENT:      Head: Normocephalic and atraumatic.      Nose: Nose normal.      Mouth/Throat:      Mouth: Mucous membranes are dry.      Pharynx: Oropharynx is clear.   Eyes:      General: No scleral icterus.     Extraocular Movements: Extraocular movements intact.   Cardiovascular:      Rate and Rhythm: Normal rate and regular rhythm.      Pulses: Normal pulses.      Heart sounds: Normal heart sounds.   Pulmonary:      Effort: Pulmonary effort is normal.      Breath sounds: Normal breath sounds. No wheezing.   Abdominal:      General: Bowel sounds are normal.      Tenderness: There is no abdominal tenderness.   Musculoskeletal:         General: No swelling. Normal range of motion.      Cervical back: Normal range of motion.      Right lower leg: No edema.      Left lower leg: No edema.   Skin:     General: Skin is warm and dry.      Capillary Refill: Capillary refill takes more than 3 seconds.   Neurological:      General: No focal deficit present.      Mental Status: He is alert and oriented to person, place, and time.   Psychiatric:         Mood and Affect: Mood normal.         Behavior: Behavior normal.         Last Recorded Vitals  Blood pressure 117/71, pulse 83, temperature 36.8 °C (98.2 °F), temperature source Temporal, resp. rate 18, height 1.643 m (5' 4.69\"), weight 56.5 kg (124 lb 9.6 oz), SpO2 94 %.  Intake/Output last 3 " Shifts:  I/O last 3 completed shifts:  In: 2703.1 (47.8 mL/kg) [IV Piggyback:2703.1]  Out: 2720 (48.1 mL/kg) [Urine:2620 (1.3 mL/kg/hr); Emesis/NG output:100]  Weight: 56.5 kg     Relevant Results              Scheduled medications  apixaban, 5 mg, oral, q12h  aspirin, 81 mg, oral, Daily  DULoxetine, 60 mg, oral, Daily  finasteride, 5 mg, oral, Daily  levothyroxine, 125 mcg, oral, Daily  OLANZapine, 5 mg, oral, Nightly  pantoprazole, 40 mg, oral, Daily before breakfast  vancomycin, 125 mg, oral, 4x daily      Continuous medications     PRN medications  PRN medications: calcium carbonate, loperamide, ondansetron, prochlorperazine **OR** prochlorperazine **OR** prochlorperazine  Results for orders placed or performed during the hospital encounter of 11/24/23 (from the past 24 hour(s))   CBC and Auto Differential   Result Value Ref Range    WBC 13.8 (H) 4.4 - 11.3 x10*3/uL    nRBC 0.1 (H) 0.0 - 0.0 /100 WBCs    RBC 2.94 (L) 4.50 - 5.90 x10*6/uL    Hemoglobin 8.2 (L) 13.5 - 17.5 g/dL    Hematocrit 25.6 (L) 41.0 - 52.0 %    MCV 87 80 - 100 fL    MCH 27.9 26.0 - 34.0 pg    MCHC 32.0 32.0 - 36.0 g/dL    RDW 21.5 (H) 11.5 - 14.5 %    Platelets 581 (H) 150 - 450 x10*3/uL    Neutrophils %      Immature Granulocytes %, Automated      Lymphocytes %      Monocytes %      Eosinophils %      Basophils %      Neutrophils Absolute      Lymphocytes Absolute      Monocytes Absolute      Eosinophils Absolute      Basophils Absolute                 Assessment/Plan   Principal Problem:    Colitis due to Clostridioides difficile    UPDATES 11/26   -CT AP 11/26 : Lenticular loculated intraperitoneal fluid collection in the left anterior peritoneal cavity aproximally 13 x 3.9 x 12.8 cm causing compression of adjacent bowel loops.Interval decrease in the degree of small-bowel dilatation whichare fluid-filled and with a probable transition point in the right lower quadrant similar to the prior exam likely representing persistent but resolving  small-bowel obstruction  -Consult to IR for drainage of intraperitoneal fluid collection  -Multifocal pneumonia seen on CT AP, likely viral in nature as patient does not show signs or symptoms of infection. Will continue to monitor  -Diarrhea likely 2/2 viral gastroenteritis. Loperamide as needed for diarrhea.  -On oral vancomycin taper for C diff   -Had epistaxis this AM, given afrin and saline nasal spray  -Can consider strep infectious testing given hx of splenectomy    #Intractable Diarrhea  #Dehydration  #Nausea and vomiting  #Hx of C Diff  -Had 5+ watery stools since discharge on 11/21  -Had C diff diagnosed over a year ago, treated with oral vanc  -Diagnosed with C diff on 11/15, started on 10 day course of oral vancomycin  -Was also on IV vanc + Zosyn for 5 days for pneumonia.   -C diff PCR negative.  Plan  -Hydrate with IVF, given another 1L LR bolus today  -C/W oral vancomycin taper  -CT AP with concern for partial bowel obstruction somewhat improved, new fluid collection  -Zofran and compazine  - Imodium PRN  - If diarrhea unimproved consider drainage of L sided fluid collection, if better this should be pursued with primary oncology team in [place].      #Appendiceal cancer with mets to   #Hx of SBO 2/2 cancer and procedures  -- Prior surgical intervention and oncologic history at [facility]   -  s/p 6 mo neoadjuvant chemo w/ FOLFOX followed by debulking surgery with HIPEC (3/21)   - Most recently w/ SBO admitted to [facility] in 8/2023, complicated by GNR bacteremia  - surgery that admission: ex-lap, lysis of adhesions, colonoscopy, gastropexy but no HIPEC per discharge summary     #Hypothyroidism  -home synthroid 100 mcg daily  -TSH 28.34    Plan  -increase synthroid to 125 mcg daily  -obtain free T4      #History of CAD s/p PCI 2023  -c/w home aspirin, eliquis 5mg bid      F: Prn  E: prn  N: regular  A: PIV     CODE STATUS: Full code           Hina Shrestha MD

## 2023-11-26 NOTE — CARE PLAN
The patient's goals for the shift include      The clinical goals for the shift include Pt will have a decrease in BM's through shift and remain safe from falls and HDS    Over the shift, the patient did make progress towards the following goals.       Problem: Fall/Injury  Goal: Not fall by end of shift  Outcome: Progressing     Problem: Fall/Injury  Goal: Be free from injury by end of the shift  Outcome: Progressing     Problem: Fall/Injury  Goal: Verbalize understanding of personal risk factors for fall in the hospital  Outcome: Progressing     Problem: Fall/Injury  Goal: Verbalize understanding of risk factor reduction measures to prevent injury from fall in the home  Outcome: Progressing     Problem: Fall/Injury  Goal: Pace activities to prevent fatigue by end of the shift  Outcome: Progressing     Problem: Skin  Goal: Participates in plan/prevention/treatment measures  Outcome: Progressing     Problem: Skin  Goal: Prevent/manage excess moisture  Outcome: Progressing     Problem: Skin  Goal: Prevent/minimize sheer/friction injuries  Outcome: Progressing     Problem: Skin  Goal: Promote/optimize nutrition  Outcome: Progressing

## 2023-11-27 NOTE — NURSING NOTE
Pt discharged home. Meds to beds and personal belongings accompanied pt home. Pt VSS at time of discharge. Discharge instructions were reviewed with patient and patient demonstrated an understanding of teaching.

## 2023-11-27 NOTE — DISCHARGE INSTRUCTIONS
Dear Leonardo Wilder,    You were admitted to [facility] from home for diarrhea and nausea/vomiting. We retested your stool for C diff, which was negative. However, we recommend continuing to complete the vancomycin taper provided to you at your last admission. For your nausea, we got a CT scan of your belly which showed a collection of fluid in your belly. We believe that draining this fluid will help your nausea symptoms.     Thank you for choosing  for your care,   It was a pleasure taking care of you,     Please follow-up with the following providers:   -your oncology team in [place]    Medication changes:   -START: loperamide 2 mg q4hr prn diarrhea  -CHANGE: synthroid 100 mcg->synthroid 125 mcg  Please continue your vancomycin taper for C diff: Please take 2 pills every day. On 12/3, take 1 pill every day x 7 days. On 12/10 take 1 pill 2x/week for 2 weeks

## 2023-11-27 NOTE — CARE PLAN
The patient's goals for the shift include        Problem: Fall/Injury  Goal: Not fall by end of shift  Outcome: Progressing  Goal: Be free from injury by end of the shift  Outcome: Progressing  Goal: Verbalize understanding of personal risk factors for fall in the hospital  Outcome: Progressing  Goal: Verbalize understanding of risk factor reduction measures to prevent injury from fall in the home  Outcome: Progressing  Goal: Use assistive devices by end of the shift  Outcome: Progressing  Goal: Pace activities to prevent fatigue by end of the shift  Outcome: Progressing     Problem: Skin  Goal: Decreased wound size/increased tissue granulation at next dressing change  Outcome: Progressing  Flowsheets (Taken 11/26/2023 2217)  Decreased wound size/increased tissue granulation at next dressing change: Protective dressings over bony prominences  Goal: Participates in plan/prevention/treatment measures  Outcome: Progressing  Flowsheets (Taken 11/26/2023 2217)  Participates in plan/prevention/treatment measures: Increase activity/out of bed for meals  Goal: Prevent/manage excess moisture  Outcome: Progressing  Flowsheets (Taken 11/26/2023 2217)  Prevent/manage excess moisture: Moisturize dry skin  Goal: Prevent/minimize sheer/friction injuries  Outcome: Progressing  Flowsheets (Taken 11/26/2023 2217)  Prevent/minimize sheer/friction injuries: Increase activity/out of bed for meals  Goal: Promote/optimize nutrition  Outcome: Progressing  Flowsheets (Taken 11/26/2023 2217)  Promote/optimize nutrition: Offer water/supplements/favorite foods  Goal: Promote skin healing  Outcome: Progressing  Flowsheets (Taken 11/26/2023 2217)  Promote skin healing: Protective dressings over bony prominences     Problem: Pain - Adult  Goal: Verbalizes/displays adequate comfort level or baseline comfort level  Outcome: Progressing     Problem: Safety - Adult  Goal: Free from fall injury  Outcome: Progressing     Problem: Discharge  Planning  Goal: Discharge to home or other facility with appropriate resources  Outcome: Progressing     Problem: Chronic Conditions and Co-morbidities  Goal: Patient's chronic conditions and co-morbidity symptoms are monitored and maintained or improved  Outcome: Progressing

## 2023-11-27 NOTE — DISCHARGE SUMMARY
Discharge Diagnosis  Colitis due to Clostridioides difficile    Issues Requiring Follow-Up  -follow-up drainage of abscess. 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 Of note, he was recently admitted from 11/14-11/21 for diarrhea and found to be C diff positive. He presented to the infusion clinic on 11/24 and was admitted for dehydration and vomiting. His C diff PCR was negative this admission. A CT AP was obtained and showed resolving partial SBO from previous SBO in August and a loculated intraperitoneal fluid collection in the left anterior peritoneal cavity 13 x 3.9 x 12.8 cm in size causing compression of adjacent bowel loops. A multifocal pneumonia was also seen on CT AP. His symptoms were attributed to a viral gastroenteritis and his symptoms improved after loperamide. Regarding fluid collection, an IR consult was placed for drainage of the fluid. However, patient declined procedure at the time stating that he wanted to consult his oncology team in [place] prior to undergoing any further procedures.    Pertinent Physical Exam At Time of Discharge  Physical Exam  Constitutional: NAD  HEENT: no sceral icterus  CV: RRR, no mrg  Pulm: CTAB  Abdominal: Nondistended, non-tender to palpation   MSK: no swelling, normal range of motion  Neuro: no focal deficit present    Home Medications     Medication List      START taking these medications     loperamide 2 mg capsule; Commonly known as: Imodium; Take 1 capsule (2   mg) by mouth 4 times a day as needed for diarrhea.     CHANGE how  you take these medications     DULoxetine 60 mg DR capsule; Commonly known as: Cymbalta; Take 1 capsule   (60 mg) by mouth once daily in the morning. Do not crush or chew.; What   changed: Another medication with the same name was removed. Continue   taking this medication, and follow the directions you see here.   * levothyroxine 100 mcg tablet; Commonly known as: Synthroid, Levoxyl;   What changed: Another medication with the same name was added. Make sure   you understand how and when to take each.   * levothyroxine 25 mcg tablet; Commonly known as: Synthroid, Levoxyl;   Take 1 tablet (25 mcg) by mouth once daily.; What changed: You were   already taking a medication with the same name, and this prescription was   added. Make sure you understand how and when to take each.  * This list has 2 medication(s) that are the same as other medications   prescribed for you. Read the directions carefully, and ask your doctor or   other care provider to review them with you.     CONTINUE taking these medications     acetaminophen 325 mg tablet; Commonly known as: Tylenol   aspirin 81 mg EC tablet   baclofen 5 mg tablet; Commonly known as: Lioresal; Take 1 tablet (5 mg)   by mouth 2 times a day.   Eliquis 5 mg tablet; Generic drug: apixaban; TAKE 1 TABLET BY MOUTH TWO   TIMES A DAY   finasteride 5 mg tablet; Commonly known as: Proscar; Take 1 tablet (5   mg) by mouth once daily. Do not crush, chew, or split. Do not start before   November 17, 2023.   LORazepam 0.5 mg tablet; Commonly known as: Ativan; Take 1 tablet (0.5   mg) by mouth 2 times a day as needed for anxiety (insomnia).   midodrine 5 mg tablet; Commonly known as: Proamatine   OLANZapine zydis 5 mg disintegrating tablet; Commonly known as: ZyPREXA;   Take 1 tablet (5 mg) by mouth once daily at bedtime.   ondansetron 4 mg tablet; Commonly known as: Zofran; Take 1 tablet (4 mg)   by mouth every 8 hours if needed for nausea.   pantoprazole 40 mg EC tablet; Commonly  known as: ProtoNix   rosuvastatin 20 mg tablet; Commonly known as: Crestor; Take 1 tablet (20   mg) by mouth once daily.   sildenafil 100 mg tablet; Commonly known as: Viagra   vancomycin 125 mg capsule; Commonly known as: Vancocin; Take 1 capsule   (125 mg) by mouth 4 times a day for 3 days, then 1 capsule twice daily for   7 days, then 1 capsule once daily for 7 days, then 1 capsule twice weekly   for 2 weeks.       Outpatient Follow-Up  Future Appointments   Date Time Provider Department Center   12/4/2023 11:30 AM Tiffanie Ozuna MD PhD [id number] [facility]   1/10/2024  8:30 AM Emilie Rey MD [id number] [facility]       Maria Luisa Ghotra MD

## 2023-11-29 NOTE — TELEPHONE ENCOUNTER
Spoke with spouse, she states while patient was in hospital, Dr Samuel recommended him having IR consult for drainage of fluid in peritoneal cavity.  She called IR to schedule and they advised her there is no order in system, asking to place order so she can schedule

## 2023-11-30 NOTE — TELEPHONE ENCOUNTER
Per Dr. Ozuna, I called the wife back and advised ER based on his ongoing symptoms. She asked if there was any way that he could at least be seen in [facility] first to avoid the ER wait. I told her I will reach out to the team.

## 2023-11-30 NOTE — TELEPHONE ENCOUNTER
Spoke to the wife not the pt so unable to do a full assessment. However, she said that Leonardo has been vomiting daily for multiple days in a row, difficulty eating although has an appetite, and has lost a significant amount of weight. Please see phone note from yesterday also. Pt's wife asked about a paracentesis- she said they did speak to their surgeon about it who agrees. She said he would definitely need this before Monday with as sick as he has been. Secure chat sent to the team.  Additional Information   Negative:      bile   Commented on: Review EMR for h/o immunotherapy. If positive, ask if patient is having these symptoms (they may be signs of immune-related hepatitis or colitis):     Takes Imodium.    Protocols used: Nausea/Vomiting

## 2023-11-30 NOTE — TELEPHONE ENCOUNTER
Discussed with the team and [facility]. Pt would need to go to the ER for eval of if he needs a paracentesis. Concern is for possible worsening colitis and he had a fluid collection/signs of peritonitis on his last CT. Pt's wife aware we advised that based on his symptoms he goes to the ER. She said she is going to call his surgeon in [place] to discuss with them first then she will call us back.

## 2023-12-05 NOTE — PROGRESS NOTES
Leonardo Wilder is a 68 y.o. male on day 3 of admission presenting with Colitis due to Clostridioides difficile.    Patient discharged on 11/27/23. Home care agency, [facility], reached out and requesting discharge summary, H&P, and MAR from last admission. This information was sent in [facility].  He was not active with them on discharge, but patient has been active with them before. Wife contacted agency as she was having trouble with accessing his port and needed to give patient fluids. Per home care agency, patient is back in the ED at [facility].    REED ROCHA RN TCC

## 2023-12-11 NOTE — PROGRESS NOTES
Patient ID: Leonardo Wilder is a 68 y.o. male.  Referring Physician: Tiffanie Ozuna MD PhD  [id number] [place] [address]  Primary Care Provider: Eddie Okeefe MD  Surgeon: Dr. 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     STAGING: IV    CURRENT SITES OF DISEASE: Appendix, peritoneum    MOLECULAR/GENOMIC:  MMR-proficient  SMAD4 mutation     TUMOR MARKER:   CEA: 1.5 7/2020  : <4 7/2020  CEA 8/8/20 1.5  CEA 11/23/20 4.2   CEA 12/21/20 1.1   CEA 1/4/2021 1.1   CEA 2/15/2021 1.0  CEA 1/13/22: 1.0  CEA 3/18/22: 1.1  CEA 4/11/22: 8.5  CEA 10/25/22: 1.3  CEA 1/5/23: 0.5  CEA 2/13/23: 1.3  CEA 4/27/23: 1.4  CEA 5/15/23: 2.6  CEA 6/12/23: 1.5  CEA 8/21/23: 2.0    CURRENT TREATMENT:   None    PRIOR TREATMENT:   FOLFOX 9/11/20 - 2/15/21  3/15/2021: Exploratory laparotomy, lysis of adhesions, radical intraperitoneal tumor debulking to include omentectomy, splenectomy, complete peritonectomy, R hemicolectomy, cholecystectomy, sigmoid and low anterior resection with colorectal anastomosis  and diverting loop ileostomy, HIPC with MMC, placement and removal of inflow and outflow catheters.   FOLFORI every 2 weeks started 4/20/22 x 3cycles then switched back to FOLFOX since he responded well to this regimen previously and he wasn't responding to FOLFORI  FOLFOX since 6/15/22, last dose ~7/2023 8/31/23: Ex lap, ALO, ileocolonic bypass of obstruction (HIPEC not done as no clear evidence of malignancy)  12/5/23: peritoneal drain placement for fluid collection    ACTIVE ONCOLOGIC ISSUES:   Enterocutaneous fistula right lower abdominal quadrant - had cellulitis requiring ATB   Portal vein thrombosis- started Eliquis but due to cost is seeking medication through [facility]  Recurrent small bowel obstructions  Peripheral neuropathy    HISTORY:   7/2020: Presented with 3mo h/o of weight loss, partially intentional but  excessive. Also with progressive abd fullness/bloating and early satiety. PCP ordered CT, which showed extensive peritoneal carcinomatosis and cecal/appendiceal tumor. C-scope showed  tumor in the cecum extending from the appendix. Biopsy: poorly differentiated signet ring adenocarcinoma. MMR-proficient  3/15/21: underwent debulking surgery with HIPEC (Dr. Jabari Em, [facility]). Post-operative course complicated by b/l pleural effusions  6/16/21: Ileostomy closure  6/27/21: Admitted to  with fever, increased drainage, c/f enterocutaneous fistula, transferred to [facility], treated with bowel rest and TPN  7/23/21: Repeat CT showed no evidence of bowel leak and CHANEL  1/2022: CT showed stable perisurgical nodules  2/2022-4/2022: Admitted several times for SBO. MRI at [facility] showed: multiple enhancing mesenteric and serosal lesions compatible with peritoneal carcinomatosis, distortion and narrowing of multiple blowel loops with probable partial small bowel obstruction  at the level of the right lower quadrant tumor. Periportal tumor extending along the hepatic fissures with associated occlusion of the left portal vein and marked narrowing of the distal main portal vein with poststenotic dilatation of the anterior right portal vein.    Subjective:  Patient has been hospitalized multiple times since last visit. Most recently, he was admitted with C diff, recurrent SBO, KEYSHA, and peritoneal fluid collection. He was recommended IR drain placement but patient opted to do this in [place] under the care of his surgical oncologist. He was admitted to [facility] 12/4 - 12/8 where a drain was placed. He had 1/4 blood cultures growing E coli for which he was placed on abx. Venting PEG was discussed but his N/V improved after drain placement so this was deferred. His symptoms were well controlled at [facility] but recurred after discharge over this weekend with significant nausea/vomiting. His drain output has been steady even with his recurrent  "symptoms, about 30-50cc daily. They are much better today. The fluid is becoming lighter/less bloody. He endorses fatigue, PADILLA, diarrhea. He denies fever, chills, dyspnea at rest, bleeding, rash, urinary symptoms, swelling.     ROS as above. Remainder of 10-point review of systems elicited and negative.    Family History   Problem Relation Name Age of Onset    Squamous cell carcinoma Other        reports that he has quit smoking. His smoking use included cigarettes. He has never used smokeless tobacco.  He  reports that he does not currently use alcohol.  He  reports no history of drug use.    Objective:  BP 99/55 (BP Location: Left arm, Patient Position: Sitting, BP Cuff Size: Adult)   Pulse 95   Temp 36.3 °C (97.3 °F)   Resp 16   Ht 1.666 m (5' 5.59\")   Wt 57.2 kg (126 lb 3.2 oz)   SpO2 92%   BMI 20.62 kg/m²     PE:  Gen: A&O, NAD, cachectic   Head: Normocephalic, atraumatic  Eyes: no scleral icterus  ENT: mucous membranes dry, no oropharyngeal lesions  Resp: Lungs CTAB  Cardiac: Normal rate, regular rhythm, no murmurs appreciated  Abdomen: Soft, nondistended, nontender, +BS, left sided peritoneal drain with serosang fluid   Neuro: CNII-XII grossly intact  Psych: appropriate mood & affect  Skin: warm, dry, no apparent rashes     Assessment & Plan:   Mr. 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 Em).      He developed recurrence to the peritoneum in 2022 and was started on FOLFIRI, however he had multiple issues with FOLFIRI and ultimately had progression on it.      We switched to FOLFOX 6/15/22, which is tolerating very well with mild Grade 1 neuropathy. Fosaprepitant added to his chemotherapy premedications, with improved nausea.      He is on midodrine for a hospitalization in early 2023 for COVID and orthostatic hypotension. His symptoms " continue, but are improved, will add IV Hydration to his chemotherapy. He does note new urinary symptoms of frequency especially at night and unsteady  stream. I suspect this may be partially related to the midodrine. He was recently evaluated by his surgeon at [facility] Dr. Tapia, who feels that he may be able to do a repeat debulking surgery in July.     6/12/23: peripheral neuropathy getting worse will decrease oxaliplatin to 75mg/m2      8/7/23: Recently admited & discharged for SBO & sepsis, was in the ICU for some time. Currently home for 3 days and getting stronger every day. He is working intensively on nutrition to gain at least 10 pounds prior to his surgery scheduled on Aug 31 at [facility]. I recommended against chemotherapy between now and his surgery, as I do not want to risk making it harder for him to fully recover his strenght & nutrition before his surgery, and he is in agreement. He will call my office when his surgery is completed or sooner if he has any new symptoms.     12/11/23: Presents after multiple admissions with infections, KEYSHA, recurrent SBO, and most recently a peritoneal fluid collection s/p IR drain placement 12/5/23 at [facility]. He felt symptomatically improved while admitted but had more nausea/vomiting since discharge. Feeling symptomatically better today but PS/weight has declined significantly since last visit. They report biopsies from 8/31/23 surgery showed malignancy. Unclear if patient's recurrent N/V is from worsening disease vs post-surgical changes.      Plan:  Metastatic appendiceal cancer  - hold systemic therapy for the time being given decline in PS  - will arrange resuming daily IVF via home care  - asked patient to follow-up with Dr. Em for drain management and SBO  - asked patient to call vs go to ER if N/V recurs for urgent imaging  - if patient's PS improves, discussed restarting FOLFOX vs 5FU alone vs fruquintinib     Not addressed this visit:   Iron deficiency  anemia  - fatigued  - will give IV venofer once precerted with insurance     Urinary frequency  - Trial of finasteride d/t concern for hypotension with tamsulosin (pt on midodrine)  - Urology consultation on 7/3     CIPN  - Continues on Duloxetine  - Managed by supportive oncology     Portal vein thrombosis  - started Eliquis but due to cost and not qualifying for financial assistance, has stopped taking it. Patient has requested Eliquis through [facility]  - Would recommend holding Eliquis for 2 days prior to a surgery and resuming when safe from a surgical standpoint     Patient seen and discussed with Dr. Ozuna.     Tito Urbano MD  Hematology-Oncology Fellow, PGY4  Hematology Consult Pager: [phone]  Oncology Consult Pager: [phone]

## 2023-12-11 NOTE — PROGRESS NOTES
NUTRITION Assessment NOTE    Nutrition Assessment     Reason for Visit:  Leonardo Wilder is a 68 y.o. male who I received an secure message from RN partner to MD to meet with patient and spouse to reassess nutrition status for patient.    I last spoke with patient on 10- and at that time  It is noted that He completed his surgery at [facility] on 8-.  (Cytoreductive surgery)   He was having diarrhea  It appears he was on TPN through [facility] as of  10-6-2023- but per supportive onc's note on 10- it was stopped. He tells me he has been off TPN for 7-10 days  He reports he had vomiting with the TPN- the team at [facility] felt it was related to the lipids.      Today he is here with his spouse  He has had 3 admission since this encounter  2 at  and 1 at [facility] - this admission was the most recent and was 12-4 to 12-8-2023  This admission was for persistent N/V, dehydration and recurrent c.diff   Treated with cefepime, metronidazole and his oral vancomycin was continued. A pigtail drain was placed into his peritoneal collection. fluid was sampled and remained negative. Over the course of his hospital stay, his renal function improved. Repeat blood cultures from December 5 were negative. At the time of discharge, he was ambulating, tolerating PO, and without significant pain. His diarrhea improved.   discharged on a seven day course of cefuroxime and a vancomycin taper with close follow up in our clinic.   At this visit he was defined as having severe malnutrition in the context of a chronic illness due to weight loss and po intake     Lab Results   Component Value Date/Time    GLUCOSE 67 (L) 11/27/2023 0520     11/27/2023 0520    K 3.3 (L) 11/27/2023 0520     11/27/2023 0520    CO2 22 11/27/2023 0520    ANIONGAP 14 11/27/2023 0520    BUN 14 11/27/2023 0520    CREATININE 1.59 (H) 11/27/2023 0520    EGFR 47 (L) 11/27/2023 0520    CALCIUM 7.3 (L) 11/27/2023 0520    ALBUMIN 2.5 (L) 11/27/2023 0520    ALKPHOS  "226 (H) 11/24/2023 1335    PROT 7.8 11/24/2023 1335    AST 12 11/24/2023 1335    BILITOT 0.5 11/24/2023 1335    ALT 7 (L) 11/24/2023 1335     No results found for: \"VITD25\"    Anthropometrics:     .6 cm  BMI: 20.6  IBW: 64.5  88.7% IBW      Wt Readings from Last 10 Encounters:   12/11/23 57.2 kg (126 lb 3.2 oz)   11/24/23 56.5 kg (124 lb 9.6 oz)   11/24/23 53.5 kg (117 lb 15.1 oz)   11/14/23 57.8 kg (127 lb 6.8 oz)   11/14/23 57.8 kg (127 lb 6.8 oz)   10/26/23 59.4 kg (130 lb 15.3 oz)   07/03/23 70.5 kg (155 lb 6 oz)   06/29/23 70.5 kg (155 lb 6.8 oz)   06/27/23 70.4 kg (155 lb 3.3 oz)   03/15/23 68.8 kg (151 lb 10.8 oz)        Food And Nutrient Intake:  Food and Nutrient History  Energy Intake: Poor < 50 %  Fluid Intake: taking 2 cups of water with liquid IV- coke- coffee  GI Symptoms: anorexia, nausea, vomiting, diarrhea     Food Intake  Meal 1: 3 to 4 /7 days will skip eating this meal- if he skips it is due to nausea- if he eats he might have 2 hard boiled eggs or get take out of eggs, ham and vegetables  Meal 2: He will skip lunch 50% of the time- this is agian due to nausea and if he eats he might have a sandwich with ham, or roast beef, cheese, lettuce, tomato and brennan along with couscous, potato salad or chips  Meal 3: He will usually always have dinner- kast night he had rotisarie chicken and baked potato on a bad day will have soup  Snacks: not snackin                        Parenteral Nutrition Intake  IV Fluids: He is again being set up with IVF                   Food Supplement Intake  Oral Nutrition Supplements:  (Is trying to take Ensure high protein- this is not well tolerated - if he lays down after eating it will come up on him)           Nutrition Focused Physical Exam Findings:      Subcutaneous Fat Loss  Orbital Fat Pads: Severe (dark circles, hollowing and loose skin)  Buccal Fat Pads: Severe (hollow, sunken and narrow face)    Muscle Wasting  Temporalis: Severe (hollowed scooping " depression)  Pectoralis (Clavicular Region): Mild-Moderate (some protrusion of clavicle)    Edema  Edema: none         Energy Needs     1725 -2000 calories to   He reports his goal has been 60-90 grams which makes sense- but is no where near goal   Fluid needs 1725 to 2000 +      Nutrition Diagnosis   Malnutrition Diagnosis  Patient has Malnutrition Diagnosis: Yes  Diagnosis Status: Ongoing  Malnutrition Diagnosis: Severe malnutrition related to chronic disease or condition  As Evidenced by: ongoing issues with po intake due to N/V and diarrhea         Nutrition Interventions/Recommendations   Food and Nutrition Delivery   Goal is to control N/V/D to hopefully impact ability to take po intake- because I am told at [facility] once drain was placed- patient felt much better and was able to eat and fell well- this changed when he got back home- hydration will be set up to help- additionally I provided patient with banatrol plus to try to help with diarrhea and in turn to help with hydration   Additionally plan will be to call patient tomorrow- wanted to review chart and gain more knowledge about hospitalization prior to making more recs     Nutrition Education   Take banatrol plus 1 to 6 times per day- samples provided       Coordination of Care   Med onc     There are no Patient Instructions on file for this visit.    Nutrition Monitoring and Evaluation    Will call patient tomorrow - need to address fluid intake         Time Spent  Prep time on day of patient encounter: 15 minutes  Time spent directly with patient, family or caregiver: 20 minutes  Additional Time Spent on Patient Care Activities: 10 minutes  Documentation Time: 30 minutes  Other Time Spent: 0 minutes  Total: 75 minutes

## 2023-12-12 NOTE — PROGRESS NOTES
NUTRITION COMMUNICATION NOTE    Leonardo Wilder     REASON FOR COMMUNICATION:     Patient was called to see how he was feeling and eating.  Also wanted to see how banatrol plus was tolerated and impact on BMs  VM left  Will continue to follow         Time Spent  Prep time on day of patient encounter: 5 minutes  Time spent directly with patient, family or caregiver: 5 minutes  Additional Time Spent on Patient Care Activities: 0 minutes  Documentation Time: 5 minutes  Other Time Spent: 0 minutes  Total: 15 minutes

## 2023-12-12 NOTE — PROGRESS NOTES
[facility] was consulted on 12/11/23 to see patient and spouse at [facility] with Dr. Ozuna. 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 Patient recently inpatient at  and from 11/24/23 to 11/27/23. Patient went to [facility] ED and was inpatient from 12/4/23 to 12/9/23. Patient had his port de-accessed and needs it re-accessed for spouse to complete daily home hydration. [facility] met with patient and spouse on 12/11/23. Patient provided SW with [facility] [phone] where patient has his supplies through. UCHE called and patient has [facility] for skilled nursing. Contact for [facility] is [phone]. UCHE called [place] and she indicated there needs to be another skilled need for patient to have his port re-accessed. UCHE spoke to Dr. Ozuna, patient has new drain for abscess. Dr. Ozuna wrote order. UCHE faxed over face sheet, H&P from [facility], Discharge summary from [facility], Physician office note and home care order today to [place] at [phone]. [facility] also called [facility]  to update on patient's status. Patient;s case was reactivated for supplies to be order. [facility] called patient today and provided him with an update and encouraged him to call [facility] if he has any other needs.

## 2023-12-18 NOTE — TELEPHONE ENCOUNTER
Leonardo Wilder called the refill line for Protonix, Crestor, Finasteride and Baclofen . Medications pended to team to approve and submit.     [facility] pharmacy.   Next FUV 1/4/24.

## 2023-12-18 NOTE — PROGRESS NOTES
Visit Type: Clinical Nutrition, Oncology, Telephone Visit:  A telephone visit (audio only) between the patient (at the distant site) and the provider (at the originating site) was utilized to provide this telehealth service.    Verbal Consent for Encounter: Verbal consent was requested and obtained from patient on this date for a telehealth visit.    NUTRITION COMMUNICATION NOTE    Leonardo Wilder     REASON FOR COMMUNICATION:       Banatrol helped - gave more notice  Still needs imodium    N/V is improved- less of an issue  Can cough or burp and feel the last thing he ate- it will come up     IVF started around 12-    No updates from [facility]  Keeping chart of drains- and needs to report to them    Intake:  [place] and chilli   Breaded mushrooms  Cheese sticks   B and Lunch still skipping about 50% of the time     Drinking:   Ensure - taking 1 per day  Coke  Carbonated water    Plan:  Build on frequency of eating / drinking- asked to start with calorie containing beverages as he can if he is unable to eat at a meal   Time Spent  Prep time on day of patient encounter: 10 minutes  Time spent directly with patient, family or caregiver: 10 minutes  Additional Time Spent on Patient Care Activities: 0 minutes  Documentation Time: 5 minutes  Other Time Spent: 0 minutes  Total: 25 minutes

## 2023-12-22 NOTE — TELEPHONE ENCOUNTER
Patient's spouse states that Leonardo's home health nurse is sick and he has not had labs in a couple weeks. Would you like him to come in for labs? If so, orders are needed.

## 2023-12-27 NOTE — PROGRESS NOTES
Pharmacy Medication History Review    Leonardo Wilder is a 68 y.o. male admitted for Hypomagnesemia. Pharmacy reviewed the patient's [id number] medications and allergies for accuracy.    The list below reflects the updated PTA list. Comments regarding how patient may be taking medications differently can be found in the Admit Orders Activity  Prior to Admission Medications   Prescriptions Last Dose Informant Patient Reported?   0.9 % sodium chloride (sodium chloride 0.9%) solution 12/26/2023 Spouse/Significant Other Yes   Sig: Infuse 1,000 mL into a venous catheter once daily.   DULoxetine (Cymbalta) 30 mg DR capsule 12/27/2023 Spouse/Significant Other Yes   Sig: Take 1 capsule (30 mg) by mouth once daily in the morning.   DULoxetine (Cymbalta) 60 mg DR capsule   No   Sig: Take 1 capsule (60 mg) by mouth once daily in the morning. Do not crush or chew.   LORazepam (Ativan) 0.5 mg tablet 12/26/2023 Spouse/Significant Other No   Sig: Take 1 tablet (0.5 mg) by mouth 2 times a day as needed for anxiety (insomnia).   Lactobacillus acidophilus (PROBIOTIC ORAL) 12/26/2023 Spouse/Significant Other Yes   Sig: Take 1 tablet by mouth once daily.   OLANZapine zydis (ZyPREXA) 5 mg disintegrating tablet 12/26/2023 Spouse/Significant Other No   Sig: Take 1 tablet (5 mg) by mouth once daily at bedtime.   acetaminophen (Tylenol) 325 mg tablet Unknown Spouse/Significant Other Yes   Sig: Take 2 tablets (650 mg) by mouth every 6 hours if needed.   apixaban (Eliquis) 5 mg tablet 12/27/2023 Spouse/Significant Other No   Sig: TAKE 1 TABLET BY MOUTH TWO TIMES A DAY   aspirin 81 mg EC tablet 12/26/2023 Spouse/Significant Other Yes   Sig: Take 1 tablet (81 mg) by mouth once daily.   baclofen (Lioresal) 5 mg tablet 12/26/2023 Spouse/Significant Other No   Sig: Take 1 tablet (5 mg) by mouth 2 times a day.   cyanocobalamin (Vitamin B-12) 1,000 mcg tablet 12/26/2023 Spouse/Significant Other Yes   Sig: Take 1 tablet (1,000 mcg) by mouth once  daily.   finasteride (Proscar) 5 mg tablet 12/22/2023 Spouse/Significant Other No   Sig: Take 1 tablet (5 mg) by mouth once daily. Do not crush, chew, or split.   inulin (FIBER GUMMIES ORAL) 12/26/2023 Spouse/Significant Other Yes   Sig: Take 1 Piece by mouth once daily.   levothyroxine (Synthroid, Levoxyl) 100 mcg tablet 12/27/2023 Spouse/Significant Other Yes   Sig: Take 1 tablet (100 mcg) by mouth once daily.   levothyroxine (Synthroid, Levoxyl) 25 mcg tablet 12/27/2023 Spouse/Significant Other No   Sig: Take 1 tablet (25 mcg) by mouth once daily.   loperamide (Imodium) 2 mg capsule 12/27/2023 Spouse/Significant Other No   Sig: Take 1 capsule (2 mg) by mouth 4 times a day as needed for diarrhea.   magnesium oxide (Mag-Ox) 420 mg tablet 12/27/2023 Spouse/Significant Other Yes   Sig: Take 1 tablet (420 mg) by mouth 2 times a day.   midodrine (Proamatine) 5 mg tablet 12/27/2023 Spouse/Significant Other Yes   Sig: Take 1 tablet (5 mg) by mouth 3 times a day.   ondansetron (Zofran) 4 mg tablet 12/27/2023 Spouse/Significant Other No   Sig: Take 1 tablet (4 mg) by mouth every 8 hours if needed for nausea.   pantoprazole (ProtoNix) 40 mg EC tablet 12/27/2023 Spouse/Significant Other No   Sig: Take 1 tablet (40 mg) by mouth once daily.   pregabalin (Lyrica) 200 mg capsule 12/27/2023 Spouse/Significant Other Yes   Sig: Take 1 capsule (200 mg) by mouth 3 times a day.   rosuvastatin (Crestor) 20 mg tablet 12/26/2023 Spouse/Significant Other No   Sig: Take 1 tablet (20 mg) by mouth once daily.   vancomycin (Vancocin) 125 mg capsule 12/26/2023 Spouse/Significant Other No   Sig: Take 1 capsule (125 mg) by mouth 4 times a day for 3 days, then 1 capsule twice daily for 7 days, then 1 capsule once daily for 7 days, then 1 capsule twice weekly for 2 weeks.   vibegron (Gemtesa) 75 mg tablet Unknown Spouse/Significant Other Yes   Sig: Take 1 tablet (75 mg) by mouth once daily.      Facility-Administered Medications: None        The  list below reflects the updated allergy list. Please review each documented allergy for additional clarification and justification.  Allergies  Reviewed by Loretta Alejandre [facility] on 12/27/2023   No Known Allergies         Patient declines M2B at discharge. Pharmacy has been updated to [facility] [place].    Sources used to confirm home medication list include: Interview with patient and wif , Michelle, [facility], Care Everywhere, medication fill history.    Below are additional concerns with the patient's PTA list.  None to note.    Loretta Alejandre [facility]   Transitions of Care Pharmacist  [facility]  Please reach out via Secure Chat for questions, or if no response call [employer] or [facility]

## 2023-12-27 NOTE — H&P
History Of Present Illness  Leonardo Wilder is a 68 y.o. 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     Patient presented to the ED today per request of his outpatient oncologist after having labwork completed yesterday showing significant hypoMg. Patient endorses that he feels otherwise well and similar to his baseline. He denies having any significant new medication other than baclofen that was tried recently. Denies significant episodes of N/V and reports that they are somewhat improved in comparison to prior admissions. Denies cramps, palpitations, overt weakness, worsening fatigue. Denies HA, CP, SOB, abdominal pain. Do endorses chronic diarrhea that is not different from his baseline. Endorses that he still do IVF infusions daily with his wife.    As a small summary from prior admission: Patient was admitted at  d/t diarrhea w/ initial concerns for c. Diff. His c. Diff PCR was negative and abdominal exam and imaging showed some improvement of distention and obstruction, so patient was treated supportively. On CT scan it was showed a loculated peritoneal fluid and it was recommended drainage, which patient refused wanting a second opinion from his oncologist. After seein his primary oncologist at [facility], patient ultimately had his abdominal drain placed by IR on Dec/05, in which a bulb is drained roughly 2x per day. Around 2 weeks after having the drain placed he had routine labs that were stable. No new cancer treatment since.     Past Medical History  Past Medical History:   Diagnosis Date    Cancer of appendix (CMS/HCC)     Coronary artery calcification     Hypothyroidism     LBBB (left bundle branch block)     Portal vein external  compression        Surgical History  Past Surgical History:   Procedure Laterality Date    ABDOMINAL SURGERY          Social History  He reports that he quit smoking about 21 years ago. His smoking use included cigarettes. He smoked an average of 1 pack per day. He has never used smokeless tobacco. He reports that he does not currently use alcohol. He reports that he does not use drugs.    Family History  Family History   Problem Relation Name Age of Onset    Heart disease Father      Squamous cell carcinoma Other          Allergies  Patient has no known allergies.    Review of Systems   Constitutional:  Negative for activity change, appetite change, chills and fever.   Respiratory:  Negative for cough, chest tightness and shortness of breath.    Gastrointestinal:  Negative for abdominal distention, abdominal pain, blood in stool and constipation.   Endocrine: Negative for cold intolerance and heat intolerance.   Genitourinary:  Negative for difficulty urinating and dysuria.   Musculoskeletal:  Negative for arthralgias and myalgias.   All other systems reviewed and are negative.           Physical Exam  Constitutional:       General: He is not in acute distress.     Appearance: Normal appearance. He is ill-appearing. He is not toxic-appearing.   Eyes:      Extraocular Movements: Extraocular movements intact.      Pupils: Pupils are equal, round, and reactive to light.   Cardiovascular:      Rate and Rhythm: Normal rate and regular rhythm.      Pulses: Normal pulses.      Heart sounds: No murmur heard.  Pulmonary:      Effort: Pulmonary effort is normal. No respiratory distress.      Breath sounds: Normal breath sounds.   Abdominal:      General: Abdomen is flat. There is no distension.      Palpations: Abdomen is soft.      Tenderness: There is no abdominal tenderness. There is no guarding.   Musculoskeletal:         General: No swelling.   Skin:     General: Skin is warm.      Capillary Refill: Capillary refill  "takes less than 2 seconds.   Neurological:      General: No focal deficit present.      Mental Status: He is alert and oriented to person, place, and time.          Last Recorded Vitals  Blood pressure 127/78, pulse 84, temperature 36.5 °C (97.7 °F), temperature source Temporal, resp. rate 16, height 1.702 m (5' 7\"), weight 58.1 kg (128 lb), SpO2 96 %.    Relevant Results    Scheduled medications  apixaban, 5 mg, oral, BID  aspirin, 81 mg, oral, Daily  DULoxetine, 60 mg, oral, q AM  finasteride, 5 mg, oral, Daily  levothyroxine, 25 mcg, oral, Daily  magnesium sulfate, 4 g, intravenous, Once  OLANZapine zydis, 5 mg, oral, Nightly  pantoprazole, 40 mg, oral, Daily  rosuvastatin, 20 mg, oral, Daily      Continuous medications     PRN medications  PRN medications: acetaminophen, ondansetron    Results for orders placed or performed during the hospital encounter of 12/27/23 (from the past 24 hour(s))   CBC and Auto Differential   Result Value Ref Range    WBC 11.6 (H) 4.4 - 11.3 x10*3/uL    nRBC 0.0 0.0 - 0.0 /100 WBCs    RBC 2.71 (L) 4.50 - 5.90 x10*6/uL    Hemoglobin 7.2 (L) 13.5 - 17.5 g/dL    Hematocrit 22.0 (L) 41.0 - 52.0 %    MCV 81 80 - 100 fL    MCH 26.6 26.0 - 34.0 pg    MCHC 32.7 32.0 - 36.0 g/dL    RDW 19.9 (H) 11.5 - 14.5 %    Platelets 486 (H) 150 - 450 x10*3/uL    Neutrophils % 51.5 40.0 - 80.0 %    Immature Granulocytes %, Automated 0.3 0.0 - 0.9 %    Lymphocytes % 36.9 13.0 - 44.0 %    Monocytes % 8.7 2.0 - 10.0 %    Eosinophils % 2.3 0.0 - 6.0 %    Basophils % 0.3 0.0 - 2.0 %    Neutrophils Absolute 5.95 1.20 - 7.70 x10*3/uL    Immature Granulocytes Absolute, Automated 0.04 0.00 - 0.70 x10*3/uL    Lymphocytes Absolute 4.26 1.20 - 4.80 x10*3/uL    Monocytes Absolute 1.00 0.10 - 1.00 x10*3/uL    Eosinophils Absolute 0.26 0.00 - 0.70 x10*3/uL    Basophils Absolute 0.04 0.00 - 0.10 x10*3/uL   Comprehensive metabolic panel   Result Value Ref Range    Glucose 79 74 - 99 mg/dL    Sodium 142 136 - 145 mmol/L    " Potassium 3.1 (L) 3.5 - 5.3 mmol/L    Chloride 106 98 - 107 mmol/L    Bicarbonate 26 21 - 32 mmol/L    Anion Gap 13 10 - 20 mmol/L    Urea Nitrogen 9 6 - 23 mg/dL    Creatinine 0.87 0.50 - 1.30 mg/dL    eGFR >90 >60 mL/min/1.73m*2    Calcium 7.7 (L) 8.6 - 10.6 mg/dL    Albumin 2.5 (L) 3.4 - 5.0 g/dL    Alkaline Phosphatase 283 (H) 33 - 136 U/L    Total Protein 6.6 6.4 - 8.2 g/dL    AST 21 9 - 39 U/L    Bilirubin, Total 0.3 0.0 - 1.2 mg/dL    ALT 9 (L) 10 - 52 U/L   Magnesium   Result Value Ref Range    Magnesium 0.88 (L) 1.60 - 2.40 mg/dL   Phosphorus   Result Value Ref Range    Phosphorus 3.1 2.5 - 4.9 mg/dL   Blood Gas Venous Full Panel   Result Value Ref Range    POCT pH, Venous 7.41 7.33 - 7.43 pH    POCT pCO2, Venous 42 41 - 51 mm Hg    POCT pO2, Venous 47 (H) 35 - 45 mm Hg    POCT SO2, Venous 69 45 - 75 %    POCT Oxy Hemoglobin, Venous 68.2 45.0 - 75.0 %    POCT Hematocrit Calculated, Venous 23.0 (L) 41.0 - 52.0 %    POCT Sodium, Venous 139 136 - 145 mmol/L    POCT Potassium, Venous 3.3 (L) 3.5 - 5.3 mmol/L    POCT Chloride, Venous 106 98 - 107 mmol/L    POCT Ionized Calicum, Venous 1.11 1.10 - 1.33 mmol/L    POCT Glucose, Venous 84 74 - 99 mg/dL    POCT Lactate, Venous 0.8 0.4 - 2.0 mmol/L    POCT Base Excess, Venous 1.8 -2.0 - 3.0 mmol/L    POCT HCO3 Calculated, Venous 26.6 (H) 22.0 - 26.0 mmol/L    POCT Hemoglobin, Venous 7.6 (L) 13.5 - 17.5 g/dL    POCT Anion Gap, Venous 10.0 10.0 - 25.0 mmol/L    Patient Temperature 37.0 degrees Celsius    FiO2 21 %     CT ABDOMEN 12/4 ([facility])    Impression:   * Small bowel dilatation with transition points in the left mid   abdomen and right lower abdomen, concerning for small bowel   obstruction which may be related to increased burden of   carcinomatosis or adhesions.   *  Increase in left anterior peritoneal loculated fluid, measuring   11.7 x 2.6 cm.   *  Collection in the right gluteus musculature is new and may   represent an intramuscular hematoma or abscess.   *   Redemonstrated carcinomatosis along the falciform ligament with   extension along the portal vasculature demonstrating mass effect on   the main portal vein and its branches.   *  Right middle and lower lobe airspace opacities likely represents   recurrent aspiration pneumonia and subsegmental atelectasis. Interval   resolution of right sided pleural effusion.   *  Stable postsurgical changes related to exploratory laparotomy,   ileocolic anastomosis, and small bowel anastomosis.      Assessment/Plan     Leonardo Wilder is a 68 y.o. 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 (Feb/2021 and Aug/2023), c/b chronic malnutrition on outpatient IVF therapies d/t persistent N/V, who is admitted after outpatient labs showed significant hypoMg.     #HypoK  #HypoMg  :: K 3.1 and Mg 0.88 on admission  :: likely multifactorial from poor PO intake, N/V and diarrhea  :: unlikely d/t medications since no major med changes   :: can have a component of losses from abdominal drain? From brief literature review unclear  :: s/p Mg and K replenishment at the ED  - will continue to monitor and replete  - RFP + MG q8hrs for now    #Appendiceal cancer   #c/b portal vein thrombosis  :: s/p  neoadjuvant chemo w/ FOLFOX, 2 prior sx and 2x HIPEC @ [facility] (Feb/2021 and Aug/2023)  :: on home apixaban chronically  - c/w home apixaban    # Hypothyrodism  :: TSH 28.34 , fT4 0.75 (Nov/2023)  - c/w home 125mcg of levothyroxine    #Chronic diarreha  #Hx of c. Diff  :: per chart review, patient was on oral vanc ge, completed on 12/22  - will continue to monitor    #CAD  #Charted hx of PCI (patient denies ever having a stent)  - c/w home aspirin and statins    #MDD  - c/w home meds    #Urinary strain  - c/w home finasteride    F: PRN  E: PRN  N: Regular  A: PIV  DVT: Apixaban  GI: Home PPI  NOK: Michelle (wife) [phone]  Code Status: Full  Code (confirmed on admission)    Esdras Coleman MD

## 2023-12-27 NOTE — ED TRIAGE NOTES
Pt had labs drawn yesterday and was called today and told his magnesium is low. Pt is being treated for cancer in his appendix. Pt is not on chemo or radiation at this time

## 2023-12-27 NOTE — ED PROVIDER NOTES
HPI   Chief Complaint   Patient presents with    low magnesium       History provided by:  Patient and spouse   used: Nadiya    Leonardo Wilder is a 69 yo  male with PMHx of stage IV appendiceal cancer, 2 prior surgeries with HIPEC at [facility] (2/21 and 8/23) presenting after patient's oncology team from [facility] recommended he go to ED for admission after labs demonstrated hypomagnesemia 0.89. He denies having any new acute sxs, but his wife believes that his brain fog has gotten worse in the past few months. Other sxs he has been dealing with since his most recent surgery are fatigue, vomiting 1-2x/day, loss of appetite.     His most recent hospitalization was 11/27/23 for colitis due to C. Diff infection.     Scheduled medications:  Apixaban 5mg PO Q12  Aspirin 81mg PO daily  Duloxetine 60mg PO daily  Finasteride 5mg PO daily  Levothyroxine 125mcg PO daily  Olanzapine 5mg PO nightly  Pantoprazole 40mg PO daily before breakfast  Loperamide 2mg 4x prn  Acetaminophen 325mg  Baclofen 5mg 2x/day  Eliquis 5mg PO 2x/day  Zofran 4mg Q8 prn  Rosuvastatin 20mg PO daily                      Tabatha Coma Scale Score: 15                  Patient History   No past medical history on file.  No past surgical history on file.  Family History   Problem Relation Name Age of Onset    Squamous cell carcinoma Other       Social History     Tobacco Use    Smoking status: Former     Types: Cigarettes    Smokeless tobacco: Never   Substance Use Topics    Alcohol use: Not Currently    Drug use: Never       Physical Exam   ED Triage Vitals [12/27/23 1051]   Temp Heart Rate Resp BP   36.5 °C (97.7 °F) 89 16 125/82      SpO2 Temp Source Heart Rate Source Patient Position   92 % Temporal Monitor Sitting      BP Location FiO2 (%)     Left arm --       Physical Exam  Constitutional:       Appearance: He is ill-appearing.   Cardiovascular:      Rate and Rhythm: Normal rate and regular rhythm.      Pulses: Normal pulses.      Heart  sounds: Normal heart sounds.   Pulmonary:      Effort: Pulmonary effort is normal.      Breath sounds: Normal breath sounds.   Skin:     General: Skin is warm and dry.      Capillary Refill: Capillary refill takes 2 to 3 seconds.   Neurological:      Mental Status: He is alert and oriented to person, place, and time.   Psychiatric:         Mood and Affect: Mood normal.         Behavior: Behavior normal.         Thought Content: Thought content normal.         Judgment: Judgment normal.         ED Course & MDM   Diagnoses as of 12/27/23 1318   Hypomagnesemia       Medical Decision Making  Leonardo Wilder is a 67 yo  male with PMHx of stage IV appendiceal cancer, 2 prior surgeries with HIPEC at [facility] (2/21 and 8/23) presenting after patient's oncology team from [facility] recommended he go to ED for admission after labs demonstrated hypomagnesemia 0.89. He denies having any new acute sxs, but his wife believes that his brain fog has gotten worse in the past few months. Other sxs he has been dealing with since his most recent surgery are fatigue, vomiting 1-2x/day, loss of appetite.   Differential Dx- Hypokalemia, excess vomiting causing electrolyte imbalance  In the ED his ECG showed HR of 83 with NO concerning or acute findings, no muscle spasm or tremors and no abnormal heart rhythm.    His labs     Showed potassium of 3.1 for which he was given 40 of K PO, magnesium of 0.88 and 4 g of magnesium has been ordered and currently infusing. H&H 7.2 & 22.0 which is very slightly lower than his H&H from yesterday.  Platelet is stable with WBC of 11.6  He was given Zofran IV for nausea. And was admitted to medicine team for further medical management. The patient and his spouse are aware and in agreement with the plan of care.   Patient is staffed with Dr. Hess.   Procedure  Procedures     I was present with the PA student who participated in the documentation of this note.  I have personally seen and re-examined the  patient and performed the medical decision-making components (assessment and plan of care). I have reviewed the PA student documentation and verified the findings in the note as written with additions or exceptions as stated in the body of this note.     Yamileth Ambrosio, BUFFY-CNP, DNP  12/27/23 0615

## 2023-12-27 NOTE — CARE PLAN
Problem: Psychosocial Needs  Goal: Collaborate with me, my family, and caregiver to identify my specific goals  Recent Flowsheet Documentation  Taken 12/27/2023 1813 by Crissy Bolaños RN  Cultural Requests During Hospitalization: none  Spiritual Requests During Hospitalization: none   The patient's goals for the shift include  free from falls/injury    The clinical goals for the shift include  free from injury

## 2023-12-27 NOTE — PROGRESS NOTES
Received call via the transfer center from this patient's oncology team from [facility], in short patient with a history of appendiceal cancer with peritoneal carcinomatosis, outpatient labs by oncology team demonstrating hypomagnesemia of 0.89.  Patient's oncologist referring patient to the emergency department for evaluation, they are requesting consideration for admission.  Point of contact is Jessica (patient's oncology PA from [facility]) who is available at [phone].

## 2023-12-28 PROBLEM — E83.42 HYPOMAGNESEMIA: Status: RESOLVED | Noted: 2023-01-01 | Resolved: 2023-01-01

## 2023-12-28 NOTE — HOSPITAL COURSE
Meño is a 68 y.o. male w/ PMHx of appendiceal cancer w/ mets to abdomen c/b portal vein thrombosis (on chronic AC) s/p neoadjuvant chemo w/ FOLFOX, 2 prior sx and 2x HIPEC @ [facility] (Feb/2021 and Aug/2023), c/b chronic malnutrition on outpatient IVF therapies d/t persistent N/V who was admitted after outpatient labs showed significant hypoMg to 0.88. Patient was repleted with IV magnesium and IV potassium. Follow-up labs showed Mg 2.00 and K 3.7. The patient was discharged home in stable condition on 12/28.

## 2023-12-28 NOTE — DISCHARGE INSTRUCTIONS
Dear Jessica Meño,    You came to the hospital due to dangerously low magnesium levels. We gave you magnesium replacement via the IV until your magnesium was normal. Please continue to take your oral magnesium supplements and ask your cancer doctor if you should increase the dose.    Thank you,  Your  Healthcare Team

## 2023-12-28 NOTE — DISCHARGE SUMMARY
Discharge Diagnosis  Hypomagnesemia    Issues Requiring Follow-Up  [ ] Continue to monitor labs and consider increasing magnesium and potassium supplementation.    Test Results Pending At Discharge  Pending Labs       No current pending labs.            Hospital Course  Meño is a 68 y.o. male w/ PMHx of appendiceal cancer w/ mets to abdomen c/b portal vein thrombosis (on chronic AC) s/p neoadjuvant chemo w/ FOLFOX, 2 prior sx and 2x HIPEC @ [facility] (Feb/2021 and Aug/2023), c/b chronic malnutrition on outpatient IVF therapies d/t persistent N/V who was admitted after outpatient labs showed significant hypoMg to 0.88. Patient was repleted with IV magnesium and IV potassium. Follow-up labs showed Mg 2.00 and K 3.7. The patient was discharged home in stable condition on 12/28.     Pertinent Physical Exam At Time of Discharge  GEN: no acute distress  HEENT: PERRLA, EOMI, moist mucous membranes, no scleral icterus  NECK: Supple  CV: Warm and well-perfused  PULM: Breathing comfortably  AB: Soft, non-tender, non-distended, drain in place c/d/i  : No in-dwelling zavala  MSK: No lower extremity edema bilaterally  NEURO: A&Ox3, following commands, conversational    Home Medications     Medication List      CHANGE how you take these medications     vancomycin 125 mg capsule; Commonly known as: Vancocin; Take 1 capsule   (125 mg) by mouth 2 times a week. Take 1 capsule tomorrow (12/29), then   start twice weekly dosing; What changed: when to take this, additional   instructions     CONTINUE taking these medications     acetaminophen 325 mg tablet; Commonly known as: Tylenol   aspirin 81 mg EC tablet   baclofen 5 mg tablet; Commonly known as: Lioresal; Take 1 tablet (5 mg)   by mouth 2 times a day.   cyanocobalamin 1,000 mcg tablet; Commonly known as: Vitamin B-12   * DULoxetine 30 mg DR capsule; Commonly known as: Cymbalta   * DULoxetine 60 mg DR capsule; Commonly known as: Cymbalta; Take 1   capsule (60 mg) by mouth once daily  in the morning. Do not crush or chew.   Eliquis 5 mg tablet; Generic drug: apixaban; TAKE 1 TABLET BY MOUTH TWO   TIMES A DAY   FIBER GUMMIES ORAL   finasteride 5 mg tablet; Commonly known as: Proscar; Take 1 tablet (5   mg) by mouth once daily. Do not crush, chew, or split.   Gemtesa 75 mg tablet; Generic drug: vibegron   * levothyroxine 100 mcg tablet; Commonly known as: Synthroid, Levoxyl   * levothyroxine 25 mcg tablet; Commonly known as: Synthroid, Levoxyl;   Take 1 tablet (25 mcg) by mouth once daily.   loperamide 2 mg capsule; Commonly known as: Imodium; Take 1 capsule (2   mg) by mouth 4 times a day as needed for diarrhea.   LORazepam 0.5 mg tablet; Commonly known as: Ativan; Take 1 tablet (0.5   mg) by mouth 2 times a day as needed for anxiety (insomnia).   magnesium oxide 420 mg tablet; Commonly known as: Mag-Ox   midodrine 5 mg tablet; Commonly known as: Proamatine   OLANZapine zydis 5 mg disintegrating tablet; Commonly known as: ZyPREXA;   Take 1 tablet (5 mg) by mouth once daily at bedtime.   ondansetron 4 mg tablet; Commonly known as: Zofran; Take 1 tablet (4 mg)   by mouth every 8 hours if needed for nausea.   pantoprazole 40 mg EC tablet; Commonly known as: ProtoNix; Take 1 tablet   (40 mg) by mouth once daily.   pregabalin 200 mg capsule; Commonly known as: Lyrica   PROBIOTIC ORAL   rosuvastatin 20 mg tablet; Commonly known as: Crestor; Take 1 tablet (20   mg) by mouth once daily.   sodium chloride 0.9% solution  * This list has 4 medication(s) that are the same as other medications   prescribed for you. Read the directions carefully, and ask your doctor or   other care provider to review them with you.       Outpatient Follow-Up  Future Appointments   Date Time Provider Department Center   1/4/2024  4:00 PM Tiffanie Ozuna MD PhD [id number] [facility]   1/10/2024  8:30 AM Emilie Rey MD [id number] [facility]       Vinita Tapia MD      >30 minutes were spent on discharge coordination and  planning.

## 2023-12-28 NOTE — PROGRESS NOTES
12/28/23 1002   Discharge Planning   Living Arrangements Spouse/significant other   Support Systems Spouse/significant other;Family members   Assistance Needed Pt was active with a home care agency for skilled nursing.   Type of Residence Private residence   Home or Post Acute Services In home services   Type of Home Care Services Home nursing visits   Patient expects to be discharged to: resumed home care   Does the patient need discharge transport arranged? No  (wife will provide.)   Financial Resource Strain   How hard is it for you to pay for the very basics like food, housing, medical care, and heating? Not hard   Housing Stability   In the last 12 months, was there a time when you were not able to pay the mortgage or rent on time? N   In the last 12 months, was there a time when you did not have a steady place to sleep or slept in a shelter (including now)? N   Transportation Needs   In the past 12 months, has lack of transportation kept you from medical appointments or from getting medications? no   In the past 12 months, has lack of transportation kept you from meetings, work, or from getting things needed for daily living? No     Assessment Note:  Met with pt and introduced myself as care coordinator and member of the Care Transitions team for discharge planning.   Pt feels safe at home.  Pt's wife provides transport to drs appts.  Pt's address, phone number and contact information was verified.  Pt does not have any questions/concerns at this time.     Pt admitted with abnormal outpatient labs, plan to discharge home today with outpatient follow up. No discharge needs anticipated.    Previous Home Care: Pt has a nurse visit weekly for vital signs check but cannot recall the name of the home care agency.  DME: none  Pharmacy: [facility] [address] in [place].  Falls: denies  PCP:  Eddie Okeefe (last seen 3 months ago).    Skylar Bradford MSN, RN-BC  Transitional Care Coordinator (TCC)  [phone]

## 2023-12-28 NOTE — CARE PLAN
The clinical goals for the shift include remaining free from falls during shift    Problem: Fall/Injury  Goal: Not fall by end of shift  12/28/2023 0043 by Yessy Post RN  Outcome: Progressing  12/28/2023 0043 by Yessy Post RN  Outcome: Progressing  Goal: Be free from injury by end of the shift  12/28/2023 0043 by Yessy Post RN  Outcome: Progressing  12/28/2023 0043 by Yessy Post RN  Outcome: Progressing  Goal: Verbalize understanding of personal risk factors for fall in the hospital  12/28/2023 0043 by Yessy Post RN  Outcome: Progressing  12/28/2023 0043 by Yessy Post RN  Outcome: Progressing  Goal: Verbalize understanding of risk factor reduction measures to prevent injury from fall in the home  12/28/2023 0043 by Yessy Post RN  Outcome: Progressing  12/28/2023 0043 by Yessy Post RN  Outcome: Progressing  Goal: Use assistive devices by end of the shift  12/28/2023 0043 by Yessy Post RN  Outcome: Progressing  12/28/2023 0043 by Yessy Post RN  Outcome: Progressing  Goal: Pace activities to prevent fatigue by end of the shift  12/28/2023 0043 by Yessy Post RN  Outcome: Progressing  12/28/2023 0043 by Yessy Post RN  Outcome: Progressing     Problem: Pain  Goal: My pain/discomfort is manageable  12/28/2023 0043 by Yessy Post RN  Outcome: Progressing  12/28/2023 0043 by Yessy Post RN  Outcome: Progressing     Problem: Safety  Goal: Patient will be injury free during hospitalization  12/28/2023 0043 by Yessy Post RN  Outcome: Progressing  12/28/2023 0043 by Yessy Post RN  Outcome: Progressing  Goal: I will remain free of falls  12/28/2023 0043 by Yessy Post RN  Outcome: Progressing  12/28/2023 0043 by Yessy Post RN  Outcome: Progressing     Problem: Daily Care  Goal: Daily care needs are met  12/28/2023 0043 by Yessy Post RN  Outcome:  Progressing  12/28/2023 0043 by Yessy Post RN  Outcome: Progressing     Problem: Psychosocial Needs  Goal: Demonstrates ability to cope with hospitalization/illness  12/28/2023 0043 by Yessy Post RN  Outcome: Progressing  12/28/2023 0043 by Yessy Post RN  Outcome: Progressing  Goal: Collaborate with me, my family, and caregiver to identify my specific goals  12/28/2023 0043 by Yessy Post RN  Outcome: Progressing  12/28/2023 0043 by Yessy Post RN  Outcome: Progressing     Problem: Discharge Barriers  Goal: My discharge needs are met  12/28/2023 0043 by Yessy Post RN  Outcome: Progressing  12/28/2023 0043 by Yessy Post RN  Outcome: Progressing     Problem: Pain - Adult  Goal: Verbalizes/displays adequate comfort level or baseline comfort level  12/28/2023 0043 by Yessy Post RN  Outcome: Progressing  12/28/2023 0043 by Yessy Post RN  Outcome: Progressing     Problem: Safety - Adult  Goal: Free from fall injury  12/28/2023 0043 by Yessy Psot RN  Outcome: Progressing  12/28/2023 0043 by Yessy Post RN  Outcome: Progressing     Problem: Discharge Planning  Goal: Discharge to home or other facility with appropriate resources  12/28/2023 0043 by Yessy Post RN  Outcome: Progressing  12/28/2023 0043 by Yessy Post RN  Outcome: Progressing     Problem: Chronic Conditions and Co-morbidities  Goal: Patient's chronic conditions and co-morbidity symptoms are monitored and maintained or improved  12/28/2023 0043 by Yessy Post RN  Outcome: Progressing  12/28/2023 0043 by Yessy Post RN  Outcome: Progressing     Problem: Skin  Goal: Prevent/manage excess moisture  Outcome: Progressing  Flowsheets (Taken 12/28/2023 0043)  Prevent/manage excess moisture: Moisturize dry skin

## 2023-12-28 NOTE — NURSING NOTE
VSS during shift. Patient denies pain. RFP and magnesium drawn last night. Mag resulted as 2.00. MICHELINE drain had 20mL clear/yellow output. No other significant events during shift. Patient is a possible discharge today. Patient resting safely in bed with call light in reach and side rails up x2. Yessy Post RN

## 2023-12-31 PROBLEM — R55 SYNCOPE AND COLLAPSE: Status: ACTIVE | Noted: 2023-01-01

## 2023-12-31 NOTE — ED NOTES
Assumed care of pt.  Pt presents to the ed after a syncopal episode.  Pt sts a history of appendix cancer with a SJ tube in place.  Pt sts he was walking when he suddenly passed out and awoke on the floor.  Per wife, pt now is bleeding from his SJ tube due to possible landing directly on it.  Pt denies CP, SOB, lightheaded or dizziness.  Pt endorsed chronic poor oral intake, nausea, vomiting and diarrhea    Pt is awake, alert and oriented x 3.  Skin is intact, pale, warm and dry.  Respirations are unlabored.  Lung sounds are clear.  Pt has noted Mediport in R upper chest.  Pt placed on 2L of O2 via NC.  Abdomen is soft, non tender and non distended.  Pt has a noted SJ tube in place with blood noted under dressing and in drain.  Pulses are palpable and strong in all extremities.  No edema noted.       Rabia Reyna RN  12/31/23 [id number]

## 2023-12-31 NOTE — CONSULTS
History Of Present Illness  Pt is a 68 y.o. 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     Pt was sitting on couch at home when he tried to stand, fell, loss of consciousness and wife found pt down with blood on the couch and on the floor. In [facility] ED, pt hb 6.4, given 1u RBC and BP improved from 90s/50s to 111/62.     CT scan showed IR drain in an abdominal fluid collection that has decreased in size since IR drain placement.     Cancer hx - MMR proficient, SMAD4 mutation  (2021) Dr. Em at [facility]  - exlap, omentectomy  - splenectomy  - complete peritonectomy  - R hemicolectomy  - cholecystectomy  - sigmoid resection  - low anterior resection with colorectal anastomosis  - diverting loop ileostomy  - HIPEC with [facility]    DLI since reversed    (4/2022) FOLFORI x 3 cycles, didn't respond and transitioned to FOLFOX 6/2022 - 7/2023    (8/31/23) further cytoreduction with HIPEC aborted and transitioned to ileocolonic bypass for obstruction    Pt was admitted this fall for Cdiff, recurrent SBO, KEYSHA, found to have abdominal fluid collection and with E.coli bacteremia. IR drain placed with fluid aspirated having negative cultures.     Drain has been having 30cc/day output serosang. Discussions at [facility] for palliative PEG for decompressions were placed on hold since pt's n/v mildly improved. However, over the last month, they have resumed. He has baseline diarrhea and passes gas, but has episodes where he burps up food he ate previously.     Overall, pt feels better than his usual self and has no complaints.   Last eliquis dose 12/30 9pm     Past Medical hx - see above  Past Surgical hx - see above  Family hx - not contributory  Meds -   Current Outpatient Medications   Medication Instructions    0.9 % sodium chloride (sodium chloride 0.9%) solution 1,000 mL, intravenous, Daily     acetaminophen (TYLENOL) 650 mg, oral, Every 6 hours PRN    apixaban (Eliquis) 5 mg tablet TAKE 1 TABLET BY MOUTH TWO TIMES A DAY    aspirin 81 mg, oral, Daily    baclofen (LIORESAL) 5 mg, oral, 2 times daily    cyanocobalamin (VITAMIN B-12) 1,000 mcg, oral, Daily    DULoxetine (CYMBALTA) 60 mg, oral, Every morning, Do not crush or chew.    DULoxetine (CYMBALTA) 30 mg, oral, Every morning    finasteride (PROSCAR) 5 mg, oral, Daily, Do not crush, chew, or split.    Gemtesa 75 mg, oral, Daily    inulin (FIBER GUMMIES ORAL) 1 Piece, oral, Daily    Lactobacillus acidophilus (PROBIOTIC ORAL) 1 tablet, oral, Daily    levothyroxine (Synthroid, Levoxyl) 100 mcg tablet 1 tablet, oral, Daily    levothyroxine (SYNTHROID, LEVOXYL) 25 mcg, oral, Daily    loperamide (IMODIUM) 2 mg, oral, 4 times daily PRN    LORazepam (ATIVAN) 0.5 mg, oral, 2 times daily PRN    magnesium oxide (MAG-OX) 420 mg, oral, 2 times daily    midodrine (PROAMATINE) 5 mg, oral, 3 times daily    OLANZapine zydis (ZYPREXA) 5 mg, oral, Nightly    ondansetron (ZOFRAN) 4 mg, oral, Every 8 hours PRN    pantoprazole (PROTONIX) 40 mg, oral, Daily    pregabalin (LYRICA) 200 mg, oral, 3 times daily    rosuvastatin (CRESTOR) 20 mg, oral, Daily    vancomycin (VANCOCIN) 125 mg, oral, 2 times weekly, Take 1 capsule tomorrow (12/29), then start twice weekly dosing        Allergies - NKDA    Objective  Vitals:    12/31/23 1700   BP: 94/61   Pulse: 87   Resp: 10   Temp:    SpO2: 95%        Physical Exam       Constitutional- no acute distress  HEAD - atraumatic, face stable, no blood in nares, or ears; pupils equal and reactive; oculomotor muscles intact; smile symmetric; tongue protrusion midline; no C/T/L spinal tenderness  Cards- regular rate   Chest - sternum stable, not tender  Resp- nonlabored breathing on 2 L NC  Abdomen- soft, not tender, not distended; L sided drain with tubing tented within subcutaneous tissue - when flushed towards body, serosang fluid exits at  skin site; serosang fluid in bulb  Extremities- ZHANG; radial pulses palpable bilaterally; DP pulses palpable bilaterally; 5/5 strength in bilateral UE/LE; sensation intact bilateral UE/LE; no tenderness in bilateral UE/LE  Skin- warm, dry   Neuro- alert and oriented x3   Psych- appropriate mood   Tubes/lines- abdominal drain   Pelvis - stable    CT head without signs of hemorrhage       Results for orders placed or performed during the hospital encounter of 12/31/23 (from the past 96 hour(s))   CBC and Auto Differential   Result Value Ref Range    WBC 25.5 (H) 4.4 - 11.3 x10*3/uL    nRBC 0.1 (H) 0.0 - 0.0 /100 WBCs    RBC 2.44 (L) 4.50 - 5.90 x10*6/uL    Hemoglobin 6.4 (LL) 13.5 - 17.5 g/dL    Hematocrit 19.7 (L) 41.0 - 52.0 %    MCV 81 80 - 100 fL    MCH 26.2 26.0 - 34.0 pg    MCHC 32.5 32.0 - 36.0 g/dL    RDW 19.9 (H) 11.5 - 14.5 %    Platelets 441 150 - 450 x10*3/uL    Neutrophils % 78.5 40.0 - 80.0 %    Immature Granulocytes %, Automated 0.9 0.0 - 0.9 %    Lymphocytes % 15.4 13.0 - 44.0 %    Monocytes % 4.9 2.0 - 10.0 %    Eosinophils % 0.1 0.0 - 6.0 %    Basophils % 0.2 0.0 - 2.0 %    Neutrophils Absolute 20.00 (H) 1.20 - 7.70 x10*3/uL    Immature Granulocytes Absolute, Automated 0.22 0.00 - 0.70 x10*3/uL    Lymphocytes Absolute 3.91 1.20 - 4.80 x10*3/uL    Monocytes Absolute 1.25 (H) 0.10 - 1.00 x10*3/uL    Eosinophils Absolute 0.03 0.00 - 0.70 x10*3/uL    Basophils Absolute 0.06 0.00 - 0.10 x10*3/uL   Comprehensive metabolic panel   Result Value Ref Range    Glucose 126 (H) 74 - 99 mg/dL    Sodium 139 136 - 145 mmol/L    Potassium 3.6 3.5 - 5.3 mmol/L    Chloride 107 98 - 107 mmol/L    Bicarbonate 22 21 - 32 mmol/L    Anion Gap 14 10 - 20 mmol/L    Urea Nitrogen 13 6 - 23 mg/dL    Creatinine 0.99 0.50 - 1.30 mg/dL    eGFR 83 >60 mL/min/1.73m*2    Calcium 7.9 (L) 8.6 - 10.6 mg/dL    Albumin 2.5 (L) 3.4 - 5.0 g/dL    Alkaline Phosphatase 257 (H) 33 - 136 U/L    Total Protein 6.1 (L) 6.4 - 8.2 g/dL    AST 16 9 -  39 U/L    Bilirubin, Total 0.3 0.0 - 1.2 mg/dL    ALT 6 (L) 10 - 52 U/L   Magnesium   Result Value Ref Range    Magnesium 1.54 (L) 1.60 - 2.40 mg/dL   Phosphorus   Result Value Ref Range    Phosphorus 3.2 2.5 - 4.9 mg/dL   Protime-INR   Result Value Ref Range    Protime 19.6 (H) 9.8 - 12.8 seconds    INR 1.7 (H) 0.9 - 1.1   Troponin I, High Sensitivity   Result Value Ref Range    Troponin I, High Sensitivity 4 0 - 53 ng/L   Sars-CoV-2 and Influenza A/B PCR   Result Value Ref Range    Flu A Result Not Detected Not Detected    Flu B Result Not Detected Not Detected    Coronavirus 2019, PCR Not Detected Not Detected   RSV PCR   Result Value Ref Range    RSV PCR Not Detected Not Detected   BLOOD GAS VENOUS FULL PANEL   Result Value Ref Range    POCT pH, Venous 7.38 7.33 - 7.43 pH    POCT pCO2, Venous 43 41 - 51 mm Hg    POCT pO2, Venous 25 (L) 35 - 45 mm Hg    POCT SO2, Venous 16 (L) 45 - 75 %    POCT Oxy Hemoglobin, Venous 15.9 (L) 45.0 - 75.0 %    POCT Hematocrit Calculated, Venous 19.0 (L) 41.0 - 52.0 %    POCT Sodium, Venous 140 136 - 145 mmol/L    POCT Potassium, Venous 3.9 3.5 - 5.3 mmol/L    POCT Chloride, Venous      POCT Ionized Calicum, Venous 1.20 1.10 - 1.33 mmol/L    POCT Glucose, Venous 131 (H) 74 - 99 mg/dL    POCT Lactate, Venous 2.8 (H) 0.4 - 2.0 mmol/L    POCT Base Excess, Venous 0.2 -2.0 - 3.0 mmol/L    POCT HCO3 Calculated, Venous 25.4 22.0 - 26.0 mmol/L    POCT Hemoglobin, Venous 6.4 (LL) 13.5 - 17.5 g/dL    POCT Anion Gap, Venous      Patient Temperature 37.0 degrees Celsius    FiO2 36 %   Type and Screen   Result Value Ref Range    ABO TYPE O     Rh TYPE POS     ANTIBODY SCREEN NEG    Prepare RBC: 1 Units   Result Value Ref Range    PRODUCT CODE G1407M03     Unit Number [id number]     Unit ABO O     Unit RH POS     XM INTEP COMP     Dispense Status TR     Blood Expiration Date January 27, 2024 23:59 EST     PRODUCT BLOOD TYPE 5100     UNIT VOLUME 350    Troponin I, High Sensitivity   Result  Value Ref Range    Troponin I, High Sensitivity 5 0 - 53 ng/L   Urinalysis with Reflex Culture and Microscopic   Result Value Ref Range    Color, Urine Yellow Straw, Yellow    Appearance, Urine Clear Clear    Specific Gravity, Urine 1.014 1.005 - 1.035    pH, Urine 6.0 5.0, 5.5, 6.0, 6.5, 7.0, 7.5, 8.0    Protein, Urine NEGATIVE NEGATIVE mg/dL    Glucose, Urine NEGATIVE NEGATIVE mg/dL    Blood, Urine SMALL (1+) (A) NEGATIVE    Ketones, Urine NEGATIVE NEGATIVE mg/dL    Bilirubin, Urine NEGATIVE NEGATIVE    Urobilinogen, Urine <2.0 <2.0 mg/dL    Nitrite, Urine NEGATIVE NEGATIVE    Leukocyte Esterase, Urine NEGATIVE NEGATIVE   Urinalysis Microscopic   Result Value Ref Range    WBC, Urine NONE 1-5, NONE /HPF    RBC, Urine 1-2 NONE, 1-2, 3-5 /HPF    Mucus, Urine 1+ Reference range not established. /LPF           Assessment/Plan   Pt is a 68 y.o. 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 Pt has a negative trauma survey. Pt is not currently obstructed.     PLAN  - recommend full liquid diet with very soft foods  - workup unwitnessed loss of consciousness   - IR consult for replacement of L sided abdominal drain given traumatic dislodging   - further discussion of palliative PEG or bypasses for impending obstruction should be had with Dr. Em at [facility]    Discussed with Attending, Dr. Wilmer Cody MD  Pager [phone]

## 2023-12-31 NOTE — ED PROVIDER NOTES
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  Patient states that when he woke up this morning at approximately 8 AM he sat up from bed and when he went to get up he fell losing consciousness and falling onto his side.  Patient denies having any preceding chest pain, palpitations, lightheadedness or dizziness.  Patient reportedly had brief LOC and wife found on floor.  States he is compliant with his Eliquis and wife states that he had bleeding from the MICHELINE drain site.  Denies any bleeding from that site usually drained serous fluid.  Patient states he has also had a cough but denies any recent fever, chills or known sick contacts.  Patient currently complaining of diffuse abdominal pain.     Limitations to History: None    Additional History Obtained from: EMS and Family     Records Reviewed: Recent available ED and inpatient notes reviewed in EMR.    PMHx/PSHx:  Per HPI.   - has a past medical history of Cancer of appendix (CMS/HCC), Coronary artery calcification, Hypothyroidism, LBBB (left bundle branch block), and Portal vein external compression.  - has a past surgical history that includes Abdominal surgery.    Medications: Reviewed in EMR. See EMR for complete list of medications and doses.    Allergies:  Patient has no known allergies.    Social History:  - Tobacco:  reports that he quit smoking about 21 years ago. His smoking use included cigarettes. He smoked an average of 1 pack per day. He has never used smokeless tobacco.   - Alcohol:  reports that he does not currently use alcohol.   - Illicit Drugs:   reports no history of drug use.   ???????????????????????????????????????????????????????????????  Triage Vitals:  T 36.4 °C (97.5 °F)  HR 89  /72  RR 16  O2 (!) 87 % None (Room air)    PHYSICAL EXAM:   VS: As documented in the triage note and EMR flowsheet from this visit were reviewed.  Gen: Malnourished appearing male, not in acute distress  Eyes: PERRL, EOMI. Clear scerla.  HENT: NC/AT, Mucosal membranes dry  Neck: Supple, no cervical LAD  Resp: Non-labored breathing on RA,  no wheezes or crackles  CV: RRR, nl S1, S2, no murmurs  Abd: non-distended, diffusely TTP, MICHELINE in LLQ with coagulated blood clot under bandage, no active bleeding from site, serosanguinous fluid in drain bulb  Ext: no LE edema, pulses full and equal  Skin: WWP. No systemic rashes or lesions.  MSK: normal muscle bulk, no obvious joint swelling in extremities  Neuro:  AAOx3, speech fluent, MAEx4, no focal deficit  Psych: Maintains eye contact, Appropriate mood and affect  ???????????????????????????????????????????????????????????????    ED Labs/Imaging:   Labs Reviewed   CBC WITH AUTO DIFFERENTIAL - Abnormal       Result Value    WBC 25.5 (*)     nRBC 0.1 (*)     RBC 2.44 (*)     Hemoglobin 6.4 (*)     Hematocrit 19.7 (*)     MCV 81      MCH 26.2      MCHC 32.5      RDW 19.9 (*)     Platelets 441      Neutrophils % 78.5      Immature Granulocytes %, Automated 0.9      Lymphocytes % 15.4      Monocytes % 4.9      Eosinophils % 0.1      Basophils % 0.2      Neutrophils Absolute 20.00 (*)     Immature Granulocytes Absolute, Automated 0.22      Lymphocytes Absolute 3.91      Monocytes Absolute 1.25 (*)     Eosinophils Absolute 0.03      Basophils Absolute 0.06     COMPREHENSIVE METABOLIC PANEL - Abnormal    Glucose 126 (*)     Sodium 139      Potassium 3.6      Chloride 107      Bicarbonate 22      Anion Gap 14      Urea Nitrogen 13      Creatinine 0.99      eGFR 83      Calcium 7.9 (*)     Albumin 2.5 (*)     Alkaline Phosphatase 257  (*)     Total Protein 6.1 (*)     AST 16      Bilirubin, Total 0.3      ALT 6 (*)    MAGNESIUM - Abnormal    Magnesium 1.54 (*)    PROTIME-INR - Abnormal    Protime 19.6 (*)     INR 1.7 (*)    BLOOD GAS VENOUS FULL PANEL - Abnormal    POCT pH, Venous 7.38      POCT pCO2, Venous 43      POCT pO2, Venous 25 (*)     POCT SO2, Venous 16 (*)     POCT Oxy Hemoglobin, Venous 15.9 (*)     POCT Hematocrit Calculated, Venous 19.0 (*)     POCT Sodium, Venous 140      POCT Potassium, Venous 3.9      POCT Chloride, Venous        POCT Ionized Calicum, Venous 1.20      POCT Glucose, Venous 131 (*)     POCT Lactate, Venous 2.8 (*)     POCT Base Excess, Venous 0.2      POCT HCO3 Calculated, Venous 25.4      POCT Hemoglobin, Venous 6.4 (*)     POCT Anion Gap, Venous        Patient Temperature 37.0      FiO2 36     URINALYSIS WITH REFLEX CULTURE AND MICROSCOPIC - Abnormal    Color, Urine Yellow      Appearance, Urine Clear      Specific Gravity, Urine 1.014      pH, Urine 6.0      Protein, Urine NEGATIVE      Glucose, Urine NEGATIVE      Blood, Urine SMALL (1+) (*)     Ketones, Urine NEGATIVE      Bilirubin, Urine NEGATIVE      Urobilinogen, Urine <2.0      Nitrite, Urine NEGATIVE      Leukocyte Esterase, Urine NEGATIVE     CBC WITH AUTO DIFFERENTIAL - Abnormal    WBC 18.5 (*)     nRBC 0.2 (*)     RBC 2.40 (*)     Hemoglobin 6.7 (*)     Hematocrit 20.6 (*)     MCV 86      MCH 27.9      MCHC 32.5      RDW 18.6 (*)     Platelets 361      Neutrophils % 65.9      Immature Granulocytes %, Automated 0.7      Lymphocytes % 26.6      Monocytes % 6.2      Eosinophils % 0.3      Basophils % 0.3      Neutrophils Absolute 12.19 (*)     Immature Granulocytes Absolute, Automated 0.13      Lymphocytes Absolute 4.93 (*)     Monocytes Absolute 1.15 (*)     Eosinophils Absolute 0.05      Basophils Absolute 0.06     COMPREHENSIVE METABOLIC PANEL - Abnormal    Glucose 91      Sodium 140      Potassium 3.3 (*)     Chloride 105      Bicarbonate 29       Anion Gap 9 (*)     Urea Nitrogen 10      Creatinine 0.86      eGFR >90      Calcium 7.9 (*)     Albumin 2.3 (*)     Alkaline Phosphatase 181 (*)     Total Protein 5.6 (*)     AST 13      Bilirubin, Total 1.3 (*)     ALT 7 (*)    CBC - Abnormal    WBC 13.3 (*)     nRBC 0.2 (*)     RBC 2.62 (*)     Hemoglobin 7.2 (*)     Hematocrit 21.9 (*)     MCV 84      MCH 27.5      MCHC 32.9      RDW 17.7 (*)     Platelets 334     COAGULATION SCREEN - Abnormal    Protime 16.2 (*)     INR 1.4 (*)     aPTT 34      Narrative:     The APTT is no longer used for monitoring Unfractionated Heparin Therapy. For monitoring Heparin Therapy, use the Heparin Assay.   CBC WITH AUTO DIFFERENTIAL - Abnormal    WBC 10.2      nRBC 0.3 (*)     RBC 2.58 (*)     Hemoglobin 7.2 (*)     Hematocrit 22.3 (*)     MCV 86      MCH 27.9      MCHC 32.3      RDW 18.6 (*)     Platelets 367      Neutrophils % 53.7      Immature Granulocytes %, Automated 0.7      Lymphocytes % 30.9      Monocytes % 9.5      Eosinophils % 4.6      Basophils % 0.6      Neutrophils Absolute 5.49      Immature Granulocytes Absolute, Automated 0.07      Lymphocytes Absolute 3.16      Monocytes Absolute 0.97      Eosinophils Absolute 0.47      Basophils Absolute 0.06     COMPREHENSIVE METABOLIC PANEL - Abnormal    Glucose 85      Sodium 140      Potassium 3.6      Chloride 105      Bicarbonate 30      Anion Gap 9 (*)     Urea Nitrogen 7      Creatinine 1.10      eGFR 74      Calcium 7.7 (*)     Albumin 2.2 (*)     Alkaline Phosphatase 191 (*)     Total Protein 5.5 (*)     AST 15      Bilirubin, Total 0.6      ALT 6 (*)    BLOOD CULTURE - Normal    Blood Culture No growth at 1 day     BLOOD CULTURE - Normal    Blood Culture No growth at 1 day     STAPHYLOCOCCUS AUREUS/MRSA COLONIZATION, CULTURE - Normal    Staph/MRSA Screen Culture No Staphylococcus aureus isolated     STREPTOCOCCUS PNEUMONIAE ANTIGEN, URINE - Normal    Streptococcus pneumoniae Ag, Urine Negative     LEGIONELLA  ANTIGEN, URINE - Normal    L. pneumophila Urine Ag Negative     C. DIFFICILE, PCR - Normal    C. difficile, PCR Not Detected      Narrative:     This test is an FDA-cleared real-time PCR assay for detection of toxigenic C. difficile DNA from unprocessed liquid or unformed stool specimens that have not undergone nucleic acid extraction in symptomatic patients with potential C. difficile infection (CDI). A positive result may indicate colonization, and clinical assessment is required for the diagnosis of CDI. This test cannot be performed on formed stools or used as a test of cure, and should not be performed more than once per 7 days.   PHOSPHORUS - Normal    Phosphorus 3.2     TROPONIN I, HIGH SENSITIVITY - Normal    Troponin I, High Sensitivity 4      Narrative:     Less than 99th percentile of normal range cutoff-  Female and children under 18 years old <35 ng/L; Male <54 ng/L: Negative  Repeat testing should be performed if clinically indicated.     Female and children under 18 years old  ng/L; Male  ng/L:  Consistent with possible cardiac damage and possible increased clinical   risk. Serial measurements may help to assess extent of myocardial damage.     >120 ng/L: Consistent with cardiac damage, increased clinical risk and  myocardial infarction. Serial measurements may help assess extent of   myocardial damage.      NOTE: Children less than 1 year old may have higher baseline troponin   levels and results should be interpreted in conjunction with the overall   clinical context.    NOTE: Troponin I testing is performed using a different   testing methodology at [facility] than at other   [facility]. Direct result comparisons should only   be made within the same method.     SARS-COV-2 AND INFLUENZA A/B PCR - Normal    Flu A Result Not Detected      Flu B Result Not Detected      Coronavirus 2019, PCR Not Detected      Narrative:     This assay has received FDA Emergency Use  Authorization (EUA) and  is only authorized for the duration of time that circumstances exist to justify the authorization of the emergency use of in vitro diagnostic tests for the detection of SARS-CoV-2 virus and/or diagnosis of COVID-19 infection under section 564(b)(1) of the Act, 21 U.S.C. 360bbb-3(b)(1). Testing for SARS-CoV-2 is only recommended for patients who meet current clinical and/or epidemiological criteria as defined by federal, state, or local public health directives. This assay is an in vitro diagnostic nucleic acid amplification test for the qualitative detection of SARS-CoV-2, Influenza A, and Influenza B from nasopharyngeal specimens and has been validated for use at [facility]. Negative results do not preclude COVID-19 infections or Influenza A/B infections, and should not be used as the sole basis for diagnosis, treatment, or other management decisions. If Influenza A/B and RSV PCR results are negative, testing for Parainfluenza virus, Adenovirus and Metapneumovirus is routinely performed for [facility] [place] and intensive care inpatients, and is available on other patients by placing an add-on request.    RSV PCR - Normal    RSV PCR Not Detected      Narrative:     This assay is an FDA-cleared, in vitro diagnostic nucleic acid amplification test for the detection of RSV from nasopharyngeal specimens, and has been validated for use at [facility]. Negative results do not preclude RSV infections, and should not be used as the sole basis for diagnosis, treatment, or other management decisions. If Influenza A/B and RSV PCR results are negative, testing for Parainfluenza virus, Adenovirus and Metapneumovirus is routinely performed for pediatric oncology and intensive care inpatients at [facility], and is available on other patients by placing an add-on request.       TROPONIN I, HIGH SENSITIVITY - Normal    Troponin I, High Sensitivity 5       Narrative:     Less than 99th percentile of normal range cutoff-  Female and children under 18 years old <35 ng/L; Male <54 ng/L: Negative  Repeat testing should be performed if clinically indicated.     Female and children under 18 years old  ng/L; Male  ng/L:  Consistent with possible cardiac damage and possible increased clinical   risk. Serial measurements may help to assess extent of myocardial damage.     >120 ng/L: Consistent with cardiac damage, increased clinical risk and  myocardial infarction. Serial measurements may help assess extent of   myocardial damage.      NOTE: Children less than 1 year old may have higher baseline troponin   levels and results should be interpreted in conjunction with the overall   clinical context.    NOTE: Troponin I testing is performed using a different   testing methodology at [facility] than at other   [facility]. Direct result comparisons should only   be made within the same method.     URINALYSIS WITH REFLEX MICROSCOPIC - Normal    Color, Urine Straw      Appearance, Urine Clear      Specific Gravity, Urine 1.027      pH, Urine 6.0      Protein, Urine NEGATIVE      Glucose, Urine NEGATIVE      Blood, Urine NEGATIVE      Ketones, Urine NEGATIVE      Bilirubin, Urine NEGATIVE      Urobilinogen, Urine <2.0      Nitrite, Urine NEGATIVE      Leukocyte Esterase, Urine NEGATIVE     MAGNESIUM - Normal    Magnesium 1.99     PHOSPHORUS - Normal    Phosphorus 3.8     B-TYPE NATRIURETIC PEPTIDE - Normal    BNP 45      Narrative:        <100 pg/mL - Heart failure unlikely  100-299 pg/mL - Intermediate probability of acute heart                  failure exacerbation. Correlate with clinical                  context and patient history.    >=300 pg/mL - Heart Failure likely. Correlate with clinical                  context and patient history.     Biotin interference may cause falsely decreased results. Patients taking a Biotin dose of up to 5 mg/day  should refrain from taking Biotin for 24 hours before sample  collection. Providers may contact their local laboratory for further information.   VANCOMYCIN - Normal    Vancomycin 19.9      Narrative:     Vancomycin levels can be monitored according to area under the curve (AUC) or concentration (ug/mL). The preferred monitoring strategy is determined by the patient's renal function and indication for therapy.     For AUC monitoring, a random vancomycin level should be interpreted in the context of AUC rather than the concentration at a single point in time.    For concentration monitoring, a trough concentration drawn immediately prior to the next dose is preferred.       Therapeutic ranges using concentration-guided results:  Peak (all ages):                  30.0-40.0 ug/mL  Trough (all ages):                10.0-20.0 ug/mL              MAGNESIUM - Normal    Magnesium 1.95     PHOSPHORUS - Normal    Phosphorus 3.4     RESPIRATORY CULTURE/SMEAR   TYPE AND SCREEN    ABO TYPE O      Rh TYPE POS      ANTIBODY SCREEN NEG     URINALYSIS WITH REFLEX CULTURE AND MICROSCOPIC    Narrative:     The following orders were created for panel order Urinalysis with Reflex Culture and Microscopic.  Procedure                               Abnormality         Status                     ---------                               -----------         ------                     Urinalysis with Reflex C...[[id number]]  Abnormal            Final result               Extra Urine Gray Tube[[id number]]                            Final result                 Please view results for these tests on the individual orders.   EXTRA URINE GRAY TUBE    Extra Tube Hold for add-ons.     BLOOD GAS LACTIC ACID, VENOUS   HEMOGLOBIN AND HEMATOCRIT, BLOOD   PREPARE RBC    PRODUCT CODE [id number]      Unit Number [id number]      Unit ABO O      Unit RH POS      XM INTEP COMP      Dispense Status TR      Blood Expiration Date January 27, 2024 23:59 EST       PRODUCT BLOOD TYPE 5100      UNIT VOLUME 350     PREPARE RBC    PRODUCT CODE [id number]      Unit Number [id number]      Unit ABO O      Unit RH NEG      XM INTEP COMP      Dispense Status PT      Blood Expiration Date January 05, 2024 23:59 EST      PRODUCT BLOOD TYPE 9500      UNIT VOLUME 282     URINALYSIS MICROSCOPIC WITH REFLEX CULTURE    WBC, Urine NONE      RBC, Urine 1-2      Mucus, Urine 1+       CT angio chest for pulmonary embolism   Final Result   1. No evidence of acute pulmonary embolism.   2. Bilateral significantly worse in the right compared to the left   focal consolidative opacities and ground-glass opacities with   interlobular septal thickening. Findings are concerning for   multifocal pneumonia. Correlate with a history of recurrent   aspiration. The rapid interval change when compared to a study of   08/21/2023 with argue against lymphangitic spread of tumor. Consider   correlation with bronchoscopic sampling.   3. Ventricular chamber correlate with left ventricular function.             I personally reviewed the images/study and I agree with the findings   as stated. This study was interpreted at [facility], [place], [place].        MACRO:   None        Signed by: Terrance Sharma 12/31/2023 2:26 PM   Dictation workstation:   [id number]      CT abdomen pelvis w IV contrast   Final Result   1. Significant decreased size of loculated rim enhancing left lower   quadrant intraperitoneal fluid collection with appropriate   termination of drain in the collection. There is a trace amount of   hyperdense material likely representing blood products in the   collection without active bleeding into the collection or abdominal   wall.        2. Progressively worsening peritoneal carcinomatosis:        A.) Infiltrative soft tissue mass invades the dome of the urinary   bladder wall, contiguously invading the abdominal wall musculature   and numerous adjacent small  bowel loops some intensely causing   tethering, narrowing, and worsening short segment high-grade bowel   obstruction. There are additional multifocal sites of varying degrees   of narrowing and dilatation caused by serosal metastatic disease   placing patient future risk of additional bowel obstructions.        b.) Metastatic soft tissue infiltration progressively worsening   throughout the falciform ligament, lionel hepatis and hepatic hilum   causing progressive now severe narrowing of the entire left portal   vein and its branches (previously mildly narrowed on 02/23/2023) and   progressive mild-moderate narrowing of the right portal vein   (previously normal on 02/23/2023). Is also responsible for the   previously described intrahepatic biliary ductal dilatation, similar   to prior study.        c.) Progressive now mild-moderate bilateral hydronephrosis, new from   08/21/2023, secondary to direct and/or indirect involvement of the   proximal ureters by metastatic disease.        3. Decrease in size of rim enhancing fluid collection in the right   gluteus wild muscle likely representing resolving hematoma or   muscle infarct.        I personally reviewed the images/study and I agree with the findings   as stated by Resident Neptali Brewster MD. This study was interpreted   at [facility], [place], [place].        MACRO:   Tay Moran discussed the significance and urgency of this   critical finding by telephone with  NATALIE BATEMAN on 12/31/2023 at   12:04 pm.  (**-RCF-**) Findings:  See findings.        Signed by: Tay Morna 12/31/2023 12:20 PM   Dictation workstation:   [id number]      CT head wo IV contrast   Final Result   Normal brain CT.             MACRO:   None        Signed by: Jason Moreno 12/31/2023 11:06 AM   Dictation workstation:   [id number]      XR chest 1 view   Final Result   Improving right lower lobe pneumonia. New right upper lobe opacity    consistent with pneumonia.   Signed by Kannan Woodson MD      Consult to Interventional Radiology    (Results Pending)   Transthoracic Echo (TTE) Complete    (Results Pending)   CT abdomen and pelvis w oral contrast only    (Results Pending)         ED Course & MDM   ED Course as of 01/02/24 1157   Sun Dec 31, 2023   0923 I independently interpreted the EKG:  Regular rate. Regular rhythm. Left axis deviation.  Left bundle branch block unchanged from previous.  No ST segment elevations, depressions, or T wave inversions.  Impression: Normal sinus rhythm, LBBB, Sgarbossa negative, no STEMI.   [KR]   0958 XR chest 1 view [JY]   1042 HEMOGLOBIN(!!): 6.4  Patient and wife consented for blood. 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  Patient arrives tachycardic but hemodynamically stable and mentating appropriately.  Not in acute distress.  Patient was found to be hypoxic in the mid 80s with no previous oxygen requirement was placed on 2 L nasal cannula.  Patient has a tender abdomen but not rigid and there is signs of trauma at the site of the MICHELINE drain concerned that it was pulled.  Given patient anticoagulated as well as having a syncopal fall with a new hypoxia, will obtain a CT PE study as well as a CT of the abdomen to further assess for PE and traumatic abdominal injury and to assess drain.  Patient's EKG normal sinus rhythm with left bundle branch block unchanged from previous.  Given patient's age will also initiate cardiac workup given his syncope.  Patient does appear dry with positive orthostatics and IV fluid resuscitation was initiated. No PE on CT chest,  pt was found to have multifocal pneumonia, abx started. Hgb 6.4 (baseline 7-8) and patient transfused 1 uPRBCs, no signs of active bleeding and stable BP. EKG, trops non ischemic. CT abdomen shows MICHELINE drain in place with interval decreased in fluid collection however did show progressively worsening peritoneal carcinomatosis with infiltrative disease at dome of bladder for which ACS consulted. No acute surgical intervention needed per ACS, recommend to follow with patient's surgeon at [facility]. Patient will be admitted for further management of syncope workup, dehydration and respiratory failure 2/2 pneumonia.       Social Determinants Limiting Care:  None identified    Disposition:  As a result of their workup, the patient will require admission to the hospital.  The patient was informed of their diagnosis.  Patient was given the opportunity to ask questions and answered them.  Patient agreed to be admitted to the hospital.    Patient seen and discussed with attending physician.    Hillary Hurt MD PGY3  Emergency Medicine      Procedures ? SmartLinks last updated 1/2/2024 11:57 AM        Hillary Hurt MD  Resident  01/02/24 1127

## 2023-12-31 NOTE — SIGNIFICANT EVENT
"Senior Staffing Note    Please see H&P from Dr. Parham for additional details.    68 y.o. 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    Patient states that this morning, he woke up to turn off his phone alarm and use the bathroom.  He states on the couch.  He noted that the couch was wet and he was not sure why.  His wife heard a thud and came into the room.  Patient thinks that he did lose consciousness for a few seconds.  He does think he was feeling a little bit dizzy when he stood up.  He felt fine prior to standing up.    Wife did not notice any seizure-like activity.  No bowel or bladder incontinence.  No chest pain or palpitations preceding.  When they looked at the couch, there was blood from out of the MICHELINE drain that was \"saturating\" the cushion of the couch.    Brought into the emergency room by EMS.  Of note, patient has a chronic nausea and vomiting.  He has not had any abdominal pain.  However, he states that after every meal, he will throw up \"at some point.\"  Sometimes it is right after eating and sometimes in his later.  However, he has multiple bouts of emesis each day.    He is not sure if he pulled out his MICHELINE drain or what happened.  Prior to this morning, the MICHELINE drain output was serosanguineous and seem to be getting lighter in color.  It now has clots in it and is bright red which is new. MICHELINE drain placed with surgical oncology team in [facility] for intraperitoneal fluid collection    He has not noted any fevers or chills.  He does think that he is having diarrhea.  " Currently, he is being treated for C. difficile on a p.o. vancomycin taper, currently taking it every other day.    He is not sure if the diarrhea is worse.  He was coughing yesterday and his wife says was coughing intermittently over the last week.  No one else has been sick that they know of at home.  He denies any difficulty with swallowing.  He denies anything getting stuck with eating.     In the ED:    Noted to be saturating in the 80s on room air and was placed on 2 L nasal cannula.  Otherwise hemodynamically stable and afebrile.  Orthostatics were performed and positive.  Received 1 L IV fluid bolus.    Labs notable for a white count of 25.5 from 11 on 1227, along with a hemoglobin of 6.4 from 7.5 on 12/27.  Magnesium low and repleted.  Creatinine mildly increased from baseline, 0.99 from 0.8. Trop negative    EKG performed and showing borderline QTc, left bundle branch block which is old.  No obvious ischemic changes or other interval prolongation.  Received 1 L LR bolus, 2 g IV mag.  Dose of IV azithromycin and a dose of IV Zosyn.    CTPE:  1. No evidence of acute pulmonary embolism.   2. Bilateral significantly worse in the right compared to the left   focal consolidative opacities and ground-glass opacities with   interlobular septal thickening. Findings are concerning for   multifocal pneumonia. Correlate with a history of recurrent   aspiration. The rapid interval change when compared to a study of   08/21/2023 with argue against lymphangitic spread of tumor. Consider   correlation with bronchoscopic sampling.   3. Ventricular chamber correlate with left ventricular function.     CTAP:  IMPRESSION:  1. Significant decreased size of loculated rim enhancing left lower  quadrant intraperitoneal fluid collection with appropriate  termination of drain in the collection. There is a trace amount of  hyperdense material likely representing blood products in the  collection without active bleeding into the  collection or abdominal  wall.      2. Progressively worsening peritoneal carcinomatosis:      A.) Infiltrative soft tissue mass invades the dome of the urinary  bladder wall, contiguously invading the abdominal wall musculature  and numerous adjacent small bowel loops some intensely causing  tethering, narrowing, and worsening short segment high-grade bowel  obstruction. There are additional multifocal sites of varying degrees  of narrowing and dilatation caused by serosal metastatic disease  placing patient future risk of additional bowel obstructions.      b.) Metastatic soft tissue infiltration progressively worsening  throughout the falciform ligament, lionel hepatis and hepatic hilum  causing progressive now severe narrowing of the entire left portal  vein and its branches (previously mildly narrowed on 02/23/2023) and  progressive mild-moderate narrowing of the right portal vein  (previously normal on 02/23/2023). Is also responsible for the  previously described intrahepatic biliary ductal dilatation, similar  to prior study.      c.) Progressive now mild-moderate bilateral hydronephrosis, new from  08/21/2023, secondary to direct and/or indirect involvement of the  proximal ureters by metastatic disease.      3. Decrease in size of rim enhancing fluid collection in the right  gluteus wild muscle likely representing resolving hematoma or  muscle infarct.      CTH wnl    Physical Exam  Constitutional:       General: He is not in acute distress.     Appearance: He is not ill-appearing or toxic-appearing.      Comments: Thin, pale   HENT:      Head: Normocephalic and atraumatic.      Nose: No congestion or rhinorrhea.      Mouth/Throat:      Mouth: Mucous membranes are moist.      Pharynx: No posterior oropharyngeal erythema.   Eyes:      General: No scleral icterus.     Extraocular Movements: Extraocular movements intact.      Conjunctiva/sclera: Conjunctivae normal.      Pupils: Pupils are equal, round, and  reactive to light.   Cardiovascular:      Rate and Rhythm: Normal rate and regular rhythm.      Heart sounds: No murmur heard.     No friction rub. No gallop.   Pulmonary:      Effort: Pulmonary effort is normal. No respiratory distress.      Breath sounds: No wheezing or rhonchi.      Comments: On 2L NC, diminished at R lung base  Abdominal:      General: Abdomen is flat.      Palpations: Abdomen is soft.      Tenderness: There is no abdominal tenderness.      Comments: MICHELINE drain in place, clots in drain with serosanguinous to sanguinous drainage   Musculoskeletal:         General: No swelling, tenderness or deformity. Normal range of motion.   Skin:     General: Skin is warm and dry.      Findings: No lesion or rash.   Neurological:      General: No focal deficit present.      Mental Status: He is alert and oriented to person, place, and time.            A/P:  68 y.o. 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 intake, chronic nausea and vomiting, along with acute blood loss anemia  -Orthostatics positive in the ED, status post 1 L IVF  [ ] Repeat orthostatics and bolus as needed  -EKG without signs of arrhythmia, continue telemetry  -Can consider repeat echo    # Acute blood loss anemia, bleeding from MICHELINE drain  -CT abdomen pelvis showing resolving fluid collection with MICHELINE drain still in position with blood products, no signs of active bleeding  -Status post 1 unit of PRBCs in the ED  -Hold  Eliquis  -Posttransfusion CBC  -Maintain active type and screen  -Surgical oncology consulted, recommending full liquid diet and discussion with IR for possible replacement of MICHELINE drain midweek, no active management at this time    #Pneumonia, concern for possible aspiration  # Hypoxemic respiratory failure  #Leukocytosis  -Blood cultures x 2 in the ED are pending  Status post Zosyn and azithromycin in the ED  -Continue IV Zosyn and azithromycin, will also add IV vancomycin given recurrent hospitalizations and concern for hospital-acquired pneumonia  -Check MRSA nares, urine strep and Legionella antigen, sputum culture  -Suspect that patient may be aspirating related to his recurrent nausea and vomiting  -SLP evaluation to evaluate for dysphagia/aspiration    # Diarrhea  #History of C. difficile colitis  -Currently on p.o. vancomycin taper  -With worsening leukocytosis and reports of worsening diarrhea, will recheck a C. difficile with reflex and a  -Continue current p.o. 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 Ozuna, follows with [facility] surgical oncology  [ ]Notify Dr. Ozuna of admission in the a.m.      #History of portal vein thrombosis on Eliquis  -Hold Eliquis for now in the setting of acute blood loss anemia with syncopal episode    DVT ppx: hold given c/f bleeding  Full Code, confirmed on admission    Diet: full liquid/soft per Surgical oncology    Patient to be formally staffed in AM

## 2023-12-31 NOTE — ED TRIAGE NOTES
Pt presents to the ed after a syncopal episode.  Pt sts a history of appendix cancer with a SJ tube in place.  Pt sts he was walking when he suddenly passed out and awoke on the floor.  Per wife, pt now is bleeding from his SJ tube due to possible landing directly on it.  Pt denies CP, SOB, lightheaded or dizziness.  Pt endorsed chronic poor oral intake, nausea, vomiting and diarrhea

## 2024-01-01 ENCOUNTER — APPOINTMENT (OUTPATIENT)
Dept: CARDIOLOGY | Facility: HOSPITAL | Age: 69
DRG: 312 | End: 2024-01-01
Payer: MEDICARE

## 2024-01-01 ENCOUNTER — APPOINTMENT (OUTPATIENT)
Dept: RADIOLOGY | Facility: HOSPITAL | Age: 69
DRG: 312 | End: 2024-01-01
Payer: MEDICARE

## 2024-01-01 ENCOUNTER — TELEPHONE (OUTPATIENT)
Dept: ADMISSION | Facility: HOSPITAL | Age: 69
End: 2024-01-01
Payer: MEDICARE

## 2024-01-01 ENCOUNTER — APPOINTMENT (OUTPATIENT)
Dept: HEMATOLOGY/ONCOLOGY | Facility: CLINIC | Age: 69
End: 2024-01-01
Payer: MEDICARE

## 2024-01-01 VITALS
TEMPERATURE: 97.5 F | BODY MASS INDEX: 18.79 KG/M2 | WEIGHT: 124 LBS | OXYGEN SATURATION: 16 % | RESPIRATION RATE: 10 BRPM | DIASTOLIC BLOOD PRESSURE: 53 MMHG | HEIGHT: 68 IN | SYSTOLIC BLOOD PRESSURE: 77 MMHG | HEART RATE: 89 BPM

## 2024-01-01 LAB
ABO GROUP (TYPE) IN BLOOD: NORMAL
ABO GROUP (TYPE) IN BLOOD: NORMAL
ALBUMIN SERPL BCP-MCNC: 2.2 G/DL (ref 3.4–5)
ALBUMIN SERPL BCP-MCNC: 2.3 G/DL (ref 3.4–5)
ALBUMIN SERPL BCP-MCNC: 2.3 G/DL (ref 3.4–5)
ALBUMIN SERPL BCP-MCNC: 2.5 G/DL (ref 3.4–5)
ALBUMIN SERPL BCP-MCNC: 2.6 G/DL (ref 3.4–5)
ALP SERPL-CCNC: 175 U/L (ref 33–136)
ALP SERPL-CCNC: 181 U/L (ref 33–136)
ALP SERPL-CCNC: 190 U/L (ref 33–136)
ALP SERPL-CCNC: 191 U/L (ref 33–136)
ALT SERPL W P-5'-P-CCNC: 5 U/L (ref 10–52)
ALT SERPL W P-5'-P-CCNC: 6 U/L (ref 10–52)
ALT SERPL W P-5'-P-CCNC: 7 U/L (ref 10–52)
ALT SERPL W P-5'-P-CCNC: 7 U/L (ref 10–52)
ANION GAP BLDA CALCULATED.4IONS-SCNC: 6 MMO/L (ref 10–25)
ANION GAP BLDA CALCULATED.4IONS-SCNC: 6 MMO/L (ref 10–25)
ANION GAP BLDA CALCULATED.4IONS-SCNC: 9 MMO/L (ref 10–25)
ANION GAP SERPL CALC-SCNC: 10 MMOL/L (ref 10–20)
ANION GAP SERPL CALC-SCNC: 11 MMOL/L (ref 10–20)
ANION GAP SERPL CALC-SCNC: 12 MMOL/L (ref 10–20)
ANION GAP SERPL CALC-SCNC: 13 MMOL/L (ref 10–20)
ANION GAP SERPL CALC-SCNC: 13 MMOL/L (ref 10–20)
ANION GAP SERPL CALC-SCNC: 9 MMOL/L (ref 10–20)
ANTIBODY SCREEN: NORMAL
ANTIBODY SCREEN: NORMAL
AORTIC VALVE PEAK VELOCITY: 1.14
APTT PPP: 34 SECONDS (ref 27–38)
APTT PPP: 35 SECONDS (ref 27–38)
AST SERPL W P-5'-P-CCNC: 11 U/L (ref 9–39)
AST SERPL W P-5'-P-CCNC: 13 U/L (ref 9–39)
AST SERPL W P-5'-P-CCNC: 15 U/L (ref 9–39)
AST SERPL W P-5'-P-CCNC: 16 U/L (ref 9–39)
ATRIAL RATE: 64 BPM
ATRIAL RATE: 69 BPM
ATRIAL RATE: 77 BPM
ATRIAL RATE: 96 BPM
AV PEAK GRADIENT: 5.2
AVA (PEAK VEL): 1.51
BACTERIA BLD CULT: NORMAL
BACTERIA BLD CULT: NORMAL
BASE EXCESS BLDA CALC-SCNC: -3.9 MMOL/L (ref -2–3)
BASE EXCESS BLDA CALC-SCNC: 2.9 MMOL/L (ref -2–3)
BASE EXCESS BLDA CALC-SCNC: 3.8 MMOL/L (ref -2–3)
BASOPHILS # BLD AUTO: 0.01 X10*3/UL (ref 0–0.1)
BASOPHILS # BLD AUTO: 0.01 X10*3/UL (ref 0–0.1)
BASOPHILS # BLD AUTO: 0.04 X10*3/UL (ref 0–0.1)
BASOPHILS # BLD AUTO: 0.06 X10*3/UL (ref 0–0.1)
BASOPHILS # BLD AUTO: 0.06 X10*3/UL (ref 0–0.1)
BASOPHILS # BLD MANUAL: 0 X10*3/UL (ref 0–0.1)
BASOPHILS NFR BLD AUTO: 0.1 %
BASOPHILS NFR BLD AUTO: 0.2 %
BASOPHILS NFR BLD AUTO: 0.3 %
BASOPHILS NFR BLD AUTO: 0.5 %
BASOPHILS NFR BLD AUTO: 0.6 %
BASOPHILS NFR BLD MANUAL: 0 %
BILIRUB SERPL-MCNC: 0.3 MG/DL (ref 0–1.2)
BILIRUB SERPL-MCNC: 0.5 MG/DL (ref 0–1.2)
BILIRUB SERPL-MCNC: 0.6 MG/DL (ref 0–1.2)
BILIRUB SERPL-MCNC: 1.3 MG/DL (ref 0–1.2)
BLOOD EXPIRATION DATE: NORMAL
BNP SERPL-MCNC: 33 PG/ML (ref 0–99)
BNP SERPL-MCNC: 45 PG/ML (ref 0–99)
BODY TEMPERATURE: 37 DEGREES CELSIUS
BUN SERPL-MCNC: 10 MG/DL (ref 6–23)
BUN SERPL-MCNC: 10 MG/DL (ref 6–23)
BUN SERPL-MCNC: 14 MG/DL (ref 6–23)
BUN SERPL-MCNC: 18 MG/DL (ref 6–23)
BUN SERPL-MCNC: 25 MG/DL (ref 6–23)
BUN SERPL-MCNC: 7 MG/DL (ref 6–23)
BUN SERPL-MCNC: 7 MG/DL (ref 6–23)
BUN SERPL-MCNC: 8 MG/DL (ref 6–23)
BURR CELLS BLD QL SMEAR: NORMAL
C DIF TOX TCDA+TCDB STL QL NAA+PROBE: NOT DETECTED
CA-I BLDA-SCNC: 1.17 MMOL/L (ref 1.1–1.33)
CA-I BLDA-SCNC: 1.19 MMOL/L (ref 1.1–1.33)
CA-I BLDA-SCNC: 1.19 MMOL/L (ref 1.1–1.33)
CALCIUM SERPL-MCNC: 7.7 MG/DL (ref 8.6–10.6)
CALCIUM SERPL-MCNC: 7.7 MG/DL (ref 8.6–10.6)
CALCIUM SERPL-MCNC: 7.8 MG/DL (ref 8.6–10.6)
CALCIUM SERPL-MCNC: 7.9 MG/DL (ref 8.6–10.6)
CALCIUM SERPL-MCNC: 8 MG/DL (ref 8.6–10.6)
CALCIUM SERPL-MCNC: 8 MG/DL (ref 8.6–10.6)
CALCIUM SERPL-MCNC: 8.1 MG/DL (ref 8.6–10.6)
CALCIUM SERPL-MCNC: 8.3 MG/DL (ref 8.6–10.6)
CHLORIDE BLDA-SCNC: 103 MMOL/L (ref 98–107)
CHLORIDE BLDA-SCNC: 103 MMOL/L (ref 98–107)
CHLORIDE BLDA-SCNC: 104 MMOL/L (ref 98–107)
CHLORIDE SERPL-SCNC: 100 MMOL/L (ref 98–107)
CHLORIDE SERPL-SCNC: 101 MMOL/L (ref 98–107)
CHLORIDE SERPL-SCNC: 102 MMOL/L (ref 98–107)
CHLORIDE SERPL-SCNC: 102 MMOL/L (ref 98–107)
CHLORIDE SERPL-SCNC: 103 MMOL/L (ref 98–107)
CHLORIDE SERPL-SCNC: 103 MMOL/L (ref 98–107)
CHLORIDE SERPL-SCNC: 105 MMOL/L (ref 98–107)
CHLORIDE SERPL-SCNC: 105 MMOL/L (ref 98–107)
CO2 SERPL-SCNC: 27 MMOL/L (ref 21–32)
CO2 SERPL-SCNC: 27 MMOL/L (ref 21–32)
CO2 SERPL-SCNC: 28 MMOL/L (ref 21–32)
CO2 SERPL-SCNC: 29 MMOL/L (ref 21–32)
CO2 SERPL-SCNC: 29 MMOL/L (ref 21–32)
CO2 SERPL-SCNC: 30 MMOL/L (ref 21–32)
CREAT SERPL-MCNC: 0.86 MG/DL (ref 0.5–1.3)
CREAT SERPL-MCNC: 0.88 MG/DL (ref 0.5–1.3)
CREAT SERPL-MCNC: 0.93 MG/DL (ref 0.5–1.3)
CREAT SERPL-MCNC: 0.97 MG/DL (ref 0.5–1.3)
CREAT SERPL-MCNC: 1 MG/DL (ref 0.5–1.3)
CREAT SERPL-MCNC: 1.01 MG/DL (ref 0.5–1.3)
CREAT SERPL-MCNC: 1.1 MG/DL (ref 0.5–1.3)
CREAT SERPL-MCNC: 1.12 MG/DL (ref 0.5–1.3)
DISPENSE STATUS: NORMAL
EJECTION FRACTION APICAL 4 CHAMBER: 30.3
EJECTION FRACTION: 36
EOSINOPHIL # BLD AUTO: 0 X10*3/UL (ref 0–0.7)
EOSINOPHIL # BLD AUTO: 0 X10*3/UL (ref 0–0.7)
EOSINOPHIL # BLD AUTO: 0.1 X10*3/UL (ref 0–0.7)
EOSINOPHIL # BLD AUTO: 0.21 X10*3/UL (ref 0–0.7)
EOSINOPHIL # BLD AUTO: 0.47 X10*3/UL (ref 0–0.7)
EOSINOPHIL # BLD MANUAL: 0 X10*3/UL (ref 0–0.7)
EOSINOPHIL NFR BLD AUTO: 0 %
EOSINOPHIL NFR BLD AUTO: 0 %
EOSINOPHIL NFR BLD AUTO: 0.7 %
EOSINOPHIL NFR BLD AUTO: 1.6 %
EOSINOPHIL NFR BLD AUTO: 4.6 %
EOSINOPHIL NFR BLD MANUAL: 0 %
ERYTHROCYTE [DISTWIDTH] IN BLOOD BY AUTOMATED COUNT: 17.7 % (ref 11.5–14.5)
ERYTHROCYTE [DISTWIDTH] IN BLOOD BY AUTOMATED COUNT: 18.5 % (ref 11.5–14.5)
ERYTHROCYTE [DISTWIDTH] IN BLOOD BY AUTOMATED COUNT: 18.6 % (ref 11.5–14.5)
ERYTHROCYTE [DISTWIDTH] IN BLOOD BY AUTOMATED COUNT: 18.6 % (ref 11.5–14.5)
ERYTHROCYTE [DISTWIDTH] IN BLOOD BY AUTOMATED COUNT: 18.7 % (ref 11.5–14.5)
ERYTHROCYTE [DISTWIDTH] IN BLOOD BY AUTOMATED COUNT: 18.8 % (ref 11.5–14.5)
ERYTHROCYTE [DISTWIDTH] IN BLOOD BY AUTOMATED COUNT: 19.5 % (ref 11.5–14.5)
GFR SERPL CREATININE-BSD FRML MDRD: 72 ML/MIN/1.73M*2
GFR SERPL CREATININE-BSD FRML MDRD: 74 ML/MIN/1.73M*2
GFR SERPL CREATININE-BSD FRML MDRD: 82 ML/MIN/1.73M*2
GFR SERPL CREATININE-BSD FRML MDRD: 82 ML/MIN/1.73M*2
GFR SERPL CREATININE-BSD FRML MDRD: 86 ML/MIN/1.73M*2
GFR SERPL CREATININE-BSD FRML MDRD: 90 ML/MIN/1.73M*2
GFR SERPL CREATININE-BSD FRML MDRD: >90 ML/MIN/1.73M*2
GFR SERPL CREATININE-BSD FRML MDRD: >90 ML/MIN/1.73M*2
GLUCOSE BLD MANUAL STRIP-MCNC: 106 MG/DL (ref 74–99)
GLUCOSE BLD MANUAL STRIP-MCNC: 107 MG/DL (ref 74–99)
GLUCOSE BLD MANUAL STRIP-MCNC: 107 MG/DL (ref 74–99)
GLUCOSE BLD MANUAL STRIP-MCNC: 121 MG/DL (ref 74–99)
GLUCOSE BLD MANUAL STRIP-MCNC: 126 MG/DL (ref 74–99)
GLUCOSE BLD MANUAL STRIP-MCNC: 82 MG/DL (ref 74–99)
GLUCOSE BLD MANUAL STRIP-MCNC: 88 MG/DL (ref 74–99)
GLUCOSE BLD MANUAL STRIP-MCNC: 99 MG/DL (ref 74–99)
GLUCOSE BLDA-MCNC: 118 MG/DL (ref 74–99)
GLUCOSE BLDA-MCNC: 119 MG/DL (ref 74–99)
GLUCOSE BLDA-MCNC: 252 MG/DL (ref 74–99)
GLUCOSE SERPL-MCNC: 103 MG/DL (ref 74–99)
GLUCOSE SERPL-MCNC: 111 MG/DL (ref 74–99)
GLUCOSE SERPL-MCNC: 121 MG/DL (ref 74–99)
GLUCOSE SERPL-MCNC: 72 MG/DL (ref 74–99)
GLUCOSE SERPL-MCNC: 85 MG/DL (ref 74–99)
GLUCOSE SERPL-MCNC: 91 MG/DL (ref 74–99)
GLUCOSE SERPL-MCNC: 91 MG/DL (ref 74–99)
GLUCOSE SERPL-MCNC: 97 MG/DL (ref 74–99)
HCO3 BLDA-SCNC: 24.2 MMOL/L (ref 22–26)
HCO3 BLDA-SCNC: 27.9 MMOL/L (ref 22–26)
HCO3 BLDA-SCNC: 29.1 MMOL/L (ref 22–26)
HCT VFR BLD AUTO: 21.9 % (ref 41–52)
HCT VFR BLD AUTO: 22.3 % (ref 41–52)
HCT VFR BLD AUTO: 23.3 % (ref 41–52)
HCT VFR BLD AUTO: 23.8 % (ref 41–52)
HCT VFR BLD AUTO: 23.8 % (ref 41–52)
HCT VFR BLD AUTO: 24.3 % (ref 41–52)
HCT VFR BLD AUTO: 25.8 % (ref 41–52)
HCT VFR BLD EST: 23 % (ref 41–52)
HCT VFR BLD EST: 24 % (ref 41–52)
HCT VFR BLD EST: 25 % (ref 41–52)
HGB BLD-MCNC: 7.2 G/DL (ref 13.5–17.5)
HGB BLD-MCNC: 7.2 G/DL (ref 13.5–17.5)
HGB BLD-MCNC: 7.5 G/DL (ref 13.5–17.5)
HGB BLD-MCNC: 7.6 G/DL (ref 13.5–17.5)
HGB BLD-MCNC: 7.7 G/DL (ref 13.5–17.5)
HGB BLD-MCNC: 7.8 G/DL (ref 13.5–17.5)
HGB BLD-MCNC: 8.4 G/DL (ref 13.5–17.5)
HGB BLDA-MCNC: 7.5 G/DL (ref 13.5–17.5)
HGB BLDA-MCNC: 8 G/DL (ref 13.5–17.5)
HGB BLDA-MCNC: 8.4 G/DL (ref 13.5–17.5)
HOLD SPECIMEN: NORMAL
HYPOCHROMIA BLD QL SMEAR: ABNORMAL
HYPOCHROMIA BLD QL SMEAR: NORMAL
IMM GRANULOCYTES # BLD AUTO: 0.05 X10*3/UL (ref 0–0.7)
IMM GRANULOCYTES # BLD AUTO: 0.06 X10*3/UL (ref 0–0.7)
IMM GRANULOCYTES # BLD AUTO: 0.07 X10*3/UL (ref 0–0.7)
IMM GRANULOCYTES # BLD AUTO: 0.08 X10*3/UL (ref 0–0.7)
IMM GRANULOCYTES # BLD AUTO: 0.14 X10*3/UL (ref 0–0.7)
IMM GRANULOCYTES # BLD AUTO: 0.28 X10*3/UL (ref 0–0.7)
IMM GRANULOCYTES NFR BLD AUTO: 0.5 % (ref 0–0.9)
IMM GRANULOCYTES NFR BLD AUTO: 0.5 % (ref 0–0.9)
IMM GRANULOCYTES NFR BLD AUTO: 0.7 % (ref 0–0.9)
IMM GRANULOCYTES NFR BLD AUTO: 0.7 % (ref 0–0.9)
IMM GRANULOCYTES NFR BLD AUTO: 0.8 % (ref 0–0.9)
IMM GRANULOCYTES NFR BLD AUTO: 2.1 % (ref 0–0.9)
INHALED O2 CONCENTRATION: 100 %
INHALED O2 CONCENTRATION: 80 %
INHALED O2 CONCENTRATION: 90 %
INR PPP: 1.1 (ref 0.9–1.1)
INR PPP: 1.4 (ref 0.9–1.1)
LACTATE BLDA-SCNC: 1 MMOL/L (ref 0.4–2)
LACTATE BLDA-SCNC: 1.4 MMOL/L (ref 0.4–2)
LACTATE BLDA-SCNC: 5.6 MMOL/L (ref 0.4–2)
LACTATE SERPL-SCNC: 1.1 MMOL/L (ref 0.4–2)
LEFT ATRIUM VOLUME AREA LENGTH INDEX BSA: 26.9
LEFT VENTRICLE INTERNAL DIMENSION DIASTOLE: 5.25 (ref 3.5–6)
LEFT VENTRICULAR OUTFLOW TRACT DIAMETER: 1.7
LEGIONELLA AG UR QL: NEGATIVE
LYMPHOCYTES # BLD AUTO: 2.07 X10*3/UL (ref 1.2–4.8)
LYMPHOCYTES # BLD AUTO: 2.38 X10*3/UL (ref 1.2–4.8)
LYMPHOCYTES # BLD AUTO: 2.62 X10*3/UL (ref 1.2–4.8)
LYMPHOCYTES # BLD AUTO: 3.16 X10*3/UL (ref 1.2–4.8)
LYMPHOCYTES # BLD AUTO: 3.71 X10*3/UL (ref 1.2–4.8)
LYMPHOCYTES # BLD MANUAL: 3.88 X10*3/UL (ref 1.2–4.8)
LYMPHOCYTES NFR BLD AUTO: 17.4 %
LYMPHOCYTES NFR BLD AUTO: 18 %
LYMPHOCYTES NFR BLD AUTO: 28.4 %
LYMPHOCYTES NFR BLD AUTO: 30.9 %
LYMPHOCYTES NFR BLD AUTO: 33.4 %
LYMPHOCYTES NFR BLD MANUAL: 20 %
MAGNESIUM SERPL-MCNC: 1.78 MG/DL (ref 1.6–2.4)
MAGNESIUM SERPL-MCNC: 1.79 MG/DL (ref 1.6–2.4)
MAGNESIUM SERPL-MCNC: 1.95 MG/DL (ref 1.6–2.4)
MAGNESIUM SERPL-MCNC: 1.99 MG/DL (ref 1.6–2.4)
MAGNESIUM SERPL-MCNC: 2.04 MG/DL (ref 1.6–2.4)
MAGNESIUM SERPL-MCNC: 2.11 MG/DL (ref 1.6–2.4)
MAGNESIUM SERPL-MCNC: 2.29 MG/DL (ref 1.6–2.4)
MAGNESIUM SERPL-MCNC: 2.32 MG/DL (ref 1.6–2.4)
MCH RBC QN AUTO: 27 PG (ref 26–34)
MCH RBC QN AUTO: 27.5 PG (ref 26–34)
MCH RBC QN AUTO: 27.9 PG (ref 26–34)
MCH RBC QN AUTO: 28.1 PG (ref 26–34)
MCH RBC QN AUTO: 28.1 PG (ref 26–34)
MCH RBC QN AUTO: 28.2 PG (ref 26–34)
MCH RBC QN AUTO: 28.5 PG (ref 26–34)
MCHC RBC AUTO-ENTMCNC: 31.5 G/DL (ref 32–36)
MCHC RBC AUTO-ENTMCNC: 31.7 G/DL (ref 32–36)
MCHC RBC AUTO-ENTMCNC: 32.3 G/DL (ref 32–36)
MCHC RBC AUTO-ENTMCNC: 32.6 G/DL (ref 32–36)
MCHC RBC AUTO-ENTMCNC: 32.6 G/DL (ref 32–36)
MCHC RBC AUTO-ENTMCNC: 32.8 G/DL (ref 32–36)
MCHC RBC AUTO-ENTMCNC: 32.9 G/DL (ref 32–36)
MCV RBC AUTO: 84 FL (ref 80–100)
MCV RBC AUTO: 86 FL (ref 80–100)
MCV RBC AUTO: 87 FL (ref 80–100)
MCV RBC AUTO: 89 FL (ref 80–100)
MITRAL VALVE E/A RATIO: 0.63
MITRAL VALVE E/E' RATIO: 8.54
MONOCYTES # BLD AUTO: 0.06 X10*3/UL (ref 0.1–1)
MONOCYTES # BLD AUTO: 0.69 X10*3/UL (ref 0.1–1)
MONOCYTES # BLD AUTO: 0.94 X10*3/UL (ref 0.1–1)
MONOCYTES # BLD AUTO: 0.97 X10*3/UL (ref 0.1–1)
MONOCYTES # BLD AUTO: 1.14 X10*3/UL (ref 0.1–1)
MONOCYTES # BLD MANUAL: 0.5 X10*3/UL (ref 0.1–1)
MONOCYTES NFR BLD AUTO: 1 %
MONOCYTES NFR BLD AUTO: 5.2 %
MONOCYTES NFR BLD AUTO: 6.3 %
MONOCYTES NFR BLD AUTO: 8.7 %
MONOCYTES NFR BLD AUTO: 9.5 %
MONOCYTES NFR BLD MANUAL: 2.6 %
MRSA DNA SPEC QL NAA+PROBE: NOT DETECTED
NEUTROPHILS # BLD AUTO: 11.26 X10*3/UL (ref 1.2–7.7)
NEUTROPHILS # BLD AUTO: 4.01 X10*3/UL (ref 1.2–7.7)
NEUTROPHILS # BLD AUTO: 5.49 X10*3/UL (ref 1.2–7.7)
NEUTROPHILS # BLD AUTO: 7.9 X10*3/UL (ref 1.2–7.7)
NEUTROPHILS # BLD AUTO: 9.85 X10*3/UL (ref 1.2–7.7)
NEUTROPHILS NFR BLD AUTO: 53.7 %
NEUTROPHILS NFR BLD AUTO: 60.3 %
NEUTROPHILS NFR BLD AUTO: 64.6 %
NEUTROPHILS NFR BLD AUTO: 74.6 %
NEUTROPHILS NFR BLD AUTO: 74.8 %
NEUTS SEG # BLD MANUAL: 15.02 X10*3/UL (ref 1.2–7)
NEUTS SEG NFR BLD MANUAL: 77.4 %
NRBC BLD-RTO: 0 /100 WBCS (ref 0–0)
NRBC BLD-RTO: 0.1 /100 WBCS (ref 0–0)
NRBC BLD-RTO: 0.1 /100 WBCS (ref 0–0)
NRBC BLD-RTO: 0.2 /100 WBCS (ref 0–0)
NRBC BLD-RTO: 0.3 /100 WBCS (ref 0–0)
OXYHGB MFR BLDA: 60.5 % (ref 94–98)
OXYHGB MFR BLDA: 87.7 % (ref 94–98)
OXYHGB MFR BLDA: 90.3 % (ref 94–98)
P AXIS: 18 DEGREES
P AXIS: 42 DEGREES
P AXIS: 45 DEGREES
P AXIS: 48 DEGREES
P OFFSET: 171 MS
P OFFSET: 173 MS
P OFFSET: 173 MS
P OFFSET: 183 MS
P ONSET: 114 MS
P ONSET: 118 MS
P ONSET: 119 MS
P ONSET: 133 MS
PCO2 BLDA: 44 MM HG (ref 38–42)
PCO2 BLDA: 47 MM HG (ref 38–42)
PCO2 BLDA: 62 MM HG (ref 38–42)
PH BLDA: 7.2 PH (ref 7.38–7.42)
PH BLDA: 7.4 PH (ref 7.38–7.42)
PH BLDA: 7.41 PH (ref 7.38–7.42)
PHOSPHATE SERPL-MCNC: 2.7 MG/DL (ref 2.5–4.9)
PHOSPHATE SERPL-MCNC: 3.4 MG/DL (ref 2.5–4.9)
PHOSPHATE SERPL-MCNC: 3.4 MG/DL (ref 2.5–4.9)
PHOSPHATE SERPL-MCNC: 3.7 MG/DL (ref 2.5–4.9)
PHOSPHATE SERPL-MCNC: 3.8 MG/DL (ref 2.5–4.9)
PHOSPHATE SERPL-MCNC: 4 MG/DL (ref 2.5–4.9)
PHOSPHATE SERPL-MCNC: 4.2 MG/DL (ref 2.5–4.9)
PHOSPHATE SERPL-MCNC: 4.4 MG/DL (ref 2.5–4.9)
PLATELET # BLD AUTO: 334 X10*3/UL (ref 150–450)
PLATELET # BLD AUTO: 367 X10*3/UL (ref 150–450)
PLATELET # BLD AUTO: 399 X10*3/UL (ref 150–450)
PLATELET # BLD AUTO: 424 X10*3/UL (ref 150–450)
PLATELET # BLD AUTO: 442 X10*3/UL (ref 150–450)
PLATELET # BLD AUTO: 461 X10*3/UL (ref 150–450)
PLATELET # BLD AUTO: 465 X10*3/UL (ref 150–450)
PO2 BLDA: 46 MM HG (ref 85–95)
PO2 BLDA: 60 MM HG (ref 85–95)
PO2 BLDA: 64 MM HG (ref 85–95)
POTASSIUM BLDA-SCNC: 3.8 MMOL/L (ref 3.5–5.3)
POTASSIUM BLDA-SCNC: 3.9 MMOL/L (ref 3.5–5.3)
POTASSIUM BLDA-SCNC: 4.1 MMOL/L (ref 3.5–5.3)
POTASSIUM SERPL-SCNC: 3.3 MMOL/L (ref 3.5–5.3)
POTASSIUM SERPL-SCNC: 3.5 MMOL/L (ref 3.5–5.3)
POTASSIUM SERPL-SCNC: 3.6 MMOL/L (ref 3.5–5.3)
POTASSIUM SERPL-SCNC: 3.9 MMOL/L (ref 3.5–5.3)
POTASSIUM SERPL-SCNC: 4.1 MMOL/L (ref 3.5–5.3)
POTASSIUM SERPL-SCNC: 4.1 MMOL/L (ref 3.5–5.3)
POTASSIUM SERPL-SCNC: 4.3 MMOL/L (ref 3.5–5.3)
POTASSIUM SERPL-SCNC: 4.4 MMOL/L (ref 3.5–5.3)
PR INTERVAL: 162 MS
PR INTERVAL: 186 MS
PR INTERVAL: 188 MS
PR INTERVAL: 196 MS
PROCALCITONIN SERPL-MCNC: 0.06 NG/ML
PRODUCT BLOOD TYPE: 9500
PRODUCT CODE: NORMAL
PROT SERPL-MCNC: 5.5 G/DL (ref 6.4–8.2)
PROT SERPL-MCNC: 5.6 G/DL (ref 6.4–8.2)
PROT SERPL-MCNC: 5.8 G/DL (ref 6.4–8.2)
PROT SERPL-MCNC: 6.2 G/DL (ref 6.4–8.2)
PROTHROMBIN TIME: 12 SECONDS (ref 9.8–12.8)
PROTHROMBIN TIME: 16.2 SECONDS (ref 9.8–12.8)
Q ONSET: 212 MS
Q ONSET: 214 MS
QRS COUNT: 10 BEATS
QRS COUNT: 12 BEATS
QRS COUNT: 12 BEATS
QRS COUNT: 15 BEATS
QRS DURATION: 132 MS
QRS DURATION: 152 MS
QRS DURATION: 152 MS
QRS DURATION: 154 MS
QT INTERVAL: 394 MS
QT INTERVAL: 482 MS
QT INTERVAL: 484 MS
QT INTERVAL: 492 MS
QTC CALCULATION(BAZETT): 497 MS
QTC CALCULATION(BAZETT): 497 MS
QTC CALCULATION(BAZETT): 527 MS
QTC CALCULATION(BAZETT): 547 MS
QTC FREDERICIA: 460 MS
QTC FREDERICIA: 492 MS
QTC FREDERICIA: 515 MS
QTC FREDERICIA: 525 MS
R AXIS: -37 DEGREES
R AXIS: -39 DEGREES
R AXIS: -5 DEGREES
R AXIS: -51 DEGREES
RBC # BLD AUTO: 2.58 X10*6/UL (ref 4.5–5.9)
RBC # BLD AUTO: 2.62 X10*6/UL (ref 4.5–5.9)
RBC # BLD AUTO: 2.67 X10*6/UL (ref 4.5–5.9)
RBC # BLD AUTO: 2.74 X10*6/UL (ref 4.5–5.9)
RBC # BLD AUTO: 2.77 X10*6/UL (ref 4.5–5.9)
RBC # BLD AUTO: 2.78 X10*6/UL (ref 4.5–5.9)
RBC # BLD AUTO: 2.99 X10*6/UL (ref 4.5–5.9)
RBC MORPH BLD: ABNORMAL
RBC MORPH BLD: NORMAL
RH FACTOR (ANTIGEN D): NORMAL
RH FACTOR (ANTIGEN D): NORMAL
RIGHT VENTRICLE FREE WALL PEAK S': 6
S PNEUM AG UR QL: NEGATIVE
SAO2 % BLDA: 62 % (ref 94–100)
SAO2 % BLDA: 90 % (ref 94–100)
SAO2 % BLDA: 93 % (ref 94–100)
SODIUM BLDA-SCNC: 132 MMOL/L (ref 136–145)
SODIUM BLDA-SCNC: 134 MMOL/L (ref 136–145)
SODIUM BLDA-SCNC: 134 MMOL/L (ref 136–145)
SODIUM SERPL-SCNC: 137 MMOL/L (ref 136–145)
SODIUM SERPL-SCNC: 138 MMOL/L (ref 136–145)
SODIUM SERPL-SCNC: 138 MMOL/L (ref 136–145)
SODIUM SERPL-SCNC: 139 MMOL/L (ref 136–145)
SODIUM SERPL-SCNC: 140 MMOL/L (ref 136–145)
SODIUM SERPL-SCNC: 140 MMOL/L (ref 136–145)
STAPHYLOCOCCUS SPEC CULT: NORMAL
T AXIS: 55 DEGREES
T AXIS: 63 DEGREES
T AXIS: 78 DEGREES
T AXIS: 82 DEGREES
T OFFSET: 411 MS
T OFFSET: 453 MS
T OFFSET: 454 MS
T OFFSET: 458 MS
TARGETS BLD QL SMEAR: ABNORMAL
TARGETS BLD QL SMEAR: NORMAL
TOTAL CELLS COUNTED BLD: 115
TRIGL SERPL-MCNC: 75 MG/DL (ref 0–149)
UNIT ABO: NORMAL
UNIT NUMBER: NORMAL
UNIT RH: NORMAL
UNIT VOLUME: 282
VANCOMYCIN SERPL-MCNC: 19.9 UG/ML (ref 5–20)
VENTRICULAR RATE: 64 BPM
VENTRICULAR RATE: 69 BPM
VENTRICULAR RATE: 77 BPM
VENTRICULAR RATE: 96 BPM
WBC # BLD AUTO: 10.2 X10*3/UL (ref 4.4–11.3)
WBC # BLD AUTO: 13.1 X10*3/UL (ref 4.4–11.3)
WBC # BLD AUTO: 13.2 X10*3/UL (ref 4.4–11.3)
WBC # BLD AUTO: 13.3 X10*3/UL (ref 4.4–11.3)
WBC # BLD AUTO: 15 X10*3/UL (ref 4.4–11.3)
WBC # BLD AUTO: 19.4 X10*3/UL (ref 4.4–11.3)
WBC # BLD AUTO: 6.2 X10*3/UL (ref 4.4–11.3)
XM INTEP: NORMAL

## 2024-01-01 PROCEDURE — 83605 ASSAY OF LACTIC ACID: CPT

## 2024-01-01 PROCEDURE — 99233 SBSQ HOSP IP/OBS HIGH 50: CPT | Performed by: STUDENT IN AN ORGANIZED HEALTH CARE EDUCATION/TRAINING PROGRAM

## 2024-01-01 PROCEDURE — 2500000002 HC RX 250 W HCPCS SELF ADMINISTERED DRUGS (ALT 637 FOR MEDICARE OP, ALT 636 FOR OP/ED)

## 2024-01-01 PROCEDURE — 80069 RENAL FUNCTION PANEL: CPT

## 2024-01-01 PROCEDURE — 2500000004 HC RX 250 GENERAL PHARMACY W/ HCPCS (ALT 636 FOR OP/ED)

## 2024-01-01 PROCEDURE — 84075 ASSAY ALKALINE PHOSPHATASE: CPT

## 2024-01-01 PROCEDURE — 96372 THER/PROPH/DIAG INJ SC/IM: CPT

## 2024-01-01 PROCEDURE — 99233 SBSQ HOSP IP/OBS HIGH 50: CPT

## 2024-01-01 PROCEDURE — 2500000001 HC RX 250 WO HCPCS SELF ADMINISTERED DRUGS (ALT 637 FOR MEDICARE OP)

## 2024-01-01 PROCEDURE — 71045 X-RAY EXAM CHEST 1 VIEW: CPT | Performed by: RADIOLOGY

## 2024-01-01 PROCEDURE — 87075 CULTR BACTERIA EXCEPT BLOOD: CPT

## 2024-01-01 PROCEDURE — 84100 ASSAY OF PHOSPHORUS: CPT

## 2024-01-01 PROCEDURE — 82947 ASSAY GLUCOSE BLOOD QUANT: CPT

## 2024-01-01 PROCEDURE — 85025 COMPLETE CBC W/AUTO DIFF WBC: CPT

## 2024-01-01 PROCEDURE — 74018 RADEX ABDOMEN 1 VIEW: CPT

## 2024-01-01 PROCEDURE — 84145 PROCALCITONIN (PCT): CPT

## 2024-01-01 PROCEDURE — 1200000002 HC GENERAL ROOM WITH TELEMETRY DAILY

## 2024-01-01 PROCEDURE — 84132 ASSAY OF SERUM POTASSIUM: CPT

## 2024-01-01 PROCEDURE — 93005 ELECTROCARDIOGRAM TRACING: CPT

## 2024-01-01 PROCEDURE — 74018 RADEX ABDOMEN 1 VIEW: CPT | Performed by: STUDENT IN AN ORGANIZED HEALTH CARE EDUCATION/TRAINING PROGRAM

## 2024-01-01 PROCEDURE — 31500 INSERT EMERGENCY AIRWAY: CPT

## 2024-01-01 PROCEDURE — 83735 ASSAY OF MAGNESIUM: CPT

## 2024-01-01 PROCEDURE — 99232 SBSQ HOSP IP/OBS MODERATE 35: CPT | Performed by: STUDENT IN AN ORGANIZED HEALTH CARE EDUCATION/TRAINING PROGRAM

## 2024-01-01 PROCEDURE — 82435 ASSAY OF BLOOD CHLORIDE: CPT

## 2024-01-01 PROCEDURE — 86901 BLOOD TYPING SEROLOGIC RH(D): CPT

## 2024-01-01 PROCEDURE — 99222 1ST HOSP IP/OBS MODERATE 55: CPT | Performed by: STUDENT IN AN ORGANIZED HEALTH CARE EDUCATION/TRAINING PROGRAM

## 2024-01-01 PROCEDURE — 32551 INSERTION OF CHEST TUBE: CPT

## 2024-01-01 PROCEDURE — 36415 COLL VENOUS BLD VENIPUNCTURE: CPT

## 2024-01-01 PROCEDURE — 87641 MR-STAPH DNA AMP PROBE: CPT

## 2024-01-01 PROCEDURE — 92610 EVALUATE SWALLOWING FUNCTION: CPT | Mod: GN

## 2024-01-01 PROCEDURE — 71045 X-RAY EXAM CHEST 1 VIEW: CPT

## 2024-01-01 PROCEDURE — 74018 RADEX ABDOMEN 1 VIEW: CPT | Performed by: RADIOLOGY

## 2024-01-01 PROCEDURE — 5A0935A ASSISTANCE WITH RESPIRATORY VENTILATION, LESS THAN 24 CONSECUTIVE HOURS, HIGH NASAL FLOW/VELOCITY: ICD-10-PCS | Performed by: STUDENT IN AN ORGANIZED HEALTH CARE EDUCATION/TRAINING PROGRAM

## 2024-01-01 PROCEDURE — 2500000005 HC RX 250 GENERAL PHARMACY W/O HCPCS

## 2024-01-01 PROCEDURE — 74176 CT ABD & PELVIS W/O CONTRAST: CPT

## 2024-01-01 PROCEDURE — 0W9930Z DRAINAGE OF RIGHT PLEURAL CAVITY WITH DRAINAGE DEVICE, PERCUTANEOUS APPROACH: ICD-10-PCS | Performed by: STUDENT IN AN ORGANIZED HEALTH CARE EDUCATION/TRAINING PROGRAM

## 2024-01-01 PROCEDURE — 84478 ASSAY OF TRIGLYCERIDES: CPT

## 2024-01-01 PROCEDURE — 97116 GAIT TRAINING THERAPY: CPT | Mod: GP

## 2024-01-01 PROCEDURE — 85610 PROTHROMBIN TIME: CPT

## 2024-01-01 PROCEDURE — 94002 VENT MGMT INPAT INIT DAY: CPT

## 2024-01-01 PROCEDURE — 83880 ASSAY OF NATRIURETIC PEPTIDE: CPT

## 2024-01-01 PROCEDURE — 80202 ASSAY OF VANCOMYCIN: CPT | Performed by: STUDENT IN AN ORGANIZED HEALTH CARE EDUCATION/TRAINING PROGRAM

## 2024-01-01 PROCEDURE — 97165 OT EVAL LOW COMPLEX 30 MIN: CPT | Mod: GO

## 2024-01-01 PROCEDURE — 31500 INSERT EMERGENCY AIRWAY: CPT | Mod: GC | Performed by: HOSPITALIST

## 2024-01-01 PROCEDURE — 97530 THERAPEUTIC ACTIVITIES: CPT | Mod: GP

## 2024-01-01 PROCEDURE — 0BH18EZ INSERTION OF ENDOTRACHEAL AIRWAY INTO TRACHEA, VIA NATURAL OR ARTIFICIAL OPENING ENDOSCOPIC: ICD-10-PCS | Performed by: HOSPITALIST

## 2024-01-01 PROCEDURE — 99222 1ST HOSP IP/OBS MODERATE 55: CPT | Performed by: INTERNAL MEDICINE

## 2024-01-01 PROCEDURE — 80053 COMPREHEN METABOLIC PANEL: CPT

## 2024-01-01 PROCEDURE — 32551 INSERTION OF CHEST TUBE: CPT | Mod: GC | Performed by: HOSPITALIST

## 2024-01-01 PROCEDURE — 99291 CRITICAL CARE FIRST HOUR: CPT

## 2024-01-01 PROCEDURE — 3E033XZ INTRODUCTION OF VASOPRESSOR INTO PERIPHERAL VEIN, PERCUTANEOUS APPROACH: ICD-10-PCS

## 2024-01-01 PROCEDURE — 99223 1ST HOSP IP/OBS HIGH 75: CPT

## 2024-01-01 PROCEDURE — 2500000004 HC RX 250 GENERAL PHARMACY W/ HCPCS (ALT 636 FOR OP/ED): Performed by: STUDENT IN AN ORGANIZED HEALTH CARE EDUCATION/TRAINING PROGRAM

## 2024-01-01 PROCEDURE — 93306 TTE W/DOPPLER COMPLETE: CPT

## 2024-01-01 PROCEDURE — 0D9670Z DRAINAGE OF STOMACH WITH DRAINAGE DEVICE, VIA NATURAL OR ARTIFICIAL OPENING: ICD-10-PCS | Performed by: HOSPITALIST

## 2024-01-01 PROCEDURE — 99238 HOSP IP/OBS DSCHRG MGMT 30/<: CPT

## 2024-01-01 PROCEDURE — 87493 C DIFF AMPLIFIED PROBE: CPT

## 2024-01-01 PROCEDURE — 85007 BL SMEAR W/DIFF WBC COUNT: CPT

## 2024-01-01 PROCEDURE — 85027 COMPLETE CBC AUTOMATED: CPT

## 2024-01-01 PROCEDURE — 97161 PT EVAL LOW COMPLEX 20 MIN: CPT | Mod: GP

## 2024-01-01 PROCEDURE — 36620 INSERTION CATHETER ARTERY: CPT

## 2024-01-01 PROCEDURE — 36430 TRANSFUSION BLD/BLD COMPNT: CPT

## 2024-01-01 PROCEDURE — 36620 INSERTION CATHETER ARTERY: CPT | Mod: GC | Performed by: HOSPITALIST

## 2024-01-01 PROCEDURE — 3E0G76Z INTRODUCTION OF NUTRITIONAL SUBSTANCE INTO UPPER GI, VIA NATURAL OR ARTIFICIAL OPENING: ICD-10-PCS

## 2024-01-01 PROCEDURE — 93306 TTE W/DOPPLER COMPLETE: CPT | Performed by: STUDENT IN AN ORGANIZED HEALTH CARE EDUCATION/TRAINING PROGRAM

## 2024-01-01 RX ORDER — POTASSIUM CHLORIDE 14.9 MG/ML
20 INJECTION INTRAVENOUS ONCE
Status: DISCONTINUED | OUTPATIENT
Start: 2024-01-01 | End: 2024-01-01

## 2024-01-01 RX ORDER — LORAZEPAM 2 MG/ML
0.5 INJECTION INTRAMUSCULAR EVERY 4 HOURS PRN
Status: DISCONTINUED | OUTPATIENT
Start: 2024-01-01 | End: 2024-01-01 | Stop reason: HOSPADM

## 2024-01-01 RX ORDER — LORAZEPAM 0.5 MG/1
0.5 TABLET ORAL 2 TIMES DAILY PRN
Status: DISCONTINUED | OUTPATIENT
Start: 2024-01-01 | End: 2024-01-01 | Stop reason: HOSPADM

## 2024-01-01 RX ORDER — DOXYCYCLINE HYCLATE 100 MG
100 TABLET ORAL 2 TIMES DAILY
Status: DISCONTINUED | OUTPATIENT
Start: 2024-01-01 | End: 2024-01-01

## 2024-01-01 RX ORDER — SODIUM CHLORIDE 9 MG/ML
100 INJECTION, SOLUTION INTRAVENOUS CONTINUOUS
Status: ACTIVE | OUTPATIENT
Start: 2024-01-01 | End: 2024-01-01

## 2024-01-01 RX ORDER — DICYCLOMINE HYDROCHLORIDE 10 MG/1
10 CAPSULE ORAL 4 TIMES DAILY PRN
Status: DISCONTINUED | OUTPATIENT
Start: 2024-01-01 | End: 2024-01-01 | Stop reason: HOSPADM

## 2024-01-01 RX ORDER — ALBUTEROL SULFATE 0.83 MG/ML
SOLUTION RESPIRATORY (INHALATION)
Status: DISCONTINUED
Start: 2024-01-01 | End: 2024-01-01 | Stop reason: HOSPADM

## 2024-01-01 RX ORDER — LOPERAMIDE HYDROCHLORIDE 2 MG/1
2 CAPSULE ORAL 4 TIMES DAILY PRN
Status: DISCONTINUED | OUTPATIENT
Start: 2024-01-01 | End: 2024-01-01 | Stop reason: HOSPADM

## 2024-01-01 RX ORDER — AZITHROMYCIN 500 MG/1
500 TABLET, FILM COATED ORAL DAILY
Status: CANCELLED | OUTPATIENT
Start: 2024-01-01 | End: 2024-01-01

## 2024-01-01 RX ORDER — SODIUM CHLORIDE, SODIUM LACTATE, POTASSIUM CHLORIDE, CALCIUM CHLORIDE 600; 310; 30; 20 MG/100ML; MG/100ML; MG/100ML; MG/100ML
100 INJECTION, SOLUTION INTRAVENOUS CONTINUOUS
Status: ACTIVE | OUTPATIENT
Start: 2024-01-01 | End: 2024-01-01

## 2024-01-01 RX ORDER — OXYBUTYNIN CHLORIDE 5 MG/1
2.5 TABLET ORAL 2 TIMES DAILY
Status: DISCONTINUED | OUTPATIENT
Start: 2024-01-01 | End: 2024-01-01

## 2024-01-01 RX ORDER — POTASSIUM CHLORIDE 29.8 MG/ML
40 INJECTION INTRAVENOUS ONCE
Status: COMPLETED | OUTPATIENT
Start: 2024-01-01 | End: 2024-01-01

## 2024-01-01 RX ORDER — LORAZEPAM 2 MG/ML
2 INJECTION INTRAMUSCULAR EVERY 10 MIN PRN
Status: DISCONTINUED | OUTPATIENT
Start: 2024-01-01 | End: 2024-01-01 | Stop reason: HOSPADM

## 2024-01-01 RX ORDER — NOREPINEPHRINE BITARTRATE/D5W 8 MG/250ML
.01-1 PLASTIC BAG, INJECTION (ML) INTRAVENOUS CONTINUOUS
Status: DISCONTINUED | OUTPATIENT
Start: 2024-01-01 | End: 2024-01-01 | Stop reason: HOSPADM

## 2024-01-01 RX ORDER — ONDANSETRON HYDROCHLORIDE 2 MG/ML
4 INJECTION, SOLUTION INTRAVENOUS
Status: DISCONTINUED | OUTPATIENT
Start: 2024-01-01 | End: 2024-01-01

## 2024-01-01 RX ORDER — GLYCOPYRROLATE 0.2 MG/ML
0.2 INJECTION INTRAMUSCULAR; INTRAVENOUS EVERY 4 HOURS PRN
Status: DISCONTINUED | OUTPATIENT
Start: 2024-01-01 | End: 2024-01-01 | Stop reason: HOSPADM

## 2024-01-01 RX ORDER — POTASSIUM CHLORIDE 14.9 MG/ML
20 INJECTION INTRAVENOUS ONCE
Status: COMPLETED | OUTPATIENT
Start: 2024-01-01 | End: 2024-01-01

## 2024-01-01 RX ORDER — ENOXAPARIN SODIUM 100 MG/ML
1 INJECTION SUBCUTANEOUS EVERY 12 HOURS
Status: DISCONTINUED | OUTPATIENT
Start: 2024-01-01 | End: 2024-01-01

## 2024-01-01 RX ORDER — FENTANYL CITRATE-0.9 % NACL/PF 10 MCG/ML
0-300 PLASTIC BAG, INJECTION (ML) INTRAVENOUS CONTINUOUS
Status: DISCONTINUED | OUTPATIENT
Start: 2024-01-01 | End: 2024-01-01 | Stop reason: HOSPADM

## 2024-01-01 RX ORDER — MAGNESIUM SULFATE HEPTAHYDRATE 40 MG/ML
2 INJECTION, SOLUTION INTRAVENOUS ONCE
Status: COMPLETED | OUTPATIENT
Start: 2024-01-01 | End: 2024-01-01

## 2024-01-01 RX ORDER — ENOXAPARIN SODIUM 100 MG/ML
1 INJECTION SUBCUTANEOUS EVERY 12 HOURS
Status: DISCONTINUED | OUTPATIENT
Start: 2024-01-01 | End: 2024-01-01 | Stop reason: HOSPADM

## 2024-01-01 RX ORDER — DEXAMETHASONE 4 MG/1
4 TABLET ORAL EVERY 6 HOURS
Status: DISCONTINUED | OUTPATIENT
Start: 2024-01-01 | End: 2024-01-01 | Stop reason: HOSPADM

## 2024-01-01 RX ORDER — ONDANSETRON HYDROCHLORIDE 2 MG/ML
4 INJECTION, SOLUTION INTRAVENOUS EVERY 8 HOURS
Status: DISCONTINUED | OUTPATIENT
Start: 2024-01-01 | End: 2024-01-01 | Stop reason: HOSPADM

## 2024-01-01 RX ORDER — PHENYLEPHRINE HYDROCHLORIDE 10 MG/ML
INJECTION INTRAVENOUS
Status: DISCONTINUED
Start: 2024-01-01 | End: 2024-01-01 | Stop reason: HOSPADM

## 2024-01-01 RX ORDER — PHENYLEPHRINE HCL IN 0.9% NACL 0.4MG/10ML
SYRINGE (ML) INTRAVENOUS
Status: DISCONTINUED
Start: 2024-01-01 | End: 2024-01-01 | Stop reason: HOSPADM

## 2024-01-01 RX ORDER — SULFAMETHOXAZOLE AND TRIMETHOPRIM 800; 160 MG/1; MG/1
160 TABLET ORAL 3 TIMES WEEKLY
Status: DISCONTINUED | OUTPATIENT
Start: 2024-01-08 | End: 2024-01-01 | Stop reason: HOSPADM

## 2024-01-01 RX ORDER — KETAMINE HYDROCHLORIDE 100 MG/ML
INJECTION, SOLUTION INTRAMUSCULAR; INTRAVENOUS
Status: COMPLETED
Start: 2024-01-01 | End: 2024-01-01

## 2024-01-01 RX ORDER — MAGNESIUM SULFATE HEPTAHYDRATE 40 MG/ML
2 INJECTION, SOLUTION INTRAVENOUS ONCE
Status: DISCONTINUED | OUTPATIENT
Start: 2024-01-01 | End: 2024-01-01

## 2024-01-01 RX ORDER — SODIUM CHLORIDE 9 MG/ML
100 INJECTION, SOLUTION INTRAVENOUS CONTINUOUS
Status: DISCONTINUED | OUTPATIENT
Start: 2024-01-01 | End: 2024-01-01

## 2024-01-01 RX ORDER — EPINEPHRINE 1 MG/ML
INJECTION, SOLUTION, CONCENTRATE INTRAVENOUS
Status: DISCONTINUED
Start: 2024-01-01 | End: 2024-01-01 | Stop reason: WASHOUT

## 2024-01-01 RX ORDER — ALBUMIN HUMAN 50 G/1000ML
12.5 SOLUTION INTRAVENOUS ONCE
Status: DISCONTINUED | OUTPATIENT
Start: 2024-01-01 | End: 2024-01-01

## 2024-01-01 RX ORDER — SODIUM CHLORIDE, SODIUM LACTATE, POTASSIUM CHLORIDE, CALCIUM CHLORIDE 600; 310; 30; 20 MG/100ML; MG/100ML; MG/100ML; MG/100ML
100 INJECTION, SOLUTION INTRAVENOUS CONTINUOUS
Status: DISCONTINUED | OUTPATIENT
Start: 2024-01-01 | End: 2024-01-01

## 2024-01-01 RX ORDER — SCOLOPAMINE TRANSDERMAL SYSTEM 1 MG/1
1 PATCH, EXTENDED RELEASE TRANSDERMAL
Status: DISCONTINUED | OUTPATIENT
Start: 2024-01-01 | End: 2024-01-01 | Stop reason: HOSPADM

## 2024-01-01 RX ORDER — ENOXAPARIN SODIUM 100 MG/ML
40 INJECTION SUBCUTANEOUS EVERY 24 HOURS
Status: DISCONTINUED | OUTPATIENT
Start: 2024-01-01 | End: 2024-01-01 | Stop reason: SDUPTHER

## 2024-01-01 RX ORDER — POLYETHYLENE GLYCOL 3350 17 G/17G
17 POWDER, FOR SOLUTION ORAL DAILY
Status: DISCONTINUED | OUTPATIENT
Start: 2024-01-01 | End: 2024-01-01 | Stop reason: HOSPADM

## 2024-01-01 RX ORDER — PHENYLEPHRINE HCL IN 0.9% NACL 0.4MG/10ML
SYRINGE (ML) INTRAVENOUS
Status: COMPLETED
Start: 2024-01-01 | End: 2024-01-01

## 2024-01-01 RX ORDER — ENOXAPARIN SODIUM 100 MG/ML
40 INJECTION SUBCUTANEOUS DAILY
Status: DISCONTINUED | OUTPATIENT
Start: 2024-01-01 | End: 2024-01-01

## 2024-01-01 RX ORDER — FENTANYL CITRATE 50 UG/ML
25 INJECTION, SOLUTION INTRAMUSCULAR; INTRAVENOUS
Status: DISCONTINUED | OUTPATIENT
Start: 2024-01-01 | End: 2024-01-01 | Stop reason: HOSPADM

## 2024-01-01 RX ORDER — PANTOPRAZOLE SODIUM 40 MG/1
40 TABLET, DELAYED RELEASE ORAL 2 TIMES DAILY
Status: DISCONTINUED | OUTPATIENT
Start: 2024-01-01 | End: 2024-01-01 | Stop reason: HOSPADM

## 2024-01-01 RX ORDER — PALONOSETRON 0.05 MG/ML
250 INJECTION, SOLUTION INTRAVENOUS ONCE
Status: DISCONTINUED | OUTPATIENT
Start: 2024-01-01 | End: 2024-01-01

## 2024-01-01 RX ORDER — AZITHROMYCIN 250 MG/1
250 TABLET, FILM COATED ORAL DAILY
Status: CANCELLED | OUTPATIENT
Start: 2024-01-01 | End: 2024-01-08

## 2024-01-01 RX ORDER — ENOXAPARIN SODIUM 100 MG/ML
1 INJECTION SUBCUTANEOUS 2 TIMES DAILY
Status: DISCONTINUED | OUTPATIENT
Start: 2024-01-01 | End: 2024-01-01

## 2024-01-01 RX ORDER — VANCOMYCIN HYDROCHLORIDE 500 MG/100ML
1500 INJECTION, SOLUTION INTRAVENOUS ONCE
Status: COMPLETED | OUTPATIENT
Start: 2024-01-01 | End: 2024-01-01

## 2024-01-01 RX ORDER — CALCIUM CARBONATE 200(500)MG
500 TABLET,CHEWABLE ORAL 4 TIMES DAILY PRN
Status: DISCONTINUED | OUTPATIENT
Start: 2024-01-01 | End: 2024-01-01 | Stop reason: HOSPADM

## 2024-01-01 RX ORDER — ROCURONIUM BROMIDE 10 MG/ML
INJECTION, SOLUTION INTRAVENOUS
Status: COMPLETED
Start: 2024-01-01 | End: 2024-01-01

## 2024-01-01 RX ORDER — DEXAMETHASONE 0.5 MG/5ML
4 ELIXIR ORAL 4 TIMES DAILY
Status: DISCONTINUED | OUTPATIENT
Start: 2024-01-01 | End: 2024-01-01

## 2024-01-01 RX ORDER — POTASSIUM CHLORIDE 20 MEQ/1
40 TABLET, EXTENDED RELEASE ORAL
Status: DISPENSED | OUTPATIENT
Start: 2024-01-01 | End: 2024-01-01

## 2024-01-01 RX ORDER — LIDOCAINE HYDROCHLORIDE 40 MG/ML
200 INJECTION, SOLUTION RETROBULBAR; TOPICAL ONCE
Status: DISCONTINUED | OUTPATIENT
Start: 2024-01-01 | End: 2024-01-01

## 2024-01-01 RX ORDER — DEXAMETHASONE SODIUM PHOSPHATE 4 MG/ML
4 INJECTION, SOLUTION INTRA-ARTICULAR; INTRALESIONAL; INTRAMUSCULAR; INTRAVENOUS; SOFT TISSUE EVERY 6 HOURS
Status: DISCONTINUED | OUTPATIENT
Start: 2024-01-01 | End: 2024-01-01

## 2024-01-01 RX ORDER — VANCOMYCIN HYDROCHLORIDE 750 MG/150ML
15 INJECTION, SOLUTION INTRAVENOUS EVERY 12 HOURS
Status: DISCONTINUED | OUTPATIENT
Start: 2024-01-01 | End: 2024-01-01

## 2024-01-01 RX ORDER — OLANZAPINE 5 MG/1
5 TABLET ORAL NIGHTLY
Status: DISCONTINUED | OUTPATIENT
Start: 2024-01-01 | End: 2024-01-01 | Stop reason: HOSPADM

## 2024-01-01 RX ORDER — EPINEPHRINE 0.1 MG/ML
INJECTION INTRACARDIAC; INTRAVENOUS
Status: DISCONTINUED
Start: 2024-01-01 | End: 2024-01-01 | Stop reason: HOSPADM

## 2024-01-01 RX ORDER — EPINEPHRINE HCL IN DEXTROSE 5% 4MG/250ML
0-2 PLASTIC BAG, INJECTION (ML) INTRAVENOUS CONTINUOUS
Status: DISCONTINUED | OUTPATIENT
Start: 2024-01-01 | End: 2024-01-01 | Stop reason: HOSPADM

## 2024-01-01 RX ORDER — FENTANYL CITRATE-0.9 % NACL/PF 10 MCG/ML
PLASTIC BAG, INJECTION (ML) INTRAVENOUS
Status: DISCONTINUED
Start: 2024-01-01 | End: 2024-01-01 | Stop reason: HOSPADM

## 2024-01-01 RX ORDER — EPINEPHRINE 1 MG/ML
INJECTION INTRAMUSCULAR; INTRAVENOUS; SUBCUTANEOUS
Status: DISCONTINUED
Start: 2024-01-01 | End: 2024-01-01 | Stop reason: HOSPADM

## 2024-01-01 RX ORDER — ONDANSETRON HYDROCHLORIDE 2 MG/ML
4 INJECTION, SOLUTION INTRAVENOUS EVERY 6 HOURS PRN
Status: DISCONTINUED | OUTPATIENT
Start: 2024-01-01 | End: 2024-01-01

## 2024-01-01 RX ORDER — BACLOFEN 10 MG/1
5 TABLET ORAL 3 TIMES DAILY PRN
Status: DISCONTINUED | OUTPATIENT
Start: 2024-01-01 | End: 2024-01-01 | Stop reason: HOSPADM

## 2024-01-01 RX ORDER — ONDANSETRON 4 MG/1
4 TABLET, ORALLY DISINTEGRATING ORAL
Status: DISCONTINUED | OUTPATIENT
Start: 2024-01-01 | End: 2024-01-01

## 2024-01-01 RX ORDER — TAMSULOSIN HYDROCHLORIDE 0.4 MG/1
0.4 CAPSULE ORAL NIGHTLY
Status: DISCONTINUED | OUTPATIENT
Start: 2024-01-01 | End: 2024-01-01 | Stop reason: HOSPADM

## 2024-01-01 RX ADMIN — ONDANSETRON 4 MG: 2 INJECTION INTRAMUSCULAR; INTRAVENOUS at 08:52

## 2024-01-01 RX ADMIN — Medication 12.5 MG: at 21:58

## 2024-01-01 RX ADMIN — DULOXETINE HYDROCHLORIDE 30 MG: 30 CAPSULE, DELAYED RELEASE ORAL at 08:27

## 2024-01-01 RX ADMIN — CYANOCOBALAMIN TAB 1000 MCG 1000 MCG: 1000 TAB at 08:25

## 2024-01-01 RX ADMIN — OLANZAPINE 5 MG: 5 TABLET, FILM COATED ORAL at 21:59

## 2024-01-01 RX ADMIN — ENOXAPARIN SODIUM 60 MG: 60 INJECTION SUBCUTANEOUS at 21:33

## 2024-01-01 RX ADMIN — PANTOPRAZOLE SODIUM 40 MG: 40 TABLET, DELAYED RELEASE ORAL at 21:42

## 2024-01-01 RX ADMIN — CALCIUM CARBONATE (ANTACID) CHEW TAB 500 MG 500 MG: 500 CHEW TAB at 15:26

## 2024-01-01 RX ADMIN — POTASSIUM CHLORIDE 20 MEQ: 200 INJECTION, SOLUTION INTRAVENOUS at 11:07

## 2024-01-01 RX ADMIN — POTASSIUM CHLORIDE 40 MEQ: 29.8 INJECTION, SOLUTION INTRAVENOUS at 13:01

## 2024-01-01 RX ADMIN — DEXAMETHASONE SODIUM PHOSPHATE 4 MG: 4 INJECTION INTRA-ARTICULAR; INTRALESIONAL; INTRAMUSCULAR; INTRAVENOUS; SOFT TISSUE at 17:41

## 2024-01-01 RX ADMIN — ASPIRIN 81 MG: 81 TABLET, COATED ORAL at 08:25

## 2024-01-01 RX ADMIN — ONDANSETRON 4 MG: 2 INJECTION INTRAMUSCULAR; INTRAVENOUS at 01:21

## 2024-01-01 RX ADMIN — POTASSIUM CHLORIDE 40 MEQ: 29.8 INJECTION, SOLUTION INTRAVENOUS at 09:27

## 2024-01-01 RX ADMIN — ONDANSETRON 4 MG: 2 INJECTION INTRAMUSCULAR; INTRAVENOUS at 15:26

## 2024-01-01 RX ADMIN — PIPERACILLIN SODIUM AND TAZOBACTAM SODIUM 4.5 G: 4; .5 INJECTION, SOLUTION INTRAVENOUS at 01:38

## 2024-01-01 RX ADMIN — PIPERACILLIN SODIUM AND TAZOBACTAM SODIUM 3.38 G: 3; .375 INJECTION, SOLUTION INTRAVENOUS at 09:11

## 2024-01-01 RX ADMIN — PIPERACILLIN SODIUM AND TAZOBACTAM SODIUM 4.5 G: 4; .5 INJECTION, SOLUTION INTRAVENOUS at 05:03

## 2024-01-01 RX ADMIN — DEXAMETHASONE SODIUM PHOSPHATE 4 MG: 4 INJECTION INTRA-ARTICULAR; INTRALESIONAL; INTRAMUSCULAR; INTRAVENOUS; SOFT TISSUE at 11:08

## 2024-01-01 RX ADMIN — DEXAMETHASONE SODIUM PHOSPHATE 4 MG: 4 INJECTION INTRA-ARTICULAR; INTRALESIONAL; INTRAMUSCULAR; INTRAVENOUS; SOFT TISSUE at 23:18

## 2024-01-01 RX ADMIN — DULOXETINE HYDROCHLORIDE 30 MG: 30 CAPSULE, DELAYED RELEASE ORAL at 08:25

## 2024-01-01 RX ADMIN — Medication 400 MG: at 20:03

## 2024-01-01 RX ADMIN — ONDANSETRON 4 MG: 2 INJECTION INTRAMUSCULAR; INTRAVENOUS at 17:18

## 2024-01-01 RX ADMIN — PREGABALIN 200 MG: 25 CAPSULE ORAL at 09:11

## 2024-01-01 RX ADMIN — PANTOPRAZOLE SODIUM 40 MG: 40 TABLET, DELAYED RELEASE ORAL at 06:09

## 2024-01-01 RX ADMIN — MIDODRINE HYDROCHLORIDE 5 MG: 5 TABLET ORAL at 14:11

## 2024-01-01 RX ADMIN — DEXTROSE MONOHYDRATE 1.25 G: 50 INJECTION, SOLUTION INTRAVENOUS at 20:48

## 2024-01-01 RX ADMIN — ONDANSETRON 4 MG: 2 INJECTION INTRAMUSCULAR; INTRAVENOUS at 00:32

## 2024-01-01 RX ADMIN — VANCOMYCIN HYDROCHLORIDE 125 MG: 125 CAPSULE ORAL at 08:26

## 2024-01-01 RX ADMIN — ASPIRIN 81 MG: 81 TABLET, COATED ORAL at 09:13

## 2024-01-01 RX ADMIN — SCOPALAMINE 1 PATCH: 1 PATCH, EXTENDED RELEASE TRANSDERMAL at 23:18

## 2024-01-01 RX ADMIN — TRIMETHOBENZAMIDE HYDROCHLORIDE 200 MG: 100 INJECTION INTRAMUSCULAR at 21:43

## 2024-01-01 RX ADMIN — OLANZAPINE 5 MG: 5 TABLET, FILM COATED ORAL at 21:42

## 2024-01-01 RX ADMIN — PREGABALIN 200 MG: 25 CAPSULE ORAL at 21:33

## 2024-01-01 RX ADMIN — VANCOMYCIN HYDROCHLORIDE 125 MG: 125 CAPSULE ORAL at 09:27

## 2024-01-01 RX ADMIN — KETAMINE HYDROCHLORIDE 150 MG: 100 INJECTION, SOLUTION, CONCENTRATE INTRAMUSCULAR; INTRAVENOUS at 04:00

## 2024-01-01 RX ADMIN — BACLOFEN 5 MG: 10 TABLET ORAL at 20:03

## 2024-01-01 RX ADMIN — MIDODRINE HYDROCHLORIDE 5 MG: 5 TABLET ORAL at 09:13

## 2024-01-01 RX ADMIN — PREGABALIN 200 MG: 25 CAPSULE ORAL at 14:11

## 2024-01-01 RX ADMIN — DOXYCYCLINE HYCLATE 100 MG: 100 TABLET, FILM COATED ORAL at 09:00

## 2024-01-01 RX ADMIN — ONDANSETRON 4 MG: 2 INJECTION INTRAMUSCULAR; INTRAVENOUS at 09:56

## 2024-01-01 RX ADMIN — Medication 1 MCG/KG/MIN: at 04:45

## 2024-01-01 RX ADMIN — DOXYCYCLINE HYCLATE 100 MG: 100 TABLET, FILM COATED ORAL at 20:22

## 2024-01-01 RX ADMIN — LOPERAMIDE HYDROCHLORIDE 2 MG: 2 CAPSULE ORAL at 21:42

## 2024-01-01 RX ADMIN — PREGABALIN 200 MG: 25 CAPSULE ORAL at 15:45

## 2024-01-01 RX ADMIN — MIDODRINE HYDROCHLORIDE 5 MG: 5 TABLET ORAL at 08:24

## 2024-01-01 RX ADMIN — PANTOPRAZOLE SODIUM 40 MG: 40 TABLET, DELAYED RELEASE ORAL at 08:26

## 2024-01-01 RX ADMIN — FINASTERIDE 5 MG: 5 TABLET, FILM COATED ORAL at 08:25

## 2024-01-01 RX ADMIN — PIPERACILLIN SODIUM AND TAZOBACTAM SODIUM 4.5 G: 4; .5 INJECTION, SOLUTION INTRAVENOUS at 17:03

## 2024-01-01 RX ADMIN — DEXAMETHASONE SODIUM PHOSPHATE 4 MG: 4 INJECTION INTRA-ARTICULAR; INTRALESIONAL; INTRAMUSCULAR; INTRAVENOUS; SOFT TISSUE at 05:21

## 2024-01-01 RX ADMIN — PIPERACILLIN SODIUM AND TAZOBACTAM SODIUM 4.5 G: 4; .5 INJECTION, SOLUTION INTRAVENOUS at 17:41

## 2024-01-01 RX ADMIN — LEVOTHYROXINE SODIUM 125 MCG: 100 TABLET ORAL at 06:09

## 2024-01-01 RX ADMIN — MIDODRINE HYDROCHLORIDE 5 MG: 5 TABLET ORAL at 20:22

## 2024-01-01 RX ADMIN — PREGABALIN 200 MG: 25 CAPSULE ORAL at 20:03

## 2024-01-01 RX ADMIN — CALCIUM CARBONATE (ANTACID) CHEW TAB 500 MG 500 MG: 500 CHEW TAB at 18:17

## 2024-01-01 RX ADMIN — DULOXETINE HYDROCHLORIDE 60 MG: 60 CAPSULE, DELAYED RELEASE ORAL at 20:10

## 2024-01-01 RX ADMIN — Medication 1 MCG/KG/MIN: at 05:31

## 2024-01-01 RX ADMIN — OLANZAPINE 5 MG: 5 TABLET, FILM COATED ORAL at 20:10

## 2024-01-01 RX ADMIN — PIPERACILLIN SODIUM AND TAZOBACTAM SODIUM 4.5 G: 4; .5 INJECTION, SOLUTION INTRAVENOUS at 11:30

## 2024-01-01 RX ADMIN — ROSUVASTATIN 20 MG: 10 TABLET, FILM COATED ORAL at 20:22

## 2024-01-01 RX ADMIN — ENOXAPARIN SODIUM 60 MG: 60 INJECTION SUBCUTANEOUS at 20:10

## 2024-01-01 RX ADMIN — DULOXETINE HYDROCHLORIDE 60 MG: 60 CAPSULE, DELAYED RELEASE ORAL at 21:59

## 2024-01-01 RX ADMIN — TRIMETHOBENZAMIDE HYDROCHLORIDE 200 MG: 100 INJECTION INTRAMUSCULAR at 17:09

## 2024-01-01 RX ADMIN — ENOXAPARIN SODIUM 60 MG: 60 INJECTION SUBCUTANEOUS at 08:53

## 2024-01-01 RX ADMIN — PREGABALIN 200 MG: 25 CAPSULE ORAL at 14:05

## 2024-01-01 RX ADMIN — VANCOMYCIN HYDROCHLORIDE 1500 MG: 500 INJECTION, SOLUTION INTRAVENOUS at 02:28

## 2024-01-01 RX ADMIN — PANTOPRAZOLE SODIUM 40 MG: 40 TABLET, DELAYED RELEASE ORAL at 09:13

## 2024-01-01 RX ADMIN — TRIMETHOBENZAMIDE HYDROCHLORIDE 200 MG: 100 INJECTION INTRAMUSCULAR at 14:05

## 2024-01-01 RX ADMIN — PREGABALIN 200 MG: 25 CAPSULE ORAL at 08:52

## 2024-01-01 RX ADMIN — SODIUM CHLORIDE, POTASSIUM CHLORIDE, SODIUM LACTATE AND CALCIUM CHLORIDE 100 ML/HR: 600; 310; 30; 20 INJECTION, SOLUTION INTRAVENOUS at 11:15

## 2024-01-01 RX ADMIN — TAMSULOSIN HYDROCHLORIDE 0.4 MG: 0.4 CAPSULE ORAL at 21:59

## 2024-01-01 RX ADMIN — Medication 400 MG: at 08:25

## 2024-01-01 RX ADMIN — DULOXETINE HYDROCHLORIDE 30 MG: 30 CAPSULE, DELAYED RELEASE ORAL at 09:12

## 2024-01-01 RX ADMIN — PANTOPRAZOLE SODIUM 40 MG: 40 TABLET, DELAYED RELEASE ORAL at 09:21

## 2024-01-01 RX ADMIN — TAMSULOSIN HYDROCHLORIDE 0.4 MG: 0.4 CAPSULE ORAL at 21:43

## 2024-01-01 RX ADMIN — VANCOMYCIN HYDROCHLORIDE 125 MG: 125 CAPSULE ORAL at 08:25

## 2024-01-01 RX ADMIN — ENOXAPARIN SODIUM 40 MG: 100 INJECTION SUBCUTANEOUS at 08:25

## 2024-01-01 RX ADMIN — AZITHROMYCIN 500 MG: 500 INJECTION, POWDER, LYOPHILIZED, FOR SOLUTION INTRAVENOUS at 11:07

## 2024-01-01 RX ADMIN — ENOXAPARIN SODIUM 60 MG: 60 INJECTION SUBCUTANEOUS at 08:27

## 2024-01-01 RX ADMIN — PREGABALIN 200 MG: 25 CAPSULE ORAL at 08:25

## 2024-01-01 RX ADMIN — VASOPRESSIN 0.03 UNITS/MIN: 0.2 INJECTION INTRAVENOUS at 04:45

## 2024-01-01 RX ADMIN — LOPERAMIDE HYDROCHLORIDE 2 MG: 2 CAPSULE ORAL at 21:33

## 2024-01-01 RX ADMIN — PHENYLEPHRINE HYDROCHLORIDE 4 MCG/KG/MIN: 10 INJECTION INTRAVENOUS at 05:15

## 2024-01-01 RX ADMIN — OLANZAPINE 5 MG: 5 TABLET, FILM COATED ORAL at 21:33

## 2024-01-01 RX ADMIN — MIDODRINE HYDROCHLORIDE 5 MG: 5 TABLET ORAL at 16:34

## 2024-01-01 RX ADMIN — ENOXAPARIN SODIUM 60 MG: 60 INJECTION SUBCUTANEOUS at 22:40

## 2024-01-01 RX ADMIN — Medication 400 MG: at 20:22

## 2024-01-01 RX ADMIN — BACLOFEN 5 MG: 10 TABLET ORAL at 09:13

## 2024-01-01 RX ADMIN — LEVOTHYROXINE SODIUM 125 MCG: 100 TABLET ORAL at 05:15

## 2024-01-01 RX ADMIN — ENOXAPARIN SODIUM 60 MG: 60 INJECTION SUBCUTANEOUS at 09:28

## 2024-01-01 RX ADMIN — BACLOFEN 5 MG: 10 TABLET ORAL at 08:25

## 2024-01-01 RX ADMIN — TRIMETHOBENZAMIDE HYDROCHLORIDE 200 MG: 100 INJECTION INTRAMUSCULAR at 13:40

## 2024-01-01 RX ADMIN — LOPERAMIDE HYDROCHLORIDE 2 MG: 2 CAPSULE ORAL at 13:02

## 2024-01-01 RX ADMIN — POTASSIUM CHLORIDE 40 MEQ: 29.8 INJECTION, SOLUTION INTRAVENOUS at 09:10

## 2024-01-01 RX ADMIN — PREGABALIN 200 MG: 25 CAPSULE ORAL at 20:22

## 2024-01-01 RX ADMIN — SODIUM CHLORIDE 100 ML/HR: 9 INJECTION, SOLUTION INTRAVENOUS at 17:03

## 2024-01-01 RX ADMIN — SODIUM CHLORIDE, POTASSIUM CHLORIDE, SODIUM LACTATE AND CALCIUM CHLORIDE 100 ML/HR: 600; 310; 30; 20 INJECTION, SOLUTION INTRAVENOUS at 18:45

## 2024-01-01 RX ADMIN — PIPERACILLIN SODIUM AND TAZOBACTAM SODIUM 4.5 G: 4; .5 INJECTION, SOLUTION INTRAVENOUS at 22:14

## 2024-01-01 RX ADMIN — MIDODRINE HYDROCHLORIDE 5 MG: 5 TABLET ORAL at 08:25

## 2024-01-01 RX ADMIN — DULOXETINE HYDROCHLORIDE 60 MG: 60 CAPSULE, DELAYED RELEASE ORAL at 21:43

## 2024-01-01 RX ADMIN — DULOXETINE HYDROCHLORIDE 30 MG: 30 CAPSULE, DELAYED RELEASE ORAL at 08:52

## 2024-01-01 RX ADMIN — PREGABALIN 200 MG: 25 CAPSULE ORAL at 08:26

## 2024-01-01 RX ADMIN — SODIUM CHLORIDE, POTASSIUM CHLORIDE, SODIUM LACTATE AND CALCIUM CHLORIDE 1000 ML: 600; 310; 30; 20 INJECTION, SOLUTION INTRAVENOUS at 01:38

## 2024-01-01 RX ADMIN — PIPERACILLIN SODIUM AND TAZOBACTAM SODIUM 3.38 G: 3; .375 INJECTION, SOLUTION INTRAVENOUS at 02:54

## 2024-01-01 RX ADMIN — DULOXETINE HYDROCHLORIDE 60 MG: 60 CAPSULE, DELAYED RELEASE ORAL at 21:33

## 2024-01-01 RX ADMIN — PANTOPRAZOLE SODIUM 40 MG: 40 TABLET, DELAYED RELEASE ORAL at 08:25

## 2024-01-01 RX ADMIN — PREGABALIN 200 MG: 25 CAPSULE ORAL at 21:59

## 2024-01-01 RX ADMIN — LEVOTHYROXINE SODIUM 125 MCG: 100 TABLET ORAL at 05:03

## 2024-01-01 RX ADMIN — Medication 50 MCG/HR: at 07:00

## 2024-01-01 RX ADMIN — DEXAMETHASONE 4 MG: 4 TABLET ORAL at 17:26

## 2024-01-01 RX ADMIN — SCOPALAMINE 1 PATCH: 1 PATCH, EXTENDED RELEASE TRANSDERMAL at 15:54

## 2024-01-01 RX ADMIN — PREGABALIN 200 MG: 25 CAPSULE ORAL at 16:34

## 2024-01-01 RX ADMIN — LEVOTHYROXINE SODIUM 125 MCG: 100 TABLET ORAL at 09:12

## 2024-01-01 RX ADMIN — DEXAMETHASONE SODIUM PHOSPHATE 4 MG: 4 INJECTION INTRA-ARTICULAR; INTRALESIONAL; INTRAMUSCULAR; INTRAVENOUS; SOFT TISSUE at 00:32

## 2024-01-01 RX ADMIN — PREGABALIN 200 MG: 25 CAPSULE ORAL at 20:10

## 2024-01-01 RX ADMIN — DEXAMETHASONE SODIUM PHOSPHATE 4 MG: 4 INJECTION INTRA-ARTICULAR; INTRALESIONAL; INTRAMUSCULAR; INTRAVENOUS; SOFT TISSUE at 17:35

## 2024-01-01 RX ADMIN — PIPERACILLIN SODIUM AND TAZOBACTAM SODIUM 4.5 G: 4; .5 INJECTION, SOLUTION INTRAVENOUS at 05:14

## 2024-01-01 RX ADMIN — Medication: at 05:00

## 2024-01-01 RX ADMIN — TRIMETHOBENZAMIDE HYDROCHLORIDE 200 MG: 100 INJECTION INTRAMUSCULAR at 11:07

## 2024-01-01 RX ADMIN — DULOXETINE HYDROCHLORIDE 60 MG: 60 CAPSULE, DELAYED RELEASE ORAL at 20:22

## 2024-01-01 RX ADMIN — DEXAMETHASONE SODIUM PHOSPHATE 4 MG: 4 INJECTION INTRA-ARTICULAR; INTRALESIONAL; INTRAMUSCULAR; INTRAVENOUS; SOFT TISSUE at 05:03

## 2024-01-01 RX ADMIN — ROCURONIUM 70 MG: 50 INJECTION, SOLUTION INTRAVENOUS at 04:00

## 2024-01-01 RX ADMIN — ENOXAPARIN SODIUM 60 MG: 60 INJECTION SUBCUTANEOUS at 21:58

## 2024-01-01 RX ADMIN — OXYBUTYNIN CHLORIDE 2.5 MG: 5 TABLET ORAL at 20:22

## 2024-01-01 RX ADMIN — PIPERACILLIN SODIUM AND TAZOBACTAM SODIUM 4.5 G: 4; .5 INJECTION, SOLUTION INTRAVENOUS at 23:18

## 2024-01-01 RX ADMIN — BACLOFEN 5 MG: 10 TABLET ORAL at 17:14

## 2024-01-01 RX ADMIN — CALCIUM CARBONATE (ANTACID) CHEW TAB 500 MG 500 MG: 500 CHEW TAB at 15:53

## 2024-01-01 RX ADMIN — TAMSULOSIN HYDROCHLORIDE 0.4 MG: 0.4 CAPSULE ORAL at 20:10

## 2024-01-01 RX ADMIN — DEXAMETHASONE SODIUM PHOSPHATE 4 MG: 4 INJECTION INTRA-ARTICULAR; INTRALESIONAL; INTRAMUSCULAR; INTRAVENOUS; SOFT TISSUE at 11:30

## 2024-01-01 RX ADMIN — DULOXETINE HYDROCHLORIDE 30 MG: 30 CAPSULE, DELAYED RELEASE ORAL at 09:21

## 2024-01-01 RX ADMIN — ONDANSETRON 4 MG: 2 INJECTION INTRAMUSCULAR; INTRAVENOUS at 16:22

## 2024-01-01 RX ADMIN — BACLOFEN 5 MG: 10 TABLET ORAL at 09:00

## 2024-01-01 RX ADMIN — ENOXAPARIN SODIUM 40 MG: 100 INJECTION SUBCUTANEOUS at 20:22

## 2024-01-01 RX ADMIN — PIPERACILLIN SODIUM AND TAZOBACTAM SODIUM 3.38 G: 3; .375 INJECTION, SOLUTION INTRAVENOUS at 11:01

## 2024-01-01 RX ADMIN — DULOXETINE HYDROCHLORIDE 60 MG: 60 CAPSULE, DELAYED RELEASE ORAL at 20:03

## 2024-01-01 RX ADMIN — PREGABALIN 200 MG: 25 CAPSULE ORAL at 21:42

## 2024-01-01 RX ADMIN — PIPERACILLIN SODIUM AND TAZOBACTAM SODIUM 3.38 G: 3; .375 INJECTION, SOLUTION INTRAVENOUS at 01:30

## 2024-01-01 RX ADMIN — LEVOTHYROXINE SODIUM 125 MCG: 100 TABLET ORAL at 05:21

## 2024-01-01 RX ADMIN — CALCIUM CARBONATE (ANTACID) CHEW TAB 500 MG 500 MG: 500 CHEW TAB at 21:39

## 2024-01-01 RX ADMIN — PIPERACILLIN SODIUM AND TAZOBACTAM SODIUM 3.38 G: 3; .375 INJECTION, SOLUTION INTRAVENOUS at 08:25

## 2024-01-01 RX ADMIN — PREGABALIN 200 MG: 25 CAPSULE ORAL at 09:21

## 2024-01-01 RX ADMIN — Medication 1 MCG/KG/MIN: at 04:15

## 2024-01-01 RX ADMIN — CYANOCOBALAMIN TAB 1000 MCG 1000 MCG: 1000 TAB at 09:12

## 2024-01-01 RX ADMIN — MIDODRINE HYDROCHLORIDE 5 MG: 5 TABLET ORAL at 20:03

## 2024-01-01 RX ADMIN — OXYBUTYNIN CHLORIDE 2.5 MG: 5 TABLET ORAL at 08:25

## 2024-01-01 RX ADMIN — Medication 12.5 MG: at 08:27

## 2024-01-01 RX ADMIN — ONDANSETRON 4 MG: 2 INJECTION INTRAMUSCULAR; INTRAVENOUS at 09:43

## 2024-01-01 RX ADMIN — ROSUVASTATIN 20 MG: 10 TABLET, FILM COATED ORAL at 20:03

## 2024-01-01 RX ADMIN — PIPERACILLIN SODIUM AND TAZOBACTAM SODIUM 3.38 G: 3; .375 INJECTION, SOLUTION INTRAVENOUS at 14:11

## 2024-01-01 RX ADMIN — MAGNESIUM SULFATE HEPTAHYDRATE 2 G: 40 INJECTION, SOLUTION INTRAVENOUS at 09:22

## 2024-01-01 RX ADMIN — FINASTERIDE 5 MG: 5 TABLET, FILM COATED ORAL at 09:13

## 2024-01-01 RX ADMIN — PIPERACILLIN SODIUM AND TAZOBACTAM SODIUM 3.38 G: 3; .375 INJECTION, SOLUTION INTRAVENOUS at 20:03

## 2024-01-01 RX ADMIN — Medication 400 MG: at 09:12

## 2024-01-01 RX ADMIN — BENZOCAINE AND MENTHOL 1 LOZENGE: 15; 3.6 LOZENGE ORAL at 13:00

## 2024-01-01 RX ADMIN — TAMSULOSIN HYDROCHLORIDE 0.4 MG: 0.4 CAPSULE ORAL at 21:33

## 2024-01-01 RX ADMIN — BACLOFEN 5 MG: 10 TABLET ORAL at 20:22

## 2024-01-01 RX ADMIN — ONDANSETRON 4 MG: 2 INJECTION INTRAMUSCULAR; INTRAVENOUS at 15:45

## 2024-01-01 RX ADMIN — LEVOTHYROXINE SODIUM 125 MCG: 100 TABLET ORAL at 08:24

## 2024-01-01 ASSESSMENT — COGNITIVE AND FUNCTIONAL STATUS - GENERAL
CLIMB 3 TO 5 STEPS WITH RAILING: A LITTLE
CLIMB 3 TO 5 STEPS WITH RAILING: A LITTLE
DRESSING REGULAR UPPER BODY CLOTHING: A LITTLE
MOBILITY SCORE: 12
PERSONAL GROOMING: A LITTLE
DAILY ACTIVITIY SCORE: 19
DRESSING REGULAR LOWER BODY CLOTHING: A LOT
MOVING TO AND FROM BED TO CHAIR: A LOT
PERSONAL GROOMING: A LITTLE
DRESSING REGULAR LOWER BODY CLOTHING: A LITTLE
MOVING TO AND FROM BED TO CHAIR: A LITTLE
CLIMB 3 TO 5 STEPS WITH RAILING: A LITTLE
HELP NEEDED FOR BATHING: A LITTLE
DRESSING REGULAR UPPER BODY CLOTHING: A LITTLE
WALKING IN HOSPITAL ROOM: A LITTLE
PERSONAL GROOMING: A LOT
WALKING IN HOSPITAL ROOM: A LOT
DRESSING REGULAR LOWER BODY CLOTHING: A LITTLE
TURNING FROM BACK TO SIDE WHILE IN FLAT BAD: A LITTLE
HELP NEEDED FOR BATHING: A LOT
STANDING UP FROM CHAIR USING ARMS: A LITTLE
MOVING FROM LYING ON BACK TO SITTING ON SIDE OF FLAT BED WITH BEDRAILS: A LITTLE
STANDING UP FROM CHAIR USING ARMS: A LITTLE
WALKING IN HOSPITAL ROOM: A LITTLE
TOILETING: A LOT
MOVING FROM LYING ON BACK TO SITTING ON SIDE OF FLAT BED WITH BEDRAILS: A LOT
MOVING TO AND FROM BED TO CHAIR: A LITTLE
DRESSING REGULAR UPPER BODY CLOTHING: A LITTLE
DAILY ACTIVITIY SCORE: 19
TURNING FROM BACK TO SIDE WHILE IN FLAT BAD: A LITTLE
TOILETING: A LITTLE
MOBILITY SCORE: 18
HELP NEEDED FOR BATHING: A LITTLE
MOVING TO AND FROM BED TO CHAIR: A LITTLE
DAILY ACTIVITIY SCORE: 19
MOBILITY SCORE: 19
MOBILITY SCORE: 19
STANDING UP FROM CHAIR USING ARMS: A LITTLE
PERSONAL GROOMING: A LITTLE
STANDING UP FROM CHAIR USING ARMS: A LITTLE
DAILY ACTIVITIY SCORE: 19
HELP NEEDED FOR BATHING: A LITTLE
PERSONAL GROOMING: A LITTLE
TOILETING: A LITTLE
TOILETING: A LITTLE
DRESSING REGULAR UPPER BODY CLOTHING: A LITTLE
DRESSING REGULAR LOWER BODY CLOTHING: A LITTLE
WALKING IN HOSPITAL ROOM: A LITTLE
CLIMB 3 TO 5 STEPS WITH RAILING: A LOT
MOVING TO AND FROM BED TO CHAIR: A LITTLE
MOBILITY SCORE: 19
DRESSING REGULAR UPPER BODY CLOTHING: A LOT
STANDING UP FROM CHAIR USING ARMS: A LITTLE
STANDING UP FROM CHAIR USING ARMS: A LITTLE
TURNING FROM BACK TO SIDE WHILE IN FLAT BAD: A LOT
WALKING IN HOSPITAL ROOM: A LITTLE
CLIMB 3 TO 5 STEPS WITH RAILING: A LITTLE
DRESSING REGULAR LOWER BODY CLOTHING: A LITTLE
PERSONAL GROOMING: A LITTLE
PERSONAL GROOMING: A LITTLE
STANDING UP FROM CHAIR USING ARMS: A LITTLE
CLIMB 3 TO 5 STEPS WITH RAILING: A LITTLE
MOBILITY SCORE: 20
STANDING UP FROM CHAIR USING ARMS: A LOT
WALKING IN HOSPITAL ROOM: A LITTLE
TOILETING: A LITTLE
EATING MEALS: A LOT
MOVING TO AND FROM BED TO CHAIR: A LITTLE
HELP NEEDED FOR BATHING: A LITTLE
WALKING IN HOSPITAL ROOM: A LITTLE
TURNING FROM BACK TO SIDE WHILE IN FLAT BAD: A LITTLE
TOILETING: A LITTLE
MOBILITY SCORE: 20
MOBILITY SCORE: 20
STANDING UP FROM CHAIR USING ARMS: A LITTLE
DAILY ACTIVITIY SCORE: 19
HELP NEEDED FOR BATHING: A LITTLE
DRESSING REGULAR LOWER BODY CLOTHING: A LITTLE
CLIMB 3 TO 5 STEPS WITH RAILING: A LITTLE
MOBILITY SCORE: 19
TOILETING: A LITTLE
WALKING IN HOSPITAL ROOM: A LITTLE
CLIMB 3 TO 5 STEPS WITH RAILING: A LOT
DAILY ACTIVITIY SCORE: 12
DRESSING REGULAR UPPER BODY CLOTHING: A LITTLE
DAILY ACTIVITIY SCORE: 19
DRESSING REGULAR UPPER BODY CLOTHING: A LITTLE
MOVING TO AND FROM BED TO CHAIR: A LITTLE
MOVING TO AND FROM BED TO CHAIR: A LITTLE
HELP NEEDED FOR BATHING: A LITTLE
MOVING TO AND FROM BED TO CHAIR: A LITTLE
WALKING IN HOSPITAL ROOM: A LITTLE
CLIMB 3 TO 5 STEPS WITH RAILING: A LOT
DRESSING REGULAR LOWER BODY CLOTHING: A LITTLE

## 2024-01-01 ASSESSMENT — ACTIVITIES OF DAILY LIVING (ADL)
ADL_ASSISTANCE: INDEPENDENT
ADL_ASSISTANCE: INDEPENDENT

## 2024-01-01 ASSESSMENT — PAIN - FUNCTIONAL ASSESSMENT
PAIN_FUNCTIONAL_ASSESSMENT: 0-10

## 2024-01-01 ASSESSMENT — PAIN SCALES - GENERAL

## 2024-01-01 NOTE — CARE PLAN
Problem: Pain  Goal: Takes deep breaths with improved pain control throughout the shift  Outcome: Progressing  Goal: Turns in bed with improved pain control throughout the shift  Outcome: Progressing       The clinical goals for the shift include pt will remain safe and free from injury throughout end of shift on 1/1/24 @1900

## 2024-01-01 NOTE — PROGRESS NOTES
Surgical Oncology Daily Progress Note      Subjective  No acute events overnight following admission  Mild abdominal tenderness to palpation  Left MICHELINE drain removed on morning rounds   Denies any CP, SOB, N/V    Current Meds  Scheduled medications  [Held by provider] apixaban, 5 mg, oral, BID  aspirin, 81 mg, oral, Daily  azithromycin, 500 mg, intravenous, q24h  baclofen, 5 mg, oral, BID  cyanocobalamin, 1,000 mcg, oral, Daily  DULoxetine, 30 mg, oral, q AM  DULoxetine, 60 mg, oral, Nightly  finasteride, 5 mg, oral, Daily  levothyroxine, 125 mcg, oral, Daily  magnesium oxide, 400 mg, oral, BID  midodrine, 5 mg, oral, TID  pantoprazole, 40 mg, oral, Daily  perflutren lipid microspheres, 0.5-10 mL of dilution, intravenous, Once in imaging  perflutren protein A microsphere, 0.5 mL, intravenous, Once in imaging  piperacillin-tazobactam, 3.375 g, intravenous, q6h  potassium chloride CR, 40 mEq, oral, q2h  pregabalin, 200 mg, oral, TID  rosuvastatin, 20 mg, oral, Daily  sulfur hexafluoride microsphr, 2 mL, intravenous, Once in imaging  vancomycin, 1,250 mg, intravenous, q24h  vancomycin, 125 mg, oral, Every other day      Continuous medications     PRN medications      Objective    Vitals:   Temp:  [35.8 °C (96.4 °F)-37.2 °C (99 °F)] 35.8 °C (96.4 °F)  Heart Rate:  [] 85  Resp:  [10-25] 16  BP: ()/(56-72) 103/61      I/O  I/O last 3 completed shifts:  In: 800 (14.2 mL/kg) [P.O.:100; Blood:700]  Out: 600 (10.7 mL/kg) [Urine:600 (0.3 mL/kg/hr)]  Weight: 56.2 kg       Physical Exam  GENERAL APPEARANCE:  AxOx4, generally well-appearing no acute distress.  HEART:  Normal rate and regular rhythm  LUNGS:  Equal chest rise and fall, non-labored breathing  ABDOMEN:  Soft, nontender, nondistended  EXTREMITIES:  Without cyanosis, clubbing or edema.  NEUROLOGICAL:  Grossly nonfocal. Alert and oriented, moving all 4 extremities.   Skin:  Warm and dry without any rash.    Labs:  Lab Results   Component Value Date    WBC  13.3 (H) 01/01/2024    HGB 7.2 (L) 01/01/2024    HCT 21.9 (L) 01/01/2024    MCV 84 01/01/2024     01/01/2024     Lab Results   Component Value Date    GLUCOSE 91 01/01/2024    CALCIUM 7.9 (L) 01/01/2024     01/01/2024    K 3.3 (L) 01/01/2024    CO2 29 01/01/2024     01/01/2024    BUN 10 01/01/2024    CREATININE 0.86 01/01/2024     Lab Results   Component Value Date    ALT 7 (L) 01/01/2024    AST 13 01/01/2024    GGT 91 (H) 11/06/2023    ALKPHOS 181 (H) 01/01/2024    BILITOT 1.3 (H) 01/01/2024     Results from last 7 days   Lab Units 12/31/23  0916   INR  1.7*             Imaging  CT angio chest for pulmonary embolism    Result Date: 12/31/2023  Interpreted By:  Terrance Sharma and Dervishi Mario STUDY: CT ANGIO CHEST FOR PULMONARY EMBOLISM;  12/31/2023 10:36 am   INDICATION: Signs/Symptoms:cancer hx, syncope with new oxygen requirement.   COMPARISON: CT chest: 04/14/2023   ACCESSION NUMBER(S): [id number]   ORDERING CLINICIAN: NATALIE BATEMAN   TECHNIQUE: Helical data acquisition of the chest was obtained after intravenous administration of 80 mL Omnipaque 350, as per PE protocol. Images were reformatted in coronal and sagittal planes. Axial and coronal maximum intensity projection (MIP) images were created and reviewed.   FINDINGS: POTENTIAL LIMITATIONS OF THE STUDY: The assessment is limited by respiratory motion.   HEART AND VESSELS: There are no discrete filling defects within main pulmonary artery and its branches to suggest acute pulmonary embolism. Main pulmonary artery and its branches are normal in caliber.   The thoracic aorta normal in course and caliber.There is mild scattered atherosclerosis present, including calcified and noncalcified plaques. Moderate coronary artery calcifications are seen. Please note,the study is not optimized for evaluation of coronary arteries.   Mild left ventricular chamber enlargement..There are no findings to suggest right heart strain.   There is no  pericardial effusion seen.   MEDIASTINUM AND YESENIA,. LOWER NECK AND AXILLA: The visualized thyroid gland is within normal limits. No evidence of thoracic lymphadenopathy by CT criteria. Esophagus appears within normal limits as seen.   LUNGS AND AIRWAYS: The trachea and central airways are patent. No endobronchial lesion is seen.   There is bilateral right worse than left diffuse consolidative focal consolidative opacities and ground-glass opacities with interlobular septal thickening.   There is no evidence of pleural effusions. No pneumothorax.   UPPER ABDOMEN: The visualized upper abdomen demonstrates stool concentrated at the splenic flexure. The stomach is fluid-filled. There is otherwise no significant abnormality and additional findings are dictated on separate CT of the abdomen and pelvis.       CHEST WALL AND OSSEOUS STRUCTURES: Chest wall is within normal limits. No acute osseous pathology.There are no suspicious osseous lesions.       1. No evidence of acute pulmonary embolism. 2. Bilateral significantly worse in the right compared to the left focal consolidative opacities and ground-glass opacities with interlobular septal thickening. Findings are concerning for multifocal pneumonia. Correlate with a history of recurrent aspiration. The rapid interval change when compared to a study of 08/21/2023 with argue against lymphangitic spread of tumor. Consider correlation with bronchoscopic sampling. 3. Ventricular chamber correlate with left ventricular function.     I personally reviewed the images/study and I agree with the findings as stated. This study was interpreted at [facility], [place], [place].   MACRO: None   Signed by: Terrance Sharma 12/31/2023 2:26 PM Dictation workstation:   [id number]    CT abdomen pelvis w IV contrast    Result Date: 12/31/2023  Interpreted By:  Tay Moran and Dervishi Mario STUDY: CT ABDOMEN PELVIS W IV CONTRAST;  12/31/2023 10:36  am   INDICATION: Signs/Symptoms:hx appendix cancer with LLQ MICHELINE drain - fall with abdominal pain and bleeding from drain site.   COMPARISON: CT abdomen pelvis: 11/25/2023, 02/23/2023, 02/19/2022, 01/21/2022   ACCESSION NUMBER(S): [id number]   ORDERING CLINICIAN: NATALIE BATEMAN   TECHNIQUE: CT of the abdomen and pelvis was performed.  Standard contiguous axial images were obtained at 3 mm slice thickness through the abdomen and pelvis. Coronal and sagittal reconstructions at 3 mm slice thickness were performed.   80 ml of contrast Omnipaque 350 were administered intravenously without immediate complication.   FINDINGS: LOWER CHEST: Please refer to separate report.   ABDOMEN:   LIVER: The liver is normal in size. There is redemonstration of an unchanged 0.8 cm simple cyst in the caudate lobe.   BILE DUCTS: Infiltrative soft tissue in the falciform ligament and lionel hepatis is similar. There is mild intrahepatic ductal dilatation (left > right), unchanged. The common hepatic duct is not well seen. The common bile duct is normal in caliber.   GALLBLADDER: Status post cholecystectomy.   PANCREAS: The pancreas is unremarkable.   SPLEEN: The spleen is surgically absent.   ADRENAL GLANDS: Thickening of the left adrenal gland is similar to 11/25/2023 but slightly progressed from 02/25/2023. Normal right adrenal gland.   KIDNEYS AND URETERS: There is stable bilateral pelvicaliectasis without hydroureter compared to 11/25/2023; however, this is new compared to 08/21/2023 and is most likely due to involvement of the ureters by peritoneal carcinomatosis. Both kidneys demonstrate normal contrast enhancement. Stable 3.5 cm benign exophytic cyst arising from the lower pole the left kidney.   PELVIS:   BLADDER: There is re-demonstration of infiltrative peritoneal deposits invading into the anterosuperior wall the urinary bladder (sagittal image 160/122, series 503), similar to prior study. Otherwise, the urinary bladder wall is  normal in thickness.   REPRODUCTIVE ORGANS: The prostate gland is normal in size.   VESSELS: Over prior studies dating back to 01/21/2022, there is progressive diffuse narrowing of the left portal vein and its proximal branches due to infiltrative encasing soft tissue in the hepatic hilum and falciform ligament (axial images 36-42). There is mild-moderate narrowing of the right portal vein due to soft tissue encasement similar to 11/25/2023, new from 02/23/2023. The SMV is patent. The splenic vein is ligated but otherwise patent. There is a replaced right hepatic artery arising from the SM A. Moderate aortic atherosclerosis without AAA.   RETROPERITONEUM/LYMPH NODES: There is a new enlarged 1.1 cm gastrohepatic lymph node, previously 0.8 cm. No other enlarged abdominopelvic lymph nodes. No acute retroperitoneal abnormality.     ABDOMINAL WALL/BOWEL/PERITONEUM: Postsurgical changes of right hemicolectomy with ileocolic anastomosis, sigmoid resection and anastomosis. There is redemonstration of mild herniation of bowel loops into right lower quadrant abdominal wall defect without obstruction or strangulation.   Over the course of several recent prior exams, there has been progressively worsening peritoneal carcinomatosis throughout the entire abdomen and pelvis. For example, there is progressively increasing soft tissue infiltration in the lionel hepatis and hepatic hilum, encasing and tethering numerous bowel loops results again multifocal transition points of bearing grade and intermittent dilatation of the bowel loops due to varying degrees of obstruction. For example, there is a new focally dilated short segment of bowel in the anterior midline lower abdomen measuring 4 cm with fecalization of bowel content and which is 100% narrowed by casing soft tissue (coronal image 36/117, axial image 86/155). This same soft tissue infiltrative process also extends into the abdominal wall and into the dome of the urinary  bladder. Also causes extensive tethering of locoregional small bowel loops above the bladder. There is additional thick-walled collapsed small bowel loops in the right lower quadrant which are increasingly involve with carcinomatosis deposit.   There is significant decrease in size of lenticular-shaped, loculated rim enhancing left lower quadrant intraperitoneal fluid collection with internal foci of gas. The MICHELINE drain appropriately terminates within the collection, currently measuring 10.9 cm x 1.5 cm x 10.3 cm (previously 13 cm x 3.9 cm x 12.8 cm). There is a trace amount of blood products in the collection without active bleeding within the collection or abdominal wall.   There is redemonstration of diffuse wall thickening of distal esophagus due to submucosal edema with mucosal hyperenhancement, as well as diffuse gastric rugal fold thickening in the degree of sub mucosal wall thickening in the antral pyloric region.   MUSCULOSKELETAL: No suspicious osseous lesions or acute osseous abnormality. Mild degenerative changes of the thoracolumbar spine without high-grade canal stenosis. There is interval decrease in size of a now 2.4 cm x 1.8 cm rim enhancing collection in the right gluteus wild muscle, previously 4 cm x 5.8 cm, most likely representing a subacute muscle infarct or resolving hematoma. There are no new soft tissue collections.       1. Significant decreased size of loculated rim enhancing left lower quadrant intraperitoneal fluid collection with appropriate termination of drain in the collection. There is a trace amount of hyperdense material likely representing blood products in the collection without active bleeding into the collection or abdominal wall.   2. Progressively worsening peritoneal carcinomatosis:   A.) Infiltrative soft tissue mass invades the dome of the urinary bladder wall, contiguously invading the abdominal wall musculature and numerous adjacent small bowel loops some intensely  causing tethering, narrowing, and worsening short segment high-grade bowel obstruction. There are additional multifocal sites of varying degrees of narrowing and dilatation caused by serosal metastatic disease placing patient future risk of additional bowel obstructions.   b.) Metastatic soft tissue infiltration progressively worsening throughout the falciform ligament, lionel hepatis and hepatic hilum causing progressive now severe narrowing of the entire left portal vein and its branches (previously mildly narrowed on 02/23/2023) and progressive mild-moderate narrowing of the right portal vein (previously normal on 02/23/2023). Is also responsible for the previously described intrahepatic biliary ductal dilatation, similar to prior study.   c.) Progressive now mild-moderate bilateral hydronephrosis, new from 08/21/2023, secondary to direct and/or indirect involvement of the proximal ureters by metastatic disease.   3. Decrease in size of rim enhancing fluid collection in the right gluteus wild muscle likely representing resolving hematoma or muscle infarct.   I personally reviewed the images/study and I agree with the findings as stated by Resident Neptali Brewster MD. This study was interpreted at [place], [place].   MACRO: Tay Moran discussed the significance and urgency of this critical finding by telephone with  NATALIE OLIVERYSTER on 12/31/2023 at 12:04 pm.  (**-RCF-**) Findings:  See findings.   Signed by: Tay Moran 12/31/2023 12:20 PM Dictation workstation:   [id number]    CT head wo IV contrast    Result Date: 12/31/2023  Interpreted By:  Jason Moreno, STUDY: CT HEAD WO IV CONTRAST;  12/31/2023 10:36 am   INDICATION: Signs/Symptoms:fall, anticoagulation.  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 Syncopal episode with fall. Loss of consciousness.   COMPARISON: 04/14/2023 head CT   ACCESSION NUMBER(S): [id number]    ORDERING CLINICIAN: HILLARY HURT   TECHNIQUE: Noncontrast axial CT scan of head was performed. Angled reformats in brain and bone windows were generated. The images were reviewed in bone, brain, blood and soft tissue windows.   FINDINGS: CSF Spaces: The ventricles and sulci are normal, unchanged. There is no extraaxial fluid collection.   Parenchyma:  The brain parenchyma is normal with no infarct, hemorrhage or mass.   Calvarium: The calvarium is unremarkable.   Paranasal sinuses and mastoids: Visualized paranasal sinuses and mastoids are clear.       Normal brain CT.     MACRO: None   Signed by: Jason Moreno 12/31/2023 11:06 AM Dictation workstation:   [id number]    XR chest 1 view    Result Date: 12/31/2023  STUDY: Chest Radiograph; 12/31/2023 10:08 AM INDICATION: Hypoxia, fall. COMPARISON: XR chest 11/16/2023, 08/22/2023. ACCESSION NUMBER(S): [id number] ORDERING CLINICIAN: Hillary Hurt TECHNIQUE:  Frontal chest was obtained at 10:08:00 hours. FINDINGS: CARDIOMEDIASTINAL SILHOUETTE: Cardiomediastinal silhouette is normal in size and configuration.  LUNGS: Partial clearing of the right lower lobe pneumonia/atelectasis.  New right upper lobe opacity.  ABDOMEN: No remarkable upper abdominal findings.  BONES: No acute osseous changes. A right IJ infusion catheter remains in place extending into the right atrium.    Improving right lower lobe pneumonia. New right upper lobe opacity consistent with pneumonia. Signed by Kannan Woodson MD      Assessment:  Pt is a 68 y.o. 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 Pt has a negative trauma survey. Pt is not currently obstructed.     Plan/Recs  - recommend full liquid diet with very soft foods  - Will discuss with IR for  replacement of L sided abdominal drain given traumatic dislodging/removal by our team  - further discussion of palliative PEG or bypasses for impending obstruction should be had with Dr. Em at [facility]    Discussed with Attending Physician    Zak Hector DO  Department of Surgery / IR Integrated PGY1  Surgical Oncology [id number]

## 2024-01-01 NOTE — CARE PLAN
The patient's goals for the shift include      The clinical goals for the shift include pt will remain safe and free from falls      Problem: Pain  Goal: Takes deep breaths with improved pain control throughout the shift  Outcome: Progressing  Goal: Turns in bed with improved pain control throughout the shift  Outcome: Progressing

## 2024-01-01 NOTE — H&P
History Of Present Illness  Leonardo Wilder is a 68 y.o. 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 Patient was brought in by EMS after an unwitnessed syncopal episode at home. Found to be hypoxic in the mid 80s with no previous oxygen requirement was placed on 2 L nasal cannula.     Patient was sitting on couch at home this am, when he noticed it saturated with blood. He reports getting up and falling to the ground and losing consciousness for a few seconds. Wife heard a loud thump and found patient down and awake, denied any jerking movements, or confusion. He reported pre syncopal dizziness but denied any changes in vision or extremity weakness. Mentioned that he usually avoids getting up too fast from a seated position due to previous fall history, and usually takes his time.     Patient denied any accidental manipulation or pulling of MICHELINE drain, output is usually clear and noticed this am the color changing from yellow to bright red blood with occasional clots. Patient reporting 1 week of non productive cough, diarrhea that has slowed down to 3 episodes per day. Denied any dizziness, fever, chills, sob, abdominal tenderness and n/v.     Otherwise ROS negative unless stated above.    Of note, patient has recent admissions for hypomg 0.88, intractable n/v and diarrhea. Found to be positive for C. Diff on admission from 11/14- 11/21 and negative on following admission from 11/24- 11/27 . CT AP 11/25 was obtained and showed resolving partial SBO from previous SBO in August and a loculated intraperitoneal fluid collection in the left anterior peritoneal cavity 13 x  3.9 x 12.8 cm in size causing compression of adjacent bowel loops. A multifocal pneumonia was also seen on CT AP. His symptoms were attributed to a viral gastroenteritis and his symptoms improved after loperamide. Currently on PO Vanc taper till 1/19/24.       ED Course:    Vitals: /72 HR 89 RR 16 O2 87% on RA  CBC: WBC 18.5 Hgb 6.4   CMP: Na 139 K 3.6 Cl 107 HCO3 22 BUN 13 Cr 0.99 Ca 7.9 Phos 3.2 Mg 1.54  AST/ALT 16/6 Albumin 2.5 Alk Phos 257 T Bili 0.3 T protein 6.1   VBG: pH 7.38 pCO2 43 pO2 25 Lactate 2.8  Trop 5  EKG no concerns for ischemic changes, LBBB seen previously.  COVID/Flu/RSV negative    Interventions:   Saturating in the 80s on room air and was placed on 2 L nasal cannula.     S/p 1 unit pRBCs  Positive orthostatics S/p 1 L of LR  Dose of IV azithromycin and IV Zosyn.     Images:  CTPE:  1. No evidence of acute pulmonary embolism.   2. Bilateral significantly worse in the right compared to the left   focal consolidative opacities and ground-glass opacities with   interlobular septal thickening. Findings are concerning for   multifocal pneumonia. Correlate with a history of recurrent   aspiration. The rapid interval change when compared to a study of   08/21/2023 with argue against lymphangitic spread of tumor. Consider   correlation with bronchoscopic sampling.   3. Ventricular chamber correlate with left ventricular function.      CTAP:  IMPRESSION:  1. Significant decreased size of loculated rim enhancing left lower  quadrant intraperitoneal fluid collection with appropriate  termination of drain in the collection. There is a trace amount of  hyperdense material likely representing blood products in the  collection without active bleeding into the collection or abdominal  wall.      2. Progressively worsening peritoneal carcinomatosis:      A.) Infiltrative soft tissue mass invades the dome of the urinary  bladder wall, contiguously invading the abdominal wall musculature  and  numerous adjacent small bowel loops some intensely causing  tethering, narrowing, and worsening short segment high-grade bowel  obstruction. There are additional multifocal sites of varying degrees  of narrowing and dilatation caused by serosal metastatic disease  placing patient future risk of additional bowel obstructions.      b.) Metastatic soft tissue infiltration progressively worsening  throughout the falciform ligament, lionel hepatis and hepatic hilum  causing progressive now severe narrowing of the entire left portal  vein and its branches (previously mildly narrowed on 02/23/2023) and  progressive mild-moderate narrowing of the right portal vein  (previously normal on 02/23/2023). Is also responsible for the  previously described intrahepatic biliary ductal dilatation, similar  to prior study.      c.) Progressive now mild-moderate bilateral hydronephrosis, new from  08/21/2023, secondary to direct and/or indirect involvement of the  proximal ureters by metastatic disease.      3. Decrease in size of rim enhancing fluid collection in the right  gluteus wild muscle likely representing resolving hematoma or  muscle infarct.      CTH normal.     Cancer  History  Primary Oncologist , Dr. 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   STAGING: IV   CURRENT SITES OF DISEASE: Appendix, peritoneum     MOLECULAR/GENOMIC:  MMR-proficient  SMAD4 mutation      TUMOR MARKER:   CEA: 1.5 7/2020  : <4 7/2020  CEA 8/8/20 1.5  CEA 11/23/20 4.2   CEA 12/21/20 1.1   CEA 1/4/2021 1.1   CEA 2/15/2021 1.0  CEA 1/13/22: 1.0  CEA 3/18/22: 1.1  CEA 4/11/22: 8.5  CEA 10/25/22: 1.3  CEA 1/5/23: 0.5  CEA 2/13/23: 1.3  CEA 4/27/23: 1.4  CEA 5/15/23: 2.6  CEA 6/12/23: 1.5  CEA 8/21/23: 2.0     CURRENT TREATMENT:   None     PRIOR TREATMENT:   FOLFOX 9/11/20 - 2/15/21  3/15/2021: Exploratory laparotomy, lysis of adhesions, radical  intraperitoneal tumor debulking to include omentectomy, splenectomy, complete peritonectomy, R hemicolectomy, cholecystectomy, sigmoid and low anterior resection with colorectal anastomosis  and diverting loop ileostomy, HIPC with MMC, placement and removal of inflow and outflow catheters.   FOLFORI every 2 weeks started 4/20/22 x 3cycles then switched back to FOLFOX since he responded well to this regimen previously and he wasn't responding to FOLFORI  FOLFOX since 6/15/22, last dose ~7/2023 8/31/23: Ex lap, ALO, ileocolonic bypass of obstruction (HIPEC not done as no clear evidence of malignancy)  12/5/23: peritoneal drain placement for fluid collection     ACTIVE ONCOLOGIC ISSUES:   Enterocutaneous fistula right lower abdominal quadrant - had cellulitis requiring ATB   Portal vein thrombosis- started Eliquis but due to cost is seeking medication through [facility]  Recurrent small bowel obstructions  Peripheral neuropathy     HISTORY:   7/2020: Presented with 3mo h/o of weight loss, partially intentional but excessive. Also with progressive abd fullness/bloating and early satiety. PCP ordered CT, which showed extensive peritoneal carcinomatosis and cecal/appendiceal tumor. C-scope showed  tumor in the cecum extending from the appendix. Biopsy: poorly differentiated signet ring adenocarcinoma. MMR-proficient  3/15/21: underwent debulking surgery with HIPEC (Dr. Jabari Em, [facility]). Post-operative course complicated by b/l pleural effusions  6/16/21: Ileostomy closure  6/27/21: Admitted to  with fever, increased drainage, c/f enterocutaneous fistula, transferred to [facility], treated with bowel rest and TPN  7/23/21: Repeat CT showed no evidence of bowel leak and CHANEL  1/2022: CT showed stable perisurgical nodules  2/2022-4/2022: Admitted several times for SBO. MRI at [facility] showed: multiple enhancing mesenteric and serosal lesions compatible with peritoneal carcinomatosis, distortion and narrowing of multiple blowel  loops with probable partial small bowel obstruction  at the level of the right lower quadrant tumor. Periportal tumor extending along the hepatic fissures with associated occlusion of the left portal vein and marked narrowing of the distal main portal vein with poststenotic dilatation of the anterior right portal vein.      Past Medical History  Past Medical History:   Diagnosis Date    Cancer of appendix (CMS/HCC)     Coronary artery calcification     Hypothyroidism     LBBB (left bundle branch block)     Portal vein external compression        Surgical History  - Exploratory laparotomy, lysis of adhesions, radical intraperitoneal tumor debulking (omentectomy, splenectomy, complete peritonectomy, R hemicolectomy, cholecystectomy, sigmoid and LAR with colorectal anastomosis, DLI creation, and HIPEC with mitomycin-C 3/15/2021  - Ex lap, lysis of adhesions, colonoscopy, gastropexy on 8/31/2023       Social History  He reports that he quit smoking about 21 years ago. His smoking use included cigarettes. He smoked an average of 1 pack per day. He has never used smokeless tobacco. He reports that he does not currently use alcohol. He reports that he does not use drugs. Denies any alcohol or illicit drug use.     Family History  Melanoma (father, mother)  Lung cancer (paternal grandfather and uncle)    Allergies  Patient has no known allergies.     Physical Exam  Constitutional:       Appearance: Normal appearance.      Comments: Malnourished appearing.    HENT:      Head: Normocephalic and atraumatic.   Eyes:      Extraocular Movements: Extraocular movements intact.      Pupils: Pupils are equal, round, and reactive to light.   Cardiovascular:      Rate and Rhythm: Normal rate and regular rhythm.      Pulses: Normal pulses.      Heart sounds: Normal heart sounds.   Pulmonary:      Effort: Pulmonary effort is normal.      Breath sounds: Normal breath sounds.   Abdominal:      General: Abdomen is flat. Bowel sounds are  "normal. There is no distension.      Palpations: Abdomen is soft.      Tenderness: There is no abdominal tenderness.      Comments: Healed surgical scars, midline abdomen and RLQ. MICHELINE drain in LLQ   Musculoskeletal:      Right lower leg: No edema.      Left lower leg: No edema.   Skin:     General: Skin is warm.   Neurological:      General: No focal deficit present.      Mental Status: He is alert and oriented to person, place, and time.      Cranial Nerves: No cranial nerve deficit.      Comments: UE 5/5 LE 5/5          Last Recorded Vitals  Blood pressure 89/56, pulse 104, temperature 37 °C (98.6 °F), temperature source Temporal, resp. rate 18, height 1.737 m (5' 8.4\"), weight 56.2 kg (124 lb), SpO2 95 %.    Relevant Results      Current Facility-Administered Medications:     [Held by provider] apixaban (Eliquis) tablet 5 mg, 5 mg, oral, BID, Guy Parham MD    aspirin EC tablet 81 mg, 81 mg, oral, Daily, Guy Parham MD    [START ON 1/1/2024] azithromycin (Zithromax) in dextrose 5 % in water (D5W) 250 mL  mg, 500 mg, intravenous, q24h, Guy Parham MD    baclofen (Lioresal) tablet 5 mg, 5 mg, oral, BID, Guy Parham MD    cyanocobalamin (Vitamin B-12) tablet 1,000 mcg, 1,000 mcg, oral, Daily, Guy Parham MD    [START ON 1/1/2024] DULoxetine (Cymbalta) DR capsule 30 mg, 30 mg, oral, q AM, Guy Parham MD    DULoxetine (Cymbalta) DR capsule 60 mg, 60 mg, oral, Nightly, Guy Parham MD    finasteride (Proscar) tablet 5 mg, 5 mg, oral, Daily, Guy Parham MD    lactated Ringer's bolus 1,000 mL, 1,000 mL, intravenous, Once, Guy Parham MD, Last Rate: 1,000 mL/hr at 12/31/23 1900, 1,000 mL at 12/31/23 1900    [START ON 1/1/2024] levothyroxine (Synthroid, Levoxyl) tablet 125 mcg, 125 mcg, oral, Daily, Guy Parham MD    magnesium oxide (Mag-Ox) tablet 400 mg, 400 mg, oral, BID, Guy Parham MD    midodrine (Proamatine) tablet 5 mg, 5 mg, oral, TID, Guy Parham MD    [START ON 1/1/2024] pantoprazole (ProtoNix) EC tablet 40 " mg, 40 mg, oral, Daily, Guy Parham MD    perflutren lipid microspheres (Definity) injection 0.5-10 mL of dilution, 0.5-10 mL of dilution, intravenous, Once in imaging, Guy Parham MD    perflutren protein A microsphere (Optison) injection 0.5 mL, 0.5 mL, intravenous, Once in imaging, Guy Parham MD    piperacillin-tazobactam-dextrose (Zosyn) IV 3.375 g, 3.375 g, intravenous, q6h, Guy Parham MD    pregabalin (Lyrica) capsule 200 mg, 200 mg, oral, TID, Guy Parham MD    rosuvastatin (Crestor) tablet 20 mg, 20 mg, oral, Daily, Guy Parham MD    sulfur hexafluoride microsphr (Lumason) injection 24.28 mg, 2 mL, intravenous, Once in imaging, Guy Parham MD    [START ON 1/1/2024] vancomycin (Vancocin) 1.25 g in dextrose 5 % in water (D5W) 250 mL IV, 1,250 mg, intravenous, q24h, Ian Alcazar MD    [START ON 1/1/2024] vancomycin (Vancocin) capsule 125 mg, 125 mg, oral, Every other day, Guy Parham MD    vancomycin (Vancocin) in dextrose 5 % water (D5W) 500 mL IV 1,500 mg, 1,500 mg, intravenous, Once, Ian Alcazar MD    Results for orders placed or performed during the hospital encounter of 12/31/23 (from the past 24 hour(s))   CBC and Auto Differential   Result Value Ref Range    WBC 25.5 (H) 4.4 - 11.3 x10*3/uL    nRBC 0.1 (H) 0.0 - 0.0 /100 WBCs    RBC 2.44 (L) 4.50 - 5.90 x10*6/uL    Hemoglobin 6.4 (LL) 13.5 - 17.5 g/dL    Hematocrit 19.7 (L) 41.0 - 52.0 %    MCV 81 80 - 100 fL    MCH 26.2 26.0 - 34.0 pg    MCHC 32.5 32.0 - 36.0 g/dL    RDW 19.9 (H) 11.5 - 14.5 %    Platelets 441 150 - 450 x10*3/uL    Neutrophils % 78.5 40.0 - 80.0 %    Immature Granulocytes %, Automated 0.9 0.0 - 0.9 %    Lymphocytes % 15.4 13.0 - 44.0 %    Monocytes % 4.9 2.0 - 10.0 %    Eosinophils % 0.1 0.0 - 6.0 %    Basophils % 0.2 0.0 - 2.0 %    Neutrophils Absolute 20.00 (H) 1.20 - 7.70 x10*3/uL    Immature Granulocytes Absolute, Automated 0.22 0.00 - 0.70 x10*3/uL    Lymphocytes Absolute 3.91 1.20 - 4.80 x10*3/uL    Monocytes Absolute  1.25 (H) 0.10 - 1.00 x10*3/uL    Eosinophils Absolute 0.03 0.00 - 0.70 x10*3/uL    Basophils Absolute 0.06 0.00 - 0.10 x10*3/uL   Comprehensive metabolic panel   Result Value Ref Range    Glucose 126 (H) 74 - 99 mg/dL    Sodium 139 136 - 145 mmol/L    Potassium 3.6 3.5 - 5.3 mmol/L    Chloride 107 98 - 107 mmol/L    Bicarbonate 22 21 - 32 mmol/L    Anion Gap 14 10 - 20 mmol/L    Urea Nitrogen 13 6 - 23 mg/dL    Creatinine 0.99 0.50 - 1.30 mg/dL    eGFR 83 >60 mL/min/1.73m*2    Calcium 7.9 (L) 8.6 - 10.6 mg/dL    Albumin 2.5 (L) 3.4 - 5.0 g/dL    Alkaline Phosphatase 257 (H) 33 - 136 U/L    Total Protein 6.1 (L) 6.4 - 8.2 g/dL    AST 16 9 - 39 U/L    Bilirubin, Total 0.3 0.0 - 1.2 mg/dL    ALT 6 (L) 10 - 52 U/L   Magnesium   Result Value Ref Range    Magnesium 1.54 (L) 1.60 - 2.40 mg/dL   Phosphorus   Result Value Ref Range    Phosphorus 3.2 2.5 - 4.9 mg/dL   Protime-INR   Result Value Ref Range    Protime 19.6 (H) 9.8 - 12.8 seconds    INR 1.7 (H) 0.9 - 1.1   Troponin I, High Sensitivity   Result Value Ref Range    Troponin I, High Sensitivity 4 0 - 53 ng/L   Sars-CoV-2 and Influenza A/B PCR   Result Value Ref Range    Flu A Result Not Detected Not Detected    Flu B Result Not Detected Not Detected    Coronavirus 2019, PCR Not Detected Not Detected   RSV PCR   Result Value Ref Range    RSV PCR Not Detected Not Detected   BLOOD GAS VENOUS FULL PANEL   Result Value Ref Range    POCT pH, Venous 7.38 7.33 - 7.43 pH    POCT pCO2, Venous 43 41 - 51 mm Hg    POCT pO2, Venous 25 (L) 35 - 45 mm Hg    POCT SO2, Venous 16 (L) 45 - 75 %    POCT Oxy Hemoglobin, Venous 15.9 (L) 45.0 - 75.0 %    POCT Hematocrit Calculated, Venous 19.0 (L) 41.0 - 52.0 %    POCT Sodium, Venous 140 136 - 145 mmol/L    POCT Potassium, Venous 3.9 3.5 - 5.3 mmol/L    POCT Chloride, Venous      POCT Ionized Calicum, Venous 1.20 1.10 - 1.33 mmol/L    POCT Glucose, Venous 131 (H) 74 - 99 mg/dL    POCT Lactate, Venous 2.8 (H) 0.4 - 2.0 mmol/L    POCT Base  Excess, Venous 0.2 -2.0 - 3.0 mmol/L    POCT HCO3 Calculated, Venous 25.4 22.0 - 26.0 mmol/L    POCT Hemoglobin, Venous 6.4 (LL) 13.5 - 17.5 g/dL    POCT Anion Gap, Venous      Patient Temperature 37.0 degrees Celsius    FiO2 36 %   Type and Screen   Result Value Ref Range    ABO TYPE O     Rh TYPE POS     ANTIBODY SCREEN NEG    Prepare RBC: 1 Units   Result Value Ref Range    PRODUCT CODE M4574L94     Unit Number [id number]     Unit ABO O     Unit RH POS     XM INTEP COMP     Dispense Status TR     Blood Expiration Date January 27, 2024 23:59 EST     PRODUCT BLOOD TYPE 5100     UNIT VOLUME 350    Troponin I, High Sensitivity   Result Value Ref Range    Troponin I, High Sensitivity 5 0 - 53 ng/L   Blood Culture    Specimen: Peripheral Venipuncture; Blood culture   Result Value Ref Range    Blood Culture Loaded on Instrument - Culture in progress    Blood Culture    Specimen: Peripheral Venipuncture; Blood culture   Result Value Ref Range    Blood Culture Loaded on Instrument - Culture in progress    Urinalysis with Reflex Culture and Microscopic   Result Value Ref Range    Color, Urine Yellow Straw, Yellow    Appearance, Urine Clear Clear    Specific Gravity, Urine 1.014 1.005 - 1.035    pH, Urine 6.0 5.0, 5.5, 6.0, 6.5, 7.0, 7.5, 8.0    Protein, Urine NEGATIVE NEGATIVE mg/dL    Glucose, Urine NEGATIVE NEGATIVE mg/dL    Blood, Urine SMALL (1+) (A) NEGATIVE    Ketones, Urine NEGATIVE NEGATIVE mg/dL    Bilirubin, Urine NEGATIVE NEGATIVE    Urobilinogen, Urine <2.0 <2.0 mg/dL    Nitrite, Urine NEGATIVE NEGATIVE    Leukocyte Esterase, Urine NEGATIVE NEGATIVE   Urinalysis Microscopic   Result Value Ref Range    WBC, Urine NONE 1-5, NONE /HPF    RBC, Urine 1-2 NONE, 1-2, 3-5 /HPF    Mucus, Urine 1+ Reference range not established. /LPF        Assessment/Plan   Principal Problem:    Syncope and collapse      Leonardo Wilder is a 68 y.o. 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 Patient was brought in by EMS after an unwitnessed syncopal episode at home, now being admitted for syncopal work up likely due to orthostatic hypotension 2/2 acute blood loss anemia and hypovolemia from diarrhea and poor PO intake vs unlikely cardiogenic etiology. EKG in the ED, showed no concerns for ischemic changes, and LBBB seen previously. Will repeat echo to complete workup.  MICHELINE drain, with significant bloody output saturating patients couch at home prior to syncopal episode. On admission patient found to have AHRF 2/2 multifocal pna seen on imaging, Hgb 6.4 s/p 1u pRBCs, infectious workup pending and currently on vanc/zosyn and azithro. CTAP concerning for blood product collection but no c/f active bleed. Surgical oncology consulted in the ED for management of MICHELINE drain, pending recs.       #Syncopal episode  :: Suspect related to hypovolemia from diarrhea, poor p.o. intake, chronic nausea and vomiting, along with acute blood loss anemia    Plan:  - Orthostatic positive in the ED, s/p 1L LR  - Repeat orthostatics on admission positive s/p 1L LR will cautiously bolus as needed given HFmrEF 35-40%  - EKG without signs of arrhythmia, continue telemetry  - Can consider repeat echo     #Acute blood loss anemia, bleeding from MICHELINE drain  ::CT abdomen pelvis showing resolving fluid collection with MICHELINE drain still in position with blood products, no signs of active bleeding  ::Status post 1 unit of PRBCs in the ED 12/31    Plan:  - Will continue to Hold Eliquis  - Maintain Active T & S  - Pm CBC  - Surgical oncology consulted, recommending full liquid diet and  discussion with IR for possible replacement of MICHELINE drain midweek, no active management at this time     #Pneumonia, concern for possible aspiration  #Hypoxemic respiratory failure  #Leukocytosis  ::Received one dose of Zosyn and azithromycin in the ED    Plan:  [ ] Blood cultures x 2 in the ED pending  [ ] MRSA nares, urine strep and Legionella antigen, sputum culture pending results  - Will Continue with IV Zosyn and azithromycin, will also add IV vancomycin given recurrent hospitalizations and concern for hospital-acquired pneumonia  - Suspect that patient may be aspirating related to his recurrent nausea and vomiting  - SLP evaluation to evaluate for dysphagia/aspiration     # Diarrhea  #History of C. difficile colitis  ::Currently on p.o. vancomycin taper, every other day, last dose 12/30.     Plan:  - With worsening leukocytosis and reports of worsening diarrhea, will recheck a C. difficile with reflex and a  - Continue current p.o. vancomycin plan for now, end date 1/19/24     #Appendiceal cancer w/ mets to abdomen  Primary oncologist is Dr. Ozuna, follows with [facility] surgical oncology  [ ]Notify Dr. Ozuna of admission in the a.m.     #History of portal vein thrombosis on Eliquis  - Hold Eliquis for now in the setting of acute blood loss anemia with syncopal episode      F: PRN  E: PRN  N: Full liquid/ soft diet per surg onc   DVT ppx: hold given c/f bleeding  Full Code, confirmed on admission    NOK: Michelle (wife) [phone]      Patient to be formally staffed in AM         Guy Parham MD

## 2024-01-01 NOTE — PROGRESS NOTES
"Leonardo Wilder is a 68 y.o. male on day 1 of admission presenting with Syncope and collapse.    Subjective   NAOE. Patient denies any syncopal episodes since admission or dizziness. Endorsing nausea but denies vomiting and abdominal pain.        Objective     Physical Exam  Constitutional:       Appearance: Normal appearance.      Comments: Malnourished appearing.    HENT:      Head: Normocephalic and atraumatic.   Eyes:      Extraocular Movements: Extraocular movements intact.      Pupils: Pupils are equal, round, and reactive to light.   Cardiovascular:      Rate and Rhythm: Normal rate and regular rhythm.      Pulses: Normal pulses.      Heart sounds: Normal heart sounds.   Pulmonary:      Effort: Pulmonary effort is normal.      Breath sounds: Normal breath sounds.   Abdominal:      General: Abdomen is flat. Bowel sounds are normal. There is no distension.      Palpations: Abdomen is soft.      Tenderness: There is no abdominal tenderness.      Comments: Healed surgical scars, midline abdomen and RLQ. MICHELINE drain in LLQ removed  Musculoskeletal:      Right lower leg: No edema.      Left lower leg: No edema.   Skin:     General: Skin is warm.   Neurological:      General: No focal deficit present.      Mental Status: He is alert and oriented to person, place, and time.      Cranial Nerves: No cranial nerve deficit.      Comments: UE 5/5 LE 5/5     Last Recorded Vitals  Blood pressure 114/70, pulse 79, temperature 36.1 °C (97 °F), resp. rate 16, height 1.737 m (5' 8.4\"), weight 56.2 kg (124 lb), SpO2 94 %.  Intake/Output last 3 Shifts:  I/O last 3 completed shifts:  In: 800 (14.2 mL/kg) [P.O.:100; Blood:700]  Out: 600 (10.7 mL/kg) [Urine:600 (0.3 mL/kg/hr)]  Weight: 56.2 kg     Relevant Results    Current Facility-Administered Medications:     [Held by provider] apixaban (Eliquis) tablet 5 mg, 5 mg, oral, BID, Guy Parham MD    aspirin EC tablet 81 mg, 81 mg, oral, Daily, Guy Parham MD, 81 mg at 01/01/24 0913    " azithromycin (Zithromax) in dextrose 5 % in water (D5W) 250 mL  mg, 500 mg, intravenous, q24h, Guy Parham MD, Stopped at 01/01/24 1207    baclofen (Lioresal) tablet 5 mg, 5 mg, oral, BID, Guy Parham MD, 5 mg at 01/01/24 0913    cyanocobalamin (Vitamin B-12) tablet 1,000 mcg, 1,000 mcg, oral, Daily, Guy Parham MD, 1,000 mcg at 01/01/24 0912    dicyclomine (Bentyl) capsule 10 mg, 10 mg, oral, 4x daily PRN, Guy Parham MD    DULoxetine (Cymbalta) DR capsule 30 mg, 30 mg, oral, q AM, Guy Parham MD, 30 mg at 01/01/24 0912    DULoxetine (Cymbalta) DR capsule 60 mg, 60 mg, oral, Nightly, Guy Parham MD, 60 mg at 12/31/23 2016    finasteride (Proscar) tablet 5 mg, 5 mg, oral, Daily, Guy Parham MD, 5 mg at 01/01/24 0913    levothyroxine (Synthroid, Levoxyl) tablet 125 mcg, 125 mcg, oral, Daily, Guy Parham MD, 125 mcg at 01/01/24 0912    magnesium oxide (Mag-Ox) tablet 400 mg, 400 mg, oral, BID, Guy Parham MD, 400 mg at 01/01/24 0912    midodrine (Proamatine) tablet 5 mg, 5 mg, oral, TID, Guy Parham MD, 5 mg at 01/01/24 1411    pantoprazole (ProtoNix) EC tablet 40 mg, 40 mg, oral, Daily, Guy Parham MD, 40 mg at 01/01/24 0913    piperacillin-tazobactam-dextrose (Zosyn) IV 3.375 g, 3.375 g, intravenous, q6h, Guy Parham MD, Stopped at 01/01/24 1441    potassium chloride 40 mEq in 100 mL IV premix, 40 mEq, intravenous, Once, Guy Parham MD, Last Rate: 25 mL/hr at 01/01/24 1301, 40 mEq at 01/01/24 1301    pregabalin (Lyrica) capsule 200 mg, 200 mg, oral, TID, Guy Parham MD, 200 mg at 01/01/24 1411    rosuvastatin (Crestor) tablet 20 mg, 20 mg, oral, Daily, Guy Parham MD, 20 mg at 12/31/23 2016    scopolamine (Transderm-Scop) patch 1 patch, 1 patch, transdermal, q72h, Guy Parham MD, 1 patch at 01/01/24 1554    sulfur hexafluoride microsphr (Lumason) injection 24.28 mg, 2 mL, intravenous, Once in imaging, Guy Parham MD    trimethobenzamide (Tigan) injection 200 mg, 200 mg, intramuscular, q6h PRN, Guy Parham MD,  200 mg at 01/01/24 1107    vancomycin (Vancocin) 1.25 g in dextrose 5 % in water (D5W) 250 mL IV, 1,250 mg, intravenous, q24h, Ian Alcazar MD    vancomycin (Vancocin) capsule 125 mg, 125 mg, oral, Every other day, Guy Parham MD, 125 mg at 01/01/24 0927    Results for orders placed or performed during the hospital encounter of 12/31/23 (from the past 24 hour(s))   CBC and Auto Differential   Result Value Ref Range    WBC 18.5 (H) 4.4 - 11.3 x10*3/uL    nRBC 0.2 (H) 0.0 - 0.0 /100 WBCs    RBC 2.40 (L) 4.50 - 5.90 x10*6/uL    Hemoglobin 6.7 (L) 13.5 - 17.5 g/dL    Hematocrit 20.6 (L) 41.0 - 52.0 %    MCV 86 80 - 100 fL    MCH 27.9 26.0 - 34.0 pg    MCHC 32.5 32.0 - 36.0 g/dL    RDW 18.6 (H) 11.5 - 14.5 %    Platelets 361 150 - 450 x10*3/uL    Neutrophils % 65.9 40.0 - 80.0 %    Immature Granulocytes %, Automated 0.7 0.0 - 0.9 %    Lymphocytes % 26.6 13.0 - 44.0 %    Monocytes % 6.2 2.0 - 10.0 %    Eosinophils % 0.3 0.0 - 6.0 %    Basophils % 0.3 0.0 - 2.0 %    Neutrophils Absolute 12.19 (H) 1.20 - 7.70 x10*3/uL    Immature Granulocytes Absolute, Automated 0.13 0.00 - 0.70 x10*3/uL    Lymphocytes Absolute 4.93 (H) 1.20 - 4.80 x10*3/uL    Monocytes Absolute 1.15 (H) 0.10 - 1.00 x10*3/uL    Eosinophils Absolute 0.05 0.00 - 0.70 x10*3/uL    Basophils Absolute 0.06 0.00 - 0.10 x10*3/uL   Prepare RBC: 1 Units   Result Value Ref Range    PRODUCT CODE Z0429B30     Unit Number [id number]     Unit ABO O     Unit RH NEG     XM INTEP COMP     Dispense Status IS     Blood Expiration Date January 05, 2024 23:59 EST     PRODUCT BLOOD TYPE 9500     UNIT VOLUME 282    Streptococcus pneumoniae Antigen, Urine    Specimen: Urine   Result Value Ref Range    Streptococcus pneumoniae Ag, Urine Negative Negative   Legionella Antigen, Urine    Specimen: Urine   Result Value Ref Range    L. pneumophila Urine Ag Negative Negative   Urinalysis with Reflex Microscopic   Result Value Ref Range    Color, Urine Straw Straw, Yellow     Appearance, Urine Clear Clear    Specific Gravity, Urine 1.027 1.005 - 1.035    pH, Urine 6.0 5.0, 5.5, 6.0, 6.5, 7.0, 7.5, 8.0    Protein, Urine NEGATIVE NEGATIVE mg/dL    Glucose, Urine NEGATIVE NEGATIVE mg/dL    Blood, Urine NEGATIVE NEGATIVE    Ketones, Urine NEGATIVE NEGATIVE mg/dL    Bilirubin, Urine NEGATIVE NEGATIVE    Urobilinogen, Urine <2.0 <2.0 mg/dL    Nitrite, Urine NEGATIVE NEGATIVE    Leukocyte Esterase, Urine NEGATIVE NEGATIVE   Comprehensive metabolic panel   Result Value Ref Range    Glucose 91 74 - 99 mg/dL    Sodium 140 136 - 145 mmol/L    Potassium 3.3 (L) 3.5 - 5.3 mmol/L    Chloride 105 98 - 107 mmol/L    Bicarbonate 29 21 - 32 mmol/L    Anion Gap 9 (L) 10 - 20 mmol/L    Urea Nitrogen 10 6 - 23 mg/dL    Creatinine 0.86 0.50 - 1.30 mg/dL    eGFR >90 >60 mL/min/1.73m*2    Calcium 7.9 (L) 8.6 - 10.6 mg/dL    Albumin 2.3 (L) 3.4 - 5.0 g/dL    Alkaline Phosphatase 181 (H) 33 - 136 U/L    Total Protein 5.6 (L) 6.4 - 8.2 g/dL    AST 13 9 - 39 U/L    Bilirubin, Total 1.3 (H) 0.0 - 1.2 mg/dL    ALT 7 (L) 10 - 52 U/L   Magnesium   Result Value Ref Range    Magnesium 1.99 1.60 - 2.40 mg/dL   Phosphorus   Result Value Ref Range    Phosphorus 3.8 2.5 - 4.9 mg/dL   CBC   Result Value Ref Range    WBC 13.3 (H) 4.4 - 11.3 x10*3/uL    nRBC 0.2 (H) 0.0 - 0.0 /100 WBCs    RBC 2.62 (L) 4.50 - 5.90 x10*6/uL    Hemoglobin 7.2 (L) 13.5 - 17.5 g/dL    Hematocrit 21.9 (L) 41.0 - 52.0 %    MCV 84 80 - 100 fL    MCH 27.5 26.0 - 34.0 pg    MCHC 32.9 32.0 - 36.0 g/dL    RDW 17.7 (H) 11.5 - 14.5 %    Platelets 334 150 - 450 x10*3/uL   B-type natriuretic peptide   Result Value Ref Range    BNP 45 0 - 99 pg/mL   Coagulation Screen   Result Value Ref Range    Protime 16.2 (H) 9.8 - 12.8 seconds    INR 1.4 (H) 0.9 - 1.1    aPTT 34 27 - 38 seconds           Assessment/Plan   Principal Problem:    Syncope and collapse    Leonardo Wilder is a 68 y.o. 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 Patient was brought in by EMS after an unwitnessed syncopal episode at home, now being admitted for syncopal work up likely due to orthostatic hypotension 2/2 acute blood loss anemia and hypovolemia from diarrhea and poor PO intake vs unlikely cardiogenic etiology. EKG in the ED, showed no concerns for ischemic changes, and LBBB seen previously. Will repeat echo to complete workup.  MICHELINE drain, with significant bloody output saturating patients couch at home prior to syncopal episode. On admission patient found to have AHRF 2/2 multifocal pna seen on imaging, Hgb 6.4 s/p 1u pRBCs, infectious workup pending and currently on vanc/zosyn and azithro. CTAP concerning for blood product collection but no c/f active bleed. Surgical oncology consulted in the ED for management of MICHELINE drain removed 1/1, tentatively plan for replacement with IR on 1/2.     Update 1/1:  - MICHELINE drain removal by surg onc this am, tentatively plan for replacement with IR   - NPO at midnight  - SLP recs: regular thin liquid diet  - Will consider cardiology for syncope work up tomorrow  - BNP 45   - Urine strep and Legionella antigen negative    Vancomycin PO Taper course:  - 1 cap QID for 1 week (12/8- 12/14 )  - 1 cap BID for 1 week (12/15- 12/21)  - 1 cap once daily for 1 week (12/22- 12/28)  - 1 cap every other day for 3 weeks (12/29-  through 1/19/24)    #Syncopal episode due to orthostatic hypotension vs cardiogenic etiology unlikely  #HFmrEF 35- 45% (7/23)   #HLD  ::Suspect related to hypovolemia from diarrhea, poor p.o. intake, chronic nausea and vomiting, along with acute blood loss anemia  ::Not on  GDMT  Plan:  - Resumed home midodrine 5 mg TID, ASA 81 mg and rosuvastatin 20 mg OD  - Orthostatic positive in the ED, s/p 1L LR  - Repeat orthostatics on admission positive s/p 1L LR will cautiously bolus as needed given HFmrEF 35-40%  - EKG without signs of arrhythmia, continue telemetry  - Echo pending   - Possible Cardiology consult 1/2    #Acute blood loss anemia, bleeding from MICHELINE drain removed by surg onc 1/1  ::CT abdomen pelvis showing resolving fluid collection with MICHELINE drain still in position with blood products, no signs of active bleeding  ::Status post 1 unit of PRBCs in the ED 12/31  ::loculated peritoneal fluid s/p MICHELINE drain by IR at [facility] on 12/05/23     Plan:  - Will continue to Hold Eliquis  - Maintain Active T & S  - Pm CBC  - Surgical oncology consulted, recommending full liquid diet and discussion with IR for possible replacement of MICHELINE drain midweek, no active management at this time     #Pneumonia, concern for possible aspiration  #Hypoxemic respiratory failure  #Leukocytosis  ::Received one dose of Zosyn and azithromycin in the ED     Plan:  [ ] Blood cultures x 2 in the ED pending  [ ] MRSA nares, sputum culture pending results  - Will Continue with IV Zosyn and azithromycin, will also add IV vancomycin given recurrent hospitalizations and concern for hospital-acquired pneumonia  - Suspect that patient may be aspirating related to his recurrent nausea and vomiting  - SLP recs: Regular thin liquid diet 1/1  - Urine strep and Legionella antigen negative 1/1     #Diarrhea  #History of C. difficile colitis  ::Currently on p.o. vancomycin taper, every other day, last dose 12/30.      Plan:  - With worsening leukocytosis and reports of worsening diarrhea, will recheck a C. difficile with reflex FREDY  - Continue current p.o. vancomycin plan for now every other day, end date 1/19/24    Vancomycin PO Taper course:  - 1 cap QID for 1 week (12/8- 12/14 )  - 1 cap BID for 1 week (12/15- 12/21)  - 1 cap once  daily for 1 week (12/22- 12/28)  - 1 cap every other day for 3 weeks (12/29-  through 1/19/24)    #Nausea  #Vomiting  ::QTC prolonged on admission 497 12/31 will avoid prolonging meds    Plan:  - Supportive onc recs: scopolamine patch and Bentyl for abdominal spasms due to diarrhea 1/1     #Appendiceal cancer w/ mets to abdomen  Primary oncologist is Dr. Ozuna, follows with [facility] surgical oncology Dr. Em  [ ]Notify Dr. Ozuna of admission in the a.m.     #History of portal vein thrombosis on Eliquis  - Hold Eliquis for now in the setting of acute blood loss anemia with syncopal episode    #Insomnia  #Anxiety  - Resumed home Ativan 0.5 BID PRN     F: PRN  E: PRN  N: Full liquid/ soft diet per surg onc   DVT ppx: hold given c/f bleeding  Full Code, confirmed on admission     NOK: Michelle (wife) [phone]            Guy Parham MD

## 2024-01-01 NOTE — PROGRESS NOTES
"Vancomycin Dosing by Pharmacy- INITIAL    Leonardo Wilder is a 68 y.o. year old male who Pharmacy has been consulted for vancomycin dosing for pneumonia. Based on the patient's indication and renal status this patient will be dosed based on a goal AUC of 400-600.     Renal function is currently stable.    Visit Vitals  BP 89/56 (BP Location: Right arm, Patient Position: Standing)   Pulse 104   Temp 37 °C (98.6 °F) (Temporal)   Resp 18        Lab Results   Component Value Date    CREATININE 0.99 12/31/2023    CREATININE 0.85 12/28/2023    CREATININE 0.82 12/27/2023    CREATININE 0.80 12/27/2023        Patient weight is No results found for: \"PTWEIGHT\"    No results found for: \"CULTURE\"     I/O last 3 completed shifts:  In: 700 (12.4 mL/kg) [Blood:700]  Out: - (0 mL/kg)   Weight: 56.2 kg   [unfilled]    Lab Results   Component Value Date    PATIENTTEMP 37.0 12/31/2023    PATIENTTEMP 37.0 12/27/2023    PATIENTTEMP 37.0 11/18/2023          Assessment/Plan     Patient will be given a loading dose of 1500 mg.  Will initiate vancomycin maintenance,  1250 mg every 24 hours.    This dosing regimen is predicted by [employer] to result in the following pharmacokinetic parameters:    Loading dose: 1500 mg at 20:00 12/31/2023.  Regimen: 1250 mg IV every 24 hours.  Start time: 20:00 on 01/01/2024  Exposure target: AUC24 (range)400-600 mg/L.hr   AUC24,ss: 489 mg/L.hr  Probability of AUC24 > 400: 72 %  Ctrough,ss: 13.5 mg/L  Probability of Ctrough,ss > 20: 19 %  Probability of nephrotoxicity (Lodise RYAN 2009): 9 %      Follow-up level will be ordered on 1/2 at 1st am lab unless clinically indicated sooner.  Will continue to monitor renal function daily while on vancomycin and order serum creatinine at least every 48 hours if not already ordered.  Follow for continued vancomycin needs, clinical response, and signs/symptoms of toxicity.       Dayron Davis, PharmD       "

## 2024-01-01 NOTE — CONSULTS
SUPPORTIVE AND PALLIATIVE ONCOLOGY CONSULT    Inpatient consult to [facility] Adult Supportive Oncology  Consult performed by: BRAYDEN Arce  Consult ordered by: Villa Silva MD      SERVICE DATE: 1/1/2024      PALLIATIVE MEDICINE OUTPATIENT PROVIDER:  BRAYDEN Kapoor (last seen 11/9/23)  CURRENT ATTENDING PROVIDER: Villa Silva MD     Medical Oncologist: Tiffanie Ozuna MD PhD  Ольга Haskins, MD Leonardo Uriostegui MD Sakti Chakrabarti, MD   Radiation Oncologist: No care team member to display  Primary Physician: Eddie Okeefe  [phone]    REASON FOR CONSULT/CHIEF CONSULT COMPLAINT: symptom management, continuity of care    Subjective   HISTORY OF PRESENT ILLNESS: Leonardo Wilder is a 68 y.o. 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 Patient was brought in by EMS after an unwitnessed syncopal episode at home. Found to be hypoxic in the mid 80s with no previous oxygen requirement was placed on 2 L nasal cannula.      Patient was sitting on couch at home this am, when he noticed it saturated with blood. He reports getting up and falling to the ground and losing consciousness for a few seconds. Wife heard a loud thump and found patient down and awake, denied any jerking movements, or confusion. He reported pre syncopal dizziness but denied any changes in vision or extremity weakness. Mentioned that he usually avoids getting up too fast from a seated position due to previous fall history, and usually takes his time.      Patient denied any accidental manipulation or pulling of MICHELINE drain, output is usually clear and  noticed this am the color changing from yellow to bright red blood with occasional clots. Patient reporting 1 week of non productive cough, diarrhea that has slowed down to 3 episodes per day. Denied any dizziness, fever, chills, sob, abdominal tenderness and n/v.      Patient has recent admissions for hypomg 0.88, intractable n/v and diarrhea. Found to be positive for C. Diff on admission from 11/14- 11/21 and negative on following admission from 11/24- 11/27 . CT AP 11/25 was obtained and showed resolving partial SBO from previous SBO in August and a loculated intraperitoneal fluid collection in the left anterior peritoneal cavity 13 x 3.9 x 12.8 cm in size causing compression of adjacent bowel loops. A multifocal pneumonia was also seen on CT AP. His symptoms were attributed to a viral gastroenteritis and his symptoms improved after loperamide. Currently on PO Vanc taper till 1/19/24. Supportive and Palliative Oncology is consulted for symptom management..     Pain Assessment:  Onset: 2 months ago   Location:  abdominal  Duration: Intermittent  Characteristics:   Rating:  denies at present; 4-5 with onset   Descriptors: cramping   Aggravating: bowel movements    Relieving:  emptying of bowels   Treatment/Home Regimen:  none   Intolerances:Leonardo Wilder has No Known Allergies.   Personal Pain Goal: 2    Interference with Function: A little   Coping Strategies: Relaxation   Emotional Response:  coping well   Barriers to Pain Management:  none    Opioid Use  Past 24 h prn opioid use:  none  Total 24h OME use:  0    OARRS/PDMP reviewed no aberrant behavior noted.    Symptom Assessment:  Pain:a little  Headache: none  Dizziness:a little  Lack of energy: none  Difficulty sleeping: none  Worrying: none  Anxiety: none  Depression: none  Pain in mouth/swallowing: none  Dry mouth: none  Taste changes: none  Shortness of breath: none  Lack of appetite: none   Nausea: very much  Vomiting: somewhat  Constipation:  none  Diarrhea: somewhat  Sore muscles: none  Numbness or tingling in hands/feet/other: none  Other: none      Information obtained from: chart review, interview of patient, and discussion with primary team  ______________________________________________________________________     Oncology History    No history exists.       Past Medical History:   Diagnosis Date    Cancer of appendix (CMS/HCC)     Coronary artery calcification     Hypothyroidism     LBBB (left bundle branch block)     Portal vein external compression      Past Surgical History:   Procedure Laterality Date    ABDOMINAL SURGERY       Family History   Problem Relation Name Age of Onset    Heart disease Father      Squamous cell carcinoma Other          SOCIAL HISTORY:  Marital Status  to wife Michelle Wilder   Social History:  reports that he quit smoking about 21 years ago. His smoking use included cigarettes. He smoked an average of 1 pack per day. He has never used smokeless tobacco. He reports that he does not currently use alcohol. He reports that he does not use drugs.    Protestant and Importance of Protestant:  Buddhism       REVIEW OF SYSTEMS:  Review of systems negative unless noted in HPI.       Objective       Lab Results   Component Value Date    WBC 13.3 (H) 01/01/2024    HGB 7.2 (L) 01/01/2024    HCT 21.9 (L) 01/01/2024    MCV 84 01/01/2024     01/01/2024      Lab Results   Component Value Date    GLUCOSE 91 01/01/2024    CALCIUM 7.9 (L) 01/01/2024     01/01/2024    K 3.3 (L) 01/01/2024    CO2 29 01/01/2024     01/01/2024    BUN 10 01/01/2024    CREATININE 0.86 01/01/2024     Lab Results   Component Value Date    ALT 7 (L) 01/01/2024    AST 13 01/01/2024    GGT 91 (H) 11/06/2023    ALKPHOS 181 (H) 01/01/2024    BILITOT 1.3 (H) 01/01/2024     Estimated Creatinine Clearance: 66.3 mL/min (by C-G formula based on SCr of 0.86 mg/dL).     Current Outpatient Medications   Medication Instructions    0.9 % sodium chloride  (sodium chloride 0.9%) solution 1,000 mL, intravenous, Daily    acetaminophen (TYLENOL) 650 mg, oral, Every 6 hours PRN    apixaban (Eliquis) 5 mg tablet TAKE 1 TABLET BY MOUTH TWO TIMES A DAY    aspirin 81 mg, oral, Daily    baclofen (LIORESAL) 5 mg, oral, 2 times daily    cyanocobalamin (VITAMIN B-12) 1,000 mcg, oral, Daily    DULoxetine (CYMBALTA) 60 mg, oral, Every morning, Do not crush or chew.    DULoxetine (CYMBALTA) 30 mg, oral, Every morning    finasteride (PROSCAR) 5 mg, oral, Daily, Do not crush, chew, or split.    Gemtesa 75 mg, oral, Daily    inulin (FIBER GUMMIES ORAL) 1 Piece, oral, Daily    Lactobacillus acidophilus (PROBIOTIC ORAL) 1 tablet, oral, Daily    levothyroxine (Synthroid, Levoxyl) 100 mcg tablet 1 tablet, oral, Daily    levothyroxine (SYNTHROID, LEVOXYL) 25 mcg, oral, Daily    loperamide (IMODIUM) 2 mg, oral, 4 times daily PRN    LORazepam (ATIVAN) 0.5 mg, oral, 2 times daily PRN    magnesium oxide (MAG-OX) 420 mg, oral, 2 times daily    midodrine (PROAMATINE) 5 mg, oral, 3 times daily    OLANZapine zydis (ZYPREXA) 5 mg, oral, Nightly    ondansetron (ZOFRAN) 4 mg, oral, Every 8 hours PRN    pantoprazole (PROTONIX) 40 mg, oral, Daily    pregabalin (LYRICA) 200 mg, oral, 3 times daily    rosuvastatin (CRESTOR) 20 mg, oral, Daily    vancomycin (VANCOCIN) 125 mg, oral, 2 times weekly, Take 1 capsule tomorrow (12/29), then start twice weekly dosing     Scheduled medications   [Held by provider] apixaban, 5 mg, oral, BID  aspirin, 81 mg, oral, Daily  azithromycin, 500 mg, intravenous, q24h  baclofen, 5 mg, oral, BID  cyanocobalamin, 1,000 mcg, oral, Daily  DULoxetine, 30 mg, oral, q AM  DULoxetine, 60 mg, oral, Nightly  finasteride, 5 mg, oral, Daily  levothyroxine, 125 mcg, oral, Daily  magnesium oxide, 400 mg, oral, BID  midodrine, 5 mg, oral, TID  pantoprazole, 40 mg, oral, Daily  piperacillin-tazobactam, 3.375 g, intravenous, q6h  potassium chloride, 20 mEq, intravenous, Once  potassium  chloride, 40 mEq, intravenous, Once  potassium chloride CR, 40 mEq, oral, q2h  pregabalin, 200 mg, oral, TID  rosuvastatin, 20 mg, oral, Daily  sulfur hexafluoride microsphr, 2 mL, intravenous, Once in imaging  vancomycin, 1,250 mg, intravenous, q24h  vancomycin, 125 mg, oral, Every other day      Continuous medications     PRN medications  PRN medications: trimethobenzamide     Allergies: No Known Allergies             PHYSICAL EXAMINATION:  Vital Signs:   Vital signs reviewed  Vitals:    24 0908   BP: 98/51   Pulse: 79   Resp: 16   Temp: 35.8 °C (96.4 °F)   SpO2: 98%     Pain Score: 0 - No pain    Qtc prolonged yesterday on EK     Physical Exam  Constitutional:       Appearance: He is ill-appearing.      Comments: Cachectic   HENT:      Head: Normocephalic and atraumatic.   Cardiovascular:      Rate and Rhythm: Normal rate and regular rhythm.      Pulses: Normal pulses.      Heart sounds: Normal heart sounds.   Pulmonary:      Effort: Pulmonary effort is normal.   Abdominal:      General: There is no distension.      Palpations: Abdomen is soft.   Musculoskeletal:      Right lower leg: No edema.      Left lower leg: No edema.   Skin:     General: Skin is warm and dry.   Neurological:      General: No focal deficit present.      Mental Status: He is alert and oriented to person, place, and time.   Psychiatric:         Mood and Affect: Mood normal.         Behavior: Behavior normal.         Thought Content: Thought content normal.         Judgment: Judgment normal.         ASSESSMENT/PLAN:  Leonardo Wilder is a 68 y.o. 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 Patient was brought in by EMS after an unwitnessed syncopal episode at home. Found to be hypoxic in the mid 80s with no previous oxygen requirement was placed on 2 L nasal cannula.    Supportive and Palliative Oncology is consulted for symptom management    Pain:  Pain related to hx of c diff/diarrhea  Pain is:  related to infectious process  Type: visceral  Pain control: sub-optimally controlled  Home regimen:   none  Intolerances/previously tried: none  Personalized pain goal: 3  Total OME usage for the past 24 hours:  none  Will likely improve with resolution of diarrhea  Recommend Bentyl 10mg po tid prn    Nausea:  Intermittent nausea with vomiting related to  motion, movement    Home regimen:  zofran  suboptimally controlled  Qtc on EKG 12/31: 497  Recommend Scopolamine TD patch q 72 hrs in setting of prolonged QTc    Diarrhea  Related to c.diff course (stool cx resent this admission for test of cure)  LB 1/1/24  Management per primary team  Bentyl as listed above for abdominal cramping       Medical Decision Making/Goals of Care/Advance Care Planning:  Patient's current clinical condition, including diagnosis, prognosis, and management plan, and goals of care were discussed.   Life limiting disease: metastatic malignancy  Family: Supportive wife Michelle  Performance status: Moderate limitations due to disease process and diarrhea, N/V  Joys/meaning/strength: Family  Understanding of health: Demonstrates good prognostic understanding of disease process, understands plan for resolution of acute issues, improved symptom control  Information:Wants full disclosure  Goals: symptom control and cancer directed therapy  Worries and fears now and future: ongoing symptoms   Minimum acceptable outcome/QOL:  adequate symptom control and return to baseline of independence in ADL's  Code status discussion:  FULL    Advance Directives  Existence of Advance Directives:No - not interested  Decision maker: Surrogate  decision maker is wife Michelle Wilder [phone]  Code Status: Full code    Introduction to Supportive and Palliative Oncology:  Spoke with pt and wife at bedside  Introduced the role and philosophy of Supportive and Palliative oncology in the evaluation and management of symptoms during cancer treatment  Palliative care was introduced as a service for patients with serious illness to help with symptoms, assist with goals of care conversations, navigate complex decision making, improve quality of life for patients, and provide support both patients and families.  Patient seemed to appreciate the extra layer of support.     Supportive Interventions: will assess interest with improvement in symptoms    Disposition:  Please  start the process of having prior authorization with meds to beds deliver medications to patient prior to discharge via [facility]. Prescriptions will need to be sent 48-72 hours prior to discharge so that a prior authorization can be completed.     Discharge date: unknown pending acute issues and symptom control  Will request an appointment with Outpatient Supportive Oncology BRAYDEN Kapoor      Signature and billing:  Thank you for allowing us to participate in the care of this patient. Recommendations will be communicated back to the consulting service by way of shared electronic medical record or face-to-face.    Medical complexity was high level due to due to complexity of problems, extensive data review, and high risk of management/treatment.    I spent 75 minutes in the care of this patient which included chart review, interviewing patient/family, discussion with primary team, coordination of care, and documentation.      DATA   Diagnostic tests and information reviewed for today's visit:  Conversation with primary team, Most recent labs and imaging results, Medications       Some elements copied from H&P note on 12/31/23, the elements have been updated and all reflect  current decision making from today, 1/1/2024.    Plan of Care discussed with: Provider, RN, Patient    Thank you for asking Supportive and Palliative Oncology to assist with care of this patient.  We will continue to follow.  Please contact us for additional questions or concerns.      SIGNATURE: BUFFY Arce-CNP  PAGER/CONTACT:  Contact information:  Supportive and Palliative Oncology  Monday-Friday 8 AM-5 PM  Epic Secure chat or pager [phone].  After hours and weekends:  pager [phone]

## 2024-01-02 NOTE — PROGRESS NOTES
"Leonardo Wilder is a 68 y.o. male on day 2 of admission presenting with Syncope and collapse.    Subjective   NAOE. Patient endorsing n/v, had one episode of emesis today after having 2 cans of pepsi. Unable to keep anything down, despite having an appetite. Last BM was 2 days ago, not sure if passing gas or not. Denies any abdominal pain, fever, chills, dizziness or syncopal episodes since admission.     Objective   Physical Exam  Constitutional:       Appearance: Normal appearance.      Comments: Malnourished appearing.    HENT:      Head: Normocephalic and atraumatic.   Eyes:      Extraocular Movements: Extraocular movements intact.      Pupils: Pupils are equal, round, and reactive to light.   Cardiovascular:      Rate and Rhythm: Normal rate and regular rhythm.      Pulses: Normal pulses.      Heart sounds: Normal heart sounds.   Pulmonary:      Effort: Pulmonary effort is normal.      Breath sounds: Normal breath sounds.   Abdominal:      General: Abdomen is flat. Bowel sounds are normal. There is no distension.      Palpations: Abdomen is soft.      Tenderness: There is no abdominal tenderness.      Comments: Healed surgical scars, midline abdomen and RLQ. MICEHLINE drain in LLQ removed  Musculoskeletal:      Right lower leg: No edema.      Left lower leg: No edema.   Skin:     General: Skin is warm.   Neurological:      General: No focal deficit present.      Mental Status: He is alert and oriented to person, place, and time.      Cranial Nerves: No cranial nerve deficit.      Comments: UE 5/5 LE 5/5       Last Recorded Vitals  Blood pressure 121/80, pulse 74, temperature 36.1 °C (97 °F), resp. rate 16, height 1.737 m (5' 8.4\"), weight 56.2 kg (124 lb), SpO2 95 %.  Intake/Output last 3 Shifts:  I/O last 3 completed shifts:  In: 850 (15.1 mL/kg) [P.O.:250; IV Piggyback:600]  Out: 1925 (34.2 mL/kg) [Urine:1625 (0.8 mL/kg/hr); Emesis/NG output:300]  Weight: 56.2 kg     Relevant Results    Current Facility-Administered " Medications:     [Held by provider] apixaban (Eliquis) tablet 5 mg, 5 mg, oral, BID, Guy Parham MD    aspirin EC tablet 81 mg, 81 mg, oral, Daily, Guy Parham MD, 81 mg at 01/02/24 0825    baclofen (Lioresal) tablet 5 mg, 5 mg, oral, BID, Guy Parham MD, 5 mg at 01/02/24 0825    cyanocobalamin (Vitamin B-12) tablet 1,000 mcg, 1,000 mcg, oral, Daily, Guy Parham MD, 1,000 mcg at 01/02/24 0825    dicyclomine (Bentyl) capsule 10 mg, 10 mg, oral, 4x daily PRN, Guy Parham MD    doxycycline (Vibra-Tabs) tablet 100 mg, 100 mg, oral, BID, Abdirashid Reveles MD    DULoxetine (Cymbalta) DR capsule 30 mg, 30 mg, oral, q AM, Guy Parham MD, 30 mg at 01/02/24 0825    DULoxetine (Cymbalta) DR capsule 60 mg, 60 mg, oral, Nightly, Guy Parham MD, 60 mg at 01/01/24 2003    finasteride (Proscar) tablet 5 mg, 5 mg, oral, Daily, Guy Parham MD, 5 mg at 01/02/24 0825    levothyroxine (Synthroid, Levoxyl) tablet 125 mcg, 125 mcg, oral, Daily, Guy Parham MD, 125 mcg at 01/02/24 0824    loperamide (Imodium) capsule 2 mg, 2 mg, oral, 4x daily PRN, Abdirashid Reveles MD    LORazepam (Ativan) tablet 0.5 mg, 0.5 mg, oral, BID PRN, Guy Parham MD    magnesium oxide (Mag-Ox) tablet 400 mg, 400 mg, oral, BID, Guy Parham MD, 400 mg at 01/02/24 0825    midodrine (Proamatine) tablet 5 mg, 5 mg, oral, TID, Guy Parham MD, 5 mg at 01/02/24 0824    pantoprazole (ProtoNix) EC tablet 40 mg, 40 mg, oral, Daily, Guy Parham MD, 40 mg at 01/02/24 0825    pregabalin (Lyrica) capsule 200 mg, 200 mg, oral, TID, Guy Parham MD, 200 mg at 01/02/24 0825    rosuvastatin (Crestor) tablet 20 mg, 20 mg, oral, Daily, Guy Parham MD, 20 mg at 01/01/24 2003    scopolamine (Transderm-Scop) patch 1 patch, 1 patch, transdermal, q72h, Guy Parham MD, 1 patch at 01/01/24 1554    sodium chloride (Ocean) 0.65 % nasal spray 1 spray, 1 spray, Each Nostril, 4x daily PRN, Guy Parham MD    sulfur hexafluoride microsphr (Lumason) injection 24.28 mg, 2 mL, intravenous, Once in  imaging, Guy Parham MD    trimethobenzamide (Tigan) injection 200 mg, 200 mg, intramuscular, q6h PRN, Guy Parham MD, 200 mg at 01/01/24 1709    vancomycin (Vancocin) capsule 125 mg, 125 mg, oral, Every other day, Guy Parham MD, 125 mg at 01/01/24 0927    Results for orders placed or performed during the hospital encounter of 12/31/23 (from the past 24 hour(s))   Coagulation Screen   Result Value Ref Range    Protime 16.2 (H) 9.8 - 12.8 seconds    INR 1.4 (H) 0.9 - 1.1    aPTT 34 27 - 38 seconds   C. difficile, PCR    Specimen: Stool   Result Value Ref Range    C. difficile, PCR Not Detected Not Detected   Vancomycin   Result Value Ref Range    Vancomycin 19.9 5.0 - 20.0 ug/mL   CBC and Auto Differential   Result Value Ref Range    WBC 10.2 4.4 - 11.3 x10*3/uL    nRBC 0.3 (H) 0.0 - 0.0 /100 WBCs    RBC 2.58 (L) 4.50 - 5.90 x10*6/uL    Hemoglobin 7.2 (L) 13.5 - 17.5 g/dL    Hematocrit 22.3 (L) 41.0 - 52.0 %    MCV 86 80 - 100 fL    MCH 27.9 26.0 - 34.0 pg    MCHC 32.3 32.0 - 36.0 g/dL    RDW 18.6 (H) 11.5 - 14.5 %    Platelets 367 150 - 450 x10*3/uL    Neutrophils % 53.7 40.0 - 80.0 %    Immature Granulocytes %, Automated 0.7 0.0 - 0.9 %    Lymphocytes % 30.9 13.0 - 44.0 %    Monocytes % 9.5 2.0 - 10.0 %    Eosinophils % 4.6 0.0 - 6.0 %    Basophils % 0.6 0.0 - 2.0 %    Neutrophils Absolute 5.49 1.20 - 7.70 x10*3/uL    Immature Granulocytes Absolute, Automated 0.07 0.00 - 0.70 x10*3/uL    Lymphocytes Absolute 3.16 1.20 - 4.80 x10*3/uL    Monocytes Absolute 0.97 0.10 - 1.00 x10*3/uL    Eosinophils Absolute 0.47 0.00 - 0.70 x10*3/uL    Basophils Absolute 0.06 0.00 - 0.10 x10*3/uL   Comprehensive Metabolic Panel   Result Value Ref Range    Glucose 85 74 - 99 mg/dL    Sodium 140 136 - 145 mmol/L    Potassium 3.6 3.5 - 5.3 mmol/L    Chloride 105 98 - 107 mmol/L    Bicarbonate 30 21 - 32 mmol/L    Anion Gap 9 (L) 10 - 20 mmol/L    Urea Nitrogen 7 6 - 23 mg/dL    Creatinine 1.10 0.50 - 1.30 mg/dL    eGFR 74 >60  mL/min/1.73m*2    Calcium 7.7 (L) 8.6 - 10.6 mg/dL    Albumin 2.2 (L) 3.4 - 5.0 g/dL    Alkaline Phosphatase 191 (H) 33 - 136 U/L    Total Protein 5.5 (L) 6.4 - 8.2 g/dL    AST 15 9 - 39 U/L    Bilirubin, Total 0.6 0.0 - 1.2 mg/dL    ALT 6 (L) 10 - 52 U/L   Magnesium   Result Value Ref Range    Magnesium 1.95 1.60 - 2.40 mg/dL   Phosphorus   Result Value Ref Range    Phosphorus 3.4 2.5 - 4.9 mg/dL   Transthoracic Echo (TTE) Complete   Result Value Ref Range    BSA 1.65 m2         Assessment/Plan   Principal Problem:    Syncope and collapse    Leonardo Wilder is a 68 y.o. 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 (Feb/2021 and Aug/2023), c/b chronic malnutrition on outpatient IVF therapies d/t persistent n/v, recent loculated peritoneal fluid s/p MICHELINE drain by IR at [facility] on 12/05/23. Patient was brought in by EMS after an unwitnessed syncopal episode at home, now being admitted for syncopal work up likely due to orthostatic hypotension 2/2 acute blood loss anemia and hypovolemia from diarrhea and poor PO intake vs unlikely cardiogenic etiology. EKG in the ED, showed no concerns for ischemic changes, and LBBB seen previously. Will repeat echo to complete workup.  MICHELINE drain, with significant bloody output saturating patients couch at home prior to syncopal episode. On admission patient found to have AHRF 2/2 multifocal pna seen on imaging, Hgb 6.4 s/p 1u pRBCs, infectious workup pending and currently on vanc/zosyn and azithro. CTAP concerning for blood product collection but no c/f active bleed. Surgical oncology consulted in the ED for management of MICHELINE drain removed 1/1. Given decreased size of fluid collection with appropriate positioning of the pigtail catheter, and the patient's decreasing WBC and afebrile,  IR has determined the collection is not amenable to drain re-placement at this time 1/2.      Update 1/2:  - IR recs, Given decreased size of fluid collection with appropriate positioning of the pigtail catheter, and the patient's decreasing WBC and afebrile, IR has determined the collection is not amenable to drain re-placement at this time.   - Echo today  - MRSA negative  - Discontinued IV vanc/azithro/zosyn. Started Doxycycline 100 mg for HAP, QTC prolonged.   - CT Abdomen with Oral contrast concerns for obstruction, patient still having episodes of emesis with PO intake.   - PT/OT eval     #Syncopal episode due to orthostatic hypotension vs cardiogenic etiology unlikely  #HFmrEF 35- 45% (7/23)   #HLD  ::Suspect related to hypovolemia from diarrhea, poor p.o. intake, chronic nausea and vomiting, along with acute blood loss anemia  ::Not on GDMT  Plan:  - Resumed home midodrine 5 mg TID, ASA 81 mg and rosuvastatin 20 mg OD  - Orthostatic positive in the ED, s/p 1L LR  - Repeat orthostatics on admission positive s/p 1L LR will cautiously bolus as needed given HFmrEF 35-40%  - EKG without signs of arrhythmia, continue telemetry  - Echo pending   - Possible Cardiology consult 1/2     #Acute blood loss anemia, bleeding from MICHELINE drain removed by surg onc 1/1  ::CT abdomen pelvis showing resolving fluid collection with MICHELINE drain still in position with blood products, no signs of active bleeding  ::Status post 1 unit of PRBCs in the ED 12/31  ::loculated peritoneal fluid s/p MICHELINE drain by IR at [facility] on 12/05/23     Plan:  - Will continue to Hold Eliquis  - Maintain Active T & S  - Pm CBC  - Surgical oncology consulted, recommending full liquid diet and discussion with IR for possible replacement of MICHELINE drain midweek, no active management at this time     #Multifocal Pneumonia, concern for possible aspiration and HAP  #Hypoxemic respiratory failure  #Leukocytosis  ::Received one dose of Zosyn and azithromycin in the ED      Plan:  [ ] Blood cultures x 2 in the ED pending  [ ] Sputum culture pending results  - Will Continue with IV Zosyn and azithromycin, will also add IV vancomycin given recurrent hospitalizations and concern for hospital-acquired pneumonia  - Suspect that patient may be aspirating related to his recurrent nausea and vomiting  - SLP recs: Regular thin liquid diet 1/1  - MRSA nares, Urine strep and Legionella antigen negative 1/1    Antibiotic history:  - IV vanc/azithro/zosyn (12/31- 1/2)  - Doxycycline (1/2- through)       #Diarrhea  #History of C. difficile colitis  ::Currently on p.o. vancomycin taper, every other day, last dose 12/30.      Plan:  - With worsening leukocytosis and reports of worsening diarrhea, will recheck a C. difficile with reflex FREDY  - Continue current p.o. vancomycin plan for now every other day, end date 1/19/24     Vancomycin PO Taper course:  - 1 cap QID for 1 week (12/8- 12/14 )  - 1 cap BID for 1 week (12/15- 12/21)  - 1 cap once daily for 1 week (12/22- 12/28)  - 1 cap every other day for 3 weeks (12/29-  through 1/19/24)     #Nausea  #Vomiting  ::QTC prolonged on admission 497 12/31 will avoid prolonging meds     Plan:  - Supportive onc recs: scopolamine patch and Bentyl for abdominal spasms due to diarrhea 1/1     #Appendiceal cancer w/ mets to abdomen  Primary oncologist is Dr. Ozuna, follows with [facility] surgical oncology Dr. Em  - Dr. Ozuna notified about patients admission.      #History of portal vein thrombosis on Eliquis  - Hold Eliquis for now in the setting of acute blood loss anemia with syncopal episode     #Insomnia  #Anxiety  - Resumed home Ativan 0.5 BID PRN     F: PRN  E: PRN  N: Full liquid/ soft diet per surg onc   DVT ppx: Lovenox 40 mg   Full Code, confirmed on admission     NOK: Michelle (wife) [phone]            Guy Parham MD

## 2024-01-02 NOTE — CONSULTS
Interventional Radiology Consultation  January 2, 2024    Interventional Radiology was formally consulted to determine the appropriate procedure and whether or not a procedure can and should be performed.    Reason For Consult  intra-abdominal abscess drain replacement      Imaging  The relevant imaging was reviewed. The most recent CT 12/31 revealed appropriate positioning of the pigtail catheter within the left intra-abdominal fluid collection, which demonstrated decreased size compared to prior 11/25.    Assessment/Plan  Given decreased size of fluid collection with appropriate positioning of the pigtail catheter, and the patient's decreasing WBC and afebrile, IR has determined the collection is not amenable to drain re-placement at this time.    Should the patient's clinical status worsen with increased size of the fluid collection and dislodgement of the drain, IR can be re-engaged for drain re-placement.    The above was relayed to treating team physician Dr. Parham.    Discussed with IR Attending Dr. Schaeffer.

## 2024-01-02 NOTE — PROGRESS NOTES
Physical Therapy    Physical Therapy Evaluation    Patient Name: Leonardo Wilder  MRN: [id number]  Today's Date: 1/2/2024   Time Calculation  Start Time: 0852  Stop Time: 0910  Time Calculation (min): 18 min    Assessment/Plan   PT Assessment  PT Assessment Results: Decreased strength, Decreased endurance, Impaired balance, Decreased mobility  Rehab Prognosis: Good  End of Session Communication: Bedside nurse  End of Session Patient Position: Up in chair, Alarm off, not on at start of session  IP OR SWING BED PT PLAN  Inpatient or Swing Bed: Inpatient  PT Plan  Treatment/Interventions: Bed mobility, Transfer training, Gait training, Stair training, Balance training, Strengthening, Endurance training, Therapeutic exercise  PT Plan: Skilled PT  PT Frequency: 3 times per week  PT Discharge Recommendations: Low intensity level of continued care  PT Recommended Transfer Status: Assist x1  PT - OK to Discharge: Yes      Subjective   General Visit Information:  Reason for Referral: syncope  Past Medical History Relevant to Rehab: HLD, 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 (Feb/2021 and Aug/2023), c/b chronic malnutrition on outpatient IVF therapies d/t persistent n/v, recent loculated peritoneal fluid s/p MICHELINE drain by IR at [facility] on 12/05/23.  Co-Treatment: OT  Co-Treatment Reason: to maximize mobility and safety  Prior to Session Communication: Bedside nurse  Patient Position Received: Bed, 3 rail up, Alarm on   Home Living:  Home Living  Type of Home: House  Lives With: Spouse  Home Adaptive Equipment: None  Home Layout: Two level  Home Access: Stairs to enter with rails  Entrance Stairs-Number of Steps: 5  Prior Level of Function:  Prior Function Per Pt/Caregiver Report  ADL Assistance: Independent  Homemaking Assistance: Needs assistance  Ambulatory Assistance:  Independent  Vocational: Retired  Prior Function Comments: +drives  Precautions:  Precautions  Medical Precautions: Fall precautions       Objective     Pain:  Pain Assessment  Pain Assessment: 0-10  Pain Score: 0 - No pain  Cognition:  Cognition  Overall Cognitive Status: Within Functional Limits      Extremity/Trunk Assessments:  Strength:     RLE   RLE : Exceptions to WFL  AROM RLE (degrees)  RLE AROM Comment: WFL  Strength RLE  R Hip Flexion: 4+/5  R Knee Extension: 4+/5  R Ankle Dorsiflexion: 4+/5  LLE   LLE : Exceptions to WFL  AROM LLE (degrees)  LLE AROM Comment: WFL  Strength LLE  L Hip Flexion: 4+/5  L Knee Extension: 4+/5  L Ankle Dorsiflexion: 4+/5    General Assessments:     Sensation  Light Touch: No apparent deficits     Static Sitting Balance  Static Sitting-Level of Assistance: Distant supervision  Dynamic Sitting Balance  Dynamic Sitting-Comments: SBA  Static Standing Balance  Static Standing-Level of Assistance: Contact guard  Dynamic Standing Balance  Dynamic Standing-Comments: CGA    Functional Assessments:  Bed Mobility  Bed Mobility: Yes  Bed Mobility 1  Bed Mobility 1: Supine to sitting  Level of Assistance 1: Close supervision  Bed Mobility Comments 1: HOB elevated  Transfers  Transfer: Yes  Transfer 1  Technique 1: Sit to stand, Stand to sit  Transfer Level of Assistance 1: Contact guard  Ambulation/Gait Training  Ambulation/Gait Training Performed: Yes  Ambulation/Gait Training 1  Assistance 1: Contact guard  Quality of Gait 1: Decreased step length (decreased obinna)  Comments/Distance (ft) 1: 175 feet     Outcome Measures:  [facility] Basic Mobility  Turning from your back to your side while in a flat bed without using bedrails: None  Moving from lying on your back to sitting on the side of a flat bed without using bedrails: A little  Moving to and from bed to chair (including a wheelchair): A little  Standing up from a chair using your arms (e.g. wheelchair or bedside chair): A little  To  walk in hospital room: A little  Climbing 3-5 steps with railing: A little  Basic Mobility - Total Score: 19       Encounter Problems       Encounter Problems (Active)       Balance       No LOB with transfers/ambulation        Start:  01/02/24    Expected End:  01/23/24               Mobility       STG - Patient will ambulate 200 feet independent       Start:  01/02/24    Expected End:  01/23/24            STG - Patient will ascend and descend a flight of stairs independent       Start:  01/02/24    Expected End:  01/23/24               Transfers       STG - Patient will perform bed mobility independent       Start:  01/02/24    Expected End:  01/23/24            STG - Patient will transfer sit to and from stand independent       Start:  01/02/24    Expected End:  01/23/24                   Education Documentation  Precautions, taught by Triny Lucia PT at 1/2/2024 11:04 AM.  Learner: Patient  Readiness: Acceptance  Method: Explanation  Response: Verbalizes Understanding    Mobility Training, taught by Triny Lucia PT at 1/2/2024 11:04 AM.  Learner: Patient  Readiness: Acceptance  Method: Explanation  Response: Verbalizes Understanding    Education Comments  No comments found.            01/02/24 at 11:05 AM   Triny Lucia, PT

## 2024-01-02 NOTE — CARE PLAN
The patient's goals for the shift include      The clinical goals for the shift include patient will remain safe and free from injury this shift 1/2/23 1900      Problem: Pain  Goal: Takes deep breaths with improved pain control throughout the shift  Outcome: Progressing  Goal: Turns in bed with improved pain control throughout the shift  Outcome: Progressing     Patient remained safe and free from injury. Patient still complaining of nausea/vomiting. Has had nearly 1L out in emesis. Patient to not have MICHELINE drain replaced at this time. CT a/p with contrast done this afternoon to r/o obstruction. Patient not complaining of any pain. Vss. Patient received IV K+ and iv abx were discontinued at this time.

## 2024-01-02 NOTE — CONSULTS
Vancomycin Dosing by Pharmacy- Cessation of Therapy    Consult to pharmacy for vancomycin dosing has been discontinued by the prescriber, pharmacy will sign off at this time.    Please call pharmacy if there are further questions or re-enter a consult if vancomycin is resumed.     Kathie Pozo, PharmD

## 2024-01-02 NOTE — CARE PLAN
The patient's goals for the shift include      The clinical goals for the shift include pt will remain safe and free from falls through shift      Problem: Pain  Goal: Takes deep breaths with improved pain control throughout the shift  Outcome: Progressing  Goal: Turns in bed with improved pain control throughout the shift  Outcome: Progressing

## 2024-01-02 NOTE — PROGRESS NOTES
Surgical Oncology Daily Progress Note    Subjective  No overnight events.  Feeling okay this morning.  Denies fevers, chills, nausea, vomiting, chest pain, shortness of breath.  NPO now, but had been tolerating full liquid diet.  C diff negative.  12/31 blood cultures NGTD.  No other concerns.    Current Meds  Scheduled medications  [Held by provider] apixaban, 5 mg, oral, BID  aspirin, 81 mg, oral, Daily  azithromycin, 500 mg, intravenous, q24h  baclofen, 5 mg, oral, BID  cyanocobalamin, 1,000 mcg, oral, Daily  DULoxetine, 30 mg, oral, q AM  DULoxetine, 60 mg, oral, Nightly  finasteride, 5 mg, oral, Daily  levothyroxine, 125 mcg, oral, Daily  magnesium oxide, 400 mg, oral, BID  midodrine, 5 mg, oral, TID  pantoprazole, 40 mg, oral, Daily  piperacillin-tazobactam, 3.375 g, intravenous, q6h  pregabalin, 200 mg, oral, TID  rosuvastatin, 20 mg, oral, Daily  scopolamine, 1 patch, transdermal, q72h  sulfur hexafluoride microsphr, 2 mL, intravenous, Once in imaging  vancomycin, 1,250 mg, intravenous, q24h  vancomycin, 125 mg, oral, Every other day      Objective    Vitals:   Temp:  [35.8 °C (96.4 °F)-36.3 °C (97.3 °F)] 36 °C (96.8 °F)  Heart Rate:  [66-80] 66  Resp:  [16-18] 16  BP: ()/(51-79) 119/73      I/O  I/O last 3 completed shifts:  In: 850 (15.1 mL/kg) [P.O.:250; IV Piggyback:600]  Out: 1925 (34.2 mL/kg) [Urine:1625 (0.8 mL/kg/hr); Emesis/NG output:300]  Weight: 56.2 kg     Physical Exam  GENERAL APPEARANCE:  AxOx4, generally well-appearing no acute distress. Nontoxic appearing  HEART:  Normal rate and regular rhythm  LUNGS:  Equal chest rise and fall, non-labored breathing  ABDOMEN:  Soft, nontender, nondistended. Interval removal of L abdominal drain, dressing with moderate amount of serosang drainage.  EXTREMITIES:  Without cyanosis, clubbing or edema.  NEUROLOGICAL:  Grossly nonfocal. Alert and oriented, moving all 4 extremities.   Skin:  Warm and dry without any rash.    Labs:  Lab Results   Component  Value Date    WBC 13.3 (H) 01/01/2024    HGB 7.2 (L) 01/01/2024    HCT 21.9 (L) 01/01/2024    MCV 84 01/01/2024     01/01/2024     Lab Results   Component Value Date    GLUCOSE 91 01/01/2024    CALCIUM 7.9 (L) 01/01/2024     01/01/2024    K 3.3 (L) 01/01/2024    CO2 29 01/01/2024     01/01/2024    BUN 10 01/01/2024    CREATININE 0.86 01/01/2024     Lab Results   Component Value Date    ALT 7 (L) 01/01/2024    AST 13 01/01/2024    GGT 91 (H) 11/06/2023    ALKPHOS 181 (H) 01/01/2024    BILITOT 1.3 (H) 01/01/2024     Results from last 7 days   Lab Units 01/01/24  1419   APTT seconds 34   INR  1.4*       Imaging  CT angio chest for pulmonary embolism    Result Date: 12/31/2023  Interpreted By:  Terrance Sharma and Dervishi Mario STUDY: CT ANGIO CHEST FOR PULMONARY EMBOLISM;  12/31/2023 10:36 am   INDICATION: Signs/Symptoms:cancer hx, syncope with new oxygen requirement.   COMPARISON: CT chest: 04/14/2023   ACCESSION NUMBER(S): [id number]   ORDERING CLINICIAN: NATALIE BATEMAN   TECHNIQUE: Helical data acquisition of the chest was obtained after intravenous administration of 80 mL Omnipaque 350, as per PE protocol. Images were reformatted in coronal and sagittal planes. Axial and coronal maximum intensity projection (MIP) images were created and reviewed.   FINDINGS: POTENTIAL LIMITATIONS OF THE STUDY: The assessment is limited by respiratory motion.   HEART AND VESSELS: There are no discrete filling defects within main pulmonary artery and its branches to suggest acute pulmonary embolism. Main pulmonary artery and its branches are normal in caliber.   The thoracic aorta normal in course and caliber.There is mild scattered atherosclerosis present, including calcified and noncalcified plaques. Moderate coronary artery calcifications are seen. Please note,the study is not optimized for evaluation of coronary arteries.   Mild left ventricular chamber enlargement..There are no findings to suggest right  heart strain.   There is no pericardial effusion seen.   MEDIASTINUM AND YESENIA,. LOWER NECK AND AXILLA: The visualized thyroid gland is within normal limits. No evidence of thoracic lymphadenopathy by CT criteria. Esophagus appears within normal limits as seen.   LUNGS AND AIRWAYS: The trachea and central airways are patent. No endobronchial lesion is seen.   There is bilateral right worse than left diffuse consolidative focal consolidative opacities and ground-glass opacities with interlobular septal thickening.   There is no evidence of pleural effusions. No pneumothorax.   UPPER ABDOMEN: The visualized upper abdomen demonstrates stool concentrated at the splenic flexure. The stomach is fluid-filled. There is otherwise no significant abnormality and additional findings are dictated on separate CT of the abdomen and pelvis.       CHEST WALL AND OSSEOUS STRUCTURES: Chest wall is within normal limits. No acute osseous pathology.There are no suspicious osseous lesions.       1. No evidence of acute pulmonary embolism. 2. Bilateral significantly worse in the right compared to the left focal consolidative opacities and ground-glass opacities with interlobular septal thickening. Findings are concerning for multifocal pneumonia. Correlate with a history of recurrent aspiration. The rapid interval change when compared to a study of 08/21/2023 with argue against lymphangitic spread of tumor. Consider correlation with bronchoscopic sampling. 3. Ventricular chamber correlate with left ventricular function.     I personally reviewed the images/study and I agree with the findings as stated. This study was interpreted at [facility], [place], [place].   MACRO: None   Signed by: Terrance Sharma 12/31/2023 2:26 PM Dictation workstation:   [id number]    CT abdomen pelvis w IV contrast    Result Date: 12/31/2023  Interpreted By:  Tay Moran and Dervishi Mario STUDY: CT ABDOMEN PELVIS W IV  CONTRAST;  12/31/2023 10:36 am   INDICATION: Signs/Symptoms:hx appendix cancer with LLQ MICHELINE drain - fall with abdominal pain and bleeding from drain site.   COMPARISON: CT abdomen pelvis: 11/25/2023, 02/23/2023, 02/19/2022, 01/21/2022   ACCESSION NUMBER(S): [id number]   ORDERING CLINICIAN: NATALIE BATEMAN   TECHNIQUE: CT of the abdomen and pelvis was performed.  Standard contiguous axial images were obtained at 3 mm slice thickness through the abdomen and pelvis. Coronal and sagittal reconstructions at 3 mm slice thickness were performed.   80 ml of contrast Omnipaque 350 were administered intravenously without immediate complication.   FINDINGS: LOWER CHEST: Please refer to separate report.   ABDOMEN:   LIVER: The liver is normal in size. There is redemonstration of an unchanged 0.8 cm simple cyst in the caudate lobe.   BILE DUCTS: Infiltrative soft tissue in the falciform ligament and lionel hepatis is similar. There is mild intrahepatic ductal dilatation (left > right), unchanged. The common hepatic duct is not well seen. The common bile duct is normal in caliber.   GALLBLADDER: Status post cholecystectomy.   PANCREAS: The pancreas is unremarkable.   SPLEEN: The spleen is surgically absent.   ADRENAL GLANDS: Thickening of the left adrenal gland is similar to 11/25/2023 but slightly progressed from 02/25/2023. Normal right adrenal gland.   KIDNEYS AND URETERS: There is stable bilateral pelvicaliectasis without hydroureter compared to 11/25/2023; however, this is new compared to 08/21/2023 and is most likely due to involvement of the ureters by peritoneal carcinomatosis. Both kidneys demonstrate normal contrast enhancement. Stable 3.5 cm benign exophytic cyst arising from the lower pole the left kidney.   PELVIS:   BLADDER: There is re-demonstration of infiltrative peritoneal deposits invading into the anterosuperior wall the urinary bladder (sagittal image 160/122, series 503), similar to prior study. Otherwise, the  urinary bladder wall is normal in thickness.   REPRODUCTIVE ORGANS: The prostate gland is normal in size.   VESSELS: Over prior studies dating back to 01/21/2022, there is progressive diffuse narrowing of the left portal vein and its proximal branches due to infiltrative encasing soft tissue in the hepatic hilum and falciform ligament (axial images 36-42). There is mild-moderate narrowing of the right portal vein due to soft tissue encasement similar to 11/25/2023, new from 02/23/2023. The SMV is patent. The splenic vein is ligated but otherwise patent. There is a replaced right hepatic artery arising from the SM A. Moderate aortic atherosclerosis without AAA.   RETROPERITONEUM/LYMPH NODES: There is a new enlarged 1.1 cm gastrohepatic lymph node, previously 0.8 cm. No other enlarged abdominopelvic lymph nodes. No acute retroperitoneal abnormality.     ABDOMINAL WALL/BOWEL/PERITONEUM: Postsurgical changes of right hemicolectomy with ileocolic anastomosis, sigmoid resection and anastomosis. There is redemonstration of mild herniation of bowel loops into right lower quadrant abdominal wall defect without obstruction or strangulation.   Over the course of several recent prior exams, there has been progressively worsening peritoneal carcinomatosis throughout the entire abdomen and pelvis. For example, there is progressively increasing soft tissue infiltration in the lionel hepatis and hepatic hilum, encasing and tethering numerous bowel loops results again multifocal transition points of bearing grade and intermittent dilatation of the bowel loops due to varying degrees of obstruction. For example, there is a new focally dilated short segment of bowel in the anterior midline lower abdomen measuring 4 cm with fecalization of bowel content and which is 100% narrowed by casing soft tissue (coronal image 36/117, axial image 86/155). This same soft tissue infiltrative process also extends into the abdominal wall and into the  dome of the urinary bladder. Also causes extensive tethering of locoregional small bowel loops above the bladder. There is additional thick-walled collapsed small bowel loops in the right lower quadrant which are increasingly involve with carcinomatosis deposit.   There is significant decrease in size of lenticular-shaped, loculated rim enhancing left lower quadrant intraperitoneal fluid collection with internal foci of gas. The MICHELINE drain appropriately terminates within the collection, currently measuring 10.9 cm x 1.5 cm x 10.3 cm (previously 13 cm x 3.9 cm x 12.8 cm). There is a trace amount of blood products in the collection without active bleeding within the collection or abdominal wall.   There is redemonstration of diffuse wall thickening of distal esophagus due to submucosal edema with mucosal hyperenhancement, as well as diffuse gastric rugal fold thickening in the degree of sub mucosal wall thickening in the antral pyloric region.   MUSCULOSKELETAL: No suspicious osseous lesions or acute osseous abnormality. Mild degenerative changes of the thoracolumbar spine without high-grade canal stenosis. There is interval decrease in size of a now 2.4 cm x 1.8 cm rim enhancing collection in the right gluteus wild muscle, previously 4 cm x 5.8 cm, most likely representing a subacute muscle infarct or resolving hematoma. There are no new soft tissue collections.       1. Significant decreased size of loculated rim enhancing left lower quadrant intraperitoneal fluid collection with appropriate termination of drain in the collection. There is a trace amount of hyperdense material likely representing blood products in the collection without active bleeding into the collection or abdominal wall.   2. Progressively worsening peritoneal carcinomatosis:   A.) Infiltrative soft tissue mass invades the dome of the urinary bladder wall, contiguously invading the abdominal wall musculature and numerous adjacent small bowel  loops some intensely causing tethering, narrowing, and worsening short segment high-grade bowel obstruction. There are additional multifocal sites of varying degrees of narrowing and dilatation caused by serosal metastatic disease placing patient future risk of additional bowel obstructions.   b.) Metastatic soft tissue infiltration progressively worsening throughout the falciform ligament, lionel hepatis and hepatic hilum causing progressive now severe narrowing of the entire left portal vein and its branches (previously mildly narrowed on 02/23/2023) and progressive mild-moderate narrowing of the right portal vein (previously normal on 02/23/2023). Is also responsible for the previously described intrahepatic biliary ductal dilatation, similar to prior study.   c.) Progressive now mild-moderate bilateral hydronephrosis, new from 08/21/2023, secondary to direct and/or indirect involvement of the proximal ureters by metastatic disease.   3. Decrease in size of rim enhancing fluid collection in the right gluteus wild muscle likely representing resolving hematoma or muscle infarct.   I personally reviewed the images/study and I agree with the findings as stated by Resident Neptali Brewster MD. This study was interpreted at [place], [place].   MACRO: Tay Moran discussed the significance and urgency of this critical finding by telephone with  NATALIE BATEMAN on 12/31/2023 at 12:04 pm.  (**-RCF-**) Findings:  See findings.   Signed by: Tay Moran 12/31/2023 12:20 PM Dictation workstation:   [id number]    CT head wo IV contrast    Result Date: 12/31/2023  Interpreted By:  Jason Moreno, STUDY: CT HEAD WO IV CONTRAST;  12/31/2023 10:36 am   INDICATION: Signs/Symptoms:fall, anticoagulation.  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 Syncopal episode with fall. Loss of consciousness.   COMPARISON: 04/14/2023 head CT   ACCESSION NUMBER(S):  [id number]   ORDERING CLINICIAN: HILLARY HURT   TECHNIQUE: Noncontrast axial CT scan of head was performed. Angled reformats in brain and bone windows were generated. The images were reviewed in bone, brain, blood and soft tissue windows.   FINDINGS: CSF Spaces: The ventricles and sulci are normal, unchanged. There is no extraaxial fluid collection.   Parenchyma:  The brain parenchyma is normal with no infarct, hemorrhage or mass.   Calvarium: The calvarium is unremarkable.   Paranasal sinuses and mastoids: Visualized paranasal sinuses and mastoids are clear.       Normal brain CT.     MACRO: None   Signed by: Jason Moreno 12/31/2023 11:06 AM Dictation workstation:   [id number]    XR chest 1 view    Result Date: 12/31/2023  STUDY: Chest Radiograph; 12/31/2023 10:08 AM INDICATION: Hypoxia, fall. COMPARISON: XR chest 11/16/2023, 08/22/2023. ACCESSION NUMBER(S): [id number] ORDERING CLINICIAN: Hillary Hurt TECHNIQUE:  Frontal chest was obtained at 10:08:00 hours. FINDINGS: CARDIOMEDIASTINAL SILHOUETTE: Cardiomediastinal silhouette is normal in size and configuration.  LUNGS: Partial clearing of the right lower lobe pneumonia/atelectasis.  New right upper lobe opacity.  ABDOMEN: No remarkable upper abdominal findings.  BONES: No acute osseous changes. A right IJ infusion catheter remains in place extending into the right atrium.    Improving right lower lobe pneumonia. New right upper lobe opacity consistent with pneumonia. Signed by Kannan Woodson MD    Assessment:  Pt is a 68 y.o. 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 Pt has a negative trauma survey. Pt is not currently obstructed.    1/2: Doing okay this morning. NPO at this time, but had been tolerating full  liquid diet. Possible drain replacement with IR today.     Plan/Recs  - IR drain replacement  - Resume full liquid diet as tolerated when able.    Discussed with Attending Physician    Jeremie Justin MD  PGY3 Integrated Vascular Surgery Resident  Surgical Oncology  Pager: [phone]

## 2024-01-02 NOTE — PROGRESS NOTES
Occupational Therapy    Occupational Therapy    Evaluation    Patient Name: Leonardo Wilder  MRN: [id number]  Today's Date: 1/2/2024  Time Calculation  Start Time: 0852  Stop Time: 0910  Time Calculation (min): 18 min    Assessment  IP OT Assessment  OT Assessment: Pt completed functional bed mobility with SBA with HOB elevated, able to complete functional transfers with CGA, anticipate set-up with upper and lower body ADLs.  Prognosis: Good  Barriers to Discharge: None  Evaluation/Treatment Tolerance: Patient tolerated treatment well  End of Session Communication: Bedside nurse  End of Session Patient Position: Up in chair, Alarm off, caregiver present  Plan:  Treatment Interventions: ADL retraining, Functional transfer training  OT Frequency: 2 times per week  OT Discharge Recommendations: Low intensity level of continued care  OT Recommended Transfer Status: Stand by assist  OT - OK to Discharge: Yes    Subjective   Current Problem:  1. Syncope and collapse  CANCELED: Transthoracic Echo (TTE) Limited    CANCELED: Transthoracic Echo (TTE) Limited      2. Pneumonia of right upper lobe due to infectious organism        3. Dehydration        4. Syncope, unspecified syncope type        5. HFrEF (heart failure with reduced ejection fraction) (CMS/HCC)        6. Chronic systolic congestive heart failure (CMS/HCC)  Transthoracic Echo (TTE) Complete    Transthoracic Echo (TTE) Complete        General:  Reason for Referral: syncope  Past Medical History Relevant to Rehab: HLD, HFmrEF (35-40% in July 2023), C diff, appendiceal cancer w/ mets to abdomen c/b portal vein thrombosis (on chronic AC) s/p neoadjuvant chemo w/ FOLFOX, 2 prior sx and 2x HIPEC @ [facility] (Feb/2021 and Aug/2023), c/b chronic malnutrition on outpatient IVF therapies d/t persistent n/v, recent loculated peritoneal fluid s/p MICHELINE drain by IR at [facility] on 12/05/23.  Co-Treatment: PT  Co-Treatment Reason: to maximize mobility and safety  Prior to Session Communication:  Bedside nurse  Patient Position Received: Bed, 3 rail up, Alarm on   Precautions:  Medical Precautions: Fall precautions  Vital Signs:     Pain:  Pain Assessment  Pain Assessment: 0-10  Pain Score: 0 - No pain  Lines/Tubes/Drains:  Implantable Port Right Chest Single lumen port (Active)   Number of days:      Objective   Cognition:  Overall Cognitive Status: Within Functional Limits    Home Living:  Type of Home: House  Lives With: Spouse  Home Adaptive Equipment: None  Home Layout: Two level  Home Access: Stairs to enter with rails  Entrance Stairs-Number of Steps: 5   Prior Function:  ADL Assistance: Independent  Homemaking Assistance: Needs assistance (pt's wife assists with cooking and laudry)  Ambulatory Assistance: Independent  Vocational: Retired  Prior Function Comments: +drives       ADL:  Eating Assistance: Independent  Grooming Assistance: Stand by  Grooming Deficit: Setup (anticipate)  UE Dressing Assistance: Stand by  UE Dressing Deficit:  (anticipate)  LE Dressing Assistance: Stand by  LE Dressing Deficit:  (anticipate)  Activity Tolerance:  Endurance: Endurance does not limit participation in activity  Balance:  Dynamic Sitting Balance  Dynamic Sitting-Balance Support: No upper extremity supported  Dynamic Sitting-Balance:  (independent)  Dynamic Standing Balance  Dynamic Standing-Balance Support: No upper extremity supported  Dynamic Standing-Balance:  (CGA)  Static Sitting Balance  Static Sitting-Balance Support: No upper extremity supported  Static Sitting-Level of Assistance: Independent  Static Standing Balance  Static Standing-Balance Support: No upper extremity supported  Static Standing-Level of Assistance: Contact guard  Bed Mobility/Transfers: Bed Mobility  Bed Mobility: Yes  Bed Mobility 1  Bed Mobility 1: Supine to sitting  Level of Assistance 1: Close supervision  Bed Mobility Comments 1: HOB elevated   and Transfers  Transfer: Yes  Transfer 1  Transfer From 1: Bed to  Transfer to 1:  Stand  Technique 1: Sit to stand  Transfer Level of Assistance 1: Contact guard  Transfers 2  Transfer From 2: Stand to  Transfer to 2: Chair with arms  Technique 2: Stand to sit  Transfer Level of Assistance 2: Contact guard       Vision: Vision - Basic Assessment  Current Vision: No visual deficits    Sensation:  Light Touch: No apparent deficits    Coordination:  Movements are Fluid and Coordinated: Yes   Hand Function:  Hand Function  Gross Grasp: Functional  Coordination: Functional  Extremities: RUE   RUE : Within Functional Limits, LUE   LUE: Within Functional Limits,  , and      Outcome Measures: [facility] Daily Activity  Putting on and taking off regular lower body clothing: A little  Bathing (including washing, rinsing, drying): A little  Putting on and taking off regular upper body clothing: A little  Toileting, which includes using toilet, bedpan or urinal: A little  Taking care of personal grooming such as brushing teeth: A little  Eating Meals: None  Daily Activity - Total Score: 19         ,     OT Adult Other Outcome Measures  4AT: negative    Education Documentation  ADL Training, taught by Terence Lima OT at 1/2/2024 11:28 AM.  Learner: Patient  Readiness: Eager  Method: Explanation  Response: Verbalizes Understanding    Education Comments  No comments found.        Goals:   Encounter Problems       Encounter Problems (Active)       ADLs       Patient will perform UB and LB bathing  with modified independent level of assistance and grab bars and shower chair. (Progressing)       Start:  01/02/24    Expected End:  01/23/24            Patient will complete toileting including hygiene clothing management/hygiene with modified independent level of assistance and grab bars.  (Progressing)       Start:  01/02/24    Expected End:  01/23/24               BALANCE       Pt will maintain dynamic standing balance during ADL task with modified independent level of assistance in order to demonstrate decreased  risk of falling and improved postural control. (Progressing)       Start:  01/02/24    Expected End:  01/23/24                 MOBILITY       Patient will perform Functional mobility min Household distances/Community Distances with modified independent level of assistance and least restrictive device in order to improve safety and functional mobility. (Progressing)       Start:  01/02/24    Expected End:  01/23/24                 TRANSFERS       Patient will perform bed mobility modified independent level of assistance and bed rails in order to improve safety and independence with mobility (Progressing)       Start:  01/02/24    Expected End:  01/23/24 01/02/24 at 11:29 AM   ESTEBAN Fernandez/L, OTD   Rehab Office: [phone]

## 2024-01-02 NOTE — PROGRESS NOTES
Pharmacy Medication History Review    Leonardo Wilder is a 68 y.o. male admitted for Syncope and collapse. Pharmacy reviewed the patient's [id number] medications and allergies for accuracy.    The list below reflects the updated PTA list. Comments regarding how patient may be taking medications differently can be found in the Admit Orders Activity  Prior to Admission Medications   Prescriptions Last Dose Informant   0.9 % sodium chloride (sodium chloride 0.9%) solution 12/31/2023 Spouse/Significant Other   Sig: Infuse 1,000 mL into a venous catheter once daily.   DULoxetine (Cymbalta) 30 mg DR capsule 12/31/2023 Spouse/Significant Other   Sig: Take 1 capsule (30 mg) by mouth once daily in the morning.   DULoxetine (Cymbalta) 60 mg DR capsule     Sig: Take 1 capsule (60 mg) by mouth once daily in the morning. Do not crush or chew.   LORazepam (Ativan) 0.5 mg tablet  Spouse/Significant Other   Sig: Take 1 tablet (0.5 mg) by mouth 2 times a day as needed for anxiety (insomnia).   Lactobacillus acidophilus (PROBIOTIC ORAL) 12/31/2023 Spouse/Significant Other   Sig: Take 1 tablet by mouth once daily.   OLANZapine zydis (ZyPREXA) 5 mg disintegrating tablet  Spouse/Significant Other   Sig: Take 1 tablet (5 mg) by mouth once daily at bedtime.   acetaminophen (Tylenol) 325 mg tablet 12/31/2023 Spouse/Significant Other   Sig: Take 2 tablets (650 mg) by mouth every 6 hours if needed.   apixaban (Eliquis) 5 mg tablet 12/31/2023 Spouse/Significant Other   Sig: TAKE 1 TABLET BY MOUTH TWO TIMES A DAY   aspirin 81 mg EC tablet 12/31/2023 Spouse/Significant Other   Sig: Take 1 tablet (81 mg) by mouth once daily.   baclofen (Lioresal) 5 mg tablet 12/31/2023 Spouse/Significant Other   Sig: Take 1 tablet (5 mg) by mouth 2 times a day.   cyanocobalamin (Vitamin B-12) 1,000 mcg tablet 12/31/2023 Spouse/Significant Other   Sig: Take 1 tablet (1,000 mcg) by mouth once daily.   finasteride (Proscar) 5 mg tablet 12/31/2023  Spouse/Significant Other   Sig: Take 1 tablet (5 mg) by mouth once daily. Do not crush, chew, or split.   inulin (FIBER GUMMIES ORAL) 12/31/2023 Spouse/Significant Other   Sig: Take 1 Piece by mouth once daily.   levothyroxine (Synthroid, Levoxyl) 100 mcg tablet 12/31/2023 Spouse/Significant Other   Sig: Take 1 tablet (100 mcg) by mouth once daily.   levothyroxine (Synthroid, Levoxyl) 25 mcg tablet  Spouse/Significant Other   Sig: Take 1 tablet (25 mcg) by mouth once daily.   loperamide (Imodium) 2 mg capsule 12/31/2023 Spouse/Significant Other   Sig: Take 1 capsule (2 mg) by mouth 4 times a day as needed for diarrhea.   magnesium oxide (Mag-Ox) 420 mg tablet 12/31/2023 Spouse/Significant Other   Sig: Take 1 tablet (420 mg) by mouth 2 times a day.   midodrine (Proamatine) 5 mg tablet 12/31/2023    Sig: TAKE 1 TABLET BY MOUTH THREE TIMES A DAY   ondansetron (Zofran) 4 mg tablet 12/31/2023 Spouse/Significant Other   Sig: Take 1 tablet (4 mg) by mouth every 8 hours if needed for nausea.   pantoprazole (ProtoNix) 40 mg EC tablet 12/31/2023 Spouse/Significant Other   Sig: Take 1 tablet (40 mg) by mouth once daily.   pregabalin (Lyrica) 200 mg capsule 12/31/2023 Spouse/Significant Other   Sig: Take 1 capsule (200 mg) by mouth 3 times a day.   rosuvastatin (Crestor) 20 mg tablet 12/31/2023 Spouse/Significant Other   Sig: Take 1 tablet (20 mg) by mouth once daily.   vancomycin (Vancocin) 125 mg capsule 12/31/2023    Sig: Take 1 capsule (125 mg) by mouth 2 times a week. Take 1 capsule tomorrow (12/29), then start twice weekly dosing   vibegron (Gemtesa) 75 mg tablet 12/31/2023 Spouse/Significant Other   Sig: Take 1 tablet (75 mg) by mouth once daily.      Facility-Administered Medications: None        The list below reflects the updated allergy list. Please review each documented allergy for additional clarification and justification.  Allergies  Reviewed by Shira Marie RP on 1/2/2024   No Known Allergies          Patient accepts M2B at discharge. Pharmacy has been updated to [facility].    Sources used to complete the med history include     - out patient fill history, OARRS, and patient interview      Below are additional concerns with the patient's PTA list.   - None    Shira Marie, PharmD  PGY-1 Pharmacy Resident  [facility]  Please reach out via [employer] Secure Chat for questions, or if no response call [employer] or [employer] “[facility]”

## 2024-01-03 NOTE — TELEPHONE ENCOUNTER
Spouse called to inform the team that Leonardo is admitted on [facility] 4. She is hoping to speak with Dr. Ozuna if possible.

## 2024-01-03 NOTE — CONSULTS
Nutrition Initial Assessment:   Nutrition Assessment    The patient is a 68 y.o. male who is hospital day #3.  Pt was brought to the ED after unwitnessed syncopal episode at home. MICHELINE drain, with significant bloody output saturating patients couch at home prior to syncopal episode. On admission patient found to have AHRF 2/2 multifocal pna -> started on abx. Given decreased size of fluid collection with appropriate positioning of the pigtail catheter, and the patient's decreasing WBC and afebrile, IR has determined the collection is not amenable to drain re-placement at this time 1/2.      Throughout admission pt with significant N/V. Pt with >1L emesis on 01/03.     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 States they cannot give him fluids due to his heart. Pt has a hx of being TPN dependent but he had issues with N/V which was thought to be due to the lipids in the formulation. Pt's wife is interested in re-initiation of TPN. Saw pt in his room too (family present and provided information). Pt unable to tolerate foods or liquids this admission. Reports drinking some pop today which came back up. Last meal that he might have tolerated was on 12/30 (~4 days ago) though problems with N/V have been going on for about 1 week. Problems with nausea have been recurrent since his surgery on August  "2023. Pt has seen outpatient RDN but reports they never got to the point of increasing overall PO intake due to it not being the right time for him to try.  Additional Food and Nutrient Administration History: Last note from outpatient RDN is from 12/18 - pt with improved N/V though food would still come up w/ coughing burping. Issues with diarrhea. IVF started 12/17/23. Pt reported intake of minestronne and chilli, breaded mushroom, cheese sticks, and 1 ONS. Still skipping lunch and dinner.    Food Allergies/Intolerances:  None  GI Symptoms: Nausea and Vomiting  Oral Problems: None         Anthropometrics:  Start of admission anthropometrics:  Height: 173.7 cm (5' 8.4\")  Weight: 56.2 kg (124 lb)  BMI (Calculated): 18.64    IBW/kg (Dietitian Calculated): 71.4 kg  Percent of IBW: 79 %       Wt hx prior to admission    12/31/23 56.2 kg (124 lb) - stated    12/27/23 58.1 kg (128 lb 1.4 oz)   12/11/23 57.2 kg (126 lb 3.2 oz)   11/24/23 56.5 kg (124 lb 9.6 oz)   10/26/23 59.4 kg (130 lb 15.3 oz)   07/03/23 70.5 kg (155 lb 6 oz)   06/29/23 70.5 kg (155 lb 6.8 oz)   03/15/23 68.8 kg (151 lb 10.8 oz)   02/27/23 69.2 kg (152 lb 8.9 oz)   01/25/23 64.7 kg (142 lb 10.2 oz)   12/22/22 66.4 kg (146 lb 6.2 oz)     Weight Change %:  Weight History / % Weight Change: Pt reports that his lowest wt after surgery in August 2023 was 115# and that he has not been able to gain that back. Pt with about 19% wt loss since July 2023.  Significant Weight Loss: Yes  Interpretation of Weight Loss: >10% in 6 months    Nutrition Focused Physical Exam Findings:  defer: pt's comfort. Visually pt did appear thinner than last admission. During admission in 11/2023 pt noted to have moderate to severe losses.     Edema:  Edema: none  Physical Findings:  Skin: Negative    Objective Data:    Last BM Date: 01/01/24    Nutrition Significant Labs:    Results from last 7 days   Lab Units 01/03/24  0609 01/02/24  0602 01/01/24  0511   HEMOGLOBIN g/dL 8.4* " 7.2* 7.2*   MCV fL 86 86 84   GLUCOSE mg/dL 91 85 91   POTASSIUM mmol/L 3.9 3.6 3.3*   SODIUM mmol/L 139 140 140   PHOSPHORUS mg/dL 4.2 3.4 3.8   MAGNESIUM mg/dL 1.79 1.95 1.99   CREATININE mg/dL 0.93 1.10 0.86   BUN mg/dL 7 7 10   ALT U/L  --  6* 7*   AST U/L  --  15 13   ALK PHOS U/L  --  191* 181*       Nutrition Specific Medications:  cyanocobalamin, 1,000 mcg, oral, Daily  levothyroxine, 125 mcg, oral, Daily  magnesium oxide, 400 mg, oral, BID  pantoprazole, 40 mg, oral, Daily  piperacillin-tazobactam, 4.5 g, intravenous, q6h  pregabalin, 200 mg, oral, TID  rosuvastatin, 20 mg, oral, Daily  scopolamine, 1 patch, transdermal, q72h  vancomycin, 125 mg, oral, Every other day    I/O:   I/O last 2 completed shifts:  In: 250 (4.4 mL/kg) [P.O.:150; IV Piggyback:100]  Out: 1450 (25.8 mL/kg) [Emesis/NG output:1450]  Weight: 56.2 kg     Dietary Orders (From admission, onward)       Start     Ordered    01/02/24 1649  Adult diet Regular; Thin 0  Diet effective now        Question Answer Comment   Diet type Regular    Fluid consistency Thin 0        01/02/24 1648                     Estimated Needs:   Total Energy Estimated Needs (kCal): 1850 kCal  Method for Estimating Needs: 33 kcal/kg * admit wt    Total Protein Estimated Needs (g): 85 g  Method for Estimating Needs: 1.5 g/kg * admit wt    Method for Estimating Needs: per team          Nutrition Diagnosis   Malnutrition Diagnosis  Patient has Malnutrition Diagnosis: Yes  Diagnosis Status: New  Malnutrition Diagnosis: Severe malnutrition related to chronic disease or condition  As Evidenced by: >10% wt loss in 6 months; moderate to severe muscle and adipose tissue losses; PO intake meeting <75% of nutr needs for >1 month  Additional Assessment Information: likely acute component too given severe N/V in the past week.    Nutrition Diagnosis  Patient has Nutrition Diagnosis: Yes  Diagnosis Status (1): New  Nutrition Diagnosis 1: Altered GI function  Related to (1): altered  GI structure  As Evidenced by (1): s/p CRS (gall bladder, spleen, appendix removed, and R hemicolectomy w/ partial sigmoid resection w/ colorectal anastomis) and HIPEC       Nutrition Interventions/Recommendations     Given severe N/V with PO intake strongly suggest initiation of nutrition support. Consider initiating TPN vs. post pyloric tube feeding. Recs for both can be found below:  Start pt on 100 mg IV thiamine x5-7 days given pt at risk for refeeding  Ensure all lytes are wnl before nutrition support initiation     -> For TPN recommend the following continuous regimen:  Day 1: Initiate Standard TPN 5% Amino Acids, 15% Dextrose @ 30 mL/hr  Day 2: If BS <180 mg/dL and no major shifts in lytes occur, increase to 50 mL/hr  Day 3: If BS <180 mg/dL and no major shifts in lytes occur, increase to GOAL of 70 mL/hr    Include MVI + trace elements   Provide 20% SMOF lipids @ 21 mL/hr x 12 hours overnight (250 mL total)    TPN + lipids to provide daily: 1930 mL volume (AA/Dex & lipids), 1693 kcals, 84 g protein, 252 g dextrose, 30% kcal from fat (meeting 92% kcal & 100% protein needs)      TPN monitoring:      - daily weights      - RFP + Mg daily; replete lytes PRN      - LFTs + TGs weekly      - accuchecks q6h    --> For tube feeding, recommend the following continuous regimen:  Start  [employer] Peptide 1.5  at 15 ml/hr and increase by 10 ml/hr q12h until goal rate of 55 ml/hr  Run TF 22 hrs - hold 1 pre/post levothyroxine administration   Check RFP +Mg ; if lytes are low then replete prior to advancing TF rate   FWF per team   This regimen provides: 1210 ml, 1861 kcal, 90 g protein, 847 ml free water         Nutrition Prescription:  Individualized Nutrition Prescription Provided for : 1850 kcal and 85g protein via nutrition support        Nutrition Interventions:   Food and/or Nutrient Delivery Interventions  Interventions: Parenteral nutrition/ IV fluids, Enteral intake  Goal: Nutrition support meeting >75% of nutr  needs    Coordination of Nutrition Care by a Nutrition Professional  Collaboration and Referral of Nutrition Care: Collaboration by nutrition professional with other providers    Nutrition Education:   N/a - current plan for nutrition is not determined.      Nutrition Monitoring and Evaluation   Food/Nutrient Related History Monitoring  Monitoring and Evaluation Plan: Enteral and parenteral nutrition intake  Enteral and Parenteral Nutrition Intake: Enteral nutrition intake, Parenteral nutrition intake  Criteria: nutrition support at goal rate    Body Composition/Growth/Weight History  Monitoring and Evaluation Plan: Weight  Weight: Weight change  Criteria: no wt change                 Time Spent/Follow-up Reminder:   Time Spent (min): 60 minutes  Last Date of Nutrition Visit: 01/03/24  Nutrition Follow-Up Needed?: Dietitian to reassess per policy  Follow up Comment: TPN and TF recs given

## 2024-01-03 NOTE — CARE PLAN
The patient's goals for the shift include      The clinical goals for the shift include pt will remain free from injury this shift 1/3/23 1900      Problem: Pain  Goal: Turns in bed with improved pain control throughout the shift  Outcome: Progressing     Problem: Pain  Goal: Takes deep breaths with improved pain control throughout the shift  Outcome: Progressing     Patient remained safe and free from injury this shift. Patient on continuous fluids now, normal saline. Holding off on tpn and lipids for today to see if patient can tolerate small meals pre medicated with zofran. Meds switched as patient is unable to tolerate much PO at this time. Patient receiving zosyn q6. No complaints of pain. Vss.

## 2024-01-03 NOTE — PROGRESS NOTES
"Leonardo Wilder is a 68 y.o. male on day 3 of admission presenting with Syncope and collapse.    Subjective   NAOE. Patient not tolerating any PO intake with no improvement with antiemetics. Says he hasn't had any diarrhea since 1/1. Denies any worsening sob, f/c, abdominal pain and dizziness. Patient complaining of overactive bladder says he can't hold urine in and has had multiple accidents throughout the day.        Objective   Physical Exam  Constitutional:       Appearance: Normal appearance.      Comments: Malnourished appearing.    HENT:      Head: Normocephalic and atraumatic.   Eyes:      Extraocular Movements: Extraocular movements intact.      Pupils: Pupils are equal, round, and reactive to light.   Cardiovascular:      Rate and Rhythm: Normal rate and regular rhythm.      Pulses: Normal pulses.      Heart sounds: Normal heart sounds.   Pulmonary:      Effort: Pulmonary effort is normal.      Breath sounds: Normal breath sounds.   Abdominal:      General: Abdomen is flat. Bowel sounds are normal. There is no distension.      Palpations: Abdomen is soft.      Tenderness: There is no abdominal tenderness.      Comments: Healed surgical scars, midline abdomen and RLQ. MICHELINE drain in LLQ removed  Musculoskeletal:      Right lower leg: No edema.      Left lower leg: No edema.   Skin:     General: Skin is warm.   Neurological:      General: No focal deficit present.      Mental Status: He is alert and oriented to person, place, and time.      Cranial Nerves: No cranial nerve deficit.      Comments: UE 5/5 LE 5/5     Last Recorded Vitals  Blood pressure 138/76, pulse 75, temperature 36.3 °C (97.3 °F), resp. rate 16, height 1.737 m (5' 8.4\"), weight 56.2 kg (124 lb), SpO2 100 %.  Intake/Output last 3 Shifts:  I/O last 3 completed shifts:  In: 450 (8 mL/kg) [P.O.:300; IV Piggyback:150]  Out: 1700 (30.2 mL/kg) [Urine:250 (0.1 mL/kg/hr); Emesis/NG output:1450]  Weight: 56.2 kg     Relevant Results    Current " Facility-Administered Medications:     Adult Clinimix Parenteral Nutrition, 30 mL/hr, intravenous, Continuous, Guy Parham MD    [Held by provider] apixaban (Eliquis) tablet 5 mg, 5 mg, oral, BID, Guy Parham MD    aspirin EC tablet 81 mg, 81 mg, oral, Daily, Guy Parham MD, 81 mg at 01/03/24 0825    baclofen (Lioresal) tablet 5 mg, 5 mg, oral, BID, Guy Parham MD, 5 mg at 01/03/24 0900    cyanocobalamin (Vitamin B-12) tablet 1,000 mcg, 1,000 mcg, oral, Daily, Guy Parham MD, 1,000 mcg at 01/03/24 0825    dicyclomine (Bentyl) capsule 10 mg, 10 mg, oral, 4x daily PRN, Guy Parham MD    DULoxetine (Cymbalta) DR capsule 30 mg, 30 mg, oral, q AM, Guy Parham MD, 30 mg at 01/03/24 0825    DULoxetine (Cymbalta) DR capsule 60 mg, 60 mg, oral, Nightly, Guy Parham MD, 60 mg at 01/02/24 2022    enoxaparin (Lovenox) syringe 40 mg, 40 mg, subcutaneous, Daily, Guy Parham MD, 40 mg at 01/03/24 0825    fat emulsion fish oil/plant based (SMOFlipid) 20 % IV infusion 4.2 g, 21 mL, intravenous, Cyclic TPN, Guy Parham MD    finasteride (Proscar) tablet 5 mg, 5 mg, oral, Daily, Guy Parham MD, 5 mg at 01/03/24 0825    iohexol (OMNIPaque) 12 mg iodine/mL oral contrast 500 mL, 500 mL, oral, Once in imaging, Guy Parham MD    lactated Ringer's infusion, 100 mL/hr, intravenous, Continuous, Guy Parham MD, Last Rate: 100 mL/hr at 01/03/24 1115, 100 mL/hr at 01/03/24 1115    levothyroxine (Synthroid, Levoxyl) tablet 125 mcg, 125 mcg, oral, Daily, Guy Parham MD, 125 mcg at 01/03/24 0609    loperamide (Imodium) capsule 2 mg, 2 mg, oral, 4x daily PRN, Abdirashid Reveles MD    LORazepam (Ativan) tablet 0.5 mg, 0.5 mg, oral, BID PRN, Guy Parham MD    magnesium oxide (Mag-Ox) tablet 400 mg, 400 mg, oral, BID, Guy Parham MD, 400 mg at 01/03/24 0825    midodrine (Proamatine) tablet 5 mg, 5 mg, oral, TID, Guy Parham MD, 5 mg at 01/03/24 0825    OLANZapine (ZyPREXA) tablet 5 mg, 5 mg, oral, Nightly, Guy Parham MD    pantoprazole (ProtoNix) EC  tablet 40 mg, 40 mg, oral, Daily, Guy Parham MD, 40 mg at 01/03/24 0609    piperacillin-tazobactam-dextrose (Zosyn) IV 4.5 g, 4.5 g, intravenous, q6h, Guy Parham MD    pregabalin (Lyrica) capsule 200 mg, 200 mg, oral, TID, Guy Parham MD, 200 mg at 01/03/24 0825    rosuvastatin (Crestor) tablet 20 mg, 20 mg, oral, Daily, Guy Parham MD, 20 mg at 01/02/24 2022    scopolamine (Transderm-Scop) patch 1 patch, 1 patch, transdermal, q72h, Guy Parham MD, 1 patch at 01/01/24 1554    sodium chloride (Ocean) 0.65 % nasal spray 1 spray, 1 spray, Each Nostril, 4x daily PRN, Guy Parham MD    sulfur hexafluoride microsphr (Lumason) injection 24.28 mg, 2 mL, intravenous, Once in imaging, Guy Parham MD    tamsulosin (Flomax) 24 hr capsule 0.4 mg, 0.4 mg, oral, Nightly, Abdirashid Reveles MD    trimethobenzamide (Tigan) injection 200 mg, 200 mg, intramuscular, q6h PRN, Guy Parham MD, 200 mg at 01/01/24 1709    vancomycin (Vancocin) capsule 125 mg, 125 mg, oral, Every other day, Guy Parham MD, 125 mg at 01/03/24 0825    Results for orders placed or performed during the hospital encounter of 12/31/23 (from the past 24 hour(s))   Transthoracic Echo (TTE) Complete   Result Value Ref Range    AV pk margie 1.14     LVOT diam 1.70     LV biplane EF 36     MV avg E/e' ratio 8.54     MV E/A ratio 0.63     LA vol index A/L 26.9     RV free wall pk S' 6.00     LVIDd 5.25     Aortic Valve Area by Continuity of Peak Velocity 1.51     AV pk grad 5.2     LV A4C EF 30.3    CBC and Auto Differential   Result Value Ref Range    WBC 15.0 (H) 4.4 - 11.3 x10*3/uL    nRBC 0.1 (H) 0.0 - 0.0 /100 WBCs    RBC 2.99 (L) 4.50 - 5.90 x10*6/uL    Hemoglobin 8.4 (L) 13.5 - 17.5 g/dL    Hematocrit 25.8 (L) 41.0 - 52.0 %    MCV 86 80 - 100 fL    MCH 28.1 26.0 - 34.0 pg    MCHC 32.6 32.0 - 36.0 g/dL    RDW 18.5 (H) 11.5 - 14.5 %    Platelets 424 150 - 450 x10*3/uL    Neutrophils % 74.8 40.0 - 80.0 %    Immature Granulocytes %, Automated 0.5 0.0 - 0.9 %     Lymphocytes % 17.4 13.0 - 44.0 %    Monocytes % 6.3 2.0 - 10.0 %    Eosinophils % 0.7 0.0 - 6.0 %    Basophils % 0.3 0.0 - 2.0 %    Neutrophils Absolute 11.26 (H) 1.20 - 7.70 x10*3/uL    Immature Granulocytes Absolute, Automated 0.08 0.00 - 0.70 x10*3/uL    Lymphocytes Absolute 2.62 1.20 - 4.80 x10*3/uL    Monocytes Absolute 0.94 0.10 - 1.00 x10*3/uL    Eosinophils Absolute 0.10 0.00 - 0.70 x10*3/uL    Basophils Absolute 0.04 0.00 - 0.10 x10*3/uL   Renal Function Panel   Result Value Ref Range    Glucose 91 74 - 99 mg/dL    Sodium 139 136 - 145 mmol/L    Potassium 3.9 3.5 - 5.3 mmol/L    Chloride 103 98 - 107 mmol/L    Bicarbonate 30 21 - 32 mmol/L    Anion Gap 10 10 - 20 mmol/L    Urea Nitrogen 7 6 - 23 mg/dL    Creatinine 0.93 0.50 - 1.30 mg/dL    eGFR 90 >60 mL/min/1.73m*2    Calcium 8.3 (L) 8.6 - 10.6 mg/dL    Phosphorus 4.2 2.5 - 4.9 mg/dL    Albumin 2.5 (L) 3.4 - 5.0 g/dL   Magnesium   Result Value Ref Range    Magnesium 1.79 1.60 - 2.40 mg/dL           Malnutrition Diagnosis Status: New  Malnutrition Diagnosis: Severe malnutrition related to chronic disease or condition  As Evidenced by: >10% wt loss in 6 months; moderate to severe muscle and adipose tissue losses; PO intake meeting <75% of nutr needs for >1 month  I agree with the dietitian's malnutrition diagnosis.      Assessment/Plan   Principal Problem:    Syncope and collapse    Leonardo Wilder is a 68 y.o. 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 Patient was brought in by EMS after an unwitnessed syncopal episode at home, now being admitted for syncopal work up likely due  to orthostatic hypotension 2/2 acute blood loss anemia and hypovolemia from diarrhea and poor PO intake vs unlikely cardiogenic etiology. EKG in the ED, showed no concerns for ischemic changes, and LBBB seen previously. Will repeat echo to complete workup.  MICHELINE drain, with significant bloody output saturating patients couch at home prior to syncopal episode. On admission patient found to have AHRF 2/2 multifocal pna seen on imaging, Hgb 6.4 s/p 1u pRBCs, infectious workup pending and currently on vanc/zosyn and azithro. CTAP concerning for blood product collection but no c/f active bleed. Surgical oncology consulted in the ED for management of MICHELINE drain removed 1/1. Given decreased size of fluid collection with appropriate positioning of the pigtail catheter, and the patient's decreasing WBC and afebrile, IR has determined the collection is not amenable to drain re-placement at this time 1/2.      Update 1/3:  - Doxycycline (1/2- 1/3) unable to tolerate PO   - Zosyn (1/3- through **)  - Echo EF: 35%, global hypokinesis of the left ventricle with minor regional variations. There is significant intraventricular dyssynchrony.   - Heart Failure consult  - GI consult for intractable n/v without improvement with antiemetics concerns for gastric motility disorder.   - Nutrition consult, for initiating TPN.  - LR 100cc over 10 hours   - Discontinued oxybutynin, started patient on Flomax for overactive bladder.   - CT abdomen with PO contrast, no concerns for obstruction 1/2  - Repeat EKG to reassess QTC       #Syncopal episode due to orthostatic hypotension vs cardiogenic etiology unlikely  #HFmrEF 35- 45% (7/23)   #HLD  ::Suspect related to hypovolemia from diarrhea, poor p.o. intake, chronic nausea and vomiting, along with acute blood loss anemia  ::Not on GDMT  ::EF: 35%, global hypokinesis of the left ventricle with minor regional variations. There is significant intraventricular dyssynchrony. 1/3  Plan:  - Resumed home  midodrine 5 mg TID, ASA 81 mg and rosuvastatin 20 mg OD  - Orthostatic positive in the ED, s/p 1L LR  - Repeat orthostatics on admission positive s/p 1L LR will cautiously bolus as needed given HFmrEF 35-40%  - EKG without signs of arrhythmia, continue telemetry  - Echo pending   - HF consult 1/3     #Acute blood loss anemia, bleeding from MICHELINE drain removed by surg onc 1/1  ::CT abdomen pelvis showing resolving fluid collection with MICHELINE drain still in position with blood products, no signs of active bleeding  ::Status post 1 unit of PRBCs in the ED 12/31  ::loculated peritoneal fluid s/p MICHELINE drain by IR at [facility] on 12/05/23     Plan:  - Will continue to Hold Eliquis  - Maintain Active T & S  - Pm CBC  - Surgical oncology recs: recommending NPO 1/3  - IR recs, Given decreased size of fluid collection with appropriate positioning of the pigtail catheter, and the patient's decreasing WBC and afebrile, IR has determined the collection is not amenable to drain re-placement at this time.      #Multifocal Pneumonia, concern for possible aspiration and HAP  #Hypoxemic respiratory failure  #Leukocytosis  ::Received one dose of Zosyn and azithromycin in the ED     Plan:  [ ] Blood cultures x 2 in the ED pending  [ ] Sputum culture pending results  - Will Continue with IV Zosyn and azithromycin, will also add IV vancomycin given recurrent hospitalizations and concern for hospital-acquired pneumonia  - Suspect that patient may be aspirating related to his recurrent nausea and vomiting  - SLP recs: Regular thin liquid diet 1/1  - MRSA nares, Urine strep and Legionella antigen negative 1/1     Antibiotic history:  - IV vanc/azithro/zosyn (12/31- 1/2)  - Doxycycline (1/2- 1/3) unable to tolerate PO  - Zosyn (1/3- through **)        #Diarrhea  #History of C. difficile colitis  ::Currently on p.o. vancomycin taper, every other day, last dose 12/30.      Plan:  - With worsening leukocytosis and reports of worsening diarrhea, will recheck  a C. difficile with reflex FREDY  - Continue current p.o. vancomycin plan for now every other day, end date 1/19/24     Vancomycin PO Taper course:  - 1 cap QID for 1 week (12/8- 12/14 )  - 1 cap BID for 1 week (12/15- 12/21)  - 1 cap once daily for 1 week (12/22- 12/28)  - 1 cap every other day for 3 weeks (12/29-  through 1/19/24)     #Nausea  #Vomiting  ::QTC prolonged on admission 497 12/31 will avoid prolonging meds     Plan:  - Supportive onc recs: scopolamine patch and Bentyl for abdominal spasms due to diarrhea 1/1, olanzapine 5 mg nightly 1/3  - CT abdomen with PO contrast, no concerns for obstruction 1/2     #Appendiceal cancer w/ mets to abdomen  Primary oncologist is Dr. Ozuna, follows with [facility] surgical oncology Dr. Em  - Dr. Ozuna notified about patients admission.      #History of portal vein thrombosis on Eliquis  - Hold Eliquis for now in the setting of acute blood loss anemia with syncopal episode     #Insomnia  #Anxiety  - Resumed home Ativan 0.5 BID PRN     F: PRN  E: PRN  N: Full liquid/ soft diet per surg onc   DVT ppx: Lovenox 40 mg   Full Code, confirmed on admission     NOK: Michelle (wife) [phone]            Guy Parham MD

## 2024-01-03 NOTE — PROGRESS NOTES
Surgical Oncology Daily Progress Note    Subjective  No overnight events.  Feeling okay this morning.  Denies fevers, chills, nausea, vomiting, chest pain, shortness of breath.  Tolerated more liquids and solid foods overnight without N /V  C diff negative 1/1 12/31 blood cultures still NGTD.  No other concerns.    UPDATE 1205: patient states that he is more forgetful and remembers that he did not tolerate food or liquids overnight. Family in room also states that this AM he had small volume emesis episodes after taking in water or pepsi, the contents are the same as liquids ingested (nonbilious). Now NPO.    Current Meds  Scheduled medications  [Held by provider] apixaban, 5 mg, oral, BID  aspirin, 81 mg, oral, Daily  azithromycin, 500 mg, intravenous, q24h  baclofen, 5 mg, oral, BID  cyanocobalamin, 1,000 mcg, oral, Daily  DULoxetine, 30 mg, oral, q AM  DULoxetine, 60 mg, oral, Nightly  enoxaparin, 40 mg, subcutaneous, Daily  finasteride, 5 mg, oral, Daily  iohexol, 500 mL, oral, Once in imaging  levothyroxine, 125 mcg, oral, Daily  magnesium oxide, 400 mg, oral, BID  midodrine, 5 mg, oral, TID  pantoprazole, 40 mg, oral, Daily  piperacillin-tazobactam, 3.375 g, intravenous, q6h  pregabalin, 200 mg, oral, TID  rosuvastatin, 20 mg, oral, Daily  scopolamine, 1 patch, transdermal, q72h  sulfur hexafluoride microsphr, 2 mL, intravenous, Once in imaging  tamsulosin, 0.4 mg, oral, Nightly  vancomycin, 125 mg, oral, Every other day      Objective    Vitals:   Temp:  [36.2 °C (97.2 °F)-36.4 °C (97.5 °F)] 36.3 °C (97.3 °F)  Heart Rate:  [60-83] 75  Resp:  [16] 16  BP: (107-147)/(64-82) 138/76      I/O  I/O last 3 completed shifts:  In: 450 (8 mL/kg) [P.O.:300; IV Piggyback:150]  Out: 1700 (30.2 mL/kg) [Urine:250 (0.1 mL/kg/hr); Emesis/NG output:1450]  Weight: 56.2 kg     Physical Exam  GENERAL APPEARANCE:  AxOx4, generally well-appearing no acute distress. Nontoxic appearing  HEART:  Normal rate and regular rhythm  LUNGS:   Equal chest rise and fall, non-labored breathing  ABDOMEN:  Soft, nontender, nondistended. Interval removal of L abdominal drain, dressing with moderate amount of serosang drainage.  EXTREMITIES:  Without cyanosis, clubbing or edema.  NEUROLOGICAL:  Grossly nonfocal. Alert and oriented, moving all 4 extremities.   Skin:  Warm and dry without any rash.    Labs:  Lab Results   Component Value Date    WBC 15.0 (H) 01/03/2024    HGB 8.4 (L) 01/03/2024    HCT 25.8 (L) 01/03/2024    MCV 86 01/03/2024     01/03/2024     Lab Results   Component Value Date    GLUCOSE 91 01/03/2024    CALCIUM 8.3 (L) 01/03/2024     01/03/2024    K 3.9 01/03/2024    CO2 30 01/03/2024     01/03/2024    BUN 7 01/03/2024    CREATININE 0.93 01/03/2024     Lab Results   Component Value Date    ALT 6 (L) 01/02/2024    AST 15 01/02/2024    GGT 91 (H) 11/06/2023    ALKPHOS 191 (H) 01/02/2024    BILITOT 0.6 01/02/2024     Results from last 7 days   Lab Units 01/01/24  1419   APTT seconds 34   INR  1.4*     Imaging  CT angio chest for pulmonary embolism    Result Date: 12/31/2023  Interpreted By:  Terrance Sharma and Dervishi Mario STUDY: CT ANGIO CHEST FOR PULMONARY EMBOLISM;  12/31/2023 10:36 am   INDICATION: Signs/Symptoms:cancer hx, syncope with new oxygen requirement.   COMPARISON: CT chest: 04/14/2023   ACCESSION NUMBER(S): [id number]   ORDERING CLINICIAN: NATALIE BATEMAN   TECHNIQUE: Helical data acquisition of the chest was obtained after intravenous administration of 80 mL Omnipaque 350, as per PE protocol. Images were reformatted in coronal and sagittal planes. Axial and coronal maximum intensity projection (MIP) images were created and reviewed.   FINDINGS: POTENTIAL LIMITATIONS OF THE STUDY: The assessment is limited by respiratory motion.   HEART AND VESSELS: There are no discrete filling defects within main pulmonary artery and its branches to suggest acute pulmonary embolism. Main pulmonary artery and its branches  are normal in caliber.   The thoracic aorta normal in course and caliber.There is mild scattered atherosclerosis present, including calcified and noncalcified plaques. Moderate coronary artery calcifications are seen. Please note,the study is not optimized for evaluation of coronary arteries.   Mild left ventricular chamber enlargement..There are no findings to suggest right heart strain.   There is no pericardial effusion seen.   MEDIASTINUM AND YESENIA,. LOWER NECK AND AXILLA: The visualized thyroid gland is within normal limits. No evidence of thoracic lymphadenopathy by CT criteria. Esophagus appears within normal limits as seen.   LUNGS AND AIRWAYS: The trachea and central airways are patent. No endobronchial lesion is seen.   There is bilateral right worse than left diffuse consolidative focal consolidative opacities and ground-glass opacities with interlobular septal thickening.   There is no evidence of pleural effusions. No pneumothorax.   UPPER ABDOMEN: The visualized upper abdomen demonstrates stool concentrated at the splenic flexure. The stomach is fluid-filled. There is otherwise no significant abnormality and additional findings are dictated on separate CT of the abdomen and pelvis.       CHEST WALL AND OSSEOUS STRUCTURES: Chest wall is within normal limits. No acute osseous pathology.There are no suspicious osseous lesions.       1. No evidence of acute pulmonary embolism. 2. Bilateral significantly worse in the right compared to the left focal consolidative opacities and ground-glass opacities with interlobular septal thickening. Findings are concerning for multifocal pneumonia. Correlate with a history of recurrent aspiration. The rapid interval change when compared to a study of 08/21/2023 with argue against lymphangitic spread of tumor. Consider correlation with bronchoscopic sampling. 3. Ventricular chamber correlate with left ventricular function.     I personally reviewed the images/study and I  agree with the findings as stated. This study was interpreted at [facility], [place], [place].   MACRO: None   Signed by: Terrance Sharma 12/31/2023 2:26 PM Dictation workstation:   [id number]    CT abdomen pelvis w IV contrast    Result Date: 12/31/2023  Interpreted By:  Tay Moran and Dervishi Mario STUDY: CT ABDOMEN PELVIS W IV CONTRAST;  12/31/2023 10:36 am   INDICATION: Signs/Symptoms:hx appendix cancer with LLQ MICHELINE drain - fall with abdominal pain and bleeding from drain site.   COMPARISON: CT abdomen pelvis: 11/25/2023, 02/23/2023, 02/19/2022, 01/21/2022   ACCESSION NUMBER(S): [id number]   ORDERING CLINICIAN: NATALIE BATEMAN   TECHNIQUE: CT of the abdomen and pelvis was performed.  Standard contiguous axial images were obtained at 3 mm slice thickness through the abdomen and pelvis. Coronal and sagittal reconstructions at 3 mm slice thickness were performed.   80 ml of contrast Omnipaque 350 were administered intravenously without immediate complication.   FINDINGS: LOWER CHEST: Please refer to separate report.   ABDOMEN:   LIVER: The liver is normal in size. There is redemonstration of an unchanged 0.8 cm simple cyst in the caudate lobe.   BILE DUCTS: Infiltrative soft tissue in the falciform ligament and lionel hepatis is similar. There is mild intrahepatic ductal dilatation (left > right), unchanged. The common hepatic duct is not well seen. The common bile duct is normal in caliber.   GALLBLADDER: Status post cholecystectomy.   PANCREAS: The pancreas is unremarkable.   SPLEEN: The spleen is surgically absent.   ADRENAL GLANDS: Thickening of the left adrenal gland is similar to 11/25/2023 but slightly progressed from 02/25/2023. Normal right adrenal gland.   KIDNEYS AND URETERS: There is stable bilateral pelvicaliectasis without hydroureter compared to 11/25/2023; however, this is new compared to 08/21/2023 and is most likely due to involvement of the ureters by  peritoneal carcinomatosis. Both kidneys demonstrate normal contrast enhancement. Stable 3.5 cm benign exophytic cyst arising from the lower pole the left kidney.   PELVIS:   BLADDER: There is re-demonstration of infiltrative peritoneal deposits invading into the anterosuperior wall the urinary bladder (sagittal image 160/122, series 503), similar to prior study. Otherwise, the urinary bladder wall is normal in thickness.   REPRODUCTIVE ORGANS: The prostate gland is normal in size.   VESSELS: Over prior studies dating back to 01/21/2022, there is progressive diffuse narrowing of the left portal vein and its proximal branches due to infiltrative encasing soft tissue in the hepatic hilum and falciform ligament (axial images 36-42). There is mild-moderate narrowing of the right portal vein due to soft tissue encasement similar to 11/25/2023, new from 02/23/2023. The SMV is patent. The splenic vein is ligated but otherwise patent. There is a replaced right hepatic artery arising from the SM A. Moderate aortic atherosclerosis without AAA.   RETROPERITONEUM/LYMPH NODES: There is a new enlarged 1.1 cm gastrohepatic lymph node, previously 0.8 cm. No other enlarged abdominopelvic lymph nodes. No acute retroperitoneal abnormality.     ABDOMINAL WALL/BOWEL/PERITONEUM: Postsurgical changes of right hemicolectomy with ileocolic anastomosis, sigmoid resection and anastomosis. There is redemonstration of mild herniation of bowel loops into right lower quadrant abdominal wall defect without obstruction or strangulation.   Over the course of several recent prior exams, there has been progressively worsening peritoneal carcinomatosis throughout the entire abdomen and pelvis. For example, there is progressively increasing soft tissue infiltration in the lionel hepatis and hepatic hilum, encasing and tethering numerous bowel loops results again multifocal transition points of bearing grade and intermittent dilatation of the bowel loops  due to varying degrees of obstruction. For example, there is a new focally dilated short segment of bowel in the anterior midline lower abdomen measuring 4 cm with fecalization of bowel content and which is 100% narrowed by casing soft tissue (coronal image 36/117, axial image 86/155). This same soft tissue infiltrative process also extends into the abdominal wall and into the dome of the urinary bladder. Also causes extensive tethering of locoregional small bowel loops above the bladder. There is additional thick-walled collapsed small bowel loops in the right lower quadrant which are increasingly involve with carcinomatosis deposit.   There is significant decrease in size of lenticular-shaped, loculated rim enhancing left lower quadrant intraperitoneal fluid collection with internal foci of gas. The MICHELINE drain appropriately terminates within the collection, currently measuring 10.9 cm x 1.5 cm x 10.3 cm (previously 13 cm x 3.9 cm x 12.8 cm). There is a trace amount of blood products in the collection without active bleeding within the collection or abdominal wall.   There is redemonstration of diffuse wall thickening of distal esophagus due to submucosal edema with mucosal hyperenhancement, as well as diffuse gastric rugal fold thickening in the degree of sub mucosal wall thickening in the antral pyloric region.   MUSCULOSKELETAL: No suspicious osseous lesions or acute osseous abnormality. Mild degenerative changes of the thoracolumbar spine without high-grade canal stenosis. There is interval decrease in size of a now 2.4 cm x 1.8 cm rim enhancing collection in the right gluteus wild muscle, previously 4 cm x 5.8 cm, most likely representing a subacute muscle infarct or resolving hematoma. There are no new soft tissue collections.       1. Significant decreased size of loculated rim enhancing left lower quadrant intraperitoneal fluid collection with appropriate termination of drain in the collection. There is a  trace amount of hyperdense material likely representing blood products in the collection without active bleeding into the collection or abdominal wall.   2. Progressively worsening peritoneal carcinomatosis:   A.) Infiltrative soft tissue mass invades the dome of the urinary bladder wall, contiguously invading the abdominal wall musculature and numerous adjacent small bowel loops some intensely causing tethering, narrowing, and worsening short segment high-grade bowel obstruction. There are additional multifocal sites of varying degrees of narrowing and dilatation caused by serosal metastatic disease placing patient future risk of additional bowel obstructions.   b.) Metastatic soft tissue infiltration progressively worsening throughout the falciform ligament, lionel hepatis and hepatic hilum causing progressive now severe narrowing of the entire left portal vein and its branches (previously mildly narrowed on 02/23/2023) and progressive mild-moderate narrowing of the right portal vein (previously normal on 02/23/2023). Is also responsible for the previously described intrahepatic biliary ductal dilatation, similar to prior study.   c.) Progressive now mild-moderate bilateral hydronephrosis, new from 08/21/2023, secondary to direct and/or indirect involvement of the proximal ureters by metastatic disease.   3. Decrease in size of rim enhancing fluid collection in the right gluteus wild muscle likely representing resolving hematoma or muscle infarct.   I personally reviewed the images/study and I agree with the findings as stated by Resident Neptali Brewster MD. This study was interpreted at [facility], [place], [place].   MACRO: Tay Moran discussed the significance and urgency of this critical finding by telephone with  NATALIE BATEMAN on 12/31/2023 at 12:04 pm.  (**-RCF-**) Findings:  See findings.   Signed by: Tay Moran 12/31/2023 12:20 PM Dictation workstation:    [id number]    CT head wo IV contrast    Result Date: 12/31/2023  Interpreted By:  Jason Moreno, STUDY: CT HEAD WO IV CONTRAST;  12/31/2023 10:36 am   INDICATION: Signs/Symptoms:fall, anticoagulation.  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 Syncopal episode with fall. Loss of consciousness.   COMPARISON: 04/14/2023 head CT   ACCESSION NUMBER(S): [id number]   ORDERING CLINICIAN: HILLARY HURT   TECHNIQUE: Noncontrast axial CT scan of head was performed. Angled reformats in brain and bone windows were generated. The images were reviewed in bone, brain, blood and soft tissue windows.   FINDINGS: CSF Spaces: The ventricles and sulci are normal, unchanged. There is no extraaxial fluid collection.   Parenchyma:  The brain parenchyma is normal with no infarct, hemorrhage or mass.   Calvarium: The calvarium is unremarkable.   Paranasal sinuses and mastoids: Visualized paranasal sinuses and mastoids are clear.       Normal brain CT.     MACRO: None   Signed by: Jason Moreno 12/31/2023 11:06 AM Dictation workstation:   [id number]    XR chest 1 view    Result Date: 12/31/2023  STUDY: Chest Radiograph; 12/31/2023 10:08 AM INDICATION: Hypoxia, fall. COMPARISON: XR chest 11/16/2023, 08/22/2023. ACCESSION NUMBER(S): [id number] ORDERING CLINICIAN: Hillary Hurt TECHNIQUE:  Frontal chest was obtained at 10:08:00 hours. FINDINGS: CARDIOMEDIASTINAL SILHOUETTE: Cardiomediastinal silhouette is normal in size and configuration.  LUNGS: Partial clearing of the right lower lobe pneumonia/atelectasis.  New right upper lobe opacity.  ABDOMEN: No remarkable upper abdominal findings.  BONES: No acute osseous changes. A right IJ infusion catheter remains in place extending into the right atrium.    Improving right lower lobe pneumonia. New right upper lobe opacity consistent with pneumonia. Signed by Kannan Woodson MD    Assessment:  Pt is a 68 y.o. 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 Pt has a negative trauma survey. Pt is not currently obstructed.    1/2: Doing okay this morning. NPO at this time, but had been tolerating full liquid diet. Possible drain replacement with IR today.   1/3: Doing well this AM. IR deferred drain placement 1/2. Patient diet resumed to regular diet which he tolerated overnight. No additional changes to plan. UPDATE 1205:  NPO    Plan/Recs  - reengagte IR for drain replacement in future if there is increased size in fluid collection   -agree with abx per primary team  -if patient is again febrile or has worsening abdominal pain, recommend re-imaging  - NPO, advance diet as tolerated    Discussed with Attending Physician Dr. Guillen.    Julia Rojas MD  PGY1 General Surgery  Surgical Oncology  Pager: [phone]

## 2024-01-03 NOTE — PROGRESS NOTES
Physical Therapy    Physical Therapy Treatment    Patient Name: Leonardo Wilder  MRN: [id number]  Today's Date: 1/3/2024  Time Calculation  Start Time: 1408  Stop Time: 1432  Time Calculation (min): 24 min       Assessment/Plan   PT Assessment  End of Session Communication: Bedside nurse  Assessment Comment: Pt tolerated session well.  End of Session Patient Position: Bed, 3 rail up, Alarm on (pt declined to sit in a chair)     PT Plan  Treatment/Interventions: Bed mobility, Transfer training, Gait training, Stair training, Balance training, Strengthening, Endurance training, Therapeutic exercise  PT Plan: Skilled PT  PT Frequency: 3 times per week  PT Discharge Recommendations: Low intensity level of continued care  PT Recommended Transfer Status: Assist x1  PT - OK to Discharge: Yes      General Visit Information:   PT  Visit  PT Received On: 01/03/24  General  Prior to Session Communication: Bedside nurse  Patient Position Received: Bed, 3 rail up, Alarm on  General Comment: supine in bed, willing to participate    Subjective   Precautions:  Precautions  Medical Precautions: Fall precautions, Oxygen therapy device and L/min       Objective   Pain:  Pain Assessment  Pain Assessment: 0-10  Pain Score: 0 - No pain     Treatments:  Balance/Neuromuscular Re-Education  Balance/Neuromuscular Re-Education Activity Performed:  (SBA static stance with walker, CGA dynamic stance with walker)    Bed Mobility  Bed Mobility: Yes  Bed Mobility 1  Bed Mobility 1: Supine to sitting, Sitting to supine  Level of Assistance 1: Close supervision  Bed Mobility Comments 1: HOB elevated  Bed Mobility 2  Bed Mobility  2:  (performed bridging to don/doff brief pre/post mobility)  Level of Assistance 2: Independent    Ambulation/Gait Training  Ambulation/Gait Training Performed: Yes  Ambulation/Gait Training 1  Surface 1: Level tile  Device 1: Rolling walker  Assistance 1: Close supervision  Quality of Gait 1: Decreased step length (decreased  obinna)  Comments/Distance (ft) 1: 300 feet  Transfers  Transfer: Yes  Transfer 1  Technique 1: Sit to stand, Stand to sit  Transfer Device 1: Walker  Transfer Level of Assistance 1: Close supervision  Trials/Comments 1: verbal cues    Outcome Measures:  [facility] Basic Mobility  Turning from your back to your side while in a flat bed without using bedrails: A little  Moving from lying on your back to sitting on the side of a flat bed without using bedrails: A little  Moving to and from bed to chair (including a wheelchair): A little  Standing up from a chair using your arms (e.g. wheelchair or bedside chair): A little  To walk in hospital room: A little  Climbing 3-5 steps with railing: A little  Basic Mobility - Total Score: 18    Education Documentation  Precautions, taught by Triny Lucia, PT at 1/3/2024  2:43 PM.  Learner: Patient  Readiness: Acceptance  Method: Explanation  Response: Verbalizes Understanding    Mobility Training, taught by Triny Lucia, PT at 1/3/2024  2:43 PM.  Learner: Patient  Readiness: Acceptance  Method: Explanation  Response: Verbalizes Understanding    Education Comments  No comments found.           Encounter Problems       Encounter Problems (Active)       Balance       No LOB with transfers/ambulation  (Progressing)       Start:  01/02/24    Expected End:  01/23/24               Mobility       STG - Patient will ambulate 200 feet independent (Progressing)       Start:  01/02/24    Expected End:  01/23/24            STG - Patient will ascend and descend a flight of stairs independent (Progressing)       Start:  01/02/24    Expected End:  01/23/24               Transfers       STG - Patient will perform bed mobility independent (Progressing)       Start:  01/02/24    Expected End:  01/23/24            STG - Patient will transfer sit to and from stand independent (Progressing)       Start:  01/02/24    Expected End:  01/23/24 01/03/24 at 2:45 PM  Triny SHERMAN  Khari, PT

## 2024-01-03 NOTE — CONSULTS
[facility]   [facility] [place]  INITIAL CONSULT NOTE     Source of Information: The source of the history was patient    Consult requested by: Service: Oncology    Reason for Consult: Gastroparesis symptoms    Admission Chief Complaint: Syncopal episode      SUBJECTIVE     HPI: Leonardo Wilder is a 68 y.o. 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 since 12/31/23 for post syncopal episode. GI consulted for evaluation of chronic nausea, vomiting and anorexia.     Briefly, patient initially diagnosed with metastatic appendiceal Ca on 07/2020 after presenting with progressive abdominal fullness, early satiety and weight loss. Given extensive symptomatic peritoneal carcinomatosis, he underwent debulking surgery with HIPEC on 03/2021. He was admitted briefly on 06/2021 with c/f enterocutenaous fistula which was managed with bowel rest and TPN. He has then had several admission in early 2022 for SBO with imaging continues to demonstrate extensive peritoneal carcinomatosis with several areas of SB partial obstruction near the tumor. He was scheduled for another debulking HIPAC surgery 08/2023 but he was admitted prior to that with severe n/v during his bowel prep and was taken again to the OR. In OR, he underwent SYLVIE, gastropexy and ileocolonic bypass but HIPAC was reportedly not done. Post discharge, patient reportedly had worsening symptoms and was on TPN for a brief period. However, he eventually was weaned off and was tolerating PO  nutrition with extensive anti-emetics support. However, his symptoms recurred in the beginning of December and he was hospitalized at [facility] with work up notable for C Diff colitis, recurrent SBO and KEYSHA, Imaging also revealed new intra-abdominal fluid collection with blood cx growing E coli bacteremia. He underwent IR guided MICHELINE drain placement with fluid cx returning negative. Post discharge, he reports no improvement in his PO intake and continues to endorse post prandial n/v and early satiety. On 12/31/23, he noted bloody output from his MICHELINE drain and on the same day, he had a syncopal episode leading to his current hospitalization. Work up has been largely unrevealing, except for known peritoneal metastatic disease. His MICHELINE drain was removed upon admission.     At bedside, patient reports debilitating nature of his symptoms as he is unable to tolerate any PO nutrition. He has emesis with small sips of water and meds. He is currently on Tigan, Scopolamine patch and liquid diet. However, these interventions have not been helpful.     ROS: Complete review of systems obtained, negative unless otherwise indicated above.     No Known Allergies  Past Medical History:   Diagnosis Date    Cancer of appendix (CMS/HCC)     Coronary artery calcification     Hypothyroidism     LBBB (left bundle branch block)     Portal vein external compression      Past Surgical History:   Procedure Laterality Date    ABDOMINAL SURGERY       Family History   Problem Relation Name Age of Onset    Heart disease Father      Squamous cell carcinoma Other       Social History     Social History Narrative    Not on file     [unfilled]    Medications:  Scheduled medications  [Held by provider] apixaban, 5 mg, oral, BID  aspirin, 81 mg, oral, Daily  baclofen, 5 mg, oral, BID  cyanocobalamin, 1,000 mcg, oral, Daily  doxycycline, 100 mg, oral, BID  DULoxetine, 30 mg, oral, q AM  DULoxetine, 60 mg, oral, Nightly  enoxaparin, 40 mg, subcutaneous,  Daily  finasteride, 5 mg, oral, Daily  iohexol, 500 mL, oral, Once in imaging  levothyroxine, 125 mcg, oral, Daily  magnesium oxide, 400 mg, oral, BID  midodrine, 5 mg, oral, TID  pantoprazole, 40 mg, oral, Daily  pregabalin, 200 mg, oral, TID  rosuvastatin, 20 mg, oral, Daily  scopolamine, 1 patch, transdermal, q72h  sulfur hexafluoride microsphr, 2 mL, intravenous, Once in imaging  tamsulosin, 0.4 mg, oral, Nightly  vancomycin, 125 mg, oral, Every other day      Continuous medications     PRN medications  PRN medications: dicyclomine, loperamide, LORazepam, sodium chloride, trimethobenzamide       EXAM     Vital signs:  [unfilled]    Physical Exam  Alert and oriented x3  CTAB  RRR  Abd soft, NT, ND. Dull to percussion  Skin warm and dry        DATA                                                                            Labs     Lab Results   Component Value Date    WBC 15.0 (H) 01/03/2024    WBC 10.2 01/02/2024    WBC 13.3 (H) 01/01/2024    HGB 8.4 (L) 01/03/2024    HGB 7.2 (L) 01/02/2024    HGB 7.2 (L) 01/01/2024    MCV 86 01/03/2024    MCV 86 01/02/2024    MCV 84 01/01/2024     01/03/2024     01/02/2024     01/01/2024       Lab Results   Component Value Date    GLUCOSE 91 01/03/2024    CALCIUM 8.3 (L) 01/03/2024     01/03/2024    K 3.9 01/03/2024    CO2 30 01/03/2024     01/03/2024    BUN 7 01/03/2024    CREATININE 0.93 01/03/2024       Lab Results   Component Value Date    ALT 6 (L) 01/02/2024    ALT 7 (L) 01/01/2024    ALT 6 (L) 12/31/2023    AST 15 01/02/2024    AST 13 01/01/2024    AST 16 12/31/2023    GGT 91 (H) 11/06/2023    GGT 95 (H) 10/30/2023     (H) 10/02/2023    ALKPHOS 191 (H) 01/02/2024    ALKPHOS 181 (H) 01/01/2024    ALKPHOS 257 (H) 12/31/2023    BILITOT 0.6 01/02/2024    BILITOT 1.3 (H) 01/01/2024    BILITOT 0.3 12/31/2023                                                                                  Imaging           === 11/07/17 ===    US  SCROTUM    - Impression -  1. Large left spermatocele measuring up to 8.7 cm in diameter.  2. Dilated rete testis noted in the left testicle.  3. Multiple small right epididymal head cysts.  4. Small right hydrocele.  === 23 ===    CT ABDOMEN AND PELVIS W ORAL CONTRAST ONLY (Preliminary)  This result has not been signed. Information might be incomplete.    - Impression -  Mildly limited examination due to the lack of IV contrast. Within  these limitations, there is overall stable appearance of the  abdomen/pelvis compared to exam dated 2023 includin. Similar appearance of right worse than left focal consolidative  and ground-glass opacities.  2. Diffuse peritoneal carcinomatosis causing infiltration into the  urinary bladder wall and abdominal wall with tethering of adjacent  small bowel loops. There is decreased size and fecalization of a  short-segment small bowel loop in the anterior midline lower abdomen.  There is no evidence of significant bowel dilatation or transition  point to suggest a high-grade obstruction.  3. Stable hydronephrosis on the right with mild improvement on the  left.  4. Similar appearance of a lentiform collection in the left lower  quadrant abdomen with interval removal of the MICHELINE drain.    I personally reviewed the images/study and I agree with the findings  as stated. This study was interpreted at [place], [place].    MACRO:  None.      Dictation workstation:   [id number]                                                                           GI Procedures       ASSESSMENT / PLAN                  Assessment and Recommendations:   Leonardo Wilder is a 68 y.o. 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 GI consulted for evaluation of chronic nausea, vomiting and anorexia.     #Chronic nausea, vomiting  #Peritoneal carcinomatosis  :: We suspect patient's symptoms are likely to known extensive peritoneal carcinomatosis. It is very unlikely he has developed a primary GI motility disorder leading to his current symptoms given onset historically associated with his known metastatic disease. Management of anorexia and chronic n/v related to peritoneal mets is quite challenging given the obstructive process is multifocal and usually 2/2 partial SBO's.  :: From a GI standpoint, management can approached in a stepwise fashion with initial attempt at scheduled anti-emetics pre-meals. If this remains unsuccessful, we could consider enteral nutrition via NG or post pyloric DHT to see if he tolerates any better. If that is unsuccessful, further GI interventions are limited given venting PEG tube is typically palliative and placed in patients with intractable n/v. However, his symptoms appear primarily post prandial.     Recommendations:  - Please order IV Ondansetron 4-8 mg IV thirty minutes before meals TID scheduled. Could also consider alternatives including Phenergan and Compazine. Please check Qtc before ordering meds  - Continue with Scopolamine q72hrs  - If no improvement in aforementioned interventions, consider placing post pyloric DHT  - Consider parenteral nutrition given prolonged duration of anorexia, poor PO and severe protein calorie malnutrition    Staffed with Dr Feng.     LIZ per primary team.   ------------------------------------------------------------------------  Rhonda Omer MD  Gastroenterology Fellow    After 5PM and on Weekends, please page  on-call fellow.    Final recommendations pending attending attestation.

## 2024-01-03 NOTE — CONSULTS
Inpatient consult to Heart Failure  Consult performed by: John Paul Mejía MD  Consult ordered by: Villa Silva MD  Reason for consult: HFmrEF, recommendation on medical management of HF      History Of Present Illness:    Leonardo Wilder is a 68 y.o. male presenting with unwitnessed syncope at home. He has PMHx of HFmrEF (LVEF 35-40% on 7/2023), Stage IV appendiceal CA with metastases to the abdomen, portal vein thrombosis (on Eliquis), s/p neoadjuvant chemotherapy (FOLFOX - completed 12 cycles on 12/2021), 2 prior surgeries, HIPEC x 2 (@ [facility] 02/2021, 08/2023), chronic malnutrition, CAD, LBBB, HLD, hypothyroidism, recent C diff infection, currently on PO Vancomycin, s/p MICHELINE drain (12/05/2023 @ [facility]) for loculated peritoneal fluid.     Patient presented to the ED after an unwitnessed syncopal episode at home. Patient reportedly stood up to go to the bathroom when he suddenly collapsed. He reported losing consciousness. When his wife saw him on the floor, it was noted that there was substantial bleeding coming from MICHELINE drain insertion site. On initial evaluation in the ED, he was found positive for orthostasis for which 1 liter LR was given. EKG showed LBBB (chronic). Troponin was normal (5). WBC elevated at 18.5. Lactate elevated at 2.80. He was started on broad-spectrum antibiotics for multifocal pneumonia and transfused with pRBC for Hgb 6.4.     As part of work-up for orthostatic hypotension/syncope, 2-D echo was obtained and showed LVEF 35%, severely dilated LV, low normal RV systolic function. Heart failure service was consulted for medical management/GDMT for heart failure with reduced EF. At the present, patient denies chest pain, dyspnea, orthopnea, PND, leg swelling. He still had episodes of vomiting yesterday. Her BP range 90-130s/50-80s mmHg while on Midodrine 5 mg tid.      Last Recorded Vitals:  Vitals:    01/02/24 2217 01/03/24 0210 01/03/24 0611 01/03/24 0902   BP: 145/82 116/69 107/64 138/76   BP  Location:  Left arm Left arm Left arm   Patient Position:  Lying Lying Lying   Pulse: 82 80 83 75   Resp: 16 16 16 16   Temp: 36.2 °C (97.2 °F) 36.3 °C (97.3 °F) 36.4 °C (97.5 °F) 36.3 °C (97.3 °F)   TempSrc:  Temporal Temporal    SpO2: 95% 99% 98% 100%   Weight:       Height:         Last Labs:  CBC - 1/3/2024:  6:09 AM  15.0 8.4 424    25.8      CMP - 1/3/2024:  6:09 AM  8.3 5.5 15 --- 0.6   4.2 2.5 6 191      PTT - 1/1/2024:  2:19 PM  1.4   16.2 34     Troponin I, High Sensitivity   Date/Time Value Ref Range Status   12/31/2023 10:37 AM 5 0 - 53 ng/L Final   12/31/2023 09:16 AM 4 0 - 53 ng/L Final     Troponin I   Date/Time Value Ref Range Status   07/31/2023 04:19 PM 36 0 - 53 ng/L Final     Comment:     .  Less than 99th percentile of normal range cutoff-  Female and children under 18 years old <35 ng/L; Male <54 ng/L: Negative  Repeat testing should be performed if clinically indicated.   .  Female and children under 18 years old  ng/L; Male  ng/L:  Consistent with possible cardiac damage and possible increased clinical   risk. Serial measurements may help to assess extent of myocardial damage.   .  >120 ng/L: Consistent with cardiac damage, increased clinical risk and  myocardial infarction. Serial measurements may help assess extent of   myocardial damage.   .   NOTE: Children less than 1 year old may have higher baseline troponin   levels and results should be interpreted in conjunction with the overall   clinical context.  .  NOTE: Troponin I testing is performed using a different   testing methodology at [facility] than at other   [facility] hospitals. Direct result comparisons should only   be made within the same method.     07/31/2023 05:41 AM CANCELED       Comment:     .  Less than 99th percentile of normal range cutoff-  Female and children under 18 years old <35 ng/L; Male <54 ng/L: Negative  Repeat testing should be performed if clinically indicated.   .  Female and children  under 18 years old  ng/L; Male  ng/L:  Consistent with possible cardiac damage and possible increased clinical   risk. Serial measurements may help to assess extent of myocardial damage.   .  >120 ng/L: Consistent with cardiac damage, increased clinical risk and  myocardial infarction. Serial measurements may help assess extent of   myocardial damage.   .   NOTE: Children less than 1 year old may have higher baseline troponin   levels and results should be interpreted in conjunction with the overall   clinical context.  .  NOTE: Troponin I testing is performed using a different   testing methodology at [facility] than at other   [facility]. Direct result comparisons should only   be made within the same method.    Result canceled by the ancillary.     04/14/2023 03:52 PM 7 0 - 53 ng/L Final     Comment:     .  Less than 99th percentile of normal range cutoff-  Female and children under 18 years old <35 ng/L; Male <54 ng/L: Negative  Repeat testing should be performed if clinically indicated.   .  Female and children under 18 years old  ng/L; Male  ng/L:  Consistent with possible cardiac damage and possible increased clinical   risk. Serial measurements may help to assess extent of myocardial damage.   .  >120 ng/L: Consistent with cardiac damage, increased clinical risk and  myocardial infarction. Serial measurements may help assess extent of   myocardial damage.   .   NOTE: Children less than 1 year old may have higher baseline troponin   levels and results should be interpreted in conjunction with the overall   clinical context.  .  NOTE: Troponin I testing is performed using a different   testing methodology at [facility] than at other   [facility]. Direct result comparisons should only   be made within the same method.       BNP   Date/Time Value Ref Range Status   01/01/2024 05:11 AM 45 0 - 99 pg/mL Final     Hemoglobin A1C   Date/Time Value Ref  Range Status   04/06/2021 05:32 AM 5.6 % Final     Comment:          Diagnosis of Diabetes-Adults   Non-Diabetic: < or = 5.6%   Increased risk for developing diabetes: 5.7-6.4%   Diagnostic of diabetes: > or = 6.5%  .       Monitoring of Diabetes                Age (y)     Therapeutic Goal (%)   Adults:          >18           <7.0   Pediatrics:    13-18           <7.5                   7-12           <8.0                   0- 6            7.5-8.5   American Diabetes Association. Diabetes Care 33(S1), Jan 2010.       VLDL   Date/Time Value Ref Range Status   08/09/2020 06:24 AM 17 0 - 40 mg/dL Final      Last I/O:  I/O last 3 completed shifts:  In: 450 (8 mL/kg) [P.O.:300; IV Piggyback:150]  Out: 1700 (30.2 mL/kg) [Urine:250 (0.1 mL/kg/hr); Emesis/NG output:1450]  Weight: 56.2 kg     Past Cardiology Tests (Last 3 Years):  EKG:  ECG 12 Lead 12/31/2023  Sinus rhythm at 96 bpm, LAD, LBBB ( msec)    Echo:  Transthoracic Echo (TTE) Complete 01/02/2024   1. Left ventricular systolic function is moderately decreased with a 35% estimated ejection fraction.   2. There is global hypokinesis of the left ventricle with minor regional variations.   3. There is significant intraventricular dyssynchrony.   4. Spectral Doppler shows an impaired relaxation pattern of left ventricular diastolic filling.   5. Left ventricular cavity size is severely dilated.   6. There is low normal right ventricular systolic function.    Transthoracic Echo (TTE) Complete 07/31/2023  1. Left ventricular systolic function is moderately decreased with a 35-40% estimated ejection fraction.  2. Abnormal septal motion consistent with left bundle branch block.  3. Left ventricular cavity size is moderately dilated.  4. The left atrium is mild to moderately dilated.  5. There is global hypokinesis of the left ventricle with minor regional variations.  6. Compared with study from 12/16/2022, LV seems to be dilated and function is reduced. Previous echo  my assessment LVEF was in low normal.    Ejection Fractions:  EF   Date/Time Value Ref Range Status   01/02/2024 01:15 PM 36       Cardiac Imaging:  No results found for this or any previous visit from the past 1095 days.    Past Medical History:  He has a past medical history of Cancer of appendix (CMS/HCC), Coronary artery calcification, Hypothyroidism, LBBB (left bundle branch block), and Portal vein external compression.    Past Surgical History:  He has a past surgical history that includes Abdominal surgery.      Social History:  He reports that he quit smoking about 21 years ago. His smoking use included cigarettes. He smoked an average of 1 pack per day. He has never used smokeless tobacco. He reports that he does not currently use alcohol. He reports that he does not use drugs.    Family History:  Family History   Problem Relation Name Age of Onset    Heart disease Father      Squamous cell carcinoma Other       Allergies:  Patient has no known allergies.    Inpatient Medications:  Scheduled medications   Medication Dose Route Frequency    [Held by provider] apixaban  5 mg oral BID    aspirin  81 mg oral Daily    azithromycin  500 mg intravenous q24h    baclofen  5 mg oral BID    cyanocobalamin  1,000 mcg oral Daily    DULoxetine  30 mg oral q AM    DULoxetine  60 mg oral Nightly    enoxaparin  40 mg subcutaneous Daily    finasteride  5 mg oral Daily    iohexol  500 mL oral Once in imaging    levothyroxine  125 mcg oral Daily    magnesium oxide  400 mg oral BID    midodrine  5 mg oral TID    pantoprazole  40 mg oral Daily    piperacillin-tazobactam  3.375 g intravenous q6h    pregabalin  200 mg oral TID    rosuvastatin  20 mg oral Daily    scopolamine  1 patch transdermal q72h    sulfur hexafluoride microsphr  2 mL intravenous Once in imaging    tamsulosin  0.4 mg oral Nightly    vancomycin  125 mg oral Every other day     PRN medications   Medication    dicyclomine    loperamide    LORazepam    sodium  chloride    trimethobenzamide     Continuous Medications   Medication Dose Last Rate     Outpatient Medications:  Current Outpatient Medications   Medication Instructions    0.9 % sodium chloride (sodium chloride 0.9%) solution 1,000 mL, intravenous, Daily    acetaminophen (TYLENOL) 650 mg, oral, Every 6 hours PRN    apixaban (Eliquis) 5 mg tablet TAKE 1 TABLET BY MOUTH TWO TIMES A DAY    aspirin 81 mg, oral, Daily    baclofen (LIORESAL) 5 mg, oral, 2 times daily    cyanocobalamin (VITAMIN B-12) 1,000 mcg, oral, Daily    DULoxetine (CYMBALTA) 60 mg, oral, Every morning, Do not crush or chew.    DULoxetine (CYMBALTA) 30 mg, oral, Every morning    finasteride (PROSCAR) 5 mg, oral, Daily, Do not crush, chew, or split.    Gemtesa 75 mg, oral, Daily    inulin (FIBER GUMMIES ORAL) 1 Piece, oral, Daily    Lactobacillus acidophilus (PROBIOTIC ORAL) 1 tablet, oral, Daily    levothyroxine (Synthroid, Levoxyl) 100 mcg tablet 1 tablet, oral, Daily    levothyroxine (SYNTHROID, LEVOXYL) 25 mcg, oral, Daily    loperamide (IMODIUM) 2 mg, oral, 4 times daily PRN    LORazepam (ATIVAN) 0.5 mg, oral, 2 times daily PRN    magnesium oxide (MAG-OX) 420 mg, oral, 2 times daily    midodrine (PROAMATINE) 5 mg, oral, 3 times daily    OLANZapine zydis (ZYPREXA) 5 mg, oral, Nightly    ondansetron (ZOFRAN) 4 mg, oral, Every 8 hours PRN    pantoprazole (PROTONIX) 40 mg, oral, Daily    pregabalin (LYRICA) 200 mg, oral, 3 times daily    rosuvastatin (CRESTOR) 20 mg, oral, Daily    vancomycin (VANCOCIN) 125 mg, oral, 2 times weekly, Take 1 capsule tomorrow (12/29), then start twice weekly dosing     Physical Exam:  Constitutional:       General: Well developed adult without acute distress      Appearance: Normal appearance.   HENT:      Head: Normocephalic and atraumatic. No JVD  Eyes:      Extraocular Movements: Extraocular movements intact.      Conjunctiva/sclera: Conjunctivae normal. Anicteric sclerae  Cardiovascular:      Rate and Rhythm:  "Normal rate and regular rhythm.      Heart sounds: Normal heart sounds. No murmurs or gallups.      Extremities: 2+ pulses in bilateral radial, DP and PT arteries. No edema  Pulmonary:      Effort: Pulmonary effort is normal. No respiratory distress.      Breath sounds: Normal breath sounds bilaterally. No wheezing. Bibasal coarse crackles.  Abdominal:      General: There is no distension. Active bowel sounds.      Palpations: Abdomen is soft and non-tender.  Skin:     General: Skin is warm and dry.  Neurological:      Mental Status: He is alert and oriented to person, place, and time. Mental status is at baseline.   Psychiatric:         Mood and Affect: Mood normal.         Behavior: Behavior normal.       Assessment/Plan   67 y/o Male with HFmrEF (LVEF 35-40% on 7/2023), Stage IV appendiceal CA with metastases to the abdomen, portal vein thrombosis (on Eliquis), s/p neoadjuvant chemotherapy (FOLFOX - completed 12 cycles on 12/2021), 2 prior surgeries, HIPEC x 2 (@ [facility] 02/2021, 08/2023), chronic malnutrition, CAD, LBBB, HLD, hypothyroidism, recent C diff infection, presented after unwitnessed syncopal episode at home. Found to be orthostatic positive likely related to poor oral intake, acute blood loss anemia requiring pRBC transfusion x 1. Ischemic work-up not suggestive of ACS. Serum BNP normal (45) on admit. CT chest negative for PE but notable for focal consolidations on bilateral lobes with ground-glass opacities with interlobular thickening.    Recommendations:  Patient is currently euvolemic, not in acute decompensated state of heart failure. He had prior orthostasis responsive to IV fluid, currently on maintenance midodrine 5 mg po tid.    -No need for diuresis. Of note, patient reports having diarrhea and vomiting recently.   -Not recommending \"GDMT\" such as ARNI, MRA, SGLT2i given history of orthostasis. Risks of GDMT seems to outweigh potential benefits of having multiple maintenance medications that " could lead to more side effects. Patient is also on maintenance Midodrine tid regimen.    -No strong indication for AICD placement. His syncope is clinically consistent with orthostasis.  -Not candidate for advanced therapies such as cardiac transplant/LVAD.   -Patient can follow up with General Cardiology clinic on discharge.     Will sign off. Please call for any questions.    Patient was seen and evaluated with Heart failure attending, Dr. ANGEL Schmitt.     Signed:    John Paul Mejía MD  Heart Failure service

## 2024-01-04 NOTE — PROGRESS NOTES
Gastroenterology Consult Service Progress Note  Department of Gastroenterology & Hepatology  [facility]    [facility]  Date of Service  January 4, 2024   Patient: Leonardo Wilder    Medical Record: [id number]    Interval History: Patient not tolerating PO intake despite scheduled IV anti-emetics.     Physical Exam:  Alert and oriented x3  CTAB  RRR  Abd soft, NT, ND. Dull to percussion  Skin warm and dry    Vital Signs:    Vitals:    01/03/24 2144 01/04/24 0100 01/04/24 0649 01/04/24 0904   BP: 152/76 114/72 106/61 130/71   BP Location:  Left arm Left arm    Patient Position:  Lying Lying    Pulse: 74 87 86 78   Resp: 16 16 18 18   Temp: 36.6 °C (97.9 °F) 36.7 °C (98.1 °F) 36.8 °C (98.2 °F) 36.5 °C (97.7 °F)   TempSrc:  Temporal Temporal    SpO2: 100% 100% 96% 97%   Weight:       Height:             Intake/Output Summary (Last 24 hours) at 1/4/2024 1701  Last data filed at 1/4/2024 1327  Gross per 24 hour   Intake 526.67 ml   Output 1450 ml   Net -923.33 ml           Assessment:     Leonardo Wilder is a 68 y.o. 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 GI consulted for evaluation of chronic nausea, vomiting and anorexia.      #Chronic nausea, vomiting  #Peritoneal carcinomatosis  :: We suspect patient's symptoms are likely to known extensive peritoneal carcinomatosis. It is very unlikely he has developed a primary GI motility disorder leading to his current symptoms given onset historically  associated with his known metastatic disease. Management of anorexia and chronic n/v related to peritoneal mets is quite challenging given the obstructive process is multifocal and usually 2/2 partial SBO's.  :: From a GI standpoint, management can approached in a stepwise fashion with initial attempt at scheduled anti-emetics pre-meals. If this remains unsuccessful, we could consider enteral nutrition via NG or post pyloric DHT to see if he tolerates any better. If that is unsuccessful, further GI interventions are limited given venting PEG tube is typically palliative and placed in patients with intractable n/v. However, his symptoms appear primarily post prandial.     Updates 01/04/24  - Not tolerating PO intake despite IV anti-emetics. Will proceed to enteral nutrition via post pyloric DHT.     Recommendations:  - Please switch to IV Phenergan 4-8 mg IV thirty minutes before meals TID scheduled. Could also consider alternatives including Compazine. Please check Qtc before ordering meds  - Continue with Scopolamine q72hrs  - Consult [facility] nurse for post pyloric DHT placement.  - Consider parenteral nutrition given prolonged duration of anorexia, poor PO and severe protein calorie malnutrition    Staffed with Dr Feng    Thank you for the consultation. Gastroenterology will continue to the follow the patient. Please do not hesitate to contact me or page [phone] if there are any further questions between the weekday hours of 7 AM - 5 PM. If there is an urgent concern during the weekend, after-hours, or holidays; then please [place] [phone]. Thank you.    Rhonda Omer  Gastroenterology and Hepatology Fellow  [id number]

## 2024-01-04 NOTE — PROGRESS NOTES
"Leonardo Wilder is a 68 y.o. male on day 4 of admission presenting with Syncope and collapse.    Subjective   NAOE. Patient still endorsing n/v and not able to tolerate anything PO. Last episode of emesis was yesterday.        Objective     Physical Exam  Constitutional:       Appearance: Normal appearance.      Comments: Malnourished appearing.    HENT:      Head: Normocephalic and atraumatic.   Eyes:      Extraocular Movements: Extraocular movements intact.      Pupils: Pupils are equal, round, and reactive to light.   Cardiovascular:      Rate and Rhythm: Normal rate and regular rhythm.      Pulses: Normal pulses.      Heart sounds: Normal heart sounds.   Pulmonary:      Effort: Pulmonary effort is normal.      Breath sounds: Normal breath sounds.   Abdominal:      General: Abdomen is flat. Bowel sounds are normal. There is no distension.      Palpations: Abdomen is soft.      Tenderness: There is no abdominal tenderness.      Comments: Healed surgical scars, midline abdomen and RLQ. MICHELINE drain in LLQ removed  Musculoskeletal:      Right lower leg: No edema.      Left lower leg: No edema.   Skin:     General: Skin is warm.   Neurological:      General: No focal deficit present.      Mental Status: He is alert and oriented to person, place, and time.      Cranial Nerves: No cranial nerve deficit.      Comments: UE 5/5 LE 5/5     Last Recorded Vitals  Blood pressure 130/71, pulse 78, temperature 36.5 °C (97.7 °F), resp. rate 18, height 1.737 m (5' 8.4\"), weight 56.2 kg (124 lb), SpO2 97 %.  Intake/Output last 3 Shifts:  I/O last 3 completed shifts:  In: 1088.3 (19.3 mL/kg) [P.O.:250; I.V.:688.3 (12.2 mL/kg); IV Piggyback:150]  Out: 2100 (37.3 mL/kg) [Urine:1400 (0.7 mL/kg/hr); Emesis/NG output:700]  Weight: 56.2 kg     Relevant Results    Current Facility-Administered Medications:     [Held by provider] Adult Clinimix Parenteral Nutrition, 30 mL/hr, intravenous, Continuous, Guy Parham MD    [Held by provider] apixaban " (Eliquis) tablet 5 mg, 5 mg, oral, BID, Guy Parham MD    [Held by provider] aspirin EC tablet 81 mg, 81 mg, oral, Daily, Guy Parham MD, 81 mg at 01/03/24 0825    [Held by provider] baclofen (Lioresal) tablet 5 mg, 5 mg, oral, BID, Guy Parham MD, 5 mg at 01/03/24 0900    calcium carbonate (Tums) chewable tablet 500 mg, 500 mg, oral, 4x daily PRN, Abdirashid Reveles MD, 500 mg at 01/03/24 1817    [Held by provider] cyanocobalamin (Vitamin B-12) tablet 1,000 mcg, 1,000 mcg, oral, Daily, Guy Parham MD, 1,000 mcg at 01/03/24 0825    dicyclomine (Bentyl) capsule 10 mg, 10 mg, oral, 4x daily PRN, Guy Parham MD    DULoxetine (Cymbalta) DR capsule 30 mg, 30 mg, oral, q AM, Guy Parham MD, 30 mg at 01/04/24 0921    DULoxetine (Cymbalta) DR capsule 60 mg, 60 mg, oral, Nightly, Guy Parham MD, 60 mg at 01/03/24 2010    enoxaparin (Lovenox) syringe 60 mg, 1 mg/kg, subcutaneous, q12h, Abdirashid Reveles MD, 60 mg at 01/04/24 0928    [Held by provider] fat emulsion fish oil/plant based (SMOFlipid) 20 % IV infusion 4.2 g, 21 mL, intravenous, Cyclic TPN, Guy Parham MD    [Held by provider] finasteride (Proscar) tablet 5 mg, 5 mg, oral, Daily, Guy Parham MD, 5 mg at 01/03/24 0825    levothyroxine (Synthroid, Levoxyl) tablet 125 mcg, 125 mcg, oral, Daily, Guy Parham MD, 125 mcg at 01/04/24 0515    lidocaine PF (Xylocaine) 40 mg/mL (4 %) injection 200 mg, 200 mg, inhalation, Once, Guy Parham MD    loperamide (Imodium) capsule 2 mg, 2 mg, oral, 4x daily PRN, Abdirashid Reveles MD    LORazepam (Ativan) tablet 0.5 mg, 0.5 mg, oral, BID PRN, Guy Parham MD    OLANZapine (ZyPREXA) tablet 5 mg, 5 mg, oral, Nightly, Guy Parham MD, 5 mg at 01/03/24 2010    ondansetron (Zofran) injection 4 mg, 4 mg, intravenous, q6h PRN, Abdirashid Reveles MD, 4 mg at 01/03/24 1718    ondansetron ODT (Zofran-ODT) disintegrating tablet 4 mg, 4 mg, oral, TID AC **OR** ondansetron (Zofran) injection 4 mg, 4 mg, intravenous, TID AC, Abdirashid Reveles MD, 4 mg at  01/04/24 0956    pantoprazole (ProtoNix) EC tablet 40 mg, 40 mg, oral, Daily, Guy Parham MD, 40 mg at 01/04/24 0921    piperacillin-tazobactam-dextrose (Zosyn) IV 4.5 g, 4.5 g, intravenous, q6h, Guy Parham MD, Stopped at 01/04/24 1200    pregabalin (Lyrica) capsule 200 mg, 200 mg, oral, TID, Guy Parham MD, 200 mg at 01/04/24 0921    [Held by provider] rosuvastatin (Crestor) tablet 20 mg, 20 mg, oral, Daily, Guy Parham MD, 20 mg at 01/02/24 2022    scopolamine (Transderm-Scop) patch 1 patch, 1 patch, transdermal, q72h, Guy Parham MD, 1 patch at 01/01/24 1554    sodium chloride (Ocean) 0.65 % nasal spray 1 spray, 1 spray, Each Nostril, 4x daily PRN, Guy Parham MD    tamsulosin (Flomax) 24 hr capsule 0.4 mg, 0.4 mg, oral, Nightly, Abdirashid Reveles MD, 0.4 mg at 01/03/24 2010    trimethobenzamide (Tigan) injection 200 mg, 200 mg, intramuscular, q6h PRN, Abdirashid Reveles MD    vancomycin (Vancocin) capsule 125 mg, 125 mg, oral, Every other day, Guy Parham MD, 125 mg at 01/03/24 0825    Results for orders placed or performed during the hospital encounter of 12/31/23 (from the past 24 hour(s))   ECG 12 lead   Result Value Ref Range    Ventricular Rate 69 BPM    Atrial Rate 69 BPM    MT Interval 188 ms    QRS Duration 152 ms    QT Interval 492 ms    QTC Calculation(Bazett) 527 ms    P Axis 42 degrees    R Axis -37 degrees    T Axis 55 degrees    QRS Count 12 beats    Q Onset 212 ms    P Onset 118 ms    P Offset 173 ms    T Offset 458 ms    QTC Fredericia 515 ms   CBC and Auto Differential   Result Value Ref Range    WBC 13.1 (H) 4.4 - 11.3 x10*3/uL    nRBC 0.2 (H) 0.0 - 0.0 /100 WBCs    RBC 2.67 (L) 4.50 - 5.90 x10*6/uL    Hemoglobin 7.6 (L) 13.5 - 17.5 g/dL    Hematocrit 23.3 (L) 41.0 - 52.0 %    MCV 87 80 - 100 fL    MCH 28.5 26.0 - 34.0 pg    MCHC 32.6 32.0 - 36.0 g/dL    RDW 18.6 (H) 11.5 - 14.5 %    Platelets 399 150 - 450 x10*3/uL    Neutrophils % 60.3 40.0 - 80.0 %    Immature Granulocytes %, Automated 0.5 0.0  - 0.9 %    Lymphocytes % 28.4 13.0 - 44.0 %    Monocytes % 8.7 2.0 - 10.0 %    Eosinophils % 1.6 0.0 - 6.0 %    Basophils % 0.5 0.0 - 2.0 %    Neutrophils Absolute 7.90 (H) 1.20 - 7.70 x10*3/uL    Immature Granulocytes Absolute, Automated 0.06 0.00 - 0.70 x10*3/uL    Lymphocytes Absolute 3.71 1.20 - 4.80 x10*3/uL    Monocytes Absolute 1.14 (H) 0.10 - 1.00 x10*3/uL    Eosinophils Absolute 0.21 0.00 - 0.70 x10*3/uL    Basophils Absolute 0.06 0.00 - 0.10 x10*3/uL   Comprehensive Metabolic Panel   Result Value Ref Range    Glucose 72 (L) 74 - 99 mg/dL    Sodium 137 136 - 145 mmol/L    Potassium 3.5 3.5 - 5.3 mmol/L    Chloride 100 98 - 107 mmol/L    Bicarbonate 28 21 - 32 mmol/L    Anion Gap 13 10 - 20 mmol/L    Urea Nitrogen 8 6 - 23 mg/dL    Creatinine 1.01 0.50 - 1.30 mg/dL    eGFR 82 >60 mL/min/1.73m*2    Calcium 7.8 (L) 8.6 - 10.6 mg/dL    Albumin 2.3 (L) 3.4 - 5.0 g/dL    Alkaline Phosphatase 190 (H) 33 - 136 U/L    Total Protein 5.8 (L) 6.4 - 8.2 g/dL    AST 16 9 - 39 U/L    Bilirubin, Total 0.5 0.0 - 1.2 mg/dL    ALT 7 (L) 10 - 52 U/L   Magnesium   Result Value Ref Range    Magnesium 1.78 1.60 - 2.40 mg/dL   Phosphorus   Result Value Ref Range    Phosphorus 3.7 2.5 - 4.9 mg/dL           Malnutrition Diagnosis Status: New  Malnutrition Diagnosis: Severe malnutrition related to chronic disease or condition  As Evidenced by: >10% wt loss in 6 months; moderate to severe muscle and adipose tissue losses; PO intake meeting <75% of nutr needs for >1 month  I agree with the dietitian's malnutrition diagnosis.      Assessment/Plan   Principal Problem:    Syncope and collapse    Leonardo Wilder is a 68 y.o. male w/ PMHx of HLD, HFmrEF (35-40% in July 2023), C diff (currently on PO vanc taper, every other day) , stage IV appendiceal cancer w/ mets to abdomen c/b portal vein thrombosis (on chronic AC) s/p neoadjuvant chemo w/ FOLFOX, 2 prior sx and 2x HIPEC @ [facility] (Feb/2021 and Aug/2023), c/b chronic malnutrition on outpatient  IVF therapies d/t persistent n/v, recent loculated peritoneal fluid s/p MICHELINE drain by IR at [facility] on 12/05/23. Patient was brought in by EMS after an unwitnessed syncopal episode at home, now being admitted for syncopal work up likely due to orthostatic hypotension 2/2 acute blood loss anemia and hypovolemia from diarrhea and poor PO intake vs unlikely cardiogenic etiology. EKG in the ED, showed no concerns for ischemic changes, and LBBB seen previously. Will repeat echo to complete workup.  MICHELINE drain, with significant bloody output saturating patients couch at home prior to syncopal episode. On admission patient found to have AHRF 2/2 multifocal pna seen on imaging, Hgb 6.4 s/p 1u pRBCs, infectious workup pending and currently on vanc/zosyn and azithro. CTAP concerning for blood product collection but no c/f active bleed. Surgical oncology consulted in the ED for management of MICHELINE drain removed 1/1. Given decreased size of fluid collection with appropriate positioning of the pigtail catheter, and the patient's decreasing WBC and afebrile, IR has determined the collection is not amenable to drain re-placement at this time 1/2.      Update 1/4:  - Heart Failure recs: Not recommending GDMT at this time given history of orthostasis, and on midodrine tid. No strong indication for AICD placement syncope consistent with orthostasis. Not candiate for advanced therapies such as cardiac transplant/LVAD. Outpt follow up with General Cardiology on discharge.   -  GI recs: Schedule IV zofran 4-8 mg IV thirty mins before meals TID scheduled and alternate with other anti emetics. No improvement with antiemetics can consider DHT and paraenteral nutrition.   - Daily EKG Last  1/3  - Monitor for refeeding syndrome      #Syncopal episode due to orthostatic hypotension vs cardiogenic etiology unlikely  #HFmrEF 35- 45% (7/23)   #HLD  ::Suspect related to hypovolemia from diarrhea, poor p.o. intake, chronic nausea and vomiting, along  with acute blood loss anemia  ::Not on GDMT  ::EF: 35%, global hypokinesis of the left ventricle with minor regional variations. There is significant intraventricular dyssynchrony. 1/3  Plan:  - Resumed home midodrine 5 mg TID, ASA 81 mg and rosuvastatin 20 mg OD  - Orthostatic positive in the ED, s/p 1L LR  - Repeat orthostatics on admission positive s/p 1L LR will cautiously bolus as needed given HFmrEF 35-40%  - EKG without signs of arrhythmia, continue telemetry  - Heart Failure recs: Not recommending GDMT at this time given history of orthostasis, and on midodrine tid. No strong indication for AICD placement syncope consistent with orthostasis. Not candiate for advanced therapies such as cardiac transplant/LVAD. Outpt follow up with General Cardiology on discharge. 1/3    #Nausea/vomiting likely 2/2 peritoneal mets   ::QTC prolonged on admission 497 12/31 will avoid prolonging meds     Plan:  - Supportive onc recs: scopolamine patch and Bentyl for abdominal spasms due to diarrhea 1/1, olanzapine 5 mg nightly 1/3  - CT abdomen with PO contrast, no concerns for obstruction 1/2  - GI recs: Schedule IV zofran 4-8 mg IV thirty mins before meals TID scheduled and . No improvement with antiemetics can consider DHT and paraenteral nutrition. 1/3     #Acute blood loss anemia, bleeding from MICHELINE drain removed by surg onc 1/1  ::CT abdomen pelvis showing resolving fluid collection with MICHELINE drain still in position with blood products, no signs of active bleeding  ::Status post 1 unit of PRBCs in the ED 12/31  ::loculated peritoneal fluid s/p MICHELINE drain by IR at [facility] on 12/05/23     Plan:  - Will continue to Hold Eliquis  - Maintain Active T & S  - Pm CBC  - Surgical oncology recs: recommending NPO 1/3  - IR recs, Given decreased size of fluid collection with appropriate positioning of the pigtail catheter, and the patient's decreasing WBC and afebrile, IR has determined the collection is not amenable to drain re-placement at  this time.      #Multifocal Pneumonia, concern for possible aspiration and HAP  #Hypoxemic respiratory failure  #Leukocytosis  ::Received one dose of Zosyn and azithromycin in the ED     Plan:  [ ] Blood cultures x 2 in the ED pending  [ ] Sputum culture pending results  - Will Continue with IV Zosyn and azithromycin, will also add IV vancomycin given recurrent hospitalizations and concern for hospital-acquired pneumonia  - Suspect that patient may be aspirating related to his recurrent nausea and vomiting  - SLP recs: Regular thin liquid diet 1/1  - MRSA nares, Urine strep and Legionella antigen negative 1/1     Antibiotic history:  - IV vanc/azithro/zosyn (12/31- 1/2)  - Doxycycline (1/2- 1/3) unable to tolerate PO  - Zosyn (1/3- through **)        #Diarrhea  #History of C. difficile colitis  ::Currently on p.o. vancomycin taper, every other day, last dose 12/30.      Plan:  - With worsening leukocytosis and reports of worsening diarrhea, will recheck a C. difficile with reflex FREDY  - Continue current p.o. vancomycin plan for now every other day, end date 1/19/24     Vancomycin PO Taper course:  - 1 cap QID for 1 week (12/8- 12/14 )  - 1 cap BID for 1 week (12/15- 12/21)  - 1 cap once daily for 1 week (12/22- 12/28)  - 1 cap every other day for 3 weeks (12/29-  through 1/19/24)      #Appendiceal cancer w/ mets to abdomen  Primary oncologist is Dr. Ozuna, follows with [facility] surgical oncology Dr. Em  - Dr. Ozuna notified about patients admission.      #History of portal vein thrombosis on Eliquis  - Hold Eliquis for now in the setting of acute blood loss anemia with syncopal episode     #Insomnia  #Anxiety  - Resumed home Ativan 0.5 BID PRN     F: PRN  E: PRN  N: Full liquid/ soft diet per surg onc   DVT ppx: Lovenox 40 mg   Full Code, confirmed on admission     NOK: Michelle (wife) [phone]            Guy Parham MD

## 2024-01-04 NOTE — PROGRESS NOTES
Surgical Oncology Daily Progress Note    Subjective  NAOE. HDS. Patient has been vomiting with all PO intake recently. However denies nausea or abdominal pain this morning.     Current Meds  Scheduled medications  [Held by provider] apixaban, 5 mg, oral, BID  [Held by provider] aspirin, 81 mg, oral, Daily  [Held by provider] baclofen, 5 mg, oral, BID  [Held by provider] cyanocobalamin, 1,000 mcg, oral, Daily  DULoxetine, 30 mg, oral, q AM  DULoxetine, 60 mg, oral, Nightly  enoxaparin, 1 mg/kg, subcutaneous, q12h  [Held by provider] finasteride, 5 mg, oral, Daily  levothyroxine, 125 mcg, oral, Daily  lidocaine PF, 200 mg, inhalation, Once  OLANZapine, 5 mg, oral, Nightly  ondansetron ODT, 4 mg, oral, TID AC   Or  ondansetron, 4 mg, intravenous, TID AC  pantoprazole, 40 mg, oral, Daily  piperacillin-tazobactam, 4.5 g, intravenous, q6h  pregabalin, 200 mg, oral, TID  [Held by provider] rosuvastatin, 20 mg, oral, Daily  scopolamine, 1 patch, transdermal, q72h  tamsulosin, 0.4 mg, oral, Nightly  vancomycin, 125 mg, oral, Every other day      Objective    Vitals:   Temp:  [36.5 °C (97.7 °F)-36.8 °C (98.2 °F)] 36.5 °C (97.7 °F)  Heart Rate:  [63-87] 78  Resp:  [16-18] 18  BP: (106-152)/(61-87) 130/71      I/O  I/O last 3 completed shifts:  In: 1088.3 (19.3 mL/kg) [P.O.:250; I.V.:688.3 (12.2 mL/kg); IV Piggyback:150]  Out: 2100 (37.3 mL/kg) [Urine:1400 (0.7 mL/kg/hr); Emesis/NG output:700]  Weight: 56.2 kg     Physical Exam  GENERAL APPEARANCE:  AxOx4, generally well-appearing no acute distress. Nontoxic appearing  HEART:  Normal rate and regular rhythm  LUNGS:  Equal chest rise and fall, non-labored breathing  ABDOMEN:  Soft, nontender, nondistended.  EXTREMITIES:  Without cyanosis, clubbing or edema.  NEUROLOGICAL:  Grossly nonfocal. Alert and oriented, moving all 4 extremities.   Skin:  Warm and dry without any rash.    Labs:  Lab Results   Component Value Date    WBC 13.1 (H) 01/04/2024    HGB 7.6 (L) 01/04/2024    HCT  23.3 (L) 01/04/2024    MCV 87 01/04/2024     01/04/2024     Lab Results   Component Value Date    GLUCOSE 72 (L) 01/04/2024    CALCIUM 7.8 (L) 01/04/2024     01/04/2024    K 3.5 01/04/2024    CO2 28 01/04/2024     01/04/2024    BUN 8 01/04/2024    CREATININE 1.01 01/04/2024     Lab Results   Component Value Date    ALT 7 (L) 01/04/2024    AST 16 01/04/2024    GGT 91 (H) 11/06/2023    ALKPHOS 190 (H) 01/04/2024    BILITOT 0.5 01/04/2024     Results from last 7 days   Lab Units 01/01/24  1419   APTT seconds 34   INR  1.4*       Assessment:  Pt is a 68 y.o. 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 IR determined drain not amenable to re-placement. Patient now with persistent emesis not controlled with antiemetics.     Recommendations:   - Agree with palliative PEG and TPN     Discussed with Attending Physician Dr. Guillen.    Brigida Crawford  PGY1 General Surgery   Surgical Oncology [id number]

## 2024-01-04 NOTE — CARE PLAN
The patient's goals for the shift include      The clinical goals for the shift include patient will remain safe and free from injury this shift 1/4/23 1900      Problem: Pain  Goal: Turns in bed with improved pain control throughout the shift  Outcome: Progressing

## 2024-01-04 NOTE — PROGRESS NOTES
Physical Therapy    Physical Therapy Treatment    Patient Name: Leonardo Wilder  MRN: [id number]  Today's Date: 1/4/2024  Time Calculation  Start Time: 1142  Stop Time: 1159  Time Calculation (min): 17 min       Assessment/Plan   PT Assessment  End of Session Communication: Bedside nurse  Assessment Comment: Pt tolerated session well.  End of Session Patient Position: Bed, 3 rail up, Alarm on     PT Plan  Treatment/Interventions: Bed mobility, Transfer training, Gait training, Stair training, Balance training, Strengthening, Endurance training, Therapeutic exercise  PT Plan: Skilled PT  PT Frequency: 3 times per week  PT Discharge Recommendations: Low intensity level of continued care  PT Recommended Transfer Status: Assist x1  PT - OK to Discharge: Yes      General Visit Information:   PT  Visit  PT Received On: 01/04/24  General  Prior to Session Communication: Bedside nurse  Patient Position Received: Bed, 3 rail up, Alarm on  General Comment: supine in bed, willing to participate    Subjective   Precautions:  Precautions  Medical Precautions: Fall precautions:       Objective   Pain:  Pain Assessment  Pain Assessment: 0-10  Pain Score: 0 - No pain       Treatments:  Therapeutic Exercise  Therapeutic Exercise Performed:  (pt declined to perform ther-ex)    Balance/Neuromuscular Re-Education  Balance/Neuromuscular Re-Education Activity Performed:  (SBA static stance with walker, CGA dynamic stance with walker)    Bed Mobility  Bed Mobility: Yes  Bed Mobility 1  Bed Mobility 1: Supine to sitting, Sitting to supine  Level of Assistance 1: Close supervision  Bed Mobility Comments 1: HOB elevated  Bed Mobility 2  Bed Mobility  2:  (performed bridging to don/doff brief pre/post mobility)  Level of Assistance 2: Independent    Ambulation/Gait Training  Ambulation/Gait Training Performed: Yes  Ambulation/Gait Training 1  Surface 1: Level tile  Device 1: Rolling walker  Assistance 1: Close supervision  Quality of Gait 1:  Decreased step length (decreased obinna; one minor LOB, but able to self-correct)  Comments/Distance (ft) 1: 300 feet  Transfers  Transfer: Yes  Transfer 1  Technique 1: Sit to stand, Stand to sit  Transfer Device 1: Walker  Transfer Level of Assistance 1: Close supervision  Trials/Comments 1: verbal cues    Outcome Measures:  [facility] Basic Mobility  Turning from your back to your side while in a flat bed without using bedrails: None  Moving from lying on your back to sitting on the side of a flat bed without using bedrails: A little  Moving to and from bed to chair (including a wheelchair): A little  Standing up from a chair using your arms (e.g. wheelchair or bedside chair): A little  To walk in hospital room: A little  Climbing 3-5 steps with railing: A little  Basic Mobility - Total Score: 19    Education Documentation  Precautions, taught by Triny Lucia, PT at 1/4/2024 12:08 PM.  Learner: Patient  Readiness: Acceptance  Method: Explanation  Response: Verbalizes Understanding    Mobility Training, taught by Triny Lucia, PT at 1/4/2024 12:08 PM.  Learner: Patient  Readiness: Acceptance  Method: Explanation  Response: Verbalizes Understanding    Education Comments  No comments found.           Encounter Problems       Encounter Problems (Active)       Balance       No LOB with transfers/ambulation  (Progressing)       Start:  01/02/24    Expected End:  01/23/24               Mobility       STG - Patient will ambulate 200 feet independent (Progressing)       Start:  01/02/24    Expected End:  01/23/24            STG - Patient will ascend and descend a flight of stairs independent (Progressing)       Start:  01/02/24    Expected End:  01/23/24               Transfers       STG - Patient will perform bed mobility independent (Progressing)       Start:  01/02/24    Expected End:  01/23/24            STG - Patient will transfer sit to and from stand independent (Progressing)       Start:  01/02/24     Expected End:  01/23/24 01/04/24 at 12:09 PM  Triny Lucia, PT

## 2024-01-04 NOTE — CARE PLAN
The patient's goals for the shift include      The clinical goals for the shift include pt will remain safe and free from injury through shift      Problem: Pain  Goal: Takes deep breaths with improved pain control throughout the shift  Outcome: Progressing  Goal: Turns in bed with improved pain control throughout the shift  Outcome: Progressing

## 2024-01-05 NOTE — PROGRESS NOTES
"Leonardo Wilder is a 68 y.o. male on day 5 of admission presenting with Syncope and collapse.    Subjective   NAOE. DHT was placed yesterday, however it was coiled in the stomach TF were not started. Patient had one delgado, a bite of pizza and some apple sauce yesterday, has not thrown up since. Was also started on Dex yesterday.     This am denies any diarrhea or other symptoms.        Objective     Physical Exam  Constitutional:       Appearance: Normal appearance.      Comments: Malnourished appearing.    HENT:      Head: Normocephalic and atraumatic.   Eyes:      Extraocular Movements: Extraocular movements intact.      Pupils: Pupils are equal, round, and reactive to light.   Cardiovascular:      Rate and Rhythm: Normal rate and regular rhythm.      Pulses: Normal pulses.      Heart sounds: Normal heart sounds.   Pulmonary:      Effort: Pulmonary effort is normal.      Breath sounds: Normal breath sounds.   Abdominal:      General: Abdomen is flat. Bowel sounds are normal. There is no distension.      Palpations: Abdomen is soft.      Tenderness: There is no abdominal tenderness.      Comments: Healed surgical scars, midline abdomen and RLQ. MICHELINE drain in LLQ removed  Musculoskeletal:      Right lower leg: No edema.      Left lower leg: No edema.   Skin:     General: Skin is warm.   Neurological:      General: No focal deficit present.      Mental Status: He is alert and oriented to person, place, and time.      Cranial Nerves: No cranial nerve deficit.      Comments: UE 5/5 LE 5/5     Last Recorded Vitals  Blood pressure 115/66, pulse 79, temperature 36.3 °C (97.3 °F), resp. rate 16, height 1.737 m (5' 8.4\"), weight 56.2 kg (124 lb), SpO2 98 %.  Intake/Output last 3 Shifts:  I/O last 3 completed shifts:  In: 450 (8 mL/kg) [P.O.:100; I.V.:50 (0.9 mL/kg); IV Piggyback:300]  Out: 1450 (25.8 mL/kg) [Urine:1200 (0.6 mL/kg/hr); Emesis/NG output:250]  Weight: 56.2 kg     Relevant Results    Current Facility-Administered " Medications:     [Held by provider] Adult Clinimix Parenteral Nutrition, 30 mL/hr, intravenous, Continuous, Guy Parham MD    [Held by provider] apixaban (Eliquis) tablet 5 mg, 5 mg, oral, BID, Guy Parham MD    [Held by provider] aspirin EC tablet 81 mg, 81 mg, oral, Daily, Guy Parham MD, 81 mg at 01/03/24 0825    [Held by provider] baclofen (Lioresal) tablet 5 mg, 5 mg, oral, BID, Guy Parham MD, 5 mg at 01/03/24 0900    calcium carbonate (Tums) chewable tablet 500 mg, 500 mg, oral, 4x daily PRN, Abdirashid Reveles MD, 500 mg at 01/04/24 1526    [Held by provider] cyanocobalamin (Vitamin B-12) tablet 1,000 mcg, 1,000 mcg, oral, Daily, Guy Parham MD, 1,000 mcg at 01/03/24 0825    dexAMETHasone (Decadron) injection 4 mg, 4 mg, intravenous, q6h, Abdirashid Reveles MD, 4 mg at 01/05/24 1130    dicyclomine (Bentyl) capsule 10 mg, 10 mg, oral, 4x daily PRN, Guy Parham MD    DULoxetine (Cymbalta) DR capsule 30 mg, 30 mg, oral, q AM, Guy Parham MD, 30 mg at 01/05/24 0827    DULoxetine (Cymbalta) DR capsule 60 mg, 60 mg, oral, Nightly, Guy Parham MD, 60 mg at 01/04/24 2159    enoxaparin (Lovenox) syringe 60 mg, 1 mg/kg, subcutaneous, q12h, Abdirashid Reveles MD, 60 mg at 01/05/24 0827    [Held by provider] finasteride (Proscar) tablet 5 mg, 5 mg, oral, Daily, Guy Parham MD, 5 mg at 01/03/24 0825    levothyroxine (Synthroid, Levoxyl) tablet 125 mcg, 125 mcg, oral, Daily, Guy Parham MD, 125 mcg at 01/05/24 0503    lidocaine PF (Xylocaine) 40 mg/mL (4 %) injection 200 mg, 200 mg, inhalation, Once, Guy Parham MD    loperamide (Imodium) capsule 2 mg, 2 mg, oral, 4x daily PRN, Abdirashid Reveles MD    LORazepam (Ativan) tablet 0.5 mg, 0.5 mg, oral, BID PRN, Guy Parham MD    OLANZapine (ZyPREXA) tablet 5 mg, 5 mg, oral, Nightly, Guy Parham MD, 5 mg at 01/04/24 2159    ondansetron (Zofran) injection 4 mg, 4 mg, intravenous, q6h PRN, Abdirashid Reveles MD, 4 mg at 01/03/24 1718    ondansetron (Zofran) injection 4 mg, 4 mg,  intravenous, q8h, Guy Parham MD, 4 mg at 01/05/24 0943    pantoprazole (ProtoNix) EC tablet 40 mg, 40 mg, oral, Daily, Guy Parham MD, 40 mg at 01/05/24 0826    pregabalin (Lyrica) capsule 200 mg, 200 mg, oral, TID, Guy Parham MD, 200 mg at 01/05/24 0826    [Held by provider] rosuvastatin (Crestor) tablet 20 mg, 20 mg, oral, Daily, Guy Parham MD, 20 mg at 01/02/24 2022    scopolamine (Transderm-Scop) patch 1 patch, 1 patch, transdermal, q72h, Guy Parham MD, 1 patch at 01/04/24 2318    sodium chloride (Ocean) 0.65 % nasal spray 1 spray, 1 spray, Each Nostril, 4x daily PRN, Guy Parham MD    tamsulosin (Flomax) 24 hr capsule 0.4 mg, 0.4 mg, oral, Nightly, Abdirashid Reveles MD, 0.4 mg at 01/04/24 2159    trimethobenzamide (Tigan) injection 200 mg, 200 mg, intramuscular, q6h PRN, Abdirashid Reveles MD    vancomycin (Vancocin) capsule 125 mg, 125 mg, oral, Every other day, Guy Parham MD, 125 mg at 01/05/24 0826    Results for orders placed or performed during the hospital encounter of 12/31/23 (from the past 24 hour(s))   POCT GLUCOSE   Result Value Ref Range    POCT Glucose 88 74 - 99 mg/dL   CBC and Auto Differential   Result Value Ref Range    WBC 6.2 4.4 - 11.3 x10*3/uL    nRBC 0.0 0.0 - 0.0 /100 WBCs    RBC 2.77 (L) 4.50 - 5.90 x10*6/uL    Hemoglobin 7.8 (L) 13.5 - 17.5 g/dL    Hematocrit 23.8 (L) 41.0 - 52.0 %    MCV 86 80 - 100 fL    MCH 28.2 26.0 - 34.0 pg    MCHC 32.8 32.0 - 36.0 g/dL    RDW 18.7 (H) 11.5 - 14.5 %    Platelets 442 150 - 450 x10*3/uL    Neutrophils % 64.6 40.0 - 80.0 %    Immature Granulocytes %, Automated 0.8 0.0 - 0.9 %    Lymphocytes % 33.4 13.0 - 44.0 %    Monocytes % 1.0 2.0 - 10.0 %    Eosinophils % 0.0 0.0 - 6.0 %    Basophils % 0.2 0.0 - 2.0 %    Neutrophils Absolute 4.01 1.20 - 7.70 x10*3/uL    Immature Granulocytes Absolute, Automated 0.05 0.00 - 0.70 x10*3/uL    Lymphocytes Absolute 2.07 1.20 - 4.80 x10*3/uL    Monocytes Absolute 0.06 (L) 0.10 - 1.00 x10*3/uL    Eosinophils Absolute  0.00 0.00 - 0.70 x10*3/uL    Basophils Absolute 0.01 0.00 - 0.10 x10*3/uL   Renal Function Panel   Result Value Ref Range    Glucose 111 (H) 74 - 99 mg/dL    Sodium 137 136 - 145 mmol/L    Potassium 4.4 3.5 - 5.3 mmol/L    Chloride 101 98 - 107 mmol/L    Bicarbonate 27 21 - 32 mmol/L    Anion Gap 13 10 - 20 mmol/L    Urea Nitrogen 10 6 - 23 mg/dL    Creatinine 1.12 0.50 - 1.30 mg/dL    eGFR 72 >60 mL/min/1.73m*2    Calcium 8.0 (L) 8.6 - 10.6 mg/dL    Phosphorus 4.4 2.5 - 4.9 mg/dL    Albumin 2.5 (L) 3.4 - 5.0 g/dL   Magnesium   Result Value Ref Range    Magnesium 2.32 1.60 - 2.40 mg/dL   Type and screen   Result Value Ref Range    ABO TYPE O     Rh TYPE POS     ANTIBODY SCREEN NEG    POCT GLUCOSE   Result Value Ref Range    POCT Glucose 107 (H) 74 - 99 mg/dL   POCT GLUCOSE   Result Value Ref Range    POCT Glucose 99 74 - 99 mg/dL         Malnutrition Diagnosis Status: New  Malnutrition Diagnosis: Severe malnutrition related to chronic disease or condition  As Evidenced by: >10% wt loss in 6 months; moderate to severe muscle and adipose tissue losses; PO intake meeting <75% of nutr needs for >1 month  I agree with the dietitian's malnutrition diagnosis.      Assessment/Plan   Principal Problem:    Syncope and collapse    Leonardo Wilder is a 68 y.o. 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 Patient was brought in by EMS after an unwitnessed syncopal episode at home, now being admitted for syncopal work up likely due to orthostatic hypotension 2/2 acute blood loss anemia from MICHELINE drain that saturated couch at home prior to syncopal episode  and hypovolemia from diarrhea and poor PO intake vs unlikely cardiogenic etiology. EKG in the ED, showed no concerns for ischemic changes, and LBBB seen previously. Echo showed EF: 35%, global hypokinesis of the left ventricle with minor regional variations. There is significant intraventricular dyssynchrony 1/3. HF consulted and recommended outpatient followup 1/4. MICHELINE drain with significant bloody output saturating patients couch at home prior to syncopal episode. On admission Hgb 6.4 s/p 1u pRBCs, patient also found to have AHRF 2/2 multifocal pna seen on imaging treated for HAP and now completed 5 day course of antibiotics. CTAP concerning for blood product collection but no c/f active bleed. Surgical oncology consulted in the ED for management of MICHELINE drain removed 1/1. Given decreased size of fluid collection with appropriate positioning of the pigtail catheter, and the patient's decreasing WBC and afebrile, IR has determined the collection is not amenable to drain re-placement at this time 1/2. GI consulted, due to persistent intractable n/v with no improvement with antiemetics. Currently DHT in place, will consider PEG tube placement.      Update 1/5:  - DHT placed yesterday not started on TF due to incorrect positioning will reposition. Possible PEG tube placement on Monday with GI 1/8.  - GI appreciate recs  - Daily EKG Last  today  - Monitor for refeeding syndrome  - Completed abx course for HAP today. Zosyn discontinued.        #Syncopal episode due to orthostatic hypotension vs cardiogenic etiology unlikely  #HFmrEF 35- 45% (7/23)   #HLD  ::Suspect related to hypovolemia from diarrhea, poor p.o. intake, chronic nausea and vomiting, along with acute blood loss anemia  ::Not on GDMT  ::EF: 35%, global hypokinesis of the left ventricle with minor regional variations. There is significant intraventricular dyssynchrony. 1/3  Plan:  - Resumed home midodrine 5 mg TID, ASA 81 mg and rosuvastatin 20 mg OD  -  Orthostatic positive in the ED, s/p 1L LR  - Repeat orthostatics on admission positive s/p 1L LR will cautiously bolus as needed given HFmrEF 35-40%  - EKG without signs of arrhythmia, continue telemetry  - Heart Failure recs: Not recommending GDMT at this time given history of orthostasis, and on midodrine tid. No strong indication for AICD placement syncope consistent with orthostasis. Not candiate for advanced therapies such as cardiac transplant/LVAD. Outpt follow up with General Cardiology on discharge. 1/3     #Nausea/vomiting likely 2/2 peritoneal mets      Plan:  - Supportive onc recs: scopolamine patch and Bentyl for abdominal spasms due to diarrhea 1/1, olanzapine 5 mg nightly 1/3  - CT abdomen with PO contrast, no concerns for obstruction 1/2  - GI recs: Schedule IV zofran 4-8 mg IV thirty mins before meals TID scheduled and . No improvement with antiemetics can consider DHT and paraenteral nutrition. Possible PEG tube placement 1/3     #Acute blood loss anemia, bleeding from MICHELINE drain removed by surg onc 1/1  ::CT abdomen pelvis showing resolving fluid collection with MICHELINE drain still in position with blood products, no signs of active bleeding  ::Status post 1 unit of PRBCs in the ED 12/31  ::loculated peritoneal fluid s/p MICHELINE drain by IR at [facility] on 12/05/23     Plan:  - Will continue to Hold Eliquis  - Maintain Active T & S  - Pm CBC  - Surgical oncology recs: recommending NPO 1/3  - IR recs, Given decreased size of fluid collection with appropriate positioning of the pigtail catheter, and the patient's decreasing WBC and afebrile, IR has determined the collection is not amenable to drain re-placement at this time.      #Multifocal Pneumonia, concern for possible aspiration and HAP  #Hypoxemic respiratory failure  #Leukocytosis  ::Received one dose of Zosyn and azithromycin in the ED     Plan:  - Blood cultures negative 12/31  - C/f Aspiration PNA SLP recs: Regular thin liquid diet 1/1  - MRSA nares, Urine  strep and Legionella antigen negative 1/1     Antibiotic history:  - IV vanc/azithro/zosyn (12/31- 1/2)  - Doxycycline (1/2- 1/3) unable to tolerate PO  - Zosyn (1/3- 1/5)        #Diarrhea  #History of C. difficile colitis  ::Currently on p.o. vancomycin taper, every other day, last dose 12/30.      Plan:  - With worsening leukocytosis and reports of worsening diarrhea, will recheck a C. difficile with reflex FREDY  - Continue current p.o. vancomycin plan for now every other day, end date 1/19/24     Vancomycin PO Taper course:  - 1 cap QID for 1 week (12/8- 12/14 )  - 1 cap BID for 1 week (12/15- 12/21)  - 1 cap once daily for 1 week (12/22- 12/28)  - 1 cap every other day for 3 weeks (12/29-  through 1/19/24)      #Appendiceal cancer w/ mets to abdomen  Primary oncologist is Dr. Ozuna, follows with [facility] surgical oncology Dr. Em  - Dr. Ozuan notified about patients admission.      #History of portal vein thrombosis on Eliquis  - Hold Eliquis for now in the setting of acute blood loss anemia with syncopal episode     #Insomnia  #Anxiety  - Resumed home Ativan 0.5 BID PRN     F: PRN  E: PRN  N: Full liquid/ soft diet per surg onc   DVT ppx: Lovenox 40 mg   Full Code, confirmed on admission     NOK: Michelle (wife) [phone]            Guy Parham MD

## 2024-01-05 NOTE — PROGRESS NOTES
Physical Therapy    Therapy Communication Note    Patient Name: Leonardo Wilder  MRN: [id number]  Today's Date: 1/5/2024     Discipline: Physical Therapy      Missed Visit: Yes  Missed Visit Reason:  (pt waiting to have dobhoff adjusted)      01/05/24 at 2:17 PM   Triny Lucia, PT

## 2024-01-05 NOTE — NURSING NOTE
Called to place cortrak feeding tube.  12 Urdu cortrak feeding tube placed into left nares to 83 cm.  Bridled into place (stylet removed).  Patient tolerated well; no bleeding or adverse reaction noted.  Bedside nurse aware.  Order activated for xray to confirm placement.  Leslee Dooley RN

## 2024-01-05 NOTE — CARE PLAN
Problem: Pain  Goal: Takes deep breaths with improved pain control throughout the shift  Outcome: Progressing  Goal: Turns in bed with improved pain control throughout the shift  Outcome: Progressing     Problem: Pain  Goal: My pain/discomfort is manageable  Outcome: Progressing     Problem: Safety  Goal: Patient will be injury free during hospitalization  Outcome: Progressing  Goal: I will remain free of falls  Outcome: Progressing     Problem: Daily Care  Goal: Daily care needs are met  Outcome: Progressing     Problem: Psychosocial Needs  Goal: Demonstrates ability to cope with hospitalization/illness  Outcome: Progressing  Goal: Collaborate with me, my family, and caregiver to identify my specific goals  Outcome: Progressing     Problem: Discharge Barriers  Goal: My discharge needs are met  Outcome: Progressing     Problem: Skin  Goal: Decreased wound size/increased tissue granulation at next dressing change  Outcome: Progressing  Goal: Participates in plan/prevention/treatment measures  Outcome: Progressing  Goal: Prevent/manage excess moisture  Outcome: Progressing  Goal: Prevent/minimize sheer/friction injuries  Outcome: Progressing  Goal: Promote/optimize nutrition  Outcome: Progressing  Goal: Promote skin healing  Outcome: Progressing      The clinical goals for the shift include pt will tolerate tube feeds throughout shift on 1/4/24 @1900

## 2024-01-05 NOTE — NURSING NOTE
1/5/24 @ 1315    Nutritional support re-consulted due to coiling of feeding tube inserted yesterday. Utilizing Geniusrak technology, the stylet was re-inserted into the existing feeding tube. The bridle was untied and unsnapped, but still in place. RN pulled feeding tube out to approximately 65cms before re-inserted to attempt post-pyloric placement. New tube length is 100cms. MD notified to re-order abdominal x ray to confirm. Tube re-secured using existing nasal bridle. Patient tolerated the procedure with minimal discomfort. Pending new x ray results. MD to place the okay to use order after interpretation of x ray.    Update 1/5/24 @ 2780  Nutritional support nurse contacted due to feeding tube being inserted too far. Dobhoff pulled back to 80cms and re-secured with existing bridle. Patient tolerated well. Bedside nurse and MD notified in case if repeat x ray is required.    Chandler Dimas RN

## 2024-01-05 NOTE — CARE PLAN
The patient's goals for the shift include        Problem: Pain  Goal: Takes deep breaths with improved pain control throughout the shift  Outcome: Progressing  Goal: Turns in bed with improved pain control throughout the shift  Outcome: Progressing     Problem: Pain  Goal: My pain/discomfort is manageable  Outcome: Progressing     Problem: Safety  Goal: Patient will be injury free during hospitalization  Outcome: Progressing  Goal: I will remain free of falls  Outcome: Progressing     Problem: Daily Care  Goal: Daily care needs are met  Outcome: Progressing     Problem: Psychosocial Needs  Goal: Demonstrates ability to cope with hospitalization/illness  Outcome: Progressing  Goal: Collaborate with me, my family, and caregiver to identify my specific goals  Outcome: Progressing     Problem: Discharge Barriers  Goal: My discharge needs are met  Outcome: Progressing     Pt continues to progress towards goals.

## 2024-01-05 NOTE — CARE PLAN
The patient's goals for the shift include        Problem: Pain  Goal: Takes deep breaths with improved pain control throughout the shift  Outcome: Progressing  Goal: Turns in bed with improved pain control throughout the shift  Outcome: Progressing     Problem: Pain  Goal: My pain/discomfort is manageable  Outcome: Progressing     Problem: Safety  Goal: Patient will be injury free during hospitalization  Outcome: Progressing  Goal: I will remain free of falls  Outcome: Progressing     Problem: Daily Care  Goal: Daily care needs are met  Outcome: Progressing     Problem: Psychosocial Needs  Goal: Demonstrates ability to cope with hospitalization/illness  Outcome: Progressing  Goal: Collaborate with me, my family, and caregiver to identify my specific goals  Outcome: Progressing     Problem: Discharge Barriers  Goal: My discharge needs are met  Outcome: Progressing     Problem: Skin  Goal: Decreased wound size/increased tissue granulation at next dressing change  Outcome: Progressing  Goal: Participates in plan/prevention/treatment measures  Outcome: Progressing  Flowsheets (Taken 1/5/2024 0603)  Participates in plan/prevention/treatment measures: Elevate heels  Goal: Prevent/manage excess moisture  Outcome: Progressing  Goal: Prevent/minimize sheer/friction injuries  Outcome: Progressing  Goal: Promote/optimize nutrition  Outcome: Progressing  Goal: Promote skin healing  Outcome: Progressing  Flowsheets (Taken 1/5/2024 0603)  Promote skin healing: Assess skin/pad under line(s)/device(s)     Pt continues to progress towards goals.

## 2024-01-06 NOTE — PROGRESS NOTES
Gastroenterology Consult Service Progress Note  Department of Gastroenterology & Hepatology  [facility]    [facility]  Date of Service  January 5, 2024   Patient: Leonardo Wilder    Medical Record: [id number]    Interval History: Patient started on post pyloric tube feedings this evening. Tolerating well thus far.     Physical Exam:  Alert and oriented x3  CTAB  RRR  Abd soft, NT, ND. Dull to percussion  Skin warm and dry    Vital Signs:    Vitals:    01/05/24 0500 01/05/24 0853 01/05/24 1426 01/05/24 1700   BP: 101/61 115/66 118/67 116/73   BP Location: Left arm Left arm Left arm Left arm   Patient Position: Lying Lying Lying Lying   Pulse: 77 79 94 88   Resp: 16 16 16 16   Temp: 36.4 °C (97.5 °F) 36.3 °C (97.3 °F) 36.6 °C (97.9 °F) 36.7 °C (98.1 °F)   TempSrc: Temporal   Temporal   SpO2: 96% 98% 100% 97%   Weight:       Height:             Intake/Output Summary (Last 24 hours) at 1/5/2024 2024  Last data filed at 1/5/2024 0533  Gross per 24 hour   Intake 200 ml   Output --   Net 200 ml           Assessment:     Leonardo Wilder is a 68 y.o. 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 GI consulted for evaluation of chronic nausea, vomiting and anorexia.      #Chronic nausea, vomiting  #Peritoneal carcinomatosis  :: We suspect patient's symptoms are likely to known extensive peritoneal carcinomatosis. It is very unlikely he has developed a primary GI motility disorder leading to his current symptoms  given onset historically associated with his known metastatic disease. Management of anorexia and chronic n/v related to peritoneal mets is quite challenging given the obstructive process is multifocal and usually 2/2 partial SBO's.  :: From a GI standpoint, management can approached in a stepwise fashion with initial attempt at scheduled anti-emetics pre-meals. If this remains unsuccessful, we could consider enteral nutrition via NG or post pyloric DHT to see if he tolerates any better. If that is unsuccessful, further GI interventions are limited given venting PEG tube is typically palliative and placed in patients with intractable n/v. However, his symptoms appear primarily post prandial.      Updates 01/05/24  - Tolerating minimal PO intake with IV anti-emetics. Started on post pyloric tube feeding and tolerating well thus far without any emesis. Will monitor symptoms as tube feeds are advanced.      Recommendations:  - Please switch to IV Phenergan thirty minutes before meals TID scheduled. Could also consider alternatives including Compazine. Please check Qtc before ordering meds  - Continue with Scopolamine q72hrs  - Advance tube feeds as tolerated. If tolerating tube feeds at goal rate, can consider EGD w/ PEG tube w/ J extension before discharge.     Staffed with Dr Tao.    Thank you for the consultation. Gastroenterology will continue to the follow the patient. Please do not hesitate to contact me or page [phone] if there are any further questions between the weekday hours of 7 AM - 5 PM. If there is an urgent concern during the weekend, after-hours, or holidays; then please page the on-call GI fellow at [phone]. Thank you.    Rhonda Omer  Gastroenterology and Hepatology Fellow  PGY-5

## 2024-01-06 NOTE — CARE PLAN
Problem: Pain  Goal: Takes deep breaths with improved pain control throughout the shift  Outcome: Progressing  Goal: Turns in bed with improved pain control throughout the shift  Outcome: Progressing     Problem: Pain  Goal: My pain/discomfort is manageable  Outcome: Progressing     Problem: Safety  Goal: Patient will be injury free during hospitalization  Outcome: Progressing  Goal: I will remain free of falls  Outcome: Progressing     Problem: Daily Care  Goal: Daily care needs are met  Outcome: Progressing     Problem: Psychosocial Needs  Goal: Demonstrates ability to cope with hospitalization/illness  Outcome: Progressing  Goal: Collaborate with me, my family, and caregiver to identify my specific goals  Outcome: Progressing     Problem: Discharge Barriers  Goal: My discharge needs are met  Outcome: Progressing     Problem: Skin  Goal: Decreased wound size/increased tissue granulation at next dressing change  Outcome: Progressing  Goal: Participates in plan/prevention/treatment measures  Outcome: Progressing  Goal: Prevent/manage excess moisture  Outcome: Progressing  Goal: Prevent/minimize sheer/friction injuries  Outcome: Progressing  Goal: Promote/optimize nutrition  Outcome: Progressing  Goal: Promote skin healing  Outcome: Progressing       The clinical goals for the shift include Pt will continue to tolerate tube feed throughout end of shift on 1/6/24 @1900

## 2024-01-06 NOTE — HOSPITAL COURSE
Leonardo Wilder is a 68 y.o. 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 Patient was brought in by EMS after an unwitnessed syncopal episode at home, now being admitted for syncopal work up likely due to orthostatic hypotension 2/2 acute blood loss anemia from MICHELINE drain that saturated couch at home prior to syncopal episode and hypovolemia from diarrhea and poor PO intake vs unlikely cardiogenic etiology. EKG in the ED, showed no concerns for ischemic changes, and LBBB seen previously. Echo showed EF: 35%, global hypokinesis of the left ventricle with minor regional variations. There is significant intraventricular dyssynchrony 1/3. HF consulted and recommended outpatient followup 1/4. MICHELINE drain with significant bloody output saturating patients couch at home prior to syncopal episode. On admission Hgb 6.4 s/p 1u pRBCs, patient also found to have AHRF 2/2 multifocal pna seen on imaging treated for HAP and now completed 5 day course of antibiotics. CTAP concerning for blood product collection but no c/f active bleed. Surgical oncology consulted in the ED for management of MICHELINE drain removed 1/1. Given decreased size of fluid collection with appropriate positioning of the pigtail catheter, and the patient's decreasing WBC and afebrile, IR has determined the collection is not amenable to drain re-placement at this time 1/2. GI consulted, due to persistent intractable n/v with no improvement with antiemetics, recommended scheduling zofran 4 mg every hours and to give thirty minutes before meals.. Supp onc following for symptom management.     DHT  placed and TF started 1/5, pending how patient does over the weekend and if tolerating will plan for venting PEG tub placement on Monday 1/8.

## 2024-01-06 NOTE — PROGRESS NOTES
Gastroenterology Consult Service Progress Note  Department of Gastroenterology & Hepatology  [facility]    [facility]  Date of Service  January 6, 2024   Patient: Leonardo Wilder    Medical Record: [id number]    Interval History: Patient tolerating tube feeds well with rate advanced to 30 cc/hr at this time. NO emesis over the last 24 hours even with small amount of PO intake    Physical Exam:  Alert and oriented x3  CTAB  RRR  Abd soft, NT, ND. Dull to percussion  Skin warm and dry    Vital Signs:    Vitals:    01/05/24 2104 01/06/24 0029 01/06/24 0459 01/06/24 1004   BP: 131/72 153/79 110/68 117/72   BP Location: Left arm Left arm Left arm Left arm   Patient Position: Lying Lying Lying Lying   Pulse: 82 78 82 80   Resp: 16 16 16 16   Temp: 36.6 °C (97.9 °F) 36.5 °C (97.7 °F) 36.1 °C (97 °F) 36.3 °C (97.3 °F)   TempSrc: Temporal Temporal Temporal    SpO2: 98% 99% 97% 97%   Weight:       Height:             Intake/Output Summary (Last 24 hours) at 1/6/2024 1051  Last data filed at 1/5/2024 2300  Gross per 24 hour   Intake 315 ml   Output 100 ml   Net 215 ml           Assessment:     Leonardo Wilder is a 68 y.o. 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 GI consulted for evaluation of chronic nausea, vomiting and anorexia.      #Chronic nausea, vomiting  #Peritoneal carcinomatosis  :: We suspect patient's symptoms are likely to known extensive peritoneal carcinomatosis. It is very unlikely he has  developed a primary GI motility disorder leading to his current symptoms given onset historically associated with his known metastatic disease. Management of anorexia and chronic n/v related to peritoneal mets is quite challenging given the obstructive process is multifocal and usually 2/2 partial SBO's.  :: From a GI standpoint, management can approached in a stepwise fashion with initial attempt at scheduled anti-emetics pre-meals. If this remains unsuccessful, we could consider enteral nutrition via NG or post pyloric DHT to see if he tolerates any better. If that is unsuccessful, further GI interventions are limited given venting PEG tube is typically palliative and placed in patients with intractable n/v. However, his symptoms appear primarily post prandial.      Updates 01/06/24  - Tolerating minimal PO intake with IV anti-emetics and advancing on post pyloric tube feed rate without any n/v.      Recommendations:  - IV anti-emetics thirty minutes before meals TID scheduled. Could also consider alternatives including Compazine/Phenergan. Please check Qtc before ordering meds  - Continue with Scopolamine q72hrs  - Advance tube feeds as tolerated. If tolerating tube feeds at goal rate, can consider EGD w/ PEG tube w/ J extension before discharge.     Staffed with Dr Tao.    Thank you for the consultation. Gastroenterology will continue to the follow the patient. Please do not hesitate to contact me or page [phone] if there are any further questions between the weekday hours of 7 AM - 5 PM. If there is an urgent concern during the weekend, after-hours, or holidays; then please page the [place] [phone]. Thank you.    Rhonda Omer  Gastroenterology and Hepatology Fellow  PGY-5

## 2024-01-06 NOTE — PROGRESS NOTES
"Leonardo Wilder is a 68 y.o. male on day 6 of admission presenting with Syncope and collapse.    Subjective   No acute event overnight. He's been tolerating to some oral intake (eggs, de souza) and tube feeding that we started last night (currently 25cc/hr). No other complaints.       Objective     Physical Exam  Constitutional:       Appearance: Normal appearance.      Comments: Malnourished appearing.    HENT:      Head: Normocephalic and atraumatic.   Eyes:      Extraocular Movements: Extraocular movements intact.      Pupils: Pupils are equal, round, and reactive to light.   Cardiovascular:      Rate and Rhythm: Normal rate and regular rhythm.      Pulses: Normal pulses.      Heart sounds: Normal heart sounds.   Pulmonary:      Effort: Pulmonary effort is normal.      Breath sounds: Normal breath sounds.   Abdominal:      General: Abdomen is flat. Bowel sounds are normal. There is no distension.      Palpations: Abdomen is soft.      Tenderness: There is no abdominal tenderness.      Comments: Healed surgical scars, midline abdomen and RLQ. MICHELINE drain in LLQ removed  Musculoskeletal:      Right lower leg: No edema.      Left lower leg: No edema.   Skin:     General: Skin is warm.   Neurological:      General: No focal deficit present.      Mental Status: He is alert and oriented to person, place, and time.      Cranial Nerves: No cranial nerve deficit.      Comments: UE 5/5 LE 5/5     Last Recorded Vitals  Blood pressure 122/83, pulse 90, temperature 36.4 °C (97.5 °F), resp. rate 16, height 1.737 m (5' 8.4\"), weight 56.2 kg (124 lb), SpO2 98 %.  Intake/Output last 3 Shifts:  I/O last 3 completed shifts:  In: 515 (9.2 mL/kg) [P.O.:240; NG/GT:75; IV Piggyback:200]  Out: 100 (1.8 mL/kg) [Urine:100 (0 mL/kg/hr)]  Weight: 56.2 kg     Relevant Results    Current Facility-Administered Medications:     [Held by provider] Adult Clinimix Parenteral Nutrition, 30 mL/hr, intravenous, Continuous, Guy Parham MD    [Held by provider] " apixaban (Eliquis) tablet 5 mg, 5 mg, oral, BID, Guy Parham MD    [Held by provider] aspirin EC tablet 81 mg, 81 mg, oral, Daily, Guy Parham MD, 81 mg at 01/03/24 0825    [Held by provider] baclofen (Lioresal) tablet 5 mg, 5 mg, oral, BID, Guy Parham MD, 5 mg at 01/03/24 0900    baclofen (Lioresal) tablet 5 mg, 5 mg, oral, TID PRN, Abdirashid Reveles MD    benzocaine-menthol (Cepastat Sore Throat) 15-3.6 mg lozenge 1 lozenge, 1 lozenge, Mouth/Throat, q2h PRN, Abdirashid Reveles MD, 1 lozenge at 01/06/24 1300    calcium carbonate (Tums) chewable tablet 500 mg, 500 mg, oral, 4x daily PRN, Abdirashid Reveles MD, 500 mg at 01/06/24 1553    [Held by provider] cyanocobalamin (Vitamin B-12) tablet 1,000 mcg, 1,000 mcg, oral, Daily, Guy Parham MD, 1,000 mcg at 01/03/24 0825    dexAMETHasone oral liquid 4 mg, 4 mg, oral, 4x daily, Abdirashid Reveles MD    dicyclomine (Bentyl) capsule 10 mg, 10 mg, oral, 4x daily PRN, Guy Parham MD    DULoxetine (Cymbalta) DR capsule 30 mg, 30 mg, oral, q AM, Guy Parham MD, 30 mg at 01/06/24 0852    DULoxetine (Cymbalta) DR capsule 60 mg, 60 mg, oral, Nightly, Guy Parham MD, 60 mg at 01/05/24 2133    enoxaparin (Lovenox) syringe 60 mg, 1 mg/kg, subcutaneous, q12h, Abdirashid Reveles MD, 60 mg at 01/06/24 0853    finasteride (Proscar) tablet 5 mg, 5 mg, oral, Daily, Guy Parham MD, 5 mg at 01/03/24 0825    levothyroxine (Synthroid, Levoxyl) tablet 125 mcg, 125 mcg, oral, Daily, Guy Parham MD, 125 mcg at 01/06/24 0521    lidocaine PF (Xylocaine) 40 mg/mL (4 %) injection 200 mg, 200 mg, inhalation, Once, Guy Parham MD    loperamide (Imodium) capsule 2 mg, 2 mg, oral, 4x daily PRN, Abdirashid Reveles MD, 2 mg at 01/06/24 1302    LORazepam (Ativan) tablet 0.5 mg, 0.5 mg, oral, BID PRN, Guy Parham MD    OLANZapine (ZyPREXA) tablet 5 mg, 5 mg, oral, Nightly, Guy Parham MD, 5 mg at 01/05/24 5718    ondansetron (Zofran) injection 4 mg, 4 mg, intravenous, q8h, Guy Parham MD, 4 mg at 01/06/24 2148     pantoprazole (ProtoNix) EC tablet 40 mg, 40 mg, oral, BID, Abdirashid Reveles MD    pregabalin (Lyrica) capsule 200 mg, 200 mg, oral, TID, Guy Parham MD, 200 mg at 01/06/24 1545    [Held by provider] rosuvastatin (Crestor) tablet 20 mg, 20 mg, oral, Daily, Guy Parham MD, 20 mg at 01/02/24 2022    scopolamine (Transderm-Scop) patch 1 patch, 1 patch, transdermal, q72h, Guy Parham MD, 1 patch at 01/04/24 2318    sodium chloride (Ocean) 0.65 % nasal spray 1 spray, 1 spray, Each Nostril, 4x daily PRN, Guy Parham MD    [START ON 1/8/2024] sulfamethoxazole-trimethoprim (Bactrim DS) 800-160 mg per tablet 160 mg, 160 mg, oral, Once per day on Mon Wed Fri, Abdirashid Reveles MD    tamsulosin (Flomax) 24 hr capsule 0.4 mg, 0.4 mg, oral, Nightly, Abdirashid Reveles MD, 0.4 mg at 01/05/24 2133    trimethobenzamide (Tigan) injection 200 mg, 200 mg, intramuscular, q6h PRN, Abdirashid Reveles MD, 200 mg at 01/06/24 1340    vancomycin (Vancocin) capsule 125 mg, 125 mg, oral, Every other day, Guy Parham MD, 125 mg at 01/05/24 0826    Results for orders placed or performed during the hospital encounter of 12/31/23 (from the past 24 hour(s))   Renal function panel   Result Value Ref Range    Glucose 97 74 - 99 mg/dL    Sodium 137 136 - 145 mmol/L    Potassium 4.1 3.5 - 5.3 mmol/L    Chloride 102 98 - 107 mmol/L    Bicarbonate 27 21 - 32 mmol/L    Anion Gap 12 10 - 20 mmol/L    Urea Nitrogen 14 6 - 23 mg/dL    Creatinine 1.00 0.50 - 1.30 mg/dL    eGFR 82 >60 mL/min/1.73m*2    Calcium 7.7 (L) 8.6 - 10.6 mg/dL    Phosphorus 4.0 2.5 - 4.9 mg/dL    Albumin 2.5 (L) 3.4 - 5.0 g/dL   POCT GLUCOSE   Result Value Ref Range    POCT Glucose 82 74 - 99 mg/dL   CBC and Auto Differential   Result Value Ref Range    WBC 13.2 (H) 4.4 - 11.3 x10*3/uL    nRBC 0.2 (H) 0.0 - 0.0 /100 WBCs    RBC 2.78 (L) 4.50 - 5.90 x10*6/uL    Hemoglobin 7.5 (L) 13.5 - 17.5 g/dL    Hematocrit 23.8 (L) 41.0 - 52.0 %    MCV 86 80 - 100 fL    MCH 27.0 26.0 - 34.0 pg    MCHC  31.5 (L) 32.0 - 36.0 g/dL    RDW 18.8 (H) 11.5 - 14.5 %    Platelets 461 (H) 150 - 450 x10*3/uL    Neutrophils % 74.6 40.0 - 80.0 %    Immature Granulocytes %, Automated 2.1 (H) 0.0 - 0.9 %    Lymphocytes % 18.0 13.0 - 44.0 %    Monocytes % 5.2 2.0 - 10.0 %    Eosinophils % 0.0 0.0 - 6.0 %    Basophils % 0.1 0.0 - 2.0 %    Neutrophils Absolute 9.85 (H) 1.20 - 7.70 x10*3/uL    Immature Granulocytes Absolute, Automated 0.28 0.00 - 0.70 x10*3/uL    Lymphocytes Absolute 2.38 1.20 - 4.80 x10*3/uL    Monocytes Absolute 0.69 0.10 - 1.00 x10*3/uL    Eosinophils Absolute 0.00 0.00 - 0.70 x10*3/uL    Basophils Absolute 0.01 0.00 - 0.10 x10*3/uL   Renal Function Panel   Result Value Ref Range    Glucose 103 (H) 74 - 99 mg/dL    Sodium 138 136 - 145 mmol/L    Potassium 4.1 3.5 - 5.3 mmol/L    Chloride 102 98 - 107 mmol/L    Bicarbonate 29 21 - 32 mmol/L    Anion Gap 11 10 - 20 mmol/L    Urea Nitrogen 18 6 - 23 mg/dL    Creatinine 0.97 0.50 - 1.30 mg/dL    eGFR 86 >60 mL/min/1.73m*2    Calcium 8.0 (L) 8.6 - 10.6 mg/dL    Phosphorus 3.4 2.5 - 4.9 mg/dL    Albumin 2.5 (L) 3.4 - 5.0 g/dL   Magnesium   Result Value Ref Range    Magnesium 2.11 1.60 - 2.40 mg/dL   Morphology   Result Value Ref Range    RBC Morphology See Below     Hypochromia Mild     Target Cells Few     Ceres[place] Cells Few    POCT GLUCOSE   Result Value Ref Range    POCT Glucose 107 (H) 74 - 99 mg/dL   POCT GLUCOSE   Result Value Ref Range    POCT Glucose 106 (H) 74 - 99 mg/dL         Malnutrition Diagnosis Status: New  Malnutrition Diagnosis: Severe malnutrition related to chronic disease or condition  As Evidenced by: >10% wt loss in 6 months; moderate to severe muscle and adipose tissue losses; PO intake meeting <75% of nutr needs for >1 month  I agree with the dietitian's malnutrition diagnosis.      Assessment/Plan   Principal Problem:    Syncope and collapse    Leonardo Wilder is a 68 y.o. 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 Patient was brought in by EMS after an unwitnessed syncopal episode at home, now being admitted for syncopal work up likely due to orthostatic hypotension 2/2 acute blood loss anemia from MICHELINE drain that saturated couch at home prior to syncopal episode and hypovolemia from diarrhea and poor PO intake vs unlikely cardiogenic etiology. EKG in the ED, showed no concerns for ischemic changes, and LBBB seen previously. Echo showed EF: 35%, global hypokinesis of the left ventricle with minor regional variations. There is significant intraventricular dyssynchrony 1/3. HF consulted and recommended outpatient followup 1/4. MICHELINE drain with significant bloody output saturating patients couch at home prior to syncopal episode. On admission Hgb 6.4 s/p 1u pRBCs, patient also found to have AHRF 2/2 multifocal pna seen on imaging treated for HAP and now completed 5 day course of antibiotics. CTAP concerning for blood product collection but no c/f active bleed. Surgical oncology consulted in the ED for management of MICHELINE drain removed 1/1. Given decreased size of fluid collection with appropriate positioning of the pigtail catheter, and the patient's decreasing WBC and afebrile, IR has determined the collection is not amenable to drain re-placement at this time 1/2. GI consulted, due to persistent intractable n/v with no improvement with antiemetics. Currently DHT in place, will consider PEG tube placement.      Update 1/6:  - GI; advance the tube feeding (post pyloric). If he can tolerate to goal rate 55cc/hr then will do PEG tube with J extension and this can function as nutrition as well  as ventilation.  - change IV dex to oral liquid q6h  - increase PPI to BID  - started PJP Ppx  - daily RFP and EKG       #Syncopal episode due to orthostatic hypotension vs cardiogenic etiology unlikely  #HFmrEF 35- 45% (7/23)   #HLD  ::Suspect related to hypovolemia from diarrhea, poor p.o. intake, chronic nausea and vomiting, along with acute blood loss anemia  ::Not on GDMT  ::EF: 35%, global hypokinesis of the left ventricle with minor regional variations. There is significant intraventricular dyssynchrony. 1/3  Plan:  - Resumed home midodrine 5 mg TID, ASA 81 mg and rosuvastatin 20 mg OD  - Orthostatic positive in the ED, s/p 1L LR  - Repeat orthostatics on admission positive s/p 1L LR will cautiously bolus as needed given HFmrEF 35-40%  - EKG without signs of arrhythmia, continue telemetry  - Heart Failure recs: Not recommending GDMT at this time given history of orthostasis, and on midodrine tid. No strong indication for AICD placement syncope consistent with orthostasis. Not candiate for advanced therapies such as cardiac transplant/LVAD. Outpt follow up with General Cardiology on discharge. 1/3     #Nausea/vomiting likely 2/2 peritoneal mets      Plan:  - Supportive onc recs: scopolamine patch and Bentyl for abdominal spasms due to diarrhea 1/1, olanzapine 5 mg nightly 1/3  - CT abdomen with PO contrast, no concerns for obstruction 1/2  - GI recs: Schedule IV zofran 4-8 mg IV thirty mins before meals TID scheduled and . No improvement with antiemetics can consider DHT and paraenteral nutrition. Possible PEG tube placement 1/3     #Acute blood loss anemia, bleeding from MICHELINE drain removed by surg onc 1/1  ::CT abdomen pelvis showing resolving fluid collection with MICHELINE drain still in position with blood products, no signs of active bleeding  ::Status post 1 unit of PRBCs in the ED 12/31  ::loculated peritoneal fluid s/p MICHELINE drain by IR at [facility] on 12/05/23     Plan:  - Will continue to Hold Eliquis  - Maintain  Active T & S  - Pm CBC  - Surgical oncology recs: recommending NPO 1/3  - IR recs, Given decreased size of fluid collection with appropriate positioning of the pigtail catheter, and the patient's decreasing WBC and afebrile, IR has determined the collection is not amenable to drain re-placement at this time.      #Multifocal Pneumonia, concern for possible aspiration and HAP  #Hypoxemic respiratory failure  #Leukocytosis  ::Received one dose of Zosyn and azithromycin in the ED     Plan:  - Blood cultures negative 12/31  - C/f Aspiration PNA SLP recs: Regular thin liquid diet 1/1  - MRSA nares, Urine strep and Legionella antigen negative 1/1     Antibiotic history:  - IV vanc/azithro/zosyn (12/31- 1/2)  - Doxycycline (1/2- 1/3) unable to tolerate PO  - Zosyn (1/3- 1/5)        #Diarrhea  #History of C. difficile colitis  ::Currently on p.o. vancomycin taper, every other day, last dose 12/30.      Plan:  - With worsening leukocytosis and reports of worsening diarrhea, will recheck a C. difficile with reflex FREDY  - Continue current p.o. vancomycin plan for now every other day, end date 1/19/24     Vancomycin PO Taper course:  - 1 cap QID for 1 week (12/8- 12/14 )  - 1 cap BID for 1 week (12/15- 12/21)  - 1 cap once daily for 1 week (12/22- 12/28)  - 1 cap every other day for 3 weeks (12/29-  through 1/19/24)      #Appendiceal cancer w/ mets to abdomen  Primary oncologist is Dr. Ozuna, follows with [facility] surgical oncology Dr. Em  - Dr. Ozuna notified about patients admission.      #History of portal vein thrombosis on Eliquis  - Hold Eliquis for now in the setting of acute blood loss anemia with syncopal episode     #Insomnia  #Anxiety  - Resumed home Ativan 0.5 BID PRN     F: PRN  E: PRN  N: Full liquid/ soft diet per surg onc   DVT ppx: Lovenox 40 mg   Full Code, confirmed on admission     NOK: Michelle (wife) [phone]            Abdirashid Reveles MD

## 2024-01-07 NOTE — SIGNIFICANT EVENT
Called to see patient for unresponsiveness. On exam the patient did not respond to verbal or physical stimuli. Absent heart and breath sounds for one minute, no response to noxious stimuli, and absent corneal reflex. Absent carotid pulses. Pupils are fixed and dilated.   Patient pronounced dead at  6:49 AM  on 1/7/2024. Dr. Danette Steele notified.  Spouse present at bedside and  notified.

## 2024-01-07 NOTE — PROCEDURES
Intubation    Date/Time: 1/7/2024 5:28 AM    Performed by: Ricardo Whipple MD  Authorized by: Danette Steele DO    Consent:     Consent obtained:  Emergent situation  Pre-procedure details:     Indications: airway protection, respiratory distress and respiratory failure      Patient status:  Unresponsive    Look externally: no concerns      Mouth opening - incisor distance:  3 or more finger widths    Hyoid-mental distance: 3 or more finger widths      Hyoid-thyroid distance: 2 or more finger widths      Mallampati score:  I    Obstruction: none      Neck mobility: normal      Pharmacologic strategy: RSI      Induction agents:  Ketamine    Paralytics:  Rocuronium  Procedure details:     Preoxygenation:  Bag valve mask    Number of attempts:  1  Successful intubation attempt details:     Intubation method:  Oral    Intubation technique: video assisted      Laryngoscope blade:  Mac 4    Grade view: I      Tube size (mm):  7.5    Tube type:  Cuffed    Tube visualized through cords: yes    Placement assessment:     ETT at teeth/gumline (cm):  24 at the lip    Tube secured with:  ETT kelly    Breath sounds:  Equal    Placement verification: chest rise, colorimetric ETCO2 and equal breath sounds    Post-procedure details:     Procedure completion:  Tolerated    Complications: hypotension and hypoxia    Comments:      Patient with recovery of SpO2 status-post intubation. Shortly after, during transition to ventilator from BVM, patient with worsening hypoxemia and hypotension. Bedside ultrasound showed lack of lung sliding on the right. Emergency needle decompression with significant air leak. Patient proceeded to chest tube placement.

## 2024-01-07 NOTE — SIGNIFICANT EVENT
RT at bedside for rapid response. Pt had desaturation after coughing fit/throwing up. RN stated they placed him on NC but sats weren't improving so Pt was placed on NRB 15L; Upon RT arrival pt on 15L NRB with SpO2 99%, Pt was weaned down to 6L NC with sats in upper 80s. Slowly increased to 15L NC. SpO2 remained in upper 80s. ABG obtained. Airvo started at 40L 80%. Another ABG obtained prior to MICU transfer.

## 2024-01-07 NOTE — SIGNIFICANT EVENT
Responding to Rapid response for concern of desaturation and tachypnea. On arrival pt mentation appropriate presenting with coughing and difficulty speaking in in complete sentences. Primary RN reported pt alarm for desaturation into the 60's on continuous pulseOx monitoring. Pt was found HOB below 30 degrees, and coughing induces emesis. Tube feed was held. Pt was not recovering oxygen on NC and was placed on non-rebreather. Lungs auscultated diminished with slight crackles on L side. Vitals: /72, HR 85, RR 32, O2 99% on NRB.    Chest xray completed, lab work drawn, blood cultures completed. #18G IV placed in L FA. Pt became hypotensive 89/59 and 1L LR bolus started, repeat BP 89/59, and 84/57 after 200cc of fluid. Pt did not tolerate weaning from NRB to highflow NC, O2 saturation remained 88-90% on 6L NC. ABG drawn with PaO2 in the 60's. Pt was placed on AirVo 40L 60% with pt O2 saturation remaining at 89-91%. Breathing treatment administered. MICU team made aware of pt and acute increase in oxygen demand. Pt hypotension starting to resolve repeat BP: @0136 100/63, @0153 99/56. Pt still presenting with increase WOB and tachypnea. ABX started. MICU accepted pt and report called. Family updated by primary team, repeat ABG drawn and vitals appropriate for transport. Pt transported to MICU on monitor with Primary team and NACR at bedside.

## 2024-01-07 NOTE — SIGNIFICANT EVENT
Pt's nurse paged ~12:30 AM stating that the pt had an emesis episode which was accompanied by a coughing fit after which he was seen to be tachypneic and satting to ~75% iso suspected aspiration. His NC O2 delivery was increased to ~8L and a rapid response was called. He was placed on continuous pulse ox; his tube feed was stopped and he was transitioned to a NRB before we got there.     On arrival, the pt was not in significant distress, was able to answer questions appropriately and denied any CP. A CXR was ordered and a review of his EKG showed no added findings from previous EKGs. The CXR did not show any new consolidations from priors. Whilst monitoring him in the room, his blood pressures dropped to ~86/58 and there was consideration for possible sepsis. Sepsis labs for BCx x2, CBCs, CMP, Lactate and ABGs were drawn. He was then given 1L of LR, and he was started on BSA with Vanc and Zosyn. Blood pressures improved to ~100/60. ABG was 7.4/60/ Lactate 1.0. His saturations were still not recovering appropriately and he was started on HF AirVo 40L 60% but was still only satting ~90%. The team spoke to the MICU Fellow and Attending about his significant oxygen requirement and possible escalation of care with which they agreed was appropriate. He was then transferred to the MICU ~2 AM. His wife was called and notified of the transfer and she stated her understanding. She expressed that this has happened rather frequently in recent times and would be here in the morning to see him.

## 2024-01-07 NOTE — NURSING NOTE
0420: Vasopressin started  0427: Epi gtt started at .1mcg/kg/min  0429: epi gtt at .3mcg/mg/min  0430: epi gtt at .5mcg/kg/min  0432: 1amp bicarb given. EPI gtt to .8mcg/kg/min  0435; epi gtt to .9mg/kg/min  0438: chest tube placed by attending  0440: levophed gtt to 1mcg/kg/min  0450: 1L LR started  0455: phenyl gtt started at 2mcg/kg/min

## 2024-01-07 NOTE — ACP (ADVANCE CARE PLANNING)
Confirming Previous Code Status:   Advance Care Planning Note     Discussion Date: 01/07/24   Discussion Participants: spouse    The patient wishes to discuss Advance Care Planning today and the following is a brief summary of our discussion.     Patient has capacity to make their own medical decisions: No  Health Care Agent/Surrogate Decision Maker documented in chart: Yes    Documents on file and valid:  Advance Directive/Living Will: No   Health Care Power of : No  Other: Surrogate decision maker named by patient, Wife Michelle     Communication of Medical Status/Prognosis:   Discussed overall poor prognosis given persistent severe hypoxia and hypotension despite maximum FiO2 and pressor support with 4 pressors. Discussed that in the event that his heart were to stop the likelihood that he would be able to recover to his prior level of function would be very limited.     Communication of Treatment Goals/Options:   Given his overall clinical condition his wife expressed that she did not think he would want this. She was initially agreeable to changing his code status to DNR while she came in to the hospital. Then shortly after arrival she endorsed support of a plan whereby we focus on comfort.    Treatment Decisions  Goals of Care: comfort is paramount; other goals are not relevant or achievable     Follow Up Plan  Will continue to engage with family as needed.    Team Members  Cody Corbett MD, PhD    Time Statement: Total face to face time spent on advance care planning was 30 minutes with 25 minutes spent in counseling, including the explanation.    Cody Corbett MD PhD  1/7/2024 6:13 AM

## 2024-01-07 NOTE — H&P
History Of Present Illness  Leonardo Wilder is a 68 y.o. 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 He was admitted originally on 12/31 for syncopal workup. Currently being admitted to MICU on 1/7 for AHRF.    Brief hospital course:  Patient was brought in by EMS after an unwitnessed syncopal episode at home, now being admitted for syncopal work up likely due to orthostatic hypotension 2/2 acute blood loss anemia from MICHELINE drain that saturated couch at home prior to syncopal episode and hypovolemia from diarrhea and poor PO intake vs unlikely cardiogenic etiology. EKG in the ED, showed no concerns for ischemic changes, and LBBB seen previously. Echo showed EF: 35%, global hypokinesis of the left ventricle with minor regional variations. There is significant intraventricular dyssynchrony 1/3. HF consulted and recommended outpatient followup 1/4. MICHELINE drain with significant bloody output saturating patients couch at home prior to syncopal episode. On admission Hgb 6.4 s/p 1u pRBCs, patient also found to have AHRF 2/2 multifocal pna seen on imaging treated for HAP and now completed 5 day course of antibiotics. CTAP concerning for blood product collection but no c/f active bleed. Surgical oncology consulted in the ED for management of MICHELINE drain removed 1/1. Given decreased size of fluid collection with appropriate positioning of the pigtail catheter, and the patient's decreasing WBC and afebrile, IR has determined the collection is not amenable to drain re-placement at this time 1/2. GI consulted, due to persistent intractable n/v with no  improvement with antiemetics, recommended scheduling zofran 4 mg every hours and to give thirty minutes before meals.. Supp onc following for symptom management.     DHT placed and TF started 1/5. Overnight 1/7 he became acutely hypoxic to 75% from suspected aspiration, ultimately requiring 100% FiO2. His blood pressures also dropped to ~86/58 and there was concern for sepsis. Infectious workup was sent including blood cultures and he was started on vanc/zosyn.     Recent objective data  - Vitals:   T 36.4 , HR 94 /62 RR 33 SpO2 89 on HFNC 88%   - Labs:   CBC: WBC 19.4 Hgb 7.7 plt 465   BMP: Na 121 K 4.3 Cl 103 HCO3 30 BUN 25 Cr 0.88 glu 121   ABG 7.4/47/60 lactate 1.0   BCx x2 drawn    On the unit, he initially was stable on 88% HFNC. However he later desatted to 70's despite 100% FiO2. He endorsed significant nausea and there was concern for aspiration limiting NIPPV. An NG tube was placed for decompression with removal of significant gastric contents. He was subsequently intubated, and despite ventilation with 100% FiO2 his sats remained low ranging from 45 - 70, with correlation on ABG. At this time he was also hypotensive and was started on pressors with norepinephrine, vaso, and epi. There was suspicion for pneumothorax and pocus was significant for absent lung sliding over the right lung. Needle decompression was performed with release of air. A chest tube was then placed. Despite these interventions his o2 sats remained in the 40's. His wife Michelle was called and updated. Based on his overall condition, recent decline, and overall guarded prognosis she ultimately decided to change his code status to DNR.    Cancer  History  Primary Oncologist , Dr. 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   STAGING: IV   CURRENT SITES OF DISEASE: Appendix, peritoneum     MOLECULAR/GENOMIC:  MMR-proficient  SMAD4  mutation      TUMOR MARKER:   CEA: 1.5 7/2020  : <4 7/2020  CEA 8/8/20 1.5  CEA 11/23/20 4.2   CEA 12/21/20 1.1   CEA 1/4/2021 1.1   CEA 2/15/2021 1.0  CEA 1/13/22: 1.0  CEA 3/18/22: 1.1  CEA 4/11/22: 8.5  CEA 10/25/22: 1.3  CEA 1/5/23: 0.5  CEA 2/13/23: 1.3  CEA 4/27/23: 1.4  CEA 5/15/23: 2.6  CEA 6/12/23: 1.5  CEA 8/21/23: 2.0     CURRENT TREATMENT:   None     PRIOR TREATMENT:   FOLFOX 9/11/20 - 2/15/21  3/15/2021: Exploratory laparotomy, lysis of adhesions, radical intraperitoneal tumor debulking to include omentectomy, splenectomy, complete peritonectomy, R hemicolectomy, cholecystectomy, sigmoid and low anterior resection with colorectal anastomosis  and diverting loop ileostomy, HIPC with MMC, placement and removal of inflow and outflow catheters.   FOLFORI every 2 weeks started 4/20/22 x 3cycles then switched back to FOLFOX since he responded well to this regimen previously and he wasn't responding to FOLFORI  FOLFOX since 6/15/22, last dose ~7/2023 8/31/23: Ex lap, ALO, ileocolonic bypass of obstruction (HIPEC not done as no clear evidence of malignancy)  12/5/23: peritoneal drain placement for fluid collection     ACTIVE ONCOLOGIC ISSUES:   Enterocutaneous fistula right lower abdominal quadrant - had cellulitis requiring ATB   Portal vein thrombosis- started Eliquis but due to cost is seeking medication through [facility]  Recurrent small bowel obstructions  Peripheral neuropathy    Past Medical History  Past Medical History:   Diagnosis Date    Cancer of appendix (CMS/HCC)     Coronary artery calcification     Hypothyroidism     LBBB (left bundle branch block)     Portal vein external compression        Surgical History  Past Surgical History:   Procedure Laterality Date    ABDOMINAL SURGERY          Social History  He reports that he quit smoking about 21 years ago. His smoking use included cigarettes. He smoked an average of 1 pack per day. He has never used smokeless tobacco. He reports that he does  "not currently use alcohol. He reports that he does not use drugs.    Family History  Family History   Problem Relation Name Age of Onset    Heart disease Father      Squamous cell carcinoma Other          Allergies  Patient has no known allergies.    Review of Systems   Unable to perform ROS: Intubated        Physical Exam  Constitutional:       Appearance: He is ill-appearing and toxic-appearing.      Comments: Intubated and sedated   Eyes:      General: No scleral icterus.     Pupils: Pupils are equal, round, and reactive to light.   Cardiovascular:      Rate and Rhythm: Regular rhythm. Tachycardia present.      Heart sounds: No murmur heard.  Pulmonary:      Breath sounds: Rales present.      Comments: Diminished breath sounds bilaterally  Abdominal:      General: There is no distension.      Palpations: Abdomen is soft.   Musculoskeletal:      Right lower leg: No edema.      Left lower leg: No edema.   Neurological:      Comments: sedated          Last Recorded Vitals  Blood pressure 109/62, pulse 94, temperature 36.4 °C (97.5 °F), resp. rate (!) 33, height 1.737 m (5' 8.4\"), weight 56.2 kg (124 lb), SpO2 (!) 89 %.    Relevant Results    Scheduled medications  albuterol, , ,   [Held by provider] apixaban, 5 mg, oral, BID  [Held by provider] aspirin, 81 mg, oral, Daily  [Held by provider] baclofen, 5 mg, oral, BID  [Held by provider] cyanocobalamin, 1,000 mcg, oral, Daily  dexAMETHasone, 4 mg, oral, q6h  DULoxetine, 30 mg, oral, q AM  DULoxetine, 60 mg, oral, Nightly  enoxaparin, 1 mg/kg, subcutaneous, q12h  EPINEPHrine, , ,   EPINEPHrine, , ,   finasteride, 5 mg, oral, Daily  levothyroxine, 125 mcg, oral, Daily  OLANZapine, 5 mg, oral, Nightly  ondansetron, 4 mg, intravenous, q8h  pantoprazole, 40 mg, oral, BID  phenylephrine, , ,   phenylephrine, , ,   phenylephrine, , ,   phenylephrine HCl in 0.9% NaCl, , ,   piperacillin-tazobactam, 4.5 g, intravenous, q6h  polyethylene glycol, 17 g, oral, Daily  pregabalin, " 200 mg, oral, TID  rocuronium, , ,   [Held by provider] rosuvastatin, 20 mg, oral, Daily  scopolamine, 1 patch, transdermal, q72h  [START ON 1/8/2024] sulfamethoxazole-trimethoprim, 160 mg, oral, Once per day on Mon Wed Fri  tamsulosin, 0.4 mg, oral, Nightly  vancomycin, 125 mg, oral, Every other day  [START ON 1/8/2024] vancomycin, 1,250 mg, intravenous, q24h  vasopressin, , ,       Continuous medications  [Held by provider] Adult Clinimix Parenteral Nutrition, 30 mL/hr  EPINEPHrine, 0-2 mcg/kg/min, Last Rate: 1 mcg/kg/min (01/07/24 0445)  fentaNYL, 0-300 mcg/hr  norepinephrine, 0.01-1 mcg/kg/min, Last Rate: 1 mcg/kg/min (01/07/24 0531)  phenylephrine, 0.1-2 mcg/kg/min, Last Rate: 4 mcg/kg/min (01/07/24 0515)  vasopressin, 0.03 Units/min      PRN medications  PRN medications: albuterol, baclofen, benzocaine-menthol, calcium carbonate, dicyclomine, EPINEPHrine, EPINEPHrine, loperamide, LORazepam, oxygen, phenylephrine, phenylephrine, phenylephrine, phenylephrine HCl in 0.9% NaCl, rocuronium, sodium chloride, trimethobenzamide, vasopressin    Results for orders placed or performed during the hospital encounter of 12/31/23 (from the past 24 hour(s))   POCT GLUCOSE   Result Value Ref Range    POCT Glucose 107 (H) 74 - 99 mg/dL   POCT GLUCOSE   Result Value Ref Range    POCT Glucose 106 (H) 74 - 99 mg/dL   Blood Culture    Specimen: Peripheral Venipuncture; Blood culture   Result Value Ref Range    Blood Culture Loaded on Instrument - Culture in progress    Blood Culture    Specimen: Peripheral Venipuncture; Blood culture   Result Value Ref Range    Blood Culture Loaded on Instrument - Culture in progress    Magnesium   Result Value Ref Range    Magnesium 2.04 1.60 - 2.40 mg/dL   Coagulation Screen   Result Value Ref Range    Protime 12.0 9.8 - 12.8 seconds    INR 1.1 0.9 - 1.1    aPTT 35 27 - 38 seconds   Comprehensive Metabolic Panel   Result Value Ref Range    Glucose 121 (H) 74 - 99 mg/dL    Sodium 138 136 - 145  mmol/L    Potassium 4.3 3.5 - 5.3 mmol/L    Chloride 103 98 - 107 mmol/L    Bicarbonate 30 21 - 32 mmol/L    Anion Gap 9 (L) 10 - 20 mmol/L    Urea Nitrogen 25 (H) 6 - 23 mg/dL    Creatinine 0.88 0.50 - 1.30 mg/dL    eGFR >90 >60 mL/min/1.73m*2    Calcium 8.1 (L) 8.6 - 10.6 mg/dL    Albumin 2.6 (L) 3.4 - 5.0 g/dL    Alkaline Phosphatase 175 (H) 33 - 136 U/L    Total Protein 6.2 (L) 6.4 - 8.2 g/dL    AST 11 9 - 39 U/L    Bilirubin, Total 0.3 0.0 - 1.2 mg/dL    ALT 5 (L) 10 - 52 U/L   Phosphorus   Result Value Ref Range    Phosphorus 2.7 2.5 - 4.9 mg/dL   CBC and Auto Differential   Result Value Ref Range    WBC 19.4 (H) 4.4 - 11.3 x10*3/uL    nRBC 0.1 (H) 0.0 - 0.0 /100 WBCs    RBC 2.74 (L) 4.50 - 5.90 x10*6/uL    Hemoglobin 7.7 (L) 13.5 - 17.5 g/dL    Hematocrit 24.3 (L) 41.0 - 52.0 %    MCV 89 80 - 100 fL    MCH 28.1 26.0 - 34.0 pg    MCHC 31.7 (L) 32.0 - 36.0 g/dL    RDW 19.5 (H) 11.5 - 14.5 %    Platelets 465 (H) 150 - 450 x10*3/uL    Immature Granulocytes %, Automated 0.7 0.0 - 0.9 %    Immature Granulocytes Absolute, Automated 0.14 0.00 - 0.70 x10*3/uL   Lactate   Result Value Ref Range    Lactate 1.1 0.4 - 2.0 mmol/L   Manual Differential   Result Value Ref Range    Neutrophils %, Manual 77.4 40.0 - 80.0 %    Lymphocytes %, Manual 20.0 13.0 - 44.0 %    Monocytes %, Manual 2.6 2.0 - 10.0 %    Eosinophils %, Manual 0.0 0.0 - 6.0 %    Basophils %, Manual 0.0 0.0 - 2.0 %    Seg Neutrophils Absolute, Manual 15.02 (H) 1.20 - 7.00 x10*3/uL    Lymphocytes Absolute, Manual 3.88 1.20 - 4.80 x10*3/uL    Monocytes Absolute, Manual 0.50 0.10 - 1.00 x10*3/uL    Eosinophils Absolute, Manual 0.00 0.00 - 0.70 x10*3/uL    Basophils Absolute, Manual 0.00 0.00 - 0.10 x10*3/uL    Total Cells Counted 115     RBC Morphology See Below     Hypochromia Mild     Target Cells Few    BLOOD GAS ARTERIAL FULL PANEL   Result Value Ref Range    POCT pH, Arterial 7.40 7.38 - 7.42 pH    POCT pCO2, Arterial 47 (H) 38 - 42 mm Hg    POCT pO2,  Arterial 60 (L) 85 - 95 mm Hg    POCT SO2, Arterial 90 (L) 94 - 100 %    POCT Oxy Hemoglobin, Arterial 87.7 (L) 94.0 - 98.0 %    POCT Hematocrit Calculated, Arterial 25.0 (L) 41.0 - 52.0 %    POCT Sodium, Arterial 134 (L) 136 - 145 mmol/L    POCT Potassium, Arterial 4.1 3.5 - 5.3 mmol/L    POCT Chloride, Arterial 103 98 - 107 mmol/L    POCT Ionized Calcium, Arterial 1.19 1.10 - 1.33 mmol/L    POCT Glucose, Arterial 119 (H) 74 - 99 mg/dL    POCT Lactate, Arterial 1.0 0.4 - 2.0 mmol/L    POCT Base Excess, Arterial 3.8 (H) -2.0 - 3.0 mmol/L    POCT HCO3 Calculated, Arterial 29.1 (H) 22.0 - 26.0 mmol/L    POCT Hemoglobin, Arterial 8.4 (L) 13.5 - 17.5 g/dL    POCT Anion Gap, Arterial 6 (L) 10 - 25 mmo/L    Patient Temperature 37.0 degrees Celsius    FiO2 90 %   MRSA Surveillance for Vancomycin De-escalation, PCR    Specimen: Anterior Nares; Swab   Result Value Ref Range    MRSA PCR Not Detected Not Detected   Type and screen   Result Value Ref Range    ABO TYPE O     Rh TYPE POS     ANTIBODY SCREEN NEG    BLOOD GAS ARTERIAL FULL PANEL   Result Value Ref Range    POCT pH, Arterial 7.41 7.38 - 7.42 pH    POCT pCO2, Arterial 44 (H) 38 - 42 mm Hg    POCT pO2, Arterial 64 (L) 85 - 95 mm Hg    POCT SO2, Arterial 93 (L) 94 - 100 %    POCT Oxy Hemoglobin, Arterial 90.3 (L) 94.0 - 98.0 %    POCT Hematocrit Calculated, Arterial 23.0 (L) 41.0 - 52.0 %    POCT Sodium, Arterial 134 (L) 136 - 145 mmol/L    POCT Potassium, Arterial 3.9 3.5 - 5.3 mmol/L    POCT Chloride, Arterial 104 98 - 107 mmol/L    POCT Ionized Calcium, Arterial 1.17 1.10 - 1.33 mmol/L    POCT Glucose, Arterial 118 (H) 74 - 99 mg/dL    POCT Lactate, Arterial 1.4 0.4 - 2.0 mmol/L    POCT Base Excess, Arterial 2.9 -2.0 - 3.0 mmol/L    POCT HCO3 Calculated, Arterial 27.9 (H) 22.0 - 26.0 mmol/L    POCT Hemoglobin, Arterial 7.5 (L) 13.5 - 17.5 g/dL    POCT Anion Gap, Arterial 6 (L) 10 - 25 mmo/L    Patient Temperature 37.0 degrees Celsius    FiO2 80 %   POCT GLUCOSE    Result Value Ref Range    POCT Glucose 126 (H) 74 - 99 mg/dL   Blood Gas Arterial Full Panel   Result Value Ref Range    POCT pH, Arterial 7.20 (LL) 7.38 - 7.42 pH    POCT pCO2, Arterial 62 (H) 38 - 42 mm Hg    POCT pO2, Arterial 46 (L) 85 - 95 mm Hg    POCT SO2, Arterial 62 (L) 94 - 100 %    POCT Oxy Hemoglobin, Arterial 60.5 (L) 94.0 - 98.0 %    POCT Hematocrit Calculated, Arterial 24.0 (L) 41.0 - 52.0 %    POCT Sodium, Arterial 132 (L) 136 - 145 mmol/L    POCT Potassium, Arterial 3.8 3.5 - 5.3 mmol/L    POCT Chloride, Arterial 103 98 - 107 mmol/L    POCT Ionized Calcium, Arterial 1.19 1.10 - 1.33 mmol/L    POCT Glucose, Arterial 252 (H) 74 - 99 mg/dL    POCT Lactate, Arterial 5.6 (HH) 0.4 - 2.0 mmol/L    POCT Base Excess, Arterial -3.9 (L) -2.0 - 3.0 mmol/L    POCT HCO3 Calculated, Arterial 24.2 22.0 - 26.0 mmol/L    POCT Hemoglobin, Arterial 8.0 (L) 13.5 - 17.5 g/dL    POCT Anion Gap, Arterial 9 (L) 10 - 25 mmo/L    Patient Temperature 37.0 degrees Celsius    FiO2 100 %        Assessment/Plan   Principal Problem:    Syncope and collapse    Assessment & Plan  Leonardo Wilder is a 68 y.o. 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 He was admitted originally on 12/31 for syncopal workup. Currently being admitted to MICU on 1/7 for AHRF.    NEUROLOGIC  #Sedated  -on fentanyl for sedation s/p intubation    CARDIOVASCULAR  #Shock, undifferentiated  -potentially septic iso multifocal PNA though etiology remains unclear  -Bcx sent  -On vanc/zosyn  -Currently on norepi, vaso, epi, phenylephrine    Plan:  -Goal MAPs >65  -Continue broad  abx    PULMONARY  #AHRF  #Multifocal Pneumonia, concern for possible aspiration and HAP  #R Pneumothorax  -pt not on oxygen at baseline  -desat o/n 1/7 requiring 100% FiO2  -intubated 1/7 requiring 100% FiO2, remains satting 40s-50s  -Pneumothorax noted on exam, s/p needle decompression and chest tube placement 1/7  -On vanc/zosyn    Plan:  -Wean FiO2 goal sats >92  -Continue vanc/zosyn  -Ongoing goals of care    RENAL/  TAWANA    GASTROINTESTINAL  #Nausea/vomiting likely 2/2 peritoneal mets      Plan:  - Supportive onc recs: scopolamine patch and Bentyl for abdominal spasms due to diarrhea 1/1, olanzapine 5 mg nightly 1/3  - CT abdomen with PO contrast, no concerns for obstruction 1/2  - GI recs: Schedule IV zofran 4-8 mg IV thirty mins before meals TID scheduled and . No improvement with antiemetics can consider DHT and paraenteral nutrition. Possible PEG tube placement 1/3    ENDOCRINE  TA[place]NA    HEME/ONC  #Appendiceal cancer w/ mets to abdomen  Primary oncologist is Dr. Ozuna, follows with [facility] oncology Dr. Em  - Dr. Ozuna notified about patients admission, will engage regarding ICU admission     Plan:  - Will continue to Hold Eliquis  - Maintain Active T & S  - Pm CBC  - Surgical oncology recs: recommending NPO 1/3  - IR recs, Given decreased size of fluid collection with appropriate positioning of the pigtail catheter, and the patient's decreasing WBC and afebrile, IR has determined the collection is not amenable to drain re-placement at this time.     #History of portal vein thrombosis on Eliquis  - Hold Eliquis for now in the setting of acute blood loss anemia with syncopal episode  -on therapeutic lovenox    INFECTIOUS DISEASE  #Leukocytosis  ::Received one dose of Zosyn and azithromycin in the ED     Plan:  - Blood cultures negative 12/31  - C/f Aspiration PNA SLP recs: Regular thin liquid diet 1/1  - MRSA nares, Urine strep and Legionella antigen negative 1/1     Antibiotic history:  -  IV vanc/azithro/zosyn (12/31- 1/2)  - Doxycycline (1/2- 1/3) unable to tolerate PO  - Zosyn (1/3- 1/5 and 1/7 -)  - Vanc (1/7)    MSK/DERM  TAWANA      F: PRN  E: PRN  N: Holding iso aspiration  A: PIV    DVT ppx: lovenox  GI ppx: PPI    Code Status: DNR (confirmed with proxy  Surrogate Medical Decision-maker: Michelle (wife) [phone]             Cody Corbett MD PhD

## 2024-01-07 NOTE — DISCHARGE SUMMARY
Discharge Diagnosis  Acute Hypoxic Respiratory Failure    Issues Requiring Follow-Up  NA    Test Results Pending At Discharge  Pending Labs       Order Current Status    Procalcitonin In process    Blood Culture Preliminary result    Blood Culture Preliminary result            Hospital Course  Leonardo Wilder is a 68 y.o. 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 Patient was brought in by EMS after an unwitnessed syncopal episode at home, now being admitted for syncopal work up likely due to orthostatic hypotension 2/2 acute blood loss anemia from MICHELINE drain that saturated couch at home prior to syncopal episode and hypovolemia from diarrhea and poor PO intake vs unlikely cardiogenic etiology. EKG in the ED, showed no concerns for ischemic changes, and LBBB seen previously. Echo showed EF: 35%, global hypokinesis of the left ventricle with minor regional variations. There is significant intraventricular dyssynchrony 1/3. HF consulted and recommended outpatient followup 1/4. MICHELINE drain with significant bloody output saturating patients couch at home prior to syncopal episode. On admission Hgb 6.4 s/p 1u pRBCs, patient also found to have AHRF 2/2 multifocal pna seen on imaging treated for HAP and now completed 5 day course of antibiotics. CTAP concerning for blood product collection but no c/f active bleed. Surgical oncology consulted in the ED for management of MICHELINE drain removed 1/1. Given decreased size of fluid collection with appropriate positioning of the pigtail catheter, and the patient's decreasing WBC and afebrile, IR has determined  the collection is not amenable to drain re-placement at this time 1/2. GI consulted, due to persistent intractable n/v with no improvement with antiemetics, recommended scheduling zofran 4 mg every hours and to give thirty minutes before meals.. Supp onc following for symptom management.     DHT placed and TF started 1/5. Overnight 1/7 he became acutely hypoxic to 75% from suspected aspiration, ultimately requiring 100% FiO2. His blood pressures also dropped to ~86/58 and there was concern for sepsis. Infectious workup was sent including blood cultures and he was started on vanc/zosyn.     On the unit, he initially was stable on 88% HFNC. However he later desatted to 70's despite 100% FiO2. He endorsed significant nausea and there was concern for aspiration limiting NIPPV. An NG tube was placed for decompression with removal of significant gastric contents. He was subsequently intubated, and despite ventilation with 100% FiO2 his sats remained low ranging from 45 - 70, with correlation on ABG. At this time he was also hypotensive and was started on pressors with norepinephrine, vaso, and epi. There was suspicion for pneumothorax and pocus was significant for absent lung sliding over the right lung. Needle decompression was performed with release of air. A chest tube was then placed. Despite these interventions his o2 sats remained in the 40's. His wife Michelle was called and updated. Based on his overall condition, recent decline, and overall guarded prognosis she ultimately decided to change his code status to DNR while she came in to the hospital. After she came in the decision was made to transition to comfort. He subsequently passed 1/7/2024 at 6:49 AM.    Pertinent Physical Exam At Time of Discharge  On exam the patient did not respond to verbal or physical stimuli. Absent heart and breath sounds for one minute, no response to noxious stimuli, and absent corneal reflex. Absent carotid pulses. Pupils are fixed  and dilated. Patient pronounced dead at  6:49 AM  on 1/7/2024. Dr. Danette Steele notified.  Spouse present at bedside and  notified.     Home Medications     Medication List      ASK your doctor about these medications     acetaminophen 325 mg tablet; Commonly known as: Tylenol   aspirin 81 mg EC tablet   baclofen 5 mg tablet; Commonly known as: Lioresal; Take 1 tablet (5 mg)   by mouth 2 times a day.   cyanocobalamin 1,000 mcg tablet; Commonly known as: Vitamin B-12   * DULoxetine 30 mg DR capsule; Commonly known as: Cymbalta   * DULoxetine 60 mg DR capsule; Commonly known as: Cymbalta; Take 1   capsule (60 mg) by mouth once daily in the morning. Do not crush or chew.   Eliquis 5 mg tablet; Generic drug: apixaban; TAKE 1 TABLET BY MOUTH TWO   TIMES A DAY   FIBER GUMMIES ORAL   finasteride 5 mg tablet; Commonly known as: Proscar; Take 1 tablet (5   mg) by mouth once daily. Do not crush, chew, or split.   Gemtesa 75 mg tablet; Generic drug: vibegron   * levothyroxine 100 mcg tablet; Commonly known as: Synthroid, Levoxyl   * levothyroxine 25 mcg tablet; Commonly known as: Synthroid, Levoxyl;   Take 1 tablet (25 mcg) by mouth once daily.   loperamide 2 mg capsule; Commonly known as: Imodium; Take 1 capsule (2   mg) by mouth 4 times a day as needed for diarrhea.   LORazepam 0.5 mg tablet; Commonly known as: Ativan; Take 1 tablet (0.5   mg) by mouth 2 times a day as needed for anxiety (insomnia).   magnesium oxide 420 mg tablet; Commonly known as: Mag-Ox   midodrine 5 mg tablet; Commonly known as: Proamatine; TAKE 1 TABLET BY   MOUTH THREE TIMES A DAY   OLANZapine zydis 5 mg disintegrating tablet; Commonly known as: ZyPREXA;   Take 1 tablet (5 mg) by mouth once daily at bedtime.   ondansetron 4 mg tablet; Commonly known as: Zofran; Take 1 tablet (4 mg)   by mouth every 8 hours if needed for nausea.   pantoprazole 40 mg EC tablet; Commonly known as: ProtoNix; Take 1 tablet   (40 mg) by mouth once daily.   pregabalin 200 mg  capsule; Commonly known as: Lyrica   PROBIOTIC ORAL   rosuvastatin 20 mg tablet; Commonly known as: Crestor; Take 1 tablet (20   mg) by mouth once daily.   sodium chloride 0.9% solution   vancomycin 125 mg capsule; Commonly known as: Vancocin; Take 1 capsule   (125 mg) by mouth 2 times a week. Take 1 capsule tomorrow (12/29), then   start twice weekly dosing  * This list has 4 medication(s) that are the same as other medications   prescribed for you. Read the directions carefully, and ask your doctor or   other care provider to review them with you.       Outpatient Follow-Up  Future Appointments   Date Time Provider Department Center   1/9/2024  2:40 PM BUFFY Galvan-CNP [id number] [facility]   1/10/2024  8:30 AM Emilie Rey MD [id number]       Cody Corbett MD PhD

## 2024-01-07 NOTE — NURSING NOTE
0352: 150 mg ketamine IVP   0353: 70mg Roccuronium   0355: MICU fellow intubated with #7.5 @24. Positive breath sounds bilaterally.   0404: Pt SPO2 48%, BP 41/30.  0404: Levo gtt increased .4mcg/kg/min. 400 mcg phenyl pushed.  0407: Levo gtt .8mcg/kg/min  0408: BP 69/55 levo gtt increased to .9mcg/kg/min  0411: Needle decompression done by fellow  0417: Fellow placing chest tube

## 2024-01-07 NOTE — PROGRESS NOTES
"Vancomycin Dosing by Pharmacy- INITIAL    Leonardo Wilder is a 68 y.o. year old male who Pharmacy has been consulted for vancomycin dosing for sepsis/pneumonia. Based on the patient's indication and renal status this patient will be dosed based on a goal AUC of 500-600.     Renal function is currently stable.    Visit Vitals  /63   Pulse 96   Temp 36.4 °C (97.5 °F)   Resp (!) 27        Lab Results   Component Value Date    CREATININE 0.97 01/06/2024    CREATININE 1.00 01/05/2024    CREATININE 1.12 01/05/2024    CREATININE 1.01 01/04/2024        Patient weight is No results found for: \"PTWEIGHT\"    No results found for: \"CULTURE\"     I/O last 3 completed shifts:  In: 315 (5.6 mL/kg) [P.O.:240; NG/GT:75]  Out: 100 (1.8 mL/kg) [Urine:100 (0 mL/kg/hr)]  Weight: 56.2 kg   [unfilled]    Lab Results   Component Value Date    PATIENTTEMP 37.0 01/07/2024    PATIENTTEMP 37.0 12/31/2023    PATIENTTEMP 37.0 12/27/2023          Assessment/Plan     Patient will be given a loading dose of 1500 mg.  Will initiate vancomycin maintenance,  1250 mg every 24 hours.    This dosing regimen is predicted by [employer] to result in the following pharmacokinetic parameters:    Loading dose: 1500 mg IV on 1/7/24 @ 02:00  Regimen: 1250 mg IV every 24 hours.  Start time: 01:40 on 01/07/2024  Exposure target: AUC24 (range)400-600 mg/L.hr   AUC24,ss: 590 mg/L.hr  Probability of AUC24 > 400: 98 %  Ctrough,ss: 14.8 mg/L  Probability of Ctrough,ss > 20: 15 %  Probability of nephrotoxicity (Lodise RYAN 2009): 10 %    Follow-up level will be ordered on 1/9 at AM labs, unless clinically indicated sooner.  Will continue to monitor renal function daily while on vancomycin and order serum creatinine at least every 48 hours if not already ordered.  Follow for continued vancomycin needs, clinical response, and signs/symptoms of toxicity.       JEAN CLAUDE SPENCER, PharmD       "

## 2024-01-07 NOTE — PROCEDURES
Chest Tube Insertion    Date/Time: 1/7/2024 5:31 AM    Performed by: Ricardo Whipple MD  Authorized by: Danette Steele DO    Consent:     Consent obtained:  Emergent situation  Pre-procedure details:     Skin preparation:  Chlorhexidine with alcohol    Preparation: Patient was prepped and draped in the usual sterile fashion    Sedation:     Sedation type:  Deep  Anesthesia:     Anesthesia method:  None  Procedure details:     Placement location:  R lateral    Tube size (Turkish): 14.    Ultrasound guidance: yes      Tube connected to:  Suction    Drainage characteristics:  Air only    Suture material:  2-0 silk    Dressing: Tegaderm.  Post-procedure details:     Post-insertion x-ray findings: tube in good position      Procedure completion:  Tolerated  Comments:      Attempted x3 due to difficulty threading guidewire despite air aspiration with needle. Suspect pleural adhesions in setting of pleural-based inflammation seen on admission CT. Once tube inserted, large air leak seen, not resolved despite suction as evidenced by CXR.

## 2024-01-07 NOTE — PROCEDURES
Insert arterial line    Date/Time: 1/7/2024 5:35 AM    Performed by: Ricardo Whipple MD  Authorized by: Danette Steele DO    Consent:     Consent obtained:  Emergent situation  Indications:     Indications: hemodynamic monitoring and multiple ABGs    Pre-procedure details:     Skin preparation:  Chlorhexidine    Preparation: Patient was prepped and draped in sterile fashion    Sedation:     Sedation type:  Deep  Anesthesia:     Anesthesia method:  None  Procedure details:     Location:  R femoral    Needle gauge:  18 G    Placement technique:  Seldinger and ultrasound guided    Number of attempts:  1    Transducer: waveform confirmed    Post-procedure details:     Post-procedure:  Sterile dressing applied    CMS:  Unchanged    Procedure completion:  Tolerated well, no immediate complications

## 2024-01-08 LAB
ATRIAL RATE: 73 BPM
ATRIAL RATE: 83 BPM
P AXIS: 43 DEGREES
P AXIS: 45 DEGREES
P OFFSET: 177 MS
P OFFSET: 177 MS
P ONSET: 115 MS
P ONSET: 123 MS
PR INTERVAL: 184 MS
PR INTERVAL: 198 MS
Q ONSET: 214 MS
Q ONSET: 215 MS
QRS COUNT: 12 BEATS
QRS COUNT: 13 BEATS
QRS DURATION: 142 MS
QRS DURATION: 152 MS
QT INTERVAL: 400 MS
QT INTERVAL: 468 MS
QTC CALCULATION(BAZETT): 470 MS
QTC CALCULATION(BAZETT): 515 MS
QTC FREDERICIA: 446 MS
QTC FREDERICIA: 499 MS
R AXIS: -35 DEGREES
R AXIS: 37 DEGREES
T AXIS: 177 DEGREES
T AXIS: 69 DEGREES
T OFFSET: 415 MS
T OFFSET: 448 MS
VENTRICULAR RATE: 73 BPM
VENTRICULAR RATE: 83 BPM

## 2024-01-11 LAB
BACTERIA BLD CULT: NORMAL
BACTERIA BLD CULT: NORMAL

## 2024-01-16 LAB — GLUCOSE BLD MANUAL STRIP-MCNC: 100 MG/DL (ref 74–99)

## 2024-01-31 NOTE — DOCUMENTATION CLARIFICATION NOTE
"    PATIENT:               ISABELLE ANGULO  ACCT #:                  [id number]  MRN:                       [id number]  :                       1955  ADMIT DATE:       2023 8:46 AM  DISCH DATE:        2024 6:49 AM  RESPONDING PROVIDER #:        [id number]          PROVIDER RESPONSE TEXT:    Syncope due to orthostatic hypotension    CDI QUERY TEXT:    [facility]_Syncope Etiology    Instruction:    Based on your assessment of the patient and the clinical information, please provide the requested documentation by clicking on the appropriate radio button and enter any additional information if prompted.    Question: Please clarify etiology of syncope after workup    When answering this query, please exercise your independent professional judgment. The fact that a question is being asked, does not imply that any particular answer is desired or expected.    The patient's clinical indicators include:  Clinical Information: 67y/o male with complex PMH presented to ED via EMS after an unwitnessed syncopal episode at home.    Clinical Indicators: H and P  revealed \"Patient was brought in by EMS after an unwitnessed syncopal episode at home, now being admitted for syncopal work up likely due to orthostatic hypotension 2/2 acute blood loss anemia and hypovolemia from diarrhea and poor PO intake vs unlikely cardiogenic etiology...  Syncopal episode  Suspect related to hypovolemia from diarrhea, poor p.o. intake, chronic nausea and vomiting, along with acute blood loss anemia...Plan:  - Orthostatic positive in the ED, s/p 1L LR  - Repeat orthostatics on admission positive s/p 1L LR will cautiously bolus as needed given HFmrEF 35-40 percent  - EKG without signs of arrhythmia, continue telemetry  - Can consider repeat echo\"    GI Consult 1/3 revealed \"Chronic nausea, vomiting  #Peritoneal carcinomatosis  We suspect patient's symptoms are likely to known extensive peritoneal carcinomatosis. It is very unlikely he has developed " "a primary GI motility disorder leading to his current symptoms given onset historically associated with his known metastatic disease. Management of anorexia and chronic n/v related to peritoneal mets is quite challenging given the obstructive process is multifocal and usually 2/2 partial SBO's.\"    Treatment: 1L LR and 1 unit PRBC in ED.  1L LR on admission. Multiple consults-GI, General Surgery, Cardiology    Risk Factors: Diarrhea with poor PO intake. Orthostatic positive in ED. Repeat orthostatic VS positive on admission. Stage IV appendiceal cancer w/ mets to abdomen.  Options provided:  -- Syncope due to orthostatic hypotension  -- Syncope due to peritoneal mets  -- Syncope due to other, Please specify additional information below  -- Other - I will add my own diagnosis  -- Refer to Clinical Documentation Reviewer    Query created by: Clinton Burris Jr on 1/23/2024 10:36 AM      Electronically signed by:  ISA BRYANT MD 1/31/2024 10:22 AM          "